# Patient Record
Sex: MALE | Race: WHITE | Employment: FULL TIME | URBAN - METROPOLITAN AREA
[De-identification: names, ages, dates, MRNs, and addresses within clinical notes are randomized per-mention and may not be internally consistent; named-entity substitution may affect disease eponyms.]

---

## 2020-09-02 ENCOUNTER — HOSPITAL ENCOUNTER (OUTPATIENT)
Age: 60
Setting detail: SPECIMEN
Discharge: HOME | End: 2020-09-02
Payer: COMMERCIAL

## 2020-09-02 PROCEDURE — 81378 HLA I & II TYPING HR: CPT

## 2020-09-25 ENCOUNTER — HOSPITAL ENCOUNTER (OUTPATIENT)
Age: 60
Setting detail: SPECIMEN
Discharge: HOME | End: 2020-09-25
Payer: COMMERCIAL

## 2020-09-25 PROCEDURE — 38204 BL DONOR SEARCH MANAGEMENT: CPT

## 2020-09-29 ENCOUNTER — HOSPITAL ENCOUNTER (OUTPATIENT)
Dept: CT IMAGING | Age: 60
Discharge: HOME | End: 2020-09-29
Payer: COMMERCIAL

## 2020-09-29 ENCOUNTER — HOSPITAL ENCOUNTER (OUTPATIENT)
Dept: GENERAL RADIOLOGY | Age: 60
Discharge: HOME | End: 2020-09-29
Payer: COMMERCIAL

## 2020-09-29 ENCOUNTER — HOSPITAL ENCOUNTER (OUTPATIENT)
Dept: ONCOLOGY | Age: 60
Setting detail: INFUSION SERIES
Discharge: HOME | End: 2020-09-29
Payer: COMMERCIAL

## 2020-09-29 ENCOUNTER — APPOINTMENT (OUTPATIENT)
Dept: PULMONOLOGY | Age: 60
End: 2020-09-29
Payer: COMMERCIAL

## 2020-09-29 ENCOUNTER — HOSPITAL ENCOUNTER (OUTPATIENT)
Dept: NON INVASIVE DIAGNOSTICS | Age: 60
Discharge: HOME | End: 2020-09-29
Payer: COMMERCIAL

## 2020-09-29 VITALS
WEIGHT: 315 LBS | TEMPERATURE: 97.9 F | SYSTOLIC BLOOD PRESSURE: 135 MMHG | RESPIRATION RATE: 16 BRPM | HEIGHT: 75 IN | HEART RATE: 73 BPM | BODY MASS INDEX: 39.17 KG/M2 | DIASTOLIC BLOOD PRESSURE: 83 MMHG

## 2020-09-29 LAB
A/G RATIO: 2 (ref 1.1–2.2)
ABO/RH: NORMAL
ALBUMIN SERPL-MCNC: 4.1 G/DL (ref 3.4–5)
ALP BLD-CCNC: 83 U/L (ref 40–129)
ALT SERPL-CCNC: 61 U/L (ref 10–40)
ANION GAP SERPL CALCULATED.3IONS-SCNC: 8 MMOL/L (ref 3–16)
ANISOCYTOSIS: ABNORMAL
ANTIBODY SCREEN: NORMAL
AST SERPL-CCNC: 23 U/L (ref 15–37)
BASOPHILS ABSOLUTE: 0.1 K/UL (ref 0–0.2)
BASOPHILS RELATIVE PERCENT: 0.5 %
BILIRUB SERPL-MCNC: 0.7 MG/DL (ref 0–1)
BILIRUBIN DIRECT: <0.2 MG/DL (ref 0–0.3)
BILIRUBIN, INDIRECT: NORMAL MG/DL (ref 0–1)
BUN BLDV-MCNC: 25 MG/DL (ref 7–20)
CALCIUM SERPL-MCNC: 9 MG/DL (ref 8.3–10.6)
CHLORIDE BLD-SCNC: 104 MMOL/L (ref 99–110)
CO2: 26 MMOL/L (ref 21–32)
CREAT SERPL-MCNC: 0.9 MG/DL (ref 0.8–1.3)
EKG ATRIAL RATE: 73 BPM
EKG DIAGNOSIS: NORMAL
EKG P AXIS: 62 DEGREES
EKG P-R INTERVAL: 150 MS
EKG Q-T INTERVAL: 416 MS
EKG QRS DURATION: 92 MS
EKG QTC CALCULATION (BAZETT): 458 MS
EKG R AXIS: 51 DEGREES
EKG T AXIS: 68 DEGREES
EKG VENTRICULAR RATE: 73 BPM
EOSINOPHILS ABSOLUTE: 0 K/UL (ref 0–0.6)
EOSINOPHILS RELATIVE PERCENT: 0 %
FERRITIN: 1117 NG/ML (ref 30–400)
GFR AFRICAN AMERICAN: >60
GFR NON-AFRICAN AMERICAN: >60
GLOBULIN: 2.1 G/DL
GLUCOSE BLD-MCNC: 126 MG/DL (ref 70–99)
HAV IGM SER IA-ACNC: NORMAL
HBV SURFACE AB TITR SER: 226.7 MIU/ML
HCT VFR BLD CALC: 24.3 % (ref 40.5–52.5)
HEMOGLOBIN: 8.1 G/DL (ref 13.5–17.5)
HEPATITIS B CORE IGM ANTIBODY: NORMAL
HEPATITIS B SURFACE ANTIGEN INTERPRETATION: NORMAL
HEPATITIS C ANTIBODY INTERPRETATION: NORMAL
LACTATE DEHYDROGENASE: 195 U/L (ref 100–190)
LV EF: 63 %
LVEF MODALITY: NORMAL
LYMPHOCYTES ABSOLUTE: 0.2 K/UL (ref 1–5.1)
LYMPHOCYTES RELATIVE PERCENT: 1.9 %
MACROCYTES: ABNORMAL
MAGNESIUM: 2.2 MG/DL (ref 1.8–2.4)
MCH RBC QN AUTO: 31.8 PG (ref 26–34)
MCHC RBC AUTO-ENTMCNC: 33.3 G/DL (ref 31–36)
MCV RBC AUTO: 95.4 FL (ref 80–100)
MONOCYTES ABSOLUTE: 0 K/UL (ref 0–1.3)
MONOCYTES RELATIVE PERCENT: 0.2 %
NEUTROPHILS ABSOLUTE: 10.8 K/UL (ref 1.7–7.7)
NEUTROPHILS RELATIVE PERCENT: 97.4 %
OVALOCYTES: ABNORMAL
PDW BLD-RTO: 19.4 % (ref 12.4–15.4)
PLATELET # BLD: 68 K/UL (ref 135–450)
PLATELET SLIDE REVIEW: ABNORMAL
PMV BLD AUTO: 7 FL (ref 5–10.5)
POIKILOCYTES: ABNORMAL
POTASSIUM SERPL-SCNC: 4.3 MMOL/L (ref 3.5–5.1)
PROSTATE SPECIFIC ANTIGEN: 4.15 NG/ML (ref 0–4)
RBC # BLD: 2.54 M/UL (ref 4.2–5.9)
SODIUM BLD-SCNC: 138 MMOL/L (ref 136–145)
TEAR DROP CELLS: ABNORMAL
TOTAL PROTEIN: 6.2 G/DL (ref 6.4–8.2)
WBC # BLD: 11.1 K/UL (ref 4–11)

## 2020-09-29 PROCEDURE — 71046 X-RAY EXAM CHEST 2 VIEWS: CPT

## 2020-09-29 PROCEDURE — 80053 COMPREHEN METABOLIC PANEL: CPT

## 2020-09-29 PROCEDURE — 82728 ASSAY OF FERRITIN: CPT

## 2020-09-29 PROCEDURE — 93010 ELECTROCARDIOGRAM REPORT: CPT | Performed by: INTERNAL MEDICINE

## 2020-09-29 PROCEDURE — 86645 CMV ANTIBODY IGM: CPT

## 2020-09-29 PROCEDURE — 86663 EPSTEIN-BARR ANTIBODY: CPT

## 2020-09-29 PROCEDURE — C8929 TTE W OR WO FOL WCON,DOPPLER: HCPCS

## 2020-09-29 PROCEDURE — 0065U SYFLS TST NONTREPONEMAL ANTB: CPT

## 2020-09-29 PROCEDURE — 86664 EPSTEIN-BARR NUCLEAR ANTIGEN: CPT

## 2020-09-29 PROCEDURE — 86706 HEP B SURFACE ANTIBODY: CPT

## 2020-09-29 PROCEDURE — 86694 HERPES SIMPLEX NES ANTBDY: CPT

## 2020-09-29 PROCEDURE — 83735 ASSAY OF MAGNESIUM: CPT

## 2020-09-29 PROCEDURE — 36591 DRAW BLOOD OFF VENOUS DEVICE: CPT

## 2020-09-29 PROCEDURE — 86787 VARICELLA-ZOSTER ANTIBODY: CPT

## 2020-09-29 PROCEDURE — 70486 CT MAXILLOFACIAL W/O DYE: CPT

## 2020-09-29 PROCEDURE — 86789 WEST NILE VIRUS ANTIBODY: CPT

## 2020-09-29 PROCEDURE — 83615 LACTATE (LD) (LDH) ENZYME: CPT

## 2020-09-29 PROCEDURE — 86901 BLOOD TYPING SEROLOGIC RH(D): CPT

## 2020-09-29 PROCEDURE — 86778 TOXOPLASMA ANTIBODY IGM: CPT

## 2020-09-29 PROCEDURE — 85025 COMPLETE CBC W/AUTO DIFF WBC: CPT

## 2020-09-29 PROCEDURE — 86788 WEST NILE VIRUS AB IGM: CPT

## 2020-09-29 PROCEDURE — 86707 HEPATITIS BE ANTIBODY: CPT

## 2020-09-29 PROCEDURE — 84153 ASSAY OF PSA TOTAL: CPT

## 2020-09-29 PROCEDURE — 93005 ELECTROCARDIOGRAM TRACING: CPT | Performed by: INTERNAL MEDICINE

## 2020-09-29 PROCEDURE — 87536 HIV-1 QUANT&REVRSE TRNSCRPJ: CPT

## 2020-09-29 PROCEDURE — 82248 BILIRUBIN DIRECT: CPT

## 2020-09-29 PROCEDURE — 87340 HEPATITIS B SURFACE AG IA: CPT

## 2020-09-29 PROCEDURE — 87522 HEPATITIS C REVRS TRNSCRPJ: CPT

## 2020-09-29 PROCEDURE — 86780 TREPONEMA PALLIDUM: CPT

## 2020-09-29 PROCEDURE — 86850 RBC ANTIBODY SCREEN: CPT

## 2020-09-29 PROCEDURE — 86803 HEPATITIS C AB TEST: CPT

## 2020-09-29 PROCEDURE — 86709 HEPATITIS A IGM ANTIBODY: CPT

## 2020-09-29 PROCEDURE — 6360000004 HC RX CONTRAST MEDICATION: Performed by: INTERNAL MEDICINE

## 2020-09-29 PROCEDURE — 86705 HEP B CORE ANTIBODY IGM: CPT

## 2020-09-29 PROCEDURE — 86701 HIV-1ANTIBODY: CPT

## 2020-09-29 PROCEDURE — 81371 HLA I & II TYPE VERIFY LR: CPT

## 2020-09-29 PROCEDURE — 86900 BLOOD TYPING SEROLOGIC ABO: CPT

## 2020-09-29 PROCEDURE — 36592 COLLECT BLOOD FROM PICC: CPT

## 2020-09-29 PROCEDURE — 86665 EPSTEIN-BARR CAPSID VCA: CPT

## 2020-09-29 PROCEDURE — 86777 TOXOPLASMA ANTIBODY: CPT

## 2020-09-29 PROCEDURE — 86702 HIV-2 ANTIBODY: CPT

## 2020-09-29 RX ADMIN — PERFLUTREN 1.65 MG: 6.52 INJECTION, SUSPENSION INTRAVENOUS at 10:39

## 2020-09-29 NOTE — PROGRESS NOTES
Pt to [facility]PO for labs and teaching w/Amanda ARRIAGA RN. Labs drawn from central line and patient tolerated well. Dc'd ambulatory to home per self.

## 2020-09-29 NOTE — ONCOLOGY
Met with patient for pre transplant evaluation. Plan for MUD non-myeloablative PBSCT. Patient aware we are still waiting on the donor to agree to collection date. Brief overview of [facility] admission routine, preparative regimen side effects and day of transplant. Discussed potential side effects of infection, pancytopenia, nausea, diarrhea and GVHD. Aware of approx one month hospital stay and need for 24H caregiver for approx  days. Plan for cousin to stay with patient and is a traveling RN. Discussed discharge criteria, expected follow up visits and expectations of caregiver. Will continue to provide education on transplant recovery and medications. Patient has Allogeneic Transplant Handbook and used for reference. Labs and status reviewed with Dr. Brenda Art.  Patient to return to [facility] on 10/6 for candidacy

## 2020-09-30 ENCOUNTER — OFFICE VISIT (OUTPATIENT)
Dept: PRIMARY CARE CLINIC | Age: 60
End: 2020-09-30
Payer: COMMERCIAL

## 2020-09-30 LAB
CYTOMEGALOVIRUS IGM ANTIBODY: <8 AU/ML
EPSTEIN BARR VIRUS NUCLEAR AB IGG: 423 U/ML (ref 0–21.9)
EPSTEIN-BARR EARLY ANTIGEN ANTIBODY: 32.6 U/ML (ref 0–10.9)
EPSTEIN-BARR VCA IGG: 102 U/ML (ref 0–21.9)
EPSTEIN-BARR VCA IGM: <10 U/ML (ref 0–43.9)
HERPES TYPE 1/2 IGM COMBINED: 0.6 IV
HIV AG/AB: NORMAL
HIV ANTIGEN: NORMAL
HIV-1 ANTIBODY: NORMAL
HIV-2 AB: NORMAL
TOTAL SYPHILLIS IGG/IGM: NORMAL
TOXOPLASMA GONDI AB IGM: <3 AU/ML
TOXOPLASMA GONDII AB IGG: 28.8 IU/ML
VARICELLA-ZOSTER VIRUS AB, IGG: NORMAL

## 2020-09-30 PROCEDURE — 99211 OFF/OP EST MAY X REQ PHY/QHP: CPT | Performed by: NURSE PRACTITIONER

## 2020-09-30 NOTE — BH NOTE
Pre-Transplant Psychological Evaluation, Kelly Gates, 2020  Screening Measures:  PHQ-9, TERS  Reason for Referral: Mr. 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 He was diagnosed in late May 2020 when he presented with shortness of breath and was found to have pneumonia. He had low blood counts and a bone marrow biopsy on  revealed 24% blasts. He had induction chemotherapy with GIGI-C Daunorubicin, and he has had 2 cycles of consolidation. He will have a matched, unrelated donor. Mental Status/Appearance/Coping Style: Mr. Arely Jung is a tall, obese white man of 60 years. He is personable and engaging. He was most forthcoming about his work history and interests but didnt elaborate much on his personal life or relationships. 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 Social/Family History/Support System: Mr. Arely [place] [address] [place] [address], the middle of 3 sons born to his parents. He has a brother Abraham Bergeron who is 59 and lives in [address] [place] [address] in [facility], and a younger brother Gordon Contreras who is 61, retired from the [place] [address], and living on a farm in [place]. Their mother was an artist who specialized in oil restoration of paintings. She was also a champion , and he says she was always either in tennis clothes or paint-spattered. She  of carcinoid cancer at 68. His father was an  and he  in 1 of dementia at age 80. During high school, Mr. 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 He graduated from [employer] with a degree in Marketing and then came back to [address] [place] [address] to work.   He got an PRADEEP from [employer] after he had been working for a year or so. He enjoyed being involved in local politics and worked on [employer]. He  at 46 but that marriage lasted less than a year and he has no children. Mr. Jose Pope lives in [address] and before COVID he enjoyed throwing huge parties in his home several times per year. 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 He has a cousin in [place] who is a traveling nurse, Natalee Gomez, who has agreed to come to [place] to be his caregiver after transplant. Occupational History/Financial Concerns: Mr. Jose Pope worked for [employer] [address] until after he got his PRADEEP. 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 He says he learned to write policy in that job. In 2001 he became a  representing [employer] interests. He left his  job in 2009 to work at [employer]. He says his cousin talked him into going into real estate sales because he Gene Archana a lot of people. 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 Quality of Life Issues (Body Image, hair loss, sexual functioning):  Mr. Jose Pope denies any issues related to his diagnosis. His only complaint was that he cant have his annual [employer] party and didnt get to have his [employer] party this year. History/Current or Projected Mental Health Issues/substance use:  Mr. Susanna Munoz smoke and doesnt use marijuana. He says he drinks normal and has a couple of glasses of wine per day and denies that he has ever had higher consumption than that.   However, an ED note from 2014 when he presented with syncope and a fall leading to unconsciousness records that he was a heavy drinker and had 6-8 shots per day, which was described as normal for him 4-5 times per week. At the time of that ED visit he was counseled to decrease his drinking. That disclosure was fished for but not provided during this interview. Mr. Yaya Trinh denies any history of mental illness. Assessment and Plan:  Mr. Yaya Trinh has obtained a commitment from a second cousin who has agreed to come to town to be his caregiver. He says he knows everybody but he doesnt have anyone close enough to participate in his care. He was misleading about his history of alcohol consumption and it isnt clear if that is an old habit or if it persists. There is nothing in his medical chart in Care Everywhere to indicate that a physician has ever been told about his alcohol consumption. He was not confronted about the discrepancy between his self-report and the charted ED visit, but it might be important to do so at some point. At the very least he needs to know that he has to do his best to accurately report on his habits and compliance. Other than his omission of information about his alcohol history, Mr. Yaya Trinh seems to have found a caregiver and is otherwise presenting no psychosocial barriers to transplant. Mr. Yaya Trinh will receive routine distress screening and intervention as needed.     Estella Osorio Psy.D, ABPP

## 2020-10-01 LAB
HEPATITIS BE ANTIBODY: NEGATIVE
VARICELLA ZOSTER AB IGM: 0.14 ISR

## 2020-10-02 LAB
HCV QNT BY NAAT IU/ML: NOT DETECTED IU/ML
HCV QNT BY NAAT LOG IU/ML: NOT DETECTED LOG IU/ML
HERPES TYPE I/II IGG COMBINED: >22.4 IV
HIV-1 QNT LOG, IU/ML: NOT DETECTED LOG CPY/ML
HIV-1 QNT, IU/ML: NOT DETECTED CPY/ML
INTERPRETATION: NOT DETECTED
INTERPRETATION: NOT DETECTED
SARS-COV-2, NAA: NOT DETECTED
WEST NILE VIRUS, IGG: 0.48 IV
WEST NILE VIRUS, IGM: 0.41 IV

## 2020-10-06 ENCOUNTER — HOSPITAL ENCOUNTER (OUTPATIENT)
Dept: ONCOLOGY | Age: 60
Setting detail: INFUSION SERIES
Discharge: HOME | End: 2020-10-06
Payer: COMMERCIAL

## 2020-10-06 ENCOUNTER — HOSPITAL ENCOUNTER (OUTPATIENT)
Dept: PULMONOLOGY | Age: 60
Discharge: HOME | End: 2020-10-06
Payer: COMMERCIAL

## 2020-10-06 VITALS
RESPIRATION RATE: 18 BRPM | TEMPERATURE: 98.8 F | HEART RATE: 86 BPM | SYSTOLIC BLOOD PRESSURE: 136 MMHG | DIASTOLIC BLOOD PRESSURE: 82 MMHG

## 2020-10-06 DIAGNOSIS — C92.00 ACUTE MYELOID LEUKEMIA NOT HAVING ACHIEVED REMISSION (HCC): Primary | ICD-10-CM

## 2020-10-06 LAB
BLOOD BANK DISPENSE STATUS: NORMAL
BLOOD BANK PRODUCT CODE: NORMAL
BPU ID: NORMAL
DESCRIPTION BLOOD BANK: NORMAL

## 2020-10-06 PROCEDURE — 94760 N-INVAS EAR/PLS OXIMETRY 1: CPT

## 2020-10-06 PROCEDURE — 94726 PLETHYSMOGRAPHY LUNG VOLUMES: CPT

## 2020-10-06 PROCEDURE — 6360000002 HC RX W HCPCS: Performed by: NURSE PRACTITIONER

## 2020-10-06 PROCEDURE — 6360000002 HC RX W HCPCS: Performed by: INTERNAL MEDICINE

## 2020-10-06 PROCEDURE — 94060 EVALUATION OF WHEEZING: CPT

## 2020-10-06 PROCEDURE — 94729 DIFFUSING CAPACITY: CPT

## 2020-10-06 PROCEDURE — 94664 DEMO&/EVAL PT USE INHALER: CPT

## 2020-10-06 PROCEDURE — P9037 PLATE PHERES LEUKOREDU IRRAD: HCPCS

## 2020-10-06 PROCEDURE — 36430 TRANSFUSION BLD/BLD COMPNT: CPT

## 2020-10-06 RX ORDER — SODIUM CHLORIDE 0.9 % (FLUSH) 0.9 %
20 SYRINGE (ML) INJECTION PRN
Status: CANCELLED | OUTPATIENT
Start: 2020-10-06

## 2020-10-06 RX ORDER — 0.9 % SODIUM CHLORIDE 0.9 %
10 VIAL (ML) INJECTION ONCE
Status: CANCELLED | OUTPATIENT
Start: 2020-10-06

## 2020-10-06 RX ORDER — SODIUM CHLORIDE 9 MG/ML
INJECTION, SOLUTION INTRAVENOUS CONTINUOUS
Status: CANCELLED | OUTPATIENT
Start: 2020-10-06

## 2020-10-06 RX ORDER — SODIUM CHLORIDE 0.9 % (FLUSH) 0.9 %
10 SYRINGE (ML) INJECTION PRN
Status: CANCELLED | OUTPATIENT
Start: 2020-10-06

## 2020-10-06 RX ORDER — DIPHENHYDRAMINE HYDROCHLORIDE 50 MG/ML
50 INJECTION INTRAMUSCULAR; INTRAVENOUS ONCE
Status: CANCELLED | OUTPATIENT
Start: 2020-10-06

## 2020-10-06 RX ORDER — POTASSIUM CHLORIDE 20 MEQ/1
20 TABLET, EXTENDED RELEASE ORAL 2 TIMES DAILY
Status: ON HOLD | COMMUNITY
End: 2020-11-30 | Stop reason: HOSPADM

## 2020-10-06 RX ORDER — ALBUTEROL SULFATE 2.5 MG/3ML
2.5 SOLUTION RESPIRATORY (INHALATION) ONCE
Status: COMPLETED | OUTPATIENT
Start: 2020-10-06 | End: 2020-10-06

## 2020-10-06 RX ORDER — SOTALOL HYDROCHLORIDE 120 MG/1
120 TABLET ORAL DAILY
COMMUNITY

## 2020-10-06 RX ORDER — PROMETHAZINE HYDROCHLORIDE 12.5 MG/1
12.5 TABLET ORAL ONCE
COMMUNITY

## 2020-10-06 RX ORDER — VALACYCLOVIR HYDROCHLORIDE 500 MG/1
500 TABLET, FILM COATED ORAL 2 TIMES DAILY
COMMUNITY

## 2020-10-06 RX ORDER — METHYLPREDNISOLONE SODIUM SUCCINATE 125 MG/2ML
125 INJECTION, POWDER, LYOPHILIZED, FOR SOLUTION INTRAMUSCULAR; INTRAVENOUS ONCE
Status: CANCELLED | OUTPATIENT
Start: 2020-10-06

## 2020-10-06 RX ORDER — HEPARIN SODIUM (PORCINE) LOCK FLUSH IV SOLN 100 UNIT/ML 100 UNIT/ML
500 SOLUTION INTRAVENOUS PRN
Status: CANCELLED | OUTPATIENT
Start: 2020-10-06

## 2020-10-06 RX ORDER — HEPARIN SODIUM (PORCINE) LOCK FLUSH IV SOLN 100 UNIT/ML 100 UNIT/ML
500 SOLUTION INTRAVENOUS PRN
Status: DISCONTINUED | OUTPATIENT
Start: 2020-10-06 | End: 2020-10-07 | Stop reason: HOSPADM

## 2020-10-06 RX ORDER — AMLODIPINE BESYLATE AND BENAZEPRIL HYDROCHLORIDE 5; 40 MG/1; MG/1
1 CAPSULE ORAL DAILY
Status: ON HOLD | COMMUNITY
End: 2020-11-30 | Stop reason: HOSPADM

## 2020-10-06 RX ADMIN — ALBUTEROL SULFATE 2.5 MG: 2.5 SOLUTION RESPIRATORY (INHALATION) at 13:16

## 2020-10-06 RX ADMIN — Medication 500 UNITS: at 17:11

## 2020-10-06 NOTE — PLAN OF CARE
Problem: Falls - Risk of:  Goal: Will remain free from falls  Description: Will remain free from falls  Outcome: Ongoing  Goal: Absence of physical injury  Description: Absence of physical injury  Outcome: Ongoing   Pt is a  fall risk. Explained fall risk precautions to pt  and rationale behind their use to keep the patient safe. Belongings are in reach. Pt encouraged to notify staff for any and all assistance. Staff present in tx suite throughout entirety of pts treatment to monitor and protect from falls. Assistance provided when ambulating to restroom utilizing Stay With Me. Problem: KNOWLEDGE DEFICIT  Goal: Patient/S.O. demonstrates understanding of disease process, treatment plan, medications, and discharge instructions. Outcome: Ongoing       Patient's platelet count this AM: 8    Pt seen and assessed at [facility]. Seen at [facility] today for 1 unit platelets per standing orders for above lab values. Blood products transfused per [facility] policy. Pt tolerated transfusion well and without incident. Pt verbalizes understanding of discharge instructions. Discharged ambulatory to home per self.

## 2020-10-08 ENCOUNTER — OFFICE VISIT (OUTPATIENT)
Dept: PULMONOLOGY | Age: 60
End: 2020-10-08
Payer: COMMERCIAL

## 2020-10-08 VITALS
WEIGHT: 315 LBS | HEIGHT: 75 IN | OXYGEN SATURATION: 97 % | BODY MASS INDEX: 39.17 KG/M2 | HEART RATE: 82 BPM | TEMPERATURE: 97.1 F

## 2020-10-08 PROCEDURE — 99204 OFFICE O/P NEW MOD 45 MIN: CPT | Performed by: INTERNAL MEDICINE

## 2020-10-08 NOTE — PROGRESS NOTES
atypical infectious/inflammatory etiology versus pulmonary edema. ABDOMEN AND PELVIS:    FINDINGS:    LIVER: Mild diffuse fatty infiltration of liver. GALLBLADDER AND BILIARY TREE: Gallbladder not distended.  No biliary dilatation. PANCREAS: Normal.    SPLEEN: Normal.    ADRENAL GLANDS: Normal.    KIDNEYS AND URETERS: Normal.    URINARY BLADDER: Normal.    BOWEL: There is acute diverticulitis of the sigmoid colon with extensive edema in the sigmoid mesenteric fat. No perforation or abscess. Large and small bowel loops are otherwise normal in caliber and wall thickness.  Appendix is normal.    REPRODUCTIVE ORGANS: No associated masses. LYMPH NODES: No abnormally enlarged nodes. PERITONEUM/RETROPERITONEUM: No ascites or free air. VESSELS: Aorta is normal in caliber. Major intra-abdominal vessels are patent. ABDOMINAL WALL: Normal.    BONES: No significant abnormality. CXR on 9/29/20  No acute process or consolidation.         Right internal jugular dual-lumen catheter appreciated with the tip at the atrial caval junction     PFT on 10/6/20  FEV1/FVC 71 with predicted ratio of 75  FEV1 54%, FVC 57%  TLC 83%  %  DLCO 41%  DLCO corrected is 84%    Assessment:      Diagnosis Orders   1. 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 PFT reviewed. It technically show moderate obstruction by FEV1/FVC ratio lower than predicted and also based on ATS criteria with low FEV1 and FVC plus normal TLC and high RV. However his FEV1 ratio of 71, normal corrected DLCO, never smoker, no emphysema on CT scan and no hx of asthma, in addition no wheezing on exam with good bilateral air entry, I believe his PFT findings purely due to morbid obesity.     He had f/u CT scan on 6/25 show near resolution of pneumonia, and recent CXR is within normal.    He has CASH, he is compliant with CPAP.  He was advised to wipe mask daily, clean mask and tubing weekly, change mask and tubing every 3-6 months and change water in water tank daily.       He is cleared from pulmonary perspective to proceed with SCT

## 2020-10-10 NOTE — PROCEDURES
[address], [address]                               PULMONARY FUNCTION    PATIENT NAME: Tamie Ledezma                 :        1960  MED REC NO:   [id number]                          ROOM:  ACCOUNT NO:   [de-identified]                           ADMIT DATE: 10/06/2020  PROVIDER:     Gilma Diane MD    DATE OF PROCEDURE:  10/06/2020    INDICATION:  BMT evaluation, AML remission, short of breath. BMI:  29.2. TOBACCO HISTORY:  Never smoked. Spirometry data is acceptable and reproducible. Lung volumes performed via plethysmography. Diffusion capacity adjusted via [address] for hemoglobin of 7. Room air oxygen saturation is 93%. SPIROMETRY:  FEV1 to FVC ratio is 71%. FEV1 is 2.36, which is 54% of  predicted. FVC is 3.31, which is 57% of predicted. Lung volumes show total lung capacity of 6.89, which is 83% of  predicted. Residual volume is 2.72, which is 141% of predicted. Diffusion capacity is 34.42, which is 84% of predicted. IMPRESSION:  1. No obstructive defect. 2.  There is no significant response to bronchodilators. 3.  Air trapping is present. 4.  Normal diffusion capacity when adjusted for hemoglobin of 7.         Fely Pinto MD    D: 10/09/2020 15:59:58       T: 10/09/2020 18:33:34     [email or web address]  Job#: [id number]     Doc#: [id number]    CC:

## 2020-10-13 ENCOUNTER — HOSPITAL ENCOUNTER (OUTPATIENT)
Age: 60
Setting detail: SPECIMEN
Discharge: HOME | End: 2020-10-13
Payer: COMMERCIAL

## 2020-10-13 PROCEDURE — 38204 BL DONOR SEARCH MANAGEMENT: CPT

## 2020-10-21 ENCOUNTER — HOSPITAL ENCOUNTER (OUTPATIENT)
Dept: ONCOLOGY | Age: 60
Setting detail: INFUSION SERIES
Discharge: HOME | End: 2020-10-21
Payer: COMMERCIAL

## 2020-10-21 VITALS
OXYGEN SATURATION: 98 % | DIASTOLIC BLOOD PRESSURE: 89 MMHG | HEART RATE: 74 BPM | TEMPERATURE: 97.6 F | SYSTOLIC BLOOD PRESSURE: 137 MMHG | RESPIRATION RATE: 16 BRPM

## 2020-10-21 LAB
AMPHETAMINE SCREEN, URINE: NORMAL
ANISOCYTOSIS: ABNORMAL
ATYPICAL LYMPHOCYTE RELATIVE PERCENT: 2 % (ref 0–6)
BANDED NEUTROPHILS RELATIVE PERCENT: 2 % (ref 0–7)
BARBITURATE SCREEN URINE: NORMAL
BASOPHILS ABSOLUTE: 0.1 K/UL (ref 0–0.2)
BASOPHILS RELATIVE PERCENT: 1 %
BENZODIAZEPINE SCREEN, URINE: NORMAL
CANNABINOID SCREEN URINE: NORMAL
COCAINE METABOLITE SCREEN URINE: NORMAL
EOSINOPHILS ABSOLUTE: 0 K/UL (ref 0–0.6)
EOSINOPHILS RELATIVE PERCENT: 0 %
HCT VFR BLD CALC: 26.6 % (ref 40.5–52.5)
HEMOGLOBIN: 8.8 G/DL (ref 13.5–17.5)
LYMPHOCYTES ABSOLUTE: 1.1 K/UL (ref 1–5.1)
LYMPHOCYTES RELATIVE PERCENT: 12 %
Lab: NORMAL
MCH RBC QN AUTO: 31.9 PG (ref 26–34)
MCHC RBC AUTO-ENTMCNC: 33 G/DL (ref 31–36)
MCV RBC AUTO: 96.7 FL (ref 80–100)
METAMYELOCYTES RELATIVE PERCENT: 1 %
METHADONE SCREEN, URINE: NORMAL
MONOCYTES ABSOLUTE: 2.2 K/UL (ref 0–1.3)
MONOCYTES RELATIVE PERCENT: 27 %
NEUTROPHILS ABSOLUTE: 4.6 K/UL (ref 1.7–7.7)
NEUTROPHILS RELATIVE PERCENT: 55 %
NUCLEATED RED BLOOD CELLS: 3 /100 WBC
NUCLEATED RED BLOOD CELLS: 3 /100 WBC
OPIATE SCREEN URINE: NORMAL
OXYCODONE URINE: NORMAL
PDW BLD-RTO: 20.5 % (ref 12.4–15.4)
PH UA: 5
PHENCYCLIDINE SCREEN URINE: NORMAL
PLATELET # BLD: 163 K/UL (ref 135–450)
PMV BLD AUTO: 7.6 FL (ref 5–10.5)
POLYCHROMASIA: ABNORMAL
PROPOXYPHENE SCREEN: NORMAL
RBC # BLD: 2.75 M/UL (ref 4.2–5.9)
TEAR DROP CELLS: ABNORMAL
WBC # BLD: 8 K/UL (ref 4–11)

## 2020-10-21 PROCEDURE — 36592 COLLECT BLOOD FROM PICC: CPT

## 2020-10-21 PROCEDURE — 38204 BL DONOR SEARCH MANAGEMENT: CPT

## 2020-10-21 PROCEDURE — 85025 COMPLETE CBC W/AUTO DIFF WBC: CPT

## 2020-10-21 PROCEDURE — 80307 DRUG TEST PRSMV CHEM ANLYZR: CPT

## 2020-10-21 NOTE — ONCOLOGY
Here for repeat urine drug screen and CBC as part of allogeneic bmt evaluation.  Patient is here for teaching, family conference and candidacy at [facility]

## 2020-10-21 NOTE — PROGRESS NOTES
Patient arrived for urine drug screen and CBC for BMT evaluation and teaching. Labs drawn and sent. Patient denied additional questions or concerns. Discharged ambulatory to next appointment.

## 2020-10-30 ENCOUNTER — OFFICE VISIT (OUTPATIENT)
Dept: PRIMARY CARE CLINIC | Age: 60
End: 2020-10-30
Payer: COMMERCIAL

## 2020-10-30 PROCEDURE — 99211 OFF/OP EST MAY X REQ PHY/QHP: CPT | Performed by: NURSE PRACTITIONER

## 2020-11-02 LAB — SARS-COV-2: NOT DETECTED

## 2020-11-02 NOTE — H&P
[facility]  History and Physical          Attending Physician: Tanya Amanda MD    Primary Care: Kike Grijalva       Referring MD: Dar Lopez MD  [id number]. [address] [facility], [address] [place] [address]    Name: Kenna Juan :  1960  MRN:  [id number]    Admission: 20    Date:  20    Reason for Admission: Melphalan and Fludarabine preparative regimen followed by Matched Unrelated Allogeneic Transplant for AML in remission     History of Present Illness: This is a 55 yo male w/ h/o AML, intermediate risk (Dx 2020). His PMH is also significant for atrial fibrillation, HTN & CASH. He was diagnosed w/ AML after initially presenting w/ dyspnea and found to have pneumonia. CBC revealed a WBC 3.4, Hgb 6.9  platelets 36,576. He underwent BM bx/asp (20) that showed AML w/ 24% blasts. The FISH panel was negative & cytogenetics were normal.  His CEPPA, c-kit, FLT3, NPM1, IDH 1 & IDH 2 were not detected. He received induction chemotherapy w/ 7 + 3 (Sweetie-C / daunorubicin) on 20. His day + 14 BM bx/asp (20) was negative for residual leukemia. He then had a recovery BM bx/asp (20) that was negative for AML w/ reticulin fibrosis grade 1 of 3 diffusely. He achieved CR1 and then began consolidation chemotherapy w/ HiDAC (20). He completed 3 cycles while undergoing work up for allogeneic transplant. 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 He is currently in remission from AML. He is now being admitted for melphalan and fludarabine preparative regimen. This will be followed by a matched (10:10 w/ DPB1 non-permissive mismatch) unrelated allogeneic transplant from a female [facility] international donor. He will receive peripheral blood progenitor cells on 20. His graft versus host disease prophylaxis will include Prograf and IV Methotrexate on days + 1, 3, 6 & 11.   He feels well today and denies fever, chills, sweats, change in appetite, visual changes, headache, sinus congestion, sore throat, mouth pain, cough, increased shortness of breath, chest pain, abdominal pain, nausea, vomiting, diarrhea, constipation, dysuria, hematuria, lower extremity pain or swelling. He has no complaints today. Pre Transplant Workup:        Pre Transplant labs:     9/29/2020    EBV VCA IgG 102.0 (H)   EBV VCA IgM <10.0   HIV-1 Antibody Non-Reactive   HIV Ag/Ab Non-Reactive   HIV ANTIGEN Non-Reactive   HIV-2 Ab Non-Reactive   HIV-1 QNT Log, IU/ML Not Detected   HIV-1 QNT, IU/ML Not Detected   CMV IgM <8.0   EBV Early Ag Ab 32.6 (H)   Toxoplasmosis IgG 28.8   Toxoplasmosis IgM <3.0   VARICELLA ZOSTER AB IGM 0.14   Herpes Type 1/2 IgM Combined 0.60   Zi Barr virus nuclear Ab IgG 423.0 (H)   WEST NILE VIRUS AB IGM 0.41   WEST NILE VIRUS AB, IGG 0.48      9/29/2020    Herpes Type 1/2 IgM Combined 0.60   Hep A IgM Non-reactive   Hep B E Ab Negative   Hep B S Ab 226.70   Hep B S Ag Interp Non-reactive   Hep C Ab Interp Non-reactive   Hep B Core Ab, IgM Non-reactive   HCV QNT by NAAT IU/ML Not Detected   HCV Qnt by NAAT log IU/ml Not Detected      9/29/2020    Herpes Type I/II IgG Combined >22.40   Interpretation Not Detected   Total Syphillis IgG/IgM Non-Reactive   Varicella-Zoster Virus Ab, Igg Immune         Past Surgical History:   Procedure Laterality Date    KNEE ARTHROSCOPY         Past Medical History:   Diagnosis Date    Hypertension        Prior to Admission medications    Medication Sig Start Date End Date Taking?  Authorizing Provider   amLODIPine-benazepril (LOTREL) 5-40 MG per capsule Take 1 capsule by mouth daily    Historical Provider, MD   sotalol (BETAPACE) 120 MG tablet Take 120 mg by mouth 2 times daily    Historical Provider, MD   promethazine (PHENERGAN) 12.5 MG tablet Take 12.5 mg by mouth once    Historical Provider, MD   valACYclovir (VALTREX) 500 MG tablet Take 500 mg by mouth 2 times 09/29/20 (!) 325 lb 13.4 oz (147.8 kg)       KPS: {KPS:29423::\"100% Normal no complaints; no evidence of disease\",\"90% Able to carry on normal activity; minor signs or symptoms of disease\",\"80% Normal activity with effort; some signs or symptoms of disease\",\"70% Cares for self; unable to carry on normal activity or to do active work\",\"60% Requires occasional assistance, but is able to care for most of his personal needs\",\"50%  Requires considerable assistance and frequent medical care\",\"40% Disabled; requires special care and assistance\",\"30% Severely disabled; hospital admission is indicated although death not imminent\",\"20% Very sick; hospital admission necessary; active supportive treatment necessary\",\"10% Moribund; fatal processes progressing rapidly\",\"0% Dead\"}    ECOG PS:  {findings; ecog performance status:21682}    General: Awake, alert and oriented. HEENT: normocephalic, alopecia, PERRL, no scleral erythema or icterus, Oral mucosa moist and intact, throat clear  NECK: supple without palpable adenopathy  BACK: Straight, negative CVAT  SKIN: warm dry and intact without lesions rashes or masses  CHEST: CTA bilaterally without use of accessory muscles  CV: Normal S1 S2, RRR, no MRG  ABD: NT, ND, normoactive BS, no palpable masses or hepatosplenomegaly  EXTREMITIES: without edema, denies calf tenderness  NEURO: CN II - XII grossly intact  CATHETER: Right TLH ([facility], 6/11/20) - CDI    Laboratory Data:   CBC: No results for input(s): WBC, HGB, HCT, MCV, PLT in the last 72 hours. BMP/Mag:No results for input(s): NA, K, CL, CO2, PHOS, BUN, CREATININE, MG in the last 72 hours. Invalid input(s): CA  LIVP: No results for input(s): AST, ALT, LIPASE, BILIDIR, BILITOT, ALKPHOS in the last 72 hours. Invalid input(s): AMYLASE,  ALB  Coags: No results for input(s): PROTIME, INR, APTT in the last 72 hours. Uric Acid No results for input(s): LABURIC in the last 72 hours.   No results found for: CMVDNAQNT  No results found for: TACROLEV     PROBLEM LIST:         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 CMML - 1 (Dx 10/2020)  3. Atrial Fibrillation  4. CASH  5. HTN      TREATMENT:        1. Induction: Sweetie-C & Daunorubicin (6/12/20)  2. Consolidation:  HiDAC x 3 cycles (7/28/20, 8/29/20 & 9/22/20)  3. MUD Allo - PB BMT (DPB1 non-permissive mismatch)  Preparative Regimen:  Melphalan and Fludarabine  Date of BMT:  11/11/20  Source of stem cells:  PBPC  Donor/Recipient Blood Type:  A positive / 0 positive   Donor Sex:  Female / NMDP (DID # [id number]) - Follow FISH XY  CMV Donor / Recipient: Negative / Negative          ASSESSMENT AND PLAN:        1. AML - intermediate risk / CMML-1:  - BM bx/asp (10/14/20) - No AML, but shows CMML-1     Dr. Minor Levin has discussed the risks associated with transplant which include the risk of infection, bleeding and inability to obtain a long term remission. He understands these risks and is willing to proceed. The consent is signed and on the chart. Preparative Regimen:  Melphalan and Fludarabine  Date of BMT:  11/11/20  Source of stem cells:  PBPC  Donor/Recipient Blood Type:  A positive / 0 positive   Donor Sex:  Female / NMDP (DID # [id number]) - Follow FISH XY  CMV Donor / Recipient: Negative / Negative        Day - 6    2. ID:  No evidence of infection.    - Start Valtrex prophylaxis    - Start Diflucan prophylaxis on 11/8/20   - Start Levaquin when [address] < 1.5    Donor/Recipient CMV:  Negative / negative   - Routine surveillance is not required       3. Heme:  Anemia from chemotherapy . - Transfuse for Hgb < 7 and Platelets < 24Y.    - No transfusion today. 4.  Metabolic:  Electrolytes are WNL and renal fxn stable. - Cont KCl 20 meq bid   - Start IVF:     - Replace potassium and magnesium per policy.     5.  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  - Start Prograf on 11/8/20    Tacro Level:    11/12/20 - First Level   No results found for: TACROLEV    6. VOD:  No evidence of VOD. Admission Weight:    Current Weight:  . No results for input(s): BILIDIR, BILITOT in the last 72 hours. - Cont Actigall. 7. Pulmonary:    - H/o CASH  - PFT (10/6/20) - FEV1/FVC 71 w/ predicted ratio of 75 & DLCO 41% (corrected 84%). He has moderate obstruction by FEV1/FVC ratio lower than predicted and also based on ATS criteria with low FEV, likely d/t morbid obesity   - Pulm clearance by Dr. Nell Godoy  CASH:  - Cont CPAP  - Encourage IS and ambulation     8.  GI / Nutrition:    Nutrition:  Appetite is good at this point.  - Start low microbial diet  - Dietary to follow    9. Cardiac:  - H/o A. Fib & HTN  - Echo (9/29/20) -  LVEF 60-65% w/ left atrial dilatation  A. Fib:  - Cont sotolol 120 mg bid   HTN:   - Cont amlodipine-benazepril 5/40 mg daily    - DVT Prophylaxis: Platelets >02,271 cells/dL, - daily lovenox prophylaxis ordered  Contraindications to pharmacologic prophylaxis: None  Contraindications to mechanical prophylaxis: None    - Disposition:  D/c home once ANC > 1.0 & toxicities resolved following allogeneic transplant     The patient was seen and examined by Dr. Danish Pikcard. This admission history and physical has been discussed and agreed upon by Dr. Danish Pickard.

## 2020-11-05 ENCOUNTER — APPOINTMENT (OUTPATIENT)
Dept: GENERAL RADIOLOGY | Age: 60
DRG: 014 | End: 2020-11-05
Attending: INTERNAL MEDICINE
Payer: COMMERCIAL

## 2020-11-05 ENCOUNTER — HOSPITAL ENCOUNTER (INPATIENT)
Age: 60
LOS: 25 days | Discharge: HOME | DRG: 014 | End: 2020-11-30
Attending: INTERNAL MEDICINE | Admitting: INTERNAL MEDICINE
Payer: COMMERCIAL

## 2020-11-05 PROBLEM — Z94.84 STEM CELLS TRANSPLANT STATUS (HCC): Status: ACTIVE | Noted: 2020-11-05

## 2020-11-05 LAB
ABO/RH: NORMAL
ALBUMIN SERPL-MCNC: 4.3 G/DL (ref 3.4–5)
ALP BLD-CCNC: 105 U/L (ref 40–129)
ALT SERPL-CCNC: 22 U/L (ref 10–40)
ANION GAP SERPL CALCULATED.3IONS-SCNC: 13 MMOL/L (ref 3–16)
ANTIBODY SCREEN: NORMAL
APTT: 34.4 SEC (ref 24.2–36.2)
AST SERPL-CCNC: 18 U/L (ref 15–37)
BACTERIA: ABNORMAL /HPF
BASOPHILS ABSOLUTE: 0.1 K/UL (ref 0–0.2)
BASOPHILS RELATIVE PERCENT: 1.8 %
BILIRUB SERPL-MCNC: 0.5 MG/DL (ref 0–1)
BILIRUBIN DIRECT: <0.2 MG/DL (ref 0–0.3)
BILIRUBIN URINE: NEGATIVE
BILIRUBIN, INDIRECT: NORMAL MG/DL (ref 0–1)
BLOOD, URINE: ABNORMAL
BUN BLDV-MCNC: 24 MG/DL (ref 7–20)
CALCIUM SERPL-MCNC: 9.3 MG/DL (ref 8.3–10.6)
CHLORIDE BLD-SCNC: 103 MMOL/L (ref 99–110)
CLARITY: CLEAR
CO2: 23 MMOL/L (ref 21–32)
COLOR: YELLOW
CREAT SERPL-MCNC: 1.3 MG/DL (ref 0.8–1.3)
EKG ATRIAL RATE: 70 BPM
EKG DIAGNOSIS: NORMAL
EKG P AXIS: 63 DEGREES
EKG P-R INTERVAL: 158 MS
EKG Q-T INTERVAL: 420 MS
EKG QRS DURATION: 88 MS
EKG QTC CALCULATION (BAZETT): 453 MS
EKG R AXIS: 46 DEGREES
EKG T AXIS: 59 DEGREES
EKG VENTRICULAR RATE: 70 BPM
EOSINOPHILS ABSOLUTE: 0.3 K/UL (ref 0–0.6)
EOSINOPHILS RELATIVE PERCENT: 4.6 %
EPITHELIAL CELLS, UA: ABNORMAL /HPF (ref 0–5)
GFR AFRICAN AMERICAN: >60
GFR NON-AFRICAN AMERICAN: 56
GLUCOSE BLD-MCNC: 119 MG/DL (ref 70–99)
GLUCOSE URINE: NEGATIVE MG/DL
HCT VFR BLD CALC: 33.3 % (ref 40.5–52.5)
HEMOGLOBIN: 10.9 G/DL (ref 13.5–17.5)
HYALINE CASTS: ABNORMAL /LPF (ref 0–2)
INR BLD: 0.99 (ref 0.86–1.14)
KETONES, URINE: NEGATIVE MG/DL
LACTATE DEHYDROGENASE: 256 U/L (ref 100–190)
LEUKOCYTE ESTERASE, URINE: NEGATIVE
LYMPHOCYTES ABSOLUTE: 1 K/UL (ref 1–5.1)
LYMPHOCYTES RELATIVE PERCENT: 14.1 %
MAGNESIUM: 1.8 MG/DL (ref 1.8–2.4)
MCH RBC QN AUTO: 32.4 PG (ref 26–34)
MCHC RBC AUTO-ENTMCNC: 32.8 G/DL (ref 31–36)
MCV RBC AUTO: 98.9 FL (ref 80–100)
MICROSCOPIC EXAMINATION: YES
MONOCYTES ABSOLUTE: 0.8 K/UL (ref 0–1.3)
MONOCYTES RELATIVE PERCENT: 11.4 %
MUCUS: ABNORMAL /LPF
NEUTROPHILS ABSOLUTE: 5 K/UL (ref 1.7–7.7)
NEUTROPHILS RELATIVE PERCENT: 68.1 %
NITRITE, URINE: NEGATIVE
PDW BLD-RTO: 21.7 % (ref 12.4–15.4)
PH UA: 6 (ref 5–8)
PHOSPHORUS: 3.9 MG/DL (ref 2.5–4.9)
PLATELET # BLD: 205 K/UL (ref 135–450)
PMV BLD AUTO: 7.3 FL (ref 5–10.5)
POTASSIUM SERPL-SCNC: 4.4 MMOL/L (ref 3.5–5.1)
PROTEIN UA: NEGATIVE MG/DL
PROTHROMBIN TIME: 11.5 SEC (ref 10–13.2)
RBC # BLD: 3.37 M/UL (ref 4.2–5.9)
RBC UA: ABNORMAL /HPF (ref 0–4)
SODIUM BLD-SCNC: 139 MMOL/L (ref 136–145)
SPECIFIC GRAVITY UA: 1.02 (ref 1–1.03)
TOTAL PROTEIN: 6.4 G/DL (ref 6.4–8.2)
URIC ACID, SERUM: 8.5 MG/DL (ref 3.5–7.2)
URINE TYPE: ABNORMAL
UROBILINOGEN, URINE: 0.2 E.U./DL
WBC # BLD: 7.4 K/UL (ref 4–11)
WBC UA: ABNORMAL /HPF (ref 0–5)

## 2020-11-05 PROCEDURE — 85610 PROTHROMBIN TIME: CPT

## 2020-11-05 PROCEDURE — 80048 BASIC METABOLIC PNL TOTAL CA: CPT

## 2020-11-05 PROCEDURE — 6360000002 HC RX W HCPCS: Performed by: NURSE PRACTITIONER

## 2020-11-05 PROCEDURE — 86850 RBC ANTIBODY SCREEN: CPT

## 2020-11-05 PROCEDURE — 6360000002 HC RX W HCPCS: Performed by: INTERNAL MEDICINE

## 2020-11-05 PROCEDURE — 6370000000 HC RX 637 (ALT 250 FOR IP): Performed by: NURSE PRACTITIONER

## 2020-11-05 PROCEDURE — 94761 N-INVAS EAR/PLS OXIMETRY MLT: CPT

## 2020-11-05 PROCEDURE — 2060000000 HC ICU INTERMEDIATE R&B

## 2020-11-05 PROCEDURE — 83735 ASSAY OF MAGNESIUM: CPT

## 2020-11-05 PROCEDURE — 94150 VITAL CAPACITY TEST: CPT

## 2020-11-05 PROCEDURE — 80076 HEPATIC FUNCTION PANEL: CPT

## 2020-11-05 PROCEDURE — 2580000003 HC RX 258: Performed by: INTERNAL MEDICINE

## 2020-11-05 PROCEDURE — 85025 COMPLETE CBC W/AUTO DIFF WBC: CPT

## 2020-11-05 PROCEDURE — 83615 LACTATE (LD) (LDH) ENZYME: CPT

## 2020-11-05 PROCEDURE — 71046 X-RAY EXAM CHEST 2 VIEWS: CPT

## 2020-11-05 PROCEDURE — 96417 CHEMO IV INFUS EACH ADDL SEQ: CPT

## 2020-11-05 PROCEDURE — 6370000000 HC RX 637 (ALT 250 FOR IP): Performed by: INTERNAL MEDICINE

## 2020-11-05 PROCEDURE — 85730 THROMBOPLASTIN TIME PARTIAL: CPT

## 2020-11-05 PROCEDURE — 81001 URINALYSIS AUTO W/SCOPE: CPT

## 2020-11-05 PROCEDURE — 2580000003 HC RX 258: Performed by: NURSE PRACTITIONER

## 2020-11-05 PROCEDURE — 84100 ASSAY OF PHOSPHORUS: CPT

## 2020-11-05 PROCEDURE — 86900 BLOOD TYPING SEROLOGIC ABO: CPT

## 2020-11-05 PROCEDURE — 3E04305 INTRODUCTION OF OTHER ANTINEOPLASTIC INTO CENTRAL VEIN, PERCUTANEOUS APPROACH: ICD-10-PCS | Performed by: INTERNAL MEDICINE

## 2020-11-05 PROCEDURE — 86901 BLOOD TYPING SEROLOGIC RH(D): CPT

## 2020-11-05 PROCEDURE — 93010 ELECTROCARDIOGRAM REPORT: CPT | Performed by: INTERNAL MEDICINE

## 2020-11-05 PROCEDURE — 93005 ELECTROCARDIOGRAM TRACING: CPT | Performed by: NURSE PRACTITIONER

## 2020-11-05 PROCEDURE — 84550 ASSAY OF BLOOD/URIC ACID: CPT

## 2020-11-05 RX ORDER — FLUCONAZOLE 200 MG/1
400 TABLET ORAL DAILY
Status: DISCONTINUED | OUTPATIENT
Start: 2020-11-08 | End: 2020-11-30 | Stop reason: HOSPADM

## 2020-11-05 RX ORDER — LORAZEPAM 0.5 MG/1
0.5 TABLET ORAL EVERY 4 HOURS PRN
Status: DISCONTINUED | OUTPATIENT
Start: 2020-11-05 | End: 2020-11-30 | Stop reason: HOSPADM

## 2020-11-05 RX ORDER — SOTALOL HYDROCHLORIDE 120 MG/1
120 TABLET ORAL 2 TIMES DAILY
Status: DISCONTINUED | OUTPATIENT
Start: 2020-11-05 | End: 2020-11-30 | Stop reason: HOSPADM

## 2020-11-05 RX ORDER — SULFAMETHOXAZOLE AND TRIMETHOPRIM 800; 160 MG/1; MG/1
1 TABLET ORAL 2 TIMES DAILY
Status: COMPLETED | OUTPATIENT
Start: 2020-11-05 | End: 2020-11-06

## 2020-11-05 RX ORDER — SODIUM CHLORIDE 0.9 % (FLUSH) 0.9 %
10 SYRINGE (ML) INJECTION EVERY 12 HOURS SCHEDULED
Status: DISCONTINUED | OUTPATIENT
Start: 2020-11-05 | End: 2020-11-30 | Stop reason: HOSPADM

## 2020-11-05 RX ORDER — URSODIOL 250 MG/1
500 TABLET, FILM COATED ORAL 2 TIMES DAILY
Status: DISCONTINUED | OUTPATIENT
Start: 2020-11-05 | End: 2020-11-30 | Stop reason: HOSPADM

## 2020-11-05 RX ORDER — POTASSIUM CHLORIDE 20 MEQ/1
20 TABLET, EXTENDED RELEASE ORAL 2 TIMES DAILY
Status: DISCONTINUED | OUTPATIENT
Start: 2020-11-05 | End: 2020-11-07

## 2020-11-05 RX ORDER — AMLODIPINE BESYLATE AND BENAZEPRIL HYDROCHLORIDE 5; 40 MG/1; MG/1
1 CAPSULE ORAL DAILY
Status: DISCONTINUED | OUTPATIENT
Start: 2020-11-06 | End: 2020-11-05 | Stop reason: CLARIF

## 2020-11-05 RX ORDER — VALACYCLOVIR HYDROCHLORIDE 500 MG/1
500 TABLET, FILM COATED ORAL 2 TIMES DAILY
Status: DISCONTINUED | OUTPATIENT
Start: 2020-11-05 | End: 2020-11-30 | Stop reason: HOSPADM

## 2020-11-05 RX ORDER — MAGNESIUM SULFATE IN WATER 40 MG/ML
4 INJECTION, SOLUTION INTRAVENOUS PRN
Status: DISCONTINUED | OUTPATIENT
Start: 2020-11-05 | End: 2020-11-30 | Stop reason: HOSPADM

## 2020-11-05 RX ORDER — METHOTREXATE 25 MG/ML
12.5 INJECTION, SOLUTION INTRA-ARTERIAL; INTRAMUSCULAR; INTRAVENOUS ONCE
Status: COMPLETED | OUTPATIENT
Start: 2020-11-17 | End: 2020-11-17

## 2020-11-05 RX ORDER — POTASSIUM CHLORIDE 29.8 MG/ML
20 INJECTION INTRAVENOUS PRN
Status: DISCONTINUED | OUTPATIENT
Start: 2020-11-05 | End: 2020-11-30 | Stop reason: HOSPADM

## 2020-11-05 RX ORDER — FLUCONAZOLE 200 MG/1
400 TABLET ORAL DAILY
Status: DISCONTINUED | OUTPATIENT
Start: 2020-11-10 | End: 2020-11-05

## 2020-11-05 RX ORDER — TACROLIMUS 1 MG/1
3 CAPSULE ORAL EVERY 12 HOURS
Status: DISCONTINUED | OUTPATIENT
Start: 2020-11-08 | End: 2020-11-12

## 2020-11-05 RX ORDER — FUROSEMIDE 10 MG/ML
40 INJECTION INTRAMUSCULAR; INTRAVENOUS EVERY 12 HOURS
Status: DISCONTINUED | OUTPATIENT
Start: 2020-11-06 | End: 2020-11-12 | Stop reason: ALTCHOICE

## 2020-11-05 RX ORDER — DEXAMETHASONE 4 MG/1
20 TABLET ORAL ONCE
Status: COMPLETED | OUTPATIENT
Start: 2020-11-05 | End: 2020-11-05

## 2020-11-05 RX ORDER — SODIUM CHLORIDE 9 MG/ML
INJECTION, SOLUTION INTRAVENOUS CONTINUOUS
Status: ACTIVE | OUTPATIENT
Start: 2020-11-05 | End: 2020-11-05

## 2020-11-05 RX ORDER — PROCHLORPERAZINE MALEATE 10 MG
10 TABLET ORAL EVERY 4 HOURS PRN
Status: DISCONTINUED | OUTPATIENT
Start: 2020-11-05 | End: 2020-11-30 | Stop reason: HOSPADM

## 2020-11-05 RX ORDER — ONDANSETRON HYDROCHLORIDE 8 MG/1
24 TABLET, FILM COATED ORAL ONCE
Status: COMPLETED | OUTPATIENT
Start: 2020-11-05 | End: 2020-11-05

## 2020-11-05 RX ORDER — SODIUM CHLORIDE 9 MG/ML
INJECTION, SOLUTION INTRAVENOUS CONTINUOUS PRN
Status: DISCONTINUED | OUTPATIENT
Start: 2020-11-05 | End: 2020-11-30 | Stop reason: HOSPADM

## 2020-11-05 RX ORDER — AMLODIPINE BESYLATE 5 MG/1
5 TABLET ORAL DAILY
Status: DISCONTINUED | OUTPATIENT
Start: 2020-11-05 | End: 2020-11-30

## 2020-11-05 RX ORDER — METHOTREXATE 25 MG/ML
12.5 INJECTION, SOLUTION INTRA-ARTERIAL; INTRAMUSCULAR; INTRAVENOUS ONCE
Status: COMPLETED | OUTPATIENT
Start: 2020-11-14 | End: 2020-11-14

## 2020-11-05 RX ORDER — LORAZEPAM 2 MG/ML
0.5 INJECTION INTRAMUSCULAR EVERY 4 HOURS PRN
Status: DISCONTINUED | OUTPATIENT
Start: 2020-11-05 | End: 2020-11-30 | Stop reason: HOSPADM

## 2020-11-05 RX ORDER — METHOTREXATE 25 MG/ML
12.5 INJECTION, SOLUTION INTRA-ARTERIAL; INTRAMUSCULAR; INTRAVENOUS ONCE
Status: COMPLETED | OUTPATIENT
Start: 2020-11-12 | End: 2020-11-12

## 2020-11-05 RX ORDER — URSODIOL 300 MG/1
500 CAPSULE ORAL 2 TIMES DAILY
COMMUNITY

## 2020-11-05 RX ORDER — SODIUM CHLORIDE 9 MG/ML
INJECTION, SOLUTION INTRAVENOUS CONTINUOUS
Status: DISCONTINUED | OUTPATIENT
Start: 2020-11-05 | End: 2020-11-12

## 2020-11-05 RX ORDER — METHOTREXATE 25 MG/ML
12.5 INJECTION, SOLUTION INTRA-ARTERIAL; INTRAMUSCULAR; INTRAVENOUS ONCE
Status: COMPLETED | OUTPATIENT
Start: 2020-11-22 | End: 2020-11-22

## 2020-11-05 RX ORDER — SODIUM CHLORIDE 0.9 % (FLUSH) 0.9 %
10 SYRINGE (ML) INJECTION PRN
Status: DISCONTINUED | OUTPATIENT
Start: 2020-11-05 | End: 2020-11-30 | Stop reason: HOSPADM

## 2020-11-05 RX ORDER — PROCHLORPERAZINE EDISYLATE 5 MG/ML
10 INJECTION INTRAMUSCULAR; INTRAVENOUS EVERY 4 HOURS PRN
Status: DISCONTINUED | OUTPATIENT
Start: 2020-11-05 | End: 2020-11-30 | Stop reason: HOSPADM

## 2020-11-05 RX ORDER — LISINOPRIL 40 MG/1
40 TABLET ORAL DAILY
Status: DISCONTINUED | OUTPATIENT
Start: 2020-11-05 | End: 2020-11-30

## 2020-11-05 RX ADMIN — Medication 10 ML: at 21:09

## 2020-11-05 RX ADMIN — SULFAMETHOXAZOLE AND TRIMETHOPRIM 1 TABLET: 800; 160 TABLET ORAL at 21:11

## 2020-11-05 RX ADMIN — ONDANSETRON HYDROCHLORIDE 24 MG: 8 TABLET, FILM COATED ORAL at 15:38

## 2020-11-05 RX ADMIN — URSODIOL 500 MG: 250 TABLET, FILM COATED ORAL at 21:09

## 2020-11-05 RX ADMIN — DEXAMETHASONE 20 MG: 4 TABLET ORAL at 15:38

## 2020-11-05 RX ADMIN — MELPHALAN HYDROCHLORIDE 330 MG: KIT INTRAVENOUS at 16:09

## 2020-11-05 RX ADMIN — POTASSIUM CHLORIDE 20 MEQ: 20 TABLET, EXTENDED RELEASE ORAL at 21:09

## 2020-11-05 RX ADMIN — Medication 10 ML: at 12:12

## 2020-11-05 RX ADMIN — SODIUM CHLORIDE 15 ML: 900 IRRIGANT IRRIGATION at 17:37

## 2020-11-05 RX ADMIN — SOTALOL HYDROCHLORIDE 120 MG: 120 TABLET ORAL at 21:09

## 2020-11-05 RX ADMIN — ENOXAPARIN SODIUM 40 MG: 40 INJECTION SUBCUTANEOUS at 17:37

## 2020-11-05 RX ADMIN — SULFAMETHOXAZOLE AND TRIMETHOPRIM 1 TABLET: 800; 160 TABLET ORAL at 12:27

## 2020-11-05 RX ADMIN — SODIUM CHLORIDE 15 ML: 900 IRRIGANT IRRIGATION at 12:20

## 2020-11-05 RX ADMIN — SODIUM CHLORIDE 15 ML: 900 IRRIGANT IRRIGATION at 21:10

## 2020-11-05 RX ADMIN — VALACYCLOVIR HYDROCHLORIDE 500 MG: 500 TABLET, FILM COATED ORAL at 21:08

## 2020-11-05 RX ADMIN — SODIUM CHLORIDE: 9 INJECTION, SOLUTION INTRAVENOUS at 15:32

## 2020-11-05 RX ADMIN — SODIUM CHLORIDE: 9 INJECTION, SOLUTION INTRAVENOUS at 12:07

## 2020-11-05 ASSESSMENT — PAIN SCALES - GENERAL
PAINLEVEL_OUTOF10: 0
PAINLEVEL_OUTOF10: 0

## 2020-11-05 NOTE — H&P
[facility]  History and Physical          Attending Physician: Gabriela Henson MD    Primary Care: Cornell Redd       Referring MD: Emelyn Stewart MD  [facility]. 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 His PMH is also significant for atrial fibrillation, HTN & CASH. He was diagnosed w/ AML after initially presenting w/ dyspnea and found to have pneumonia. CBC revealed a WBC 3.4, Hgb 6.9  platelets 13,640. He underwent BM bx/asp (20) that showed AML w/ 24% blasts. The FISH panel was negative & cytogenetics were normal.  His CEPPA, c-kit, FLT3, NPM1, IDH 1 & IDH 2 were not detected. He received induction chemotherapy w/ 7 + 3 (Sweetie-C / daunorubicin) on 20. His day + 14 BM bx/asp (20) was negative for residual leukemia. He then had a recovery BM bx/asp (20) that was negative for AML w/ reticulin fibrosis grade 1 of 3 diffusely. He achieved CR1 and then began consolidation chemotherapy w/ HiDAC (20). He completed 3 cycles while undergoing work up for allogeneic transplant. 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 He is currently in remission from AML. He is now being admitted for melphalan and fludarabine preparative regimen. This will be followed by a matched (10:10 w/ DPB1 non-permissive mismatch) unrelated allogeneic transplant from a female [facility] international donor. He will receive peripheral blood progenitor cells on 20. His graft versus host disease prophylaxis will include Prograf and IV Methotrexate on days + 1, 3, 6 & 11.   He feels well today and denies fever, chills, sweats, change in appetite, visual changes, headache, sinus congestion, sore throat, mouth pain, cough, increased shortness of breath, chest pain, abdominal pain, nausea, vomiting, diarrhea, constipation, dysuria, hematuria, lower extremity pain or swelling. He has no complaints today. Pre Transplant Workup:        Pre Transplant labs:     9/29/2020    EBV VCA IgG 102.0 (H)   EBV VCA IgM <10.0   HIV-1 Antibody Non-Reactive   HIV Ag/Ab Non-Reactive   HIV ANTIGEN Non-Reactive   HIV-2 Ab Non-Reactive   HIV-1 QNT Log, IU/ML Not Detected   HIV-1 QNT, IU/ML Not Detected   CMV IgM <8.0   EBV Early Ag Ab 32.6 (H)   Toxoplasmosis IgG 28.8   Toxoplasmosis IgM <3.0   VARICELLA ZOSTER AB IGM 0.14   Herpes Type 1/2 IgM Combined 0.60   Zi Barr virus nuclear Ab IgG 423.0 (H)   WEST NILE VIRUS AB IGM 0.41   WEST NILE VIRUS AB, IGG 0.48      9/29/2020    Herpes Type 1/2 IgM Combined 0.60   Hep A IgM Non-reactive   Hep B E Ab Negative   Hep B S Ab 226.70   Hep B S Ag Interp Non-reactive   Hep C Ab Interp Non-reactive   Hep B Core Ab, IgM Non-reactive   HCV QNT by NAAT IU/ML Not Detected   HCV Qnt by NAAT log IU/ml Not Detected      9/29/2020    Herpes Type I/II IgG Combined >22.40   Interpretation Not Detected   Total Syphillis IgG/IgM Non-Reactive   Varicella-Zoster Virus Ab, Igg Immune         Past Surgical History:   Procedure Laterality Date    KNEE ARTHROSCOPY         Past Medical History:   Diagnosis Date    Hypertension        Prior to Admission medications    Medication Sig Start Date End Date Taking?  Authorizing Provider   ursodiol (ACTIGALL) 300 MG capsule Take 500 mg by mouth 2 times daily   Yes Historical Provider, MD   amLODIPine-benazepril (LOTREL) 5-40 MG per capsule Take 1 capsule by mouth daily   Yes Historical Provider, MD   sotalol (BETAPACE) 120 MG tablet Take 120 mg by mouth 2 times daily   Yes Historical Provider, MD   valACYclovir (VALTREX) 500 MG tablet Take 500 mg by mouth 2 times daily   Yes Historical Provider, MD   potassium chloride (KLOR-CON M20) 20 MEQ extended release tablet Take 20 mEq by mouth 2 times daily   Yes Historical Provider, MD   promethazine (PHENERGAN) 12.5 MG tablet Take 12.5 mg by mouth once    Historical Provider, MD       Allergies   Allergen Reactions    Mercury Rash    Pcn [Penicillins] Rash       Family History   Problem Relation Age of Onset    Cancer Mother     Cancer Father     Other Father         Social History     Socioeconomic History    Marital status: Single     Spouse name: Not on file    Number of children: Not on file    Years of education: Not on file    Highest education level: Not on file   Occupational History    Not on file   Social Needs    Financial resource strain: Not on file    Food insecurity     Worry: Not on file     Inability: Not on file    Transportation needs     Medical: Not on file     Non-medical: Not on file   Tobacco Use    Smoking status: Never Smoker    Smokeless tobacco: Never Used   Substance and Sexual Activity    Alcohol use: Yes     Comment: daily    Drug use: No    Sexual activity: Never   Lifestyle    Physical activity     Days per week: Not on file     Minutes per session: Not on file    Stress: Not on file   Relationships    Social connections     Talks on phone: Not on file     Gets together: Not on file     Attends Christian service: Not on file     Active member of club or organization: Not on file     Attends meetings of clubs or organizations: Not on file     Relationship status: Not on file    Intimate partner violence     Fear of current or ex partner: Not on file     Emotionally abused: Not on file     Physically abused: Not on file     Forced sexual activity: Not on file   Other Topics Concern    Not on file   Social History Narrative    Not on file        ROS:  As noted above, otherwise remainder of 10-point ROS negative    Physical Exam:    Vital Signs:  BP (!) 140/87 Pulse 84   Resp 16   Ht 6' 3\" (1.905 m)   Wt (!) 324 lb 3.2 oz (147.1 kg)   SpO2 98%   BMI 40.52 kg/m²     Weight:    Wt Readings from Last 3 Encounters:   11/05/20 (!) 324 lb 3.2 oz (147.1 kg)   10/08/20 (!) 325 lb (147.4 kg)   09/29/20 (!) 325 lb 13.4 oz (147.8 kg)       KPS: 80% Normal activity with effort; some signs or symptoms of disease    ECOG PS:  (2) Ambulatory and capable of self care, unable to carry out work activity, up and about > 50% or waking hours    General: Awake, alert and oriented. HEENT: normocephalic, alopecia, PERRL, no scleral erythema or icterus, Oral mucosa moist and intact, throat clear  NECK: supple without palpable adenopathy  BACK: Straight, negative CVAT  SKIN: warm dry and intact without lesions rashes or masses  CHEST: CTA bilaterally without use of accessory muscles  CV: Normal S1 S2, RRR, no MRG  ABD: NT, ND, normoactive BS, no palpable masses or hepatosplenomegaly  EXTREMITIES: without edema, denies calf tenderness  NEURO: CN II - XII grossly intact  CATHETER: Right TLH ([facility], 6/11/20) - CDI    Laboratory Data:   CBC:   Recent Labs     11/05/20  1113   WBC 7.4   HGB 10.9*   HCT 33.3*   MCV 98.9        BMP/Mag:  Recent Labs     11/05/20  1113      K 4.4      CO2 23   PHOS 3.9   BUN 24*   CREATININE 1.3   MG 1.80     LIVP:   Recent Labs     11/05/20  1113   AST 18   ALT 22   BILIDIR <0.2   BILITOT 0.5   ALKPHOS 105     Coags:   Recent Labs     11/05/20  1113   PROTIME 11.5   INR 0.99   APTT 34.4     Uric Acid   Recent Labs     11/05/20  1113   LABURIC 8.5*     No results found for: CMVDNAQNT  No results found for: TACROLEV     PROBLEM LIST:         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 CMML - 1 (Dx 10/2020)  3. Atrial Fibrillation  4. CASH  5. HTN      TREATMENT:        1. Induction: Sweetie-C & Daunorubicin (6/12/20)  2. Consolidation:  HiDAC x 3 cycles (7/28/20, 8/29/20 & 9/22/20)  3.   MUD Allo - PB BMT (DPB1 non-permissive mismatch)  Preparative policy. 5.  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  - Start Prograf on 11/8/20    Tacro Level:    11/12/20 - First Level   No results found for: TACROLEV    6. VOD:  No evidence of VOD. Admission Weight: (!) 324 lb 3.2 oz (147.1 kg)  Current Weight: Weight: (!) 324 lb 3.2 oz (147.1 kg). No results for input(s): BILIDIR, BILITOT in the last 72 hours. - Cont Actigall. 7. Pulmonary:    - H/o CASH  - PFT (10/6/20) - FEV1/FVC 71 w/ predicted ratio of 75 & DLCO 41% (corrected 84%). He has moderate obstruction by FEV1/FVC ratio lower than predicted and also based on ATS criteria with low FEV, likely d/t morbid obesity   - Pulm clearance by Dr. Amelia Marx  CASH:  - Cont CPAP  - Encourage IS and ambulation     8.  GI / Nutrition:    Nutrition:  Appetite is good at this point.  - Start low microbial diet  - Dietary to follow    9. Cardiac:  - H/o A. Fib & HTN  - Echo (9/29/20) -  LVEF 60-65% w/ left atrial dilatation  A. Fib:  - Cont sotolol 120 mg bid   HTN:   - Cont amlodipine-lisinopril 5/40 mg daily    - DVT Prophylaxis: Platelets >05,626 cells/dL, - daily lovenox prophylaxis ordered  Contraindications to pharmacologic prophylaxis: None  Contraindications to mechanical prophylaxis: None    - Disposition:  D/c home once ANC > 1.0 & toxicities resolved following allogeneic transplant     The patient was seen and examined by Dr. Pedro Pablo Geiger. This admission history and physical has been discussed and agreed upon by Dr. Pedro Pablo Geiger.

## 2020-11-05 NOTE — FLOWSHEET NOTE
11/05/20 1451   Encounter Summary   Services provided to: Patient   Referral/Consult From: Nursing Supervisor/Manager   Continue Visiting   (es 11/5)   Complexity of Encounter Low   Length of Encounter 30 minutes   Routine   Type Initial   Assessment Approachable; Hopeful   Intervention Active listening;Explored feelings, thoughts, concerns;Explored coping resources;Nurtured hope;Prayer;Discussed relationship with God;Discussed belief system/Anglican practices/riley;Discussed illness/injury and it's impact   Outcome Engaged in conversation;Receptive

## 2020-11-05 NOTE — PROGRESS NOTES
4 Eyes Admission Assessment     I agree as the admission nurse that 2 RN's have performed a thorough Head to Toe Skin Assessment on the patient. ALL assessment sites listed below have been assessed on admission. Areas assessed by both nurses: melceio/denny  [x]   Head, Face, and Ears   [x]   Shoulders, Back, and Chest  [x]   Arms, Elbows, and Hands   [x]   Coccyx, Sacrum, and Ischium  [x]   Legs, Feet, and Heels        Does the Patient have Skin Breakdown?   No         Marco Antonio Prevention initiated:  Yes   Wound Care Orders initiated:  No      [facility] nurse consulted for Pressure Injury (Stage 3,4, Unstageable, DTI, NWPT, and Complex wounds) or Marco Antonio score 18 or lower:  NA      Nurse 1 eSignature: Electronically signed by Saul Sarkar RN on 11/5/20 at 3:30 PM EST    **SHARE this note so that the co-signing nurse is able to place an eSignature**    Nurse 2 eSignature:   Electronically signed by Dav Gao RN on 11/5/20 at 3:31 PM EST

## 2020-11-05 NOTE — ONCOLOGY
Original chemotherapy orders reviewed and acknowledged. Appropriateness of chemotherapy treatment regimen Melphalan/fludarabine/methotrexate for diagnosis of AML was verified. Patient educated on chemotherapy regimen. Consent for chemotherapy obtained. Estimated body surface area is 2.79 meters squared as calculated from the following:    Height as of this encounter: 6' 3\" (1.905 m). Weight as of this encounter: 324 lb 3.2 oz (147.1 kg). verified. Appropriate dosing calculations of chemotherapy based on above height, weight, and BSA verified.       Abena Harrell11/5/2020  1:40 PM

## 2020-11-05 NOTE — PLAN OF CARE
Problem: Falls - Risk of:  Goal: Will remain free from falls  Description: Will remain free from falls  Outcome: Ongoing    Pt with activity orders for up ad abebe. Encouraged pt to be up OOB as much as possible throughout the day and for all meals. Encouraged frequent short naps as necessary to preserve energy but instructed that while awake, pt should be OOB. Encouraged pt to ambulate in halls. Pt seen up ad abebe in halls twice this shift. Pt is visualized to be OOB % of the time this shift. Will continue to encourage frequent activity. Problem: Infection - Central Venous Catheter-Associated Bloodstream Infection:  Goal: Will show no infection signs and symptoms  Description: Will show no infection signs and symptoms  Outcome: Ongoing   Pt afebrile. Pt admitted today. Will monitor. Problem: Bleeding:  Goal: Will show no signs and symptoms of excessive bleeding  Description: Will show no signs and symptoms of excessive bleeding  Outcome: Ongoing   Patient's hemoglobin this AM:   Recent Labs     11/05/20  1113   HGB 10.9*     Patient's platelet count this AM:   Recent Labs     11/05/20  1113       Thrombocytopenia Precautions in place. Patient showing no signs or symptoms of active bleeding. Transfusion not indicated at this time. Patient verbalizes understanding of all instructions. Will continue to assess and implement POC. Call light within reach and hourly rounding in place. Problem: Venous Thromboembolism:  Goal: Will show no signs or symptoms of venous thromboembolism  Description: Will show no signs or symptoms of venous thromboembolism  Outcome: Ongoing   Adherent with DVT Prevention: Pt is at risk for DVT d/t decreased mobility and cancer treatment. Pt educated on importance of activity. Pt has orders for Subcut prophylactic lovenox. Pt verbalizes understanding of need for prophylaxis while inpatient.      Problem: Discharge Planning:  Goal: Discharged to appropriate level of care  Description: Discharged to appropriate level of care  Outcome: Ongoing   Pt verbalize understanding of treatment plan. Will monitor. Problem: PROTECTIVE PRECAUTIONS  Goal: Patient will remain free of nosocomial Infections  Outcome: Ongoing   Pt verbalize understanding of precautions. Will monitor .

## 2020-11-05 NOTE — PROGRESS NOTES
Clinical Pharmacy Progress Note    Patient Name: Janey Ledbetter  YOB: 1960  Diagnosis: AML -MelFlu SHEILA f/b MUD PBSC Allogeneic HSCT     GVHD Prophylaxis:  Tacrolimus (Prograf) starting Day -3   · Adjusted according to levels - drawn via red lumen  Methotrexate 12.5 mg IVPush days +1 (11/12), +3 (11/14), +6 (11/17), +11 (11/22)     Tacrolimus (Prograf) levels starting day +1  Tacrolimus (Prograf) goal level:  8-15 ng/mL    Date SCr Bili Prograf Dose Prograf Level Adjustments / Comments   11/5; d-6 1.3 0.5 3 mg po bid -- Oral tacrolimus starts 11/8 (day-3) with first tacrolimus trough level on 11/12 (day+1) followed by MWF thereafter. Please call with questions. Taylor Kilgore. [facility] Clinical Pharmacist  Wireless:  [phone]  11/5/2020 4:55 PM

## 2020-11-05 NOTE — ONCOLOGY
Administration: Chemotherapy drug Melphalan independently verified with Dayna Welch RN prior to administration. Acknowledgement of informed consent for chemotherapy administration verified. Original order, appropriateness of regimen, drug supplied, height, weight, BSA, dose calculations, expiration dates/times, drug appearance, and two patient identifiers were verified by both RNs. Drug checked for vesicant/irritant status and for risk of hypersensitivity. Most recent laboratory values and allergies, were reviewed. Positive, brisk blood return via CVC was confirmed prior to administration. Chest x-ray for correct line placement reviewed. Mila Turner and Dayna Welch RN verified correct rate of chemotherapy and maintenance IV fluids. Patient was educated on chemotherapy regimen prior to administration including indication for treatment related to disease & side effects of chemotherapy drug. Patient verbalizes understanding of all instructions. Cryotherapy with ice and pop sicles. Completion of Chemotherapy: Monitoring during infusion done per policy, see Flowsheets. Blood return verified before, during, and after infusion per policy; no signs of extravasation. Pt {tolerated chemotherapy well and without incident. Chemotherapy infusion end time on the [facility]. Will continue to monitor.

## 2020-11-06 LAB
ALBUMIN SERPL-MCNC: 4.3 G/DL (ref 3.4–5)
ALP BLD-CCNC: 93 U/L (ref 40–129)
ALT SERPL-CCNC: 22 U/L (ref 10–40)
ANION GAP SERPL CALCULATED.3IONS-SCNC: 8 MMOL/L (ref 3–16)
AST SERPL-CCNC: 15 U/L (ref 15–37)
BASOPHILS ABSOLUTE: 0 K/UL (ref 0–0.2)
BASOPHILS RELATIVE PERCENT: 0.5 %
BILIRUB SERPL-MCNC: 0.4 MG/DL (ref 0–1)
BILIRUBIN DIRECT: <0.2 MG/DL (ref 0–0.3)
BILIRUBIN, INDIRECT: ABNORMAL MG/DL (ref 0–1)
BUN BLDV-MCNC: 19 MG/DL (ref 7–20)
CALCIUM SERPL-MCNC: 8.9 MG/DL (ref 8.3–10.6)
CHLORIDE BLD-SCNC: 106 MMOL/L (ref 99–110)
CO2: 23 MMOL/L (ref 21–32)
CREAT SERPL-MCNC: 1.2 MG/DL (ref 0.8–1.3)
EOSINOPHILS ABSOLUTE: 0 K/UL (ref 0–0.6)
EOSINOPHILS RELATIVE PERCENT: 0.2 %
GFR AFRICAN AMERICAN: >60
GFR NON-AFRICAN AMERICAN: >60
GLUCOSE BLD-MCNC: 155 MG/DL (ref 70–99)
HCT VFR BLD CALC: 31.3 % (ref 40.5–52.5)
HEMOGLOBIN: 10.4 G/DL (ref 13.5–17.5)
LACTATE DEHYDROGENASE: 208 U/L (ref 100–190)
LYMPHOCYTES ABSOLUTE: 0.4 K/UL (ref 1–5.1)
LYMPHOCYTES RELATIVE PERCENT: 5.8 %
MCH RBC QN AUTO: 32.5 PG (ref 26–34)
MCHC RBC AUTO-ENTMCNC: 33.3 G/DL (ref 31–36)
MCV RBC AUTO: 97.5 FL (ref 80–100)
MONOCYTES ABSOLUTE: 0.2 K/UL (ref 0–1.3)
MONOCYTES RELATIVE PERCENT: 2.4 %
NEUTROPHILS ABSOLUTE: 6.4 K/UL (ref 1.7–7.7)
NEUTROPHILS RELATIVE PERCENT: 91.1 %
PDW BLD-RTO: 21.6 % (ref 12.4–15.4)
PHOSPHORUS: 3.5 MG/DL (ref 2.5–4.9)
PLATELET # BLD: 173 K/UL (ref 135–450)
PMV BLD AUTO: 6.9 FL (ref 5–10.5)
POTASSIUM SERPL-SCNC: 4.4 MMOL/L (ref 3.5–5.1)
RBC # BLD: 3.21 M/UL (ref 4.2–5.9)
SODIUM BLD-SCNC: 137 MMOL/L (ref 136–145)
TOTAL PROTEIN: 6.2 G/DL (ref 6.4–8.2)
URIC ACID, SERUM: 8.6 MG/DL (ref 3.5–7.2)
WBC # BLD: 7 K/UL (ref 4–11)

## 2020-11-06 PROCEDURE — 36592 COLLECT BLOOD FROM PICC: CPT

## 2020-11-06 PROCEDURE — 2060000000 HC ICU INTERMEDIATE R&B

## 2020-11-06 PROCEDURE — 96413 CHEMO IV INFUSION 1 HR: CPT

## 2020-11-06 PROCEDURE — 2580000003 HC RX 258: Performed by: INTERNAL MEDICINE

## 2020-11-06 PROCEDURE — 84100 ASSAY OF PHOSPHORUS: CPT

## 2020-11-06 PROCEDURE — 80076 HEPATIC FUNCTION PANEL: CPT

## 2020-11-06 PROCEDURE — 85025 COMPLETE CBC W/AUTO DIFF WBC: CPT

## 2020-11-06 PROCEDURE — 83615 LACTATE (LD) (LDH) ENZYME: CPT

## 2020-11-06 PROCEDURE — 6370000000 HC RX 637 (ALT 250 FOR IP): Performed by: INTERNAL MEDICINE

## 2020-11-06 PROCEDURE — 84550 ASSAY OF BLOOD/URIC ACID: CPT

## 2020-11-06 PROCEDURE — 80048 BASIC METABOLIC PNL TOTAL CA: CPT

## 2020-11-06 PROCEDURE — 6370000000 HC RX 637 (ALT 250 FOR IP): Performed by: NURSE PRACTITIONER

## 2020-11-06 PROCEDURE — 6360000002 HC RX W HCPCS: Performed by: INTERNAL MEDICINE

## 2020-11-06 PROCEDURE — 2580000003 HC RX 258: Performed by: NURSE PRACTITIONER

## 2020-11-06 PROCEDURE — 6360000002 HC RX W HCPCS: Performed by: NURSE PRACTITIONER

## 2020-11-06 RX ORDER — ALLOPURINOL 300 MG/1
300 TABLET ORAL DAILY
Status: DISCONTINUED | OUTPATIENT
Start: 2020-11-06 | End: 2020-11-12

## 2020-11-06 RX ADMIN — ALLOPURINOL 300 MG: 300 TABLET ORAL at 08:54

## 2020-11-06 RX ADMIN — Medication 10 ML: at 08:45

## 2020-11-06 RX ADMIN — POTASSIUM CHLORIDE 20 MEQ: 20 TABLET, EXTENDED RELEASE ORAL at 08:46

## 2020-11-06 RX ADMIN — SODIUM CHLORIDE 15 ML: 900 IRRIGANT IRRIGATION at 17:31

## 2020-11-06 RX ADMIN — SODIUM CHLORIDE: 9 INJECTION, SOLUTION INTRAVENOUS at 23:59

## 2020-11-06 RX ADMIN — VALACYCLOVIR HYDROCHLORIDE 500 MG: 500 TABLET, FILM COATED ORAL at 21:41

## 2020-11-06 RX ADMIN — POTASSIUM CHLORIDE 20 MEQ: 20 TABLET, EXTENDED RELEASE ORAL at 21:41

## 2020-11-06 RX ADMIN — URSODIOL 500 MG: 250 TABLET, FILM COATED ORAL at 08:45

## 2020-11-06 RX ADMIN — SODIUM CHLORIDE: 9 INJECTION, SOLUTION INTRAVENOUS at 10:04

## 2020-11-06 RX ADMIN — LISINOPRIL 40 MG: 40 TABLET ORAL at 08:46

## 2020-11-06 RX ADMIN — SODIUM CHLORIDE 15 ML: 900 IRRIGANT IRRIGATION at 10:04

## 2020-11-06 RX ADMIN — SULFAMETHOXAZOLE AND TRIMETHOPRIM 1 TABLET: 800; 160 TABLET ORAL at 21:41

## 2020-11-06 RX ADMIN — SOTALOL HYDROCHLORIDE 120 MG: 120 TABLET ORAL at 21:41

## 2020-11-06 RX ADMIN — SODIUM CHLORIDE: 9 INJECTION, SOLUTION INTRAVENOUS at 17:28

## 2020-11-06 RX ADMIN — ENOXAPARIN SODIUM 40 MG: 40 INJECTION SUBCUTANEOUS at 17:28

## 2020-11-06 RX ADMIN — ONDANSETRON HYDROCHLORIDE 12 MG: 8 TABLET, FILM COATED ORAL at 09:34

## 2020-11-06 RX ADMIN — URSODIOL 500 MG: 250 TABLET, FILM COATED ORAL at 21:41

## 2020-11-06 RX ADMIN — Medication 10 ML: at 21:42

## 2020-11-06 RX ADMIN — VALACYCLOVIR HYDROCHLORIDE 500 MG: 500 TABLET, FILM COATED ORAL at 08:46

## 2020-11-06 RX ADMIN — SOTALOL HYDROCHLORIDE 120 MG: 120 TABLET ORAL at 08:46

## 2020-11-06 RX ADMIN — SULFAMETHOXAZOLE AND TRIMETHOPRIM 1 TABLET: 800; 160 TABLET ORAL at 08:46

## 2020-11-06 RX ADMIN — SODIUM CHLORIDE 15 ML: 900 IRRIGANT IRRIGATION at 21:45

## 2020-11-06 RX ADMIN — SODIUM CHLORIDE: 9 INJECTION, SOLUTION INTRAVENOUS at 04:55

## 2020-11-06 RX ADMIN — AMLODIPINE BESYLATE 5 MG: 5 TABLET ORAL at 08:46

## 2020-11-06 RX ADMIN — FLUDARABINE PHOSPHATE 62.5 MG: 25 INJECTION, SOLUTION INTRAVENOUS at 09:59

## 2020-11-06 ASSESSMENT — PAIN SCALES - GENERAL
PAINLEVEL_OUTOF10: 0

## 2020-11-06 NOTE — CARE COORDINATION
Type of Admission  AML  MUD ( Female Donor) T:0: 11/11/20  Prep. Regimen: Melphalan+Fludara        Central venous catheter  Right TLC ( 9/29/20 , pre-existing from [facility])        Plan  Proceed with MUD Allogenic SCT for treatment of AML        Update  11/6/20: Planned admission for allogeneic SCT. Keeping active by use elliptical equipment in his room. Education  11/6/20:  Re-introduced myself in RN Discharge planner role. States his cousin Susanne Monaco, who is a nurse will be coming into town by Thanksgiving to act as his Care giver. Patient and caregiver have been through the educational process for allogeneic bone marrow transplantation including the allogeneic family meeting, preadmission teaching and preadmission physician office visit. , with Effie Robin RN BMT Coordinator. Patient and caregiver verbalize understanding of treatment regimen, possible adverse events, length of stay, and risk and benefits of transplantation and are agreeable to proceed. Patient and caregiver have been given an opportunity to ask, and to have their questions answered to their satisfaction. Documentation for above education can be found in the [facility], [facility] office chart.     [address]      Discharge          Pending

## 2020-11-06 NOTE — PLAN OF CARE
Problem: Falls - Risk of:  Goal: Will remain free from falls  Description: Will remain free from falls  11/6/2020 0036 by Kavita Garcia RN  Outcome: Ongoing  Note: Orthostatic vital signs obtained at start of shift - see flowsheet for details. Pt does not meet criteria for orthostasis. Pt is a Med fall risk. See Cleatis Clan Fall Score and ABCDS Injury Risk assessments. - Screening for Orthostasis AND not a [place] Risk per CAMEJO/ABCDS: Pt bed is in low position, side rails up, call light and belongings are in reach. Fall risk light is on outside pts room. Pt encouraged to call for assistance as needed. Will continue with hourly rounds for PO intake, pain needs, toileting and repositioning as needed. Problem: Falls - Risk of:  Goal: Absence of physical injury  Description: Absence of physical injury  11/5/2020 1822 by Dennie Hatcher, RN  Outcome: Ongoing     Problem: Bleeding:  Goal: Will show no signs and symptoms of excessive bleeding  Description: Will show no signs and symptoms of excessive bleeding  11/6/2020 0036 by Kavita Garcia RN  Outcome: Ongoing  Note: Patient's hemoglobin this AM:   Recent Labs     11/05/20  1113   HGB 10.9*     Patient's platelet count this AM:   Recent Labs     11/05/20  1113       Thrombocytopenia not present at this time. Patient showing no signs or symptoms of active bleeding. Transfusion not indicated at this time. Patient verbalizes understanding of all instructions. Will continue to assess and implement POC. Call light within reach and hourly rounding in place. Problem: Venous Thromboembolism:  Goal: Will show no signs or symptoms of venous thromboembolism  Description: Will show no signs or symptoms of venous thromboembolism  11/6/2020 0036 by Kavita Garcia RN  Outcome: Ongoing  Note: Adherent with DVT Prevention: Pt is at risk for DVT d/t decreased mobility and cancer treatment. Pt educated on importance of activity.   Pt has orders for [employer]

## 2020-11-06 NOTE — PLAN OF CARE
Problem: Falls - Risk of:  Goal: Will remain free from falls  Description: Will remain free from falls  11/6/2020 1203 by Felicia Lucero RN  Outcome: Ongoing    Orthostatic vital signs obtained at start of shift - see flowsheet for details. Pt does not meet criteria for orthostasis. Pt is a Med fall risk. See Cinderella Hire Fall Score and ABCDS Injury Risk assessments. - Screening for Orthostasis AND not a [place] Risk per CAMEJO/ABCDS: Pt bed is in low position, side rails up, call light and belongings are in reach. Fall risk light is on outside pts room. Pt encouraged to call for assistance as needed. Will continue with hourly rounds for PO intake, pain needs, toileting and repositioning as needed. Problem: Infection - Central Venous Catheter-Associated Bloodstream Infection:  Goal: Will show no infection signs and symptoms  Description: Will show no infection signs and symptoms  11/6/2020 1203 by Felicia Lucero RN  Outcome: Ongoing    CVC site remains free of signs/symptoms of infection. No drainage, edema, erythema, pain, or warmth noted at site. Dressing changes continue per protocol and on an as needed basis - see flowsheet. Pt to shower this shift. Problem: Bleeding:  Goal: Will show no signs and symptoms of excessive bleeding  Description: Will show no signs and symptoms of excessive bleeding  11/6/2020 1203 by Felicia Lucero RN  Outcome: Ongoing    Patient's hemoglobin this AM:   Recent Labs     11/06/20  0345   HGB 10.4*     Patient's platelet count this AM:   Recent Labs     11/06/20  0345       Thrombocytopenia Precautions in place. Patient showing no signs or symptoms of active bleeding. Transfusion not indicated at this time. Patient verbalizes understanding of all instructions. Will continue to assess and implement POC. Call light within reach and hourly rounding in place.        Problem: Venous Thromboembolism:  Goal: Will show no signs or symptoms of venous thromboembolism  Description: Will show no signs or symptoms of venous thromboembolism  11/6/2020 1203 by Lesvia Williamson RN  Outcome: Ongoing    Adherent with DVT Prevention: Pt is at risk for DVT d/t decreased mobility and cancer treatment. Pt educated on importance of activity. Pt verbalizes understanding of need for prophylaxis while inpatient. Problem: PROTECTIVE PRECAUTIONS  Goal: Patient will remain free of nosocomial Infections  11/6/2020 1203 by Lesvia Williamson RN  Outcome: Ongoing    No s/sx of infection noted. RN will monitor.

## 2020-11-06 NOTE — PROGRESS NOTES
[facility]  Progress Note          Attending Physician: Kary Dowd MD    Primary Care: Gloria Lackey       Referring MD: Radha Landa MD  [address] [facility], [address] [place] [address]    Name: Preeti Shields :  1960  MRN:  [id number]    Admission: 20    Date:  20    Reason for Admission: Melphalan and Fludarabine preparative regimen followed by Matched Unrelated Allogeneic Transplant for AML in remission     Interval history:   He feels well today and denies fever, chills, sweats, change in appetite, visual changes, headache, sinus congestion, sore throat, mouth pain, cough, increased shortness of breath, chest pain, abdominal pain, nausea, vomiting, diarrhea, constipation, dysuria, hematuria, lower extremity pain or swelling. He has no complaints today. Past Surgical History:   Procedure Laterality Date    COLONOSCOPY      ENDOSCOPY, COLON, DIAGNOSTIC      HERNIA REPAIR      age 9   [employer] KNEE ARTHROSCOPY      SKIN BIOPSY      mole       Past Medical History:   Diagnosis Date    Arthritis     knees    Cancer ([facility] [address]     AML    History of blood transfusion     Hyperlipidemia     Hypertension        Prior to Admission medications    Medication Sig Start Date End Date Taking?  Authorizing Provider   ursodiol (ACTIGALL) 300 MG capsule Take 500 mg by mouth 2 times daily   Yes Historical Provider, MD   amLODIPine-benazepril (LOTREL) 5-40 MG per capsule Take 1 capsule by mouth daily   Yes Historical Provider, MD   sotalol (BETAPACE) 120 MG tablet Take 120 mg by mouth 2 times daily   Yes Historical Provider, MD   valACYclovir (VALTREX) 500 MG tablet Take 500 mg by mouth 2 times daily   Yes Historical Provider, MD   potassium chloride (KLOR-CON M20) 20 MEQ extended release tablet Take 20 mEq by mouth 2 times daily   Yes Historical Provider, MD   promethazine (PHENERGAN) 12.5 MG tablet Take 12.5 mg by mouth once    Historical Provider, MD Resp 18   Ht 6' 3\" (1.905 m)   Wt (!) 319 lb 12.8 oz (145.1 kg)   SpO2 99%   BMI 39.97 kg/m²     Weight:    Wt Readings from Last 3 Encounters:   11/06/20 (!) 319 lb 12.8 oz (145.1 kg)   10/08/20 (!) 325 lb (147.4 kg)   09/29/20 (!) 325 lb 13.4 oz (147.8 kg)       KPS: 80% Normal activity with effort; some signs or symptoms of disease    ECOG PS:  (2) Ambulatory and capable of self care, unable to carry out work activity, up and about > 50% or waking hours    General: Awake, alert and oriented. HEENT: normocephalic, alopecia, PERRL, no scleral erythema or icterus, Oral mucosa moist and intact, throat clear  NECK: supple without palpable adenopathy  BACK: Straight, negative CVAT  SKIN: warm dry and intact without lesions rashes or masses  CHEST: CTA bilaterally without use of accessory muscles  CV: Normal S1 S2, RRR, no MRG  ABD: NT, ND, normoactive BS, no palpable masses or hepatosplenomegaly  EXTREMITIES: without edema, denies calf tenderness  NEURO: CN II - XII grossly intact  CATHETER: Right tri-fusion [facility], 6/11/20) - CDI    Laboratory Data:   CBC:   Recent Labs     11/05/20  1113 11/06/20  0345   WBC 7.4 7.0   HGB 10.9* 10.4*   HCT 33.3* 31.3*   MCV 98.9 97.5    173     BMP/Mag:  Recent Labs     11/05/20 1113 11/06/20  0345    137   K 4.4 4.4    106   CO2 23 23   PHOS 3.9 3.5   BUN 24* 19   CREATININE 1.3 1.2   MG 1.80  --      LIVP:   Recent Labs     11/05/20  1113 11/06/20  0345   AST 18 15   ALT 22 22   BILIDIR <0.2 <0.2   BILITOT 0.5 0.4   ALKPHOS 105 93     Coags:   Recent Labs     11/05/20  1113   PROTIME 11.5   INR 0.99   APTT 34.4     Uric Acid   Recent Labs     11/05/20  1113 11/06/20  0345   LABURIC 8.5* 8.6*     No results found for: CMVDNAQNT  No results found for: TACROLEV     PROBLEM LIST:         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 CMML - 1 (Dx 10/2020)  3. Atrial Fibrillation  4. CASH  5. HTN      TREATMENT:        1.   Induction: Sweetie-C & Daunorubicin (6/12/20)  2. Consolidation:  HiDAC x 3 cycles (7/28/20, 8/29/20 & 9/22/20)  3. MUD Allo - PB BMT (DPB1 non-permissive mismatch)  Preparative Regimen:  Melphalan and Fludarabine  Date of BMT:  11/11/20  Source of stem cells:  PBPC  Donor/Recipient Blood Type:  A positive / 0 positive   Donor Sex:  Female / NMDP (DID # [id number]) - Follow FISH XY  CMV Donor / Recipient: Negative / Negative          ASSESSMENT AND PLAN:        1. AML - intermediate risk / CMML-1:  - BM bx/asp (10/14/20) - No AML, but shows CMML-1     Preparative Regimen:  Melphalan and Fludarabine  Date of BMT:  11/11/20  Source of stem cells:  PBPC  Donor/Recipient Blood Type:  A positive / 0 positive   Donor Sex:  Female / NMDP (DID # [id number]) - Follow FISH XY  CMV Donor / Recipient: Negative / Negative      Day - 5    2. ID:  No evidence of infection.    - Start Valtrex prophylaxis    - Start Diflucan prophylaxis on 11/8/20   - Start Levaquin when [address] < 1.5    Donor/Recipient CMV:  Negative / negative   - Routine surveillance is not required       3. Heme:  Anemia from chemotherapy . - Transfuse for Hgb < 7 and Platelets < 67L.    - No transfusion today. 4.  Metabolic:  Electrolytes are WNL; elevated Cr  -Last Cr 9/2020:  0.9  - Cont KCl 20 meq bid   - Start IVF: NS @ 150 cc/hr   - Replace potassium and magnesium per policy. 5.  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  - Start Prograf on 11/8/20    [place] Level:    11/12/20 - First Level   No results found for: TACROLEV    6. VOD:  No evidence of VOD. Admission Weight: (!) 324 lb 3.2 oz (147.1 kg)  Current Weight: Weight: (!) 319 lb 12.8 oz (145.1 kg). Recent Labs     11/06/20  0345   BILIDIR <0.2   BILITOT 0.4     - Cont Actigall. 7. Pulmonary:    - H/o CASH  - PFT (10/6/20) - FEV1/FVC 71 w/ predicted ratio of 75 & DLCO 41% (corrected 84%).   He has moderate obstruction by FEV1/FVC ratio lower than predicted and also based on ATS criteria with low

## 2020-11-06 NOTE — PROGRESS NOTES
NUTRITION ASSESSMENT  Admission Date: 11/5/2020     Type and Reason for Visit: Initial, Consult    NUTRITION RECOMMENDATIONS:   1. PO Diet: Continue current diet low microbial.    2. Nutrition Education: provided verbal review of food safety s/p BMT and dietary recommendations. NUTRITION ASSESSMENT:  Nutrition eval for pt admit for Vanessa/Flu the MUD ALLO 11/11. Pt reports stable, adequate nutrition leading up to admission for tpx. States through past chemo, pt has had altered taste affecting his PO intake, but that he was able to find alternatives to maintain nutrition. RD verbally reviewed diet during/after TPX, discussed/reviewed food safety practices (pt declined handouts at this time). RD will monitor for nutritionaly adequacy s/p BMT. MALNUTRITION ASSESSMENT  Context of Malnutrition: Acute Illness(on chronic)   Malnutrition Status: No malnutrition  Findings of the 6 clinical characteristics of malnutrition (Minimum of 2 out of 6 clinical characteristics is required to make the diagnosis of moderate or severe Protein Calorie Malnutrition based on AND/ASPEN Guidelines):  Energy Intake: No significant decrease in energy intake    Energy Intake Time: No significant    Weight Loss %: No significant loss   Weight loss Time: No significant    Due to current CDC guidelines recommending 6-ft distancing for social isolation for COVID19 prevention, physical aspects of the malnutrition assessment were withheld at this time.      NUTRITION DIAGNOSIS  Problem: Problem #1: Increased nutrient needs  Etiology: Increased demand for nutrient  Signs & Symptoms: chemot; ALLO bmt     NUTRITION INTERVENTION  Food and/or Nutrient Delivery:Continue Current diet   Nutrition education/counseling/coordination of care: Education Initiated     NUTRITION MONITORING & EVALUATION:  Evaluation:Goals set   Goals:Goals: pt will maintain adequate po nutrition s/p BMT by consuming greater than 75% of meals offered through admission to promote meeting increased nutrient needs. Monitoring: Diet Tolerance , Meal Intake  or Pertinent Labs      OBJECTIVE DATA:  · Nutrition-Focused Physical Findings: lbm pta;   · Wounds None      Past Medical History:   Diagnosis Date    Arthritis     knees    Cancer ([address]     AML    History of blood transfusion     Hyperlipidemia     Hypertension         ANTHROPOMETRICS  Current Height: 6' 3\" (190.5 cm)  Current Weight: (!) 319 lb 12.8 oz (145.1 kg)    Admission weight: (!) 324 lb 3.2 oz (147.1 kg)  Ideal Bodyweight 196 lb    Usual Bodyweight 325 lb per emr   Weight Changes no significant        BMI BMI (Calculated): 40.1    Wt Readings from Last 50 Encounters:   11/06/20 (!) 319 lb 12.8 oz (145.1 kg)   10/08/20 (!) 325 lb (147.4 kg)   09/29/20 (!) 325 lb 13.4 oz (147.8 kg)       COMPARATIVE STANDARDS  Estimated Total Kcals/Day : 15-18 Current Bodyweight (145.1 kg) 8494-5538 kcal    Estimated Total Protein (g/day) : 1.5-1.8 Ideal Bodyweight  (89.1 kg) 134-160g/day  Estimated Daily Total Fluid (ml/day): 2993-7808 mL per day     Food / Nutrition-Related History  Pre-Admission / Home Diet:  Pre-Admission/Home Diet: General   Home Supplements / Herbals:   none noted  Food Restrictions / Cultural Requests:   none noted    Current Nutrition Therapies   DIET GENERAL;     PO Intake: %  PO Supplement: None   PO Supplement Intake: None   IVF: NS @ 150 ml/hr     NUTRITION RISK LEVEL: Risk Level:  Moderate     Michael Adler RD, LD  [place]:  [phone]  Office:  [phone]

## 2020-11-06 NOTE — PLAN OF CARE
Nutrition Problem #1: Increased nutrient needs  Intervention: Food and/or Nutrient Delivery: Continue Current Diet  Nutritional Goals: pt will maintain adequate po nutrition s/p BMT by consuming greater than 75% of meals offered through admission to promote meeting increased nutrient needs.

## 2020-11-07 LAB
ANION GAP SERPL CALCULATED.3IONS-SCNC: 10 MMOL/L (ref 3–16)
BASOPHILS ABSOLUTE: 0 K/UL (ref 0–0.2)
BASOPHILS RELATIVE PERCENT: 0.5 %
BUN BLDV-MCNC: 27 MG/DL (ref 7–20)
CALCIUM SERPL-MCNC: 8.6 MG/DL (ref 8.3–10.6)
CHLORIDE BLD-SCNC: 107 MMOL/L (ref 99–110)
CO2: 23 MMOL/L (ref 21–32)
CREAT SERPL-MCNC: 1.3 MG/DL (ref 0.8–1.3)
EOSINOPHILS ABSOLUTE: 0 K/UL (ref 0–0.6)
EOSINOPHILS RELATIVE PERCENT: 0.1 %
GFR AFRICAN AMERICAN: >60
GFR NON-AFRICAN AMERICAN: 56
GLUCOSE BLD-MCNC: 109 MG/DL (ref 70–99)
HCT VFR BLD CALC: 30.3 % (ref 40.5–52.5)
HEMOGLOBIN: 10.1 G/DL (ref 13.5–17.5)
LYMPHOCYTES ABSOLUTE: 0.2 K/UL (ref 1–5.1)
LYMPHOCYTES RELATIVE PERCENT: 3.8 %
MCH RBC QN AUTO: 32.5 PG (ref 26–34)
MCHC RBC AUTO-ENTMCNC: 33.2 G/DL (ref 31–36)
MCV RBC AUTO: 97.8 FL (ref 80–100)
MONOCYTES ABSOLUTE: 0.3 K/UL (ref 0–1.3)
MONOCYTES RELATIVE PERCENT: 5 %
NEUTROPHILS ABSOLUTE: 4.6 K/UL (ref 1.7–7.7)
NEUTROPHILS RELATIVE PERCENT: 90.6 %
PDW BLD-RTO: 21.7 % (ref 12.4–15.4)
PLATELET # BLD: 159 K/UL (ref 135–450)
PMV BLD AUTO: 7.1 FL (ref 5–10.5)
POTASSIUM SERPL-SCNC: 4.7 MMOL/L (ref 3.5–5.1)
RBC # BLD: 3.1 M/UL (ref 4.2–5.9)
SODIUM BLD-SCNC: 140 MMOL/L (ref 136–145)
WBC # BLD: 5.1 K/UL (ref 4–11)

## 2020-11-07 PROCEDURE — 6360000002 HC RX W HCPCS: Performed by: NURSE PRACTITIONER

## 2020-11-07 PROCEDURE — 6370000000 HC RX 637 (ALT 250 FOR IP): Performed by: NURSE PRACTITIONER

## 2020-11-07 PROCEDURE — 2580000003 HC RX 258: Performed by: INTERNAL MEDICINE

## 2020-11-07 PROCEDURE — 2580000003 HC RX 258: Performed by: NURSE PRACTITIONER

## 2020-11-07 PROCEDURE — 96413 CHEMO IV INFUSION 1 HR: CPT

## 2020-11-07 PROCEDURE — 36592 COLLECT BLOOD FROM PICC: CPT

## 2020-11-07 PROCEDURE — 85025 COMPLETE CBC W/AUTO DIFF WBC: CPT

## 2020-11-07 PROCEDURE — 80048 BASIC METABOLIC PNL TOTAL CA: CPT

## 2020-11-07 PROCEDURE — 6360000002 HC RX W HCPCS: Performed by: INTERNAL MEDICINE

## 2020-11-07 PROCEDURE — 2060000000 HC ICU INTERMEDIATE R&B

## 2020-11-07 PROCEDURE — 6370000000 HC RX 637 (ALT 250 FOR IP): Performed by: INTERNAL MEDICINE

## 2020-11-07 RX ORDER — SENNA AND DOCUSATE SODIUM 50; 8.6 MG/1; MG/1
2 TABLET, FILM COATED ORAL DAILY PRN
Status: DISCONTINUED | OUTPATIENT
Start: 2020-11-07 | End: 2020-11-30 | Stop reason: HOSPADM

## 2020-11-07 RX ADMIN — SODIUM CHLORIDE 15 ML: 900 IRRIGANT IRRIGATION at 21:41

## 2020-11-07 RX ADMIN — FUROSEMIDE 40 MG: 10 INJECTION, SOLUTION INTRAMUSCULAR; INTRAVENOUS at 06:42

## 2020-11-07 RX ADMIN — ONDANSETRON HYDROCHLORIDE 12 MG: 8 TABLET, FILM COATED ORAL at 09:21

## 2020-11-07 RX ADMIN — VALACYCLOVIR HYDROCHLORIDE 500 MG: 500 TABLET, FILM COATED ORAL at 21:00

## 2020-11-07 RX ADMIN — SOTALOL HYDROCHLORIDE 120 MG: 120 TABLET ORAL at 09:21

## 2020-11-07 RX ADMIN — Medication 10 ML: at 21:40

## 2020-11-07 RX ADMIN — PROCHLORPERAZINE MALEATE 10 MG: 10 TABLET ORAL at 19:04

## 2020-11-07 RX ADMIN — VALACYCLOVIR HYDROCHLORIDE 500 MG: 500 TABLET, FILM COATED ORAL at 09:21

## 2020-11-07 RX ADMIN — URSODIOL 500 MG: 250 TABLET, FILM COATED ORAL at 09:21

## 2020-11-07 RX ADMIN — FLUDARABINE PHOSPHATE 62.5 MG: 25 INJECTION, SOLUTION INTRAVENOUS at 10:09

## 2020-11-07 RX ADMIN — SODIUM CHLORIDE: 9 INJECTION, SOLUTION INTRAVENOUS at 14:58

## 2020-11-07 RX ADMIN — URSODIOL 500 MG: 250 TABLET, FILM COATED ORAL at 20:59

## 2020-11-07 RX ADMIN — SOTALOL HYDROCHLORIDE 120 MG: 120 TABLET ORAL at 21:00

## 2020-11-07 RX ADMIN — AMLODIPINE BESYLATE 5 MG: 5 TABLET ORAL at 09:22

## 2020-11-07 RX ADMIN — ALLOPURINOL 300 MG: 300 TABLET ORAL at 09:22

## 2020-11-07 RX ADMIN — ENOXAPARIN SODIUM 40 MG: 40 INJECTION SUBCUTANEOUS at 18:30

## 2020-11-07 RX ADMIN — SODIUM CHLORIDE: 9 INJECTION, SOLUTION INTRAVENOUS at 06:34

## 2020-11-07 RX ADMIN — LISINOPRIL 40 MG: 40 TABLET ORAL at 09:22

## 2020-11-07 RX ADMIN — Medication 10 ML: at 10:15

## 2020-11-07 RX ADMIN — POTASSIUM CHLORIDE 20 MEQ: 20 TABLET, EXTENDED RELEASE ORAL at 09:21

## 2020-11-07 ASSESSMENT — PAIN SCALES - GENERAL
PAINLEVEL_OUTOF10: 0

## 2020-11-07 NOTE — PROGRESS NOTES
[facility] Allogeneic Progress Note    2020    Benito Goodwin    :  1960    MRN:  [id number]    Referring MD: Toby Ramsey MD  [facility]. [address] [facility], [address] [place] [address]      Subjective:  No particular complaints. Eating and drinking well.      ECOG PS:  (1) Restricted in physically strenuous activity, ambulatory and able to do work of light nature    KPS: 80% Normal activity with effort; some signs or symptoms of disease    Isolation:  None     Medications    Scheduled Meds:   allopurinol  300 mg Oral Daily    sodium chloride flush  10 mL Intravenous 2 times per day    Saline Mouthwash  15 mL Swish & Spit 4x Daily AC & HS    valACYclovir  500 mg Oral BID    enoxaparin  40 mg Subcutaneous Daily    potassium chloride  20 mEq Oral BID    sotalol  120 mg Oral BID    amLODIPine  5 mg Oral Daily    And    lisinopril  40 mg Oral Daily    furosemide  40 mg Intravenous Q12H    ondansetron  12 mg Oral Q24H    fludarabine (FLUDARA) chemo IVPB  62.5 mg Intravenous Q24H    [START ON 2020] tacrolimus  3 mg Oral Q12H    [START ON 2020] methotrexate sodium  12.5 mg Intravenous Once    [START ON 2020] methotrexate sodium  12.5 mg Intravenous Once    [START ON 2020] methotrexate sodium  12.5 mg Intravenous Once    ursodiol  500 mg Oral BID    [START ON 2020] methotrexate sodium  12.5 mg Intravenous Once    [START ON 2020] fluconazole  400 mg Oral Daily     Continuous Infusions:   sodium chloride      sodium chloride 150 mL/hr at 20 0634     PRN Meds:sodium chloride, sodium chloride flush, potassium chloride, magnesium sulfate, magnesium hydroxide, Saline Mouthwash, alteplase, prochlorperazine **OR** prochlorperazine, LORazepam **OR** LORazepam    ROS:  As noted above, otherwise remainder of 10-point ROS negative    Physical Exam:    I&O:      Intake/Output Summary (Last 24 hours) at 2020 1011  Last data filed at 11/7/2020 0915  Gross per 24 hour   Intake 4186 ml   Output 4620 ml   Net -434 ml       Vital Signs:  /75   Pulse 73   Temp 97.8 °F (36.6 °C) (Oral)   Resp 18   Ht 6' 3\" (1.905 m)   Wt (!) 330 lb (149.7 kg)   SpO2 98%   BMI 41.25 kg/m²     Weight:    Wt Readings from Last 3 Encounters:   11/07/20 (!) 330 lb (149.7 kg)   10/08/20 (!) 325 lb (147.4 kg)   09/29/20 (!) 325 lb 13.4 oz (147.8 kg)       General: Awake, alert and oriented. HEENT: normocephalic, alopecia, PERRL, no scleral erythema or icterus, Oral mucosa moist and intact, throat clear  NECK: supple without palpable adenopathy  BACK: Straight, negative CVAT  SKIN: warm dry and intact without lesions rashes or masses  CHEST: CTA bilaterally without use of accessory muscles  CV: Normal S1 S2, RRR, no MRG  ABD: NT, ND, normoactive BS, no palpable masses or hepatosplenomegaly  EXTREMITIES: without edema, denies calf tenderness  NEURO: CN II - XII grossly intact  CATHETER: Right tri-fusion ([facility], 6/11/20) - CDI    Data:   CBC:   Recent Labs     11/05/20 1113 11/06/20 0345 11/07/20  0329   WBC 7.4 7.0 5.1   HGB 10.9* 10.4* 10.1*   HCT 33.3* 31.3* 30.3*   MCV 98.9 97.5 97.8    173 159     BMP/Mag:  Recent Labs     11/05/20 1113 11/06/20  0345 11/07/20  0329    137 140   K 4.4 4.4 4.7    106 107   CO2 23 23 23   PHOS 3.9 3.5  --    BUN 24* 19 27*   CREATININE 1.3 1.2 1.3   MG 1.80  --   --      LIVP:   Recent Labs     11/05/20  1113 11/06/20  0345   AST 18 15   ALT 22 22   BILIDIR <0.2 <0.2   BILITOT 0.5 0.4   ALKPHOS 105 93     Uric Acid:    Recent Labs     11/06/20  0345   LABURIC 8.6*     Coags:   Recent Labs     11/05/20  1113   PROTIME 11.5   INR 0.99   APTT 34.4        PROBLEM LIST:         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 CMML - 1 (Dx 10/2020)  3. Atrial Fibrillation  4. CASH  5. HTN      TREATMENT:        1. Induction: Sweetie-C & Daunorubicin (6/12/20)  2.   Consolidation:  HiDAC x 3 cycles (7/28/20, 8/29/20 & 9/22/20)  3. MUD Allo - PB BMT (DPB1 non-permissive mismatch)  Preparative Regimen:  Melphalan and Fludarabine  Date of BMT:  11/11/20  Source of stem cells:  PBPC  Donor/Recipient Blood Type:  A positive / 0 positive   Donor Sex:  Female / NMDP (DID # [id number]) - Follow FISH XY  CMV Donor / Recipient: Negative / Negative       ASSESSMENT AND PLAN:        1. AML - intermediate risk / CMML-1:  - BM bx/asp (10/14/20) - No AML, but shows CMML-1   - Admitted for Melphalan & Fludarabine f/b allogeneic PBSC infusion 11/11/20     Day - 4     2. ID:  No evidence of infection.    - Start Valtrex prophylaxis    - Start Diflucan prophylaxis on 11/8/20   - Start Levaquin when [address] < 1.5     Donor/Recipient CMV:  Negative / negative   - Routine surveillance is not required       3. Heme:  Anemia from chemotherapy . - Transfuse for Hgb < 7 and Platelets < 97G.    - No transfusion today     4. Metabolic / CKD III: Baseline SCr 1.2-1.3  - D/c KCl 20 meq bid   - Cont IVFs: NS @ 150 mL/hr   - Replace potassium and magnesium per PRN orders     5.  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  - Start Prograf on 11/8/20. First level 11/12/20 then qMWF     6. VOD:  No evidence of VOD. Admission Weight: (!) 324 lb 3.2 oz (147.1 kg)  Current Weight: Weight: (!) 330 lb (149.7 kg). Recent Labs     11/06/20  0345   BILIDIR <0.2   BILITOT 0.4     - Cont Actigall.     7. Pulmonary: H/o CASH  - PFT (10/6/20) - FEV1/FVC 71 w/ predicted ratio of 75 & DLCO 41% (corrected 84%). He has moderate obstruction by FEV1/FVC ratio lower than predicted and also based on ATS criteria with low FEV, likely d/t morbid obesity   - Pulm clearance by Dr. Itzel De Oliveira  CASH:  - Cont CPAP  - Encourage IS and ambulation      8. GI / Nutrition:    Nutrition:  Appetite is good at this point.  - Cont low microbial diet  - Dietary to follow     9. Cardiac: H/o A.  Fib & HTN  - Echo (9/29/20) -  LVEF 60-65% w/ left atrial dilatation  A. Fib:  - Cont sotalol 120 mg BID  HTN:   - Cont amlodipine-lisinopril 5/40 mg daily     - DVT Prophylaxis: Platelets >95,210 cells/dL, - daily lovenox prophylaxis ordered  Contraindications to pharmacologic prophylaxis: None  Contraindications to mechanical prophylaxis: None     - Disposition:  D/c home once ANC > 1.0 & toxicities resolved following allogeneic transplant       [place], APRDION - ELIZABETH Rojas.  Adryan Encarnacion, [address] [place] [address]

## 2020-11-07 NOTE — PLAN OF CARE
Problem: Falls - Risk of:  Goal: Will remain free from falls  Description: Will remain free from falls  Outcome: Met This Shift  Note: Orthostatic vital signs obtained at start of shift - see flowsheet for details. Pt does not meet criteria for orthostasis. Pt is a Med fall risk. See Latrice Good Fall Score and ABCDS Injury Risk assessments. - Screening for Orthostasis AND not a [place] Risk per CAMEJO/ABCDS: Pt bed is in low position, side rails up, call light and belongings are in reach. Fall risk light is on outside pts room. Pt encouraged to call for assistance as needed. Will continue with hourly rounds for PO intake, pain needs, toileting and repositioning as needed. Problem: Infection - Central Venous Catheter-Associated Bloodstream Infection:  Goal: Will show no infection signs and symptoms  Description: Will show no infection signs and symptoms  Outcome: Ongoing     Problem: Bleeding:  Goal: Will show no signs and symptoms of excessive bleeding  Description: Will show no signs and symptoms of excessive bleeding  Outcome: Ongoing     Patient's hemoglobin this AM:   Recent Labs     11/07/20  0329   HGB 10.1*     Patient's platelet count this AM:   Recent Labs     11/07/20  0329       Thrombocytopenia not present at this time. Patient showing no signs or symptoms of active bleeding. Transfusion not indicated at this time. Patient verbalizes understanding of all instructions. Will continue to assess and implement POC. Call light within reach and hourly rounding in place. Problem: Venous Thromboembolism:  Goal: Will show no signs or symptoms of venous thromboembolism  Description: Will show no signs or symptoms of venous thromboembolism  Outcome: Met This Shift  Note: Adherent with DVT Prevention: Pt is at risk for DVT d/t decreased mobility and cancer treatment. Pt educated on importance of activity. Pt has orders for Subcut prophylactic lovenox.   Pt verbalizes understanding of need for prophylaxis while inpatient. Problem: PROTECTIVE PRECAUTIONS  Goal: Patient will remain free of nosocomial Infections  Outcome: Ongoing  Note: Patient is in private room. Patient is adhering to handwashing and low microbial diet. Patient has been afebrile with stable vitals. Will continue to monitor.

## 2020-11-07 NOTE — PROGRESS NOTES
VSS. Ortho negative. Assessment completed. Scheduled meds given whole with water. Pt resting comfortably in chair. Call light within reach. Denies further needs at this time. Will continue to monitor.  Electronically signed by David Monreal RN on 11/7/2020 at 9:27 AM

## 2020-11-07 NOTE — PROGRESS NOTES
Administration: Chemotherapy drug fludarabine independently verified with Ally Mujica RN prior to administration. Acknowledgement of informed consent for chemotherapy administration verified. Original order, appropriateness of regimen, drug supplied, height, weight, BSA, dose calculations, expiration dates/times, drug appearance, and two patient identifiers were verified by both RNs. Drug checked for vesicant/irritant status and for risk of hypersensitivity. Most recent laboratory values and allergies, were reviewed. Positive, brisk blood return via CVC was confirmed prior to administration. Chest x-ray for correct line placement reviewed. Ramiro Johnson and Ally Mujica RN verified correct rate of chemotherapy and maintenance IV fluids. Patient was educated on chemotherapy regimen prior to administration including indication for treatment related to disease & side effects of chemotherapy drug. Patient verbalizes understanding of all instructions.

## 2020-11-08 LAB
ANION GAP SERPL CALCULATED.3IONS-SCNC: 9 MMOL/L (ref 3–16)
BASOPHILS ABSOLUTE: 0 K/UL (ref 0–0.2)
BASOPHILS RELATIVE PERCENT: 0.8 %
BILIRUBIN URINE: NEGATIVE
BLOOD, URINE: NEGATIVE
BUN BLDV-MCNC: 29 MG/DL (ref 7–20)
CALCIUM SERPL-MCNC: 8.5 MG/DL (ref 8.3–10.6)
CHLORIDE BLD-SCNC: 106 MMOL/L (ref 99–110)
CLARITY: CLEAR
CO2: 23 MMOL/L (ref 21–32)
COLOR: YELLOW
CREAT SERPL-MCNC: 1.2 MG/DL (ref 0.8–1.3)
EOSINOPHILS ABSOLUTE: 0 K/UL (ref 0–0.6)
EOSINOPHILS RELATIVE PERCENT: 0.1 %
GFR AFRICAN AMERICAN: >60
GFR NON-AFRICAN AMERICAN: >60
GLUCOSE BLD-MCNC: 98 MG/DL (ref 70–99)
GLUCOSE URINE: NEGATIVE MG/DL
HCT VFR BLD CALC: 28.7 % (ref 40.5–52.5)
HEMOGLOBIN: 9.7 G/DL (ref 13.5–17.5)
KETONES, URINE: NEGATIVE MG/DL
LEUKOCYTE ESTERASE, URINE: NEGATIVE
LYMPHOCYTES ABSOLUTE: 0.1 K/UL (ref 1–5.1)
LYMPHOCYTES RELATIVE PERCENT: 2 %
MCH RBC QN AUTO: 32.9 PG (ref 26–34)
MCHC RBC AUTO-ENTMCNC: 33.8 G/DL (ref 31–36)
MCV RBC AUTO: 97.4 FL (ref 80–100)
MICROSCOPIC EXAMINATION: NORMAL
MONOCYTES ABSOLUTE: 0 K/UL (ref 0–1.3)
MONOCYTES RELATIVE PERCENT: 1 %
NEUTROPHILS ABSOLUTE: 2.4 K/UL (ref 1.7–7.7)
NEUTROPHILS RELATIVE PERCENT: 96.1 %
NITRITE, URINE: NEGATIVE
PDW BLD-RTO: 21.3 % (ref 12.4–15.4)
PH UA: 6 (ref 5–8)
PLATELET # BLD: 131 K/UL (ref 135–450)
PMV BLD AUTO: 7.3 FL (ref 5–10.5)
POTASSIUM SERPL-SCNC: 4.4 MMOL/L (ref 3.5–5.1)
PROTEIN UA: NEGATIVE MG/DL
RBC # BLD: 2.94 M/UL (ref 4.2–5.9)
SODIUM BLD-SCNC: 138 MMOL/L (ref 136–145)
SPECIFIC GRAVITY UA: 1.02 (ref 1–1.03)
URINE TYPE: NORMAL
UROBILINOGEN, URINE: 0.2 E.U./DL
WBC # BLD: 2.5 K/UL (ref 4–11)

## 2020-11-08 PROCEDURE — 6370000000 HC RX 637 (ALT 250 FOR IP): Performed by: NURSE PRACTITIONER

## 2020-11-08 PROCEDURE — 80048 BASIC METABOLIC PNL TOTAL CA: CPT

## 2020-11-08 PROCEDURE — 36592 COLLECT BLOOD FROM PICC: CPT

## 2020-11-08 PROCEDURE — 81003 URINALYSIS AUTO W/O SCOPE: CPT

## 2020-11-08 PROCEDURE — 6360000002 HC RX W HCPCS: Performed by: INTERNAL MEDICINE

## 2020-11-08 PROCEDURE — 6360000002 HC RX W HCPCS: Performed by: NURSE PRACTITIONER

## 2020-11-08 PROCEDURE — 6370000000 HC RX 637 (ALT 250 FOR IP): Performed by: INTERNAL MEDICINE

## 2020-11-08 PROCEDURE — 2580000003 HC RX 258: Performed by: INTERNAL MEDICINE

## 2020-11-08 PROCEDURE — 2060000000 HC ICU INTERMEDIATE R&B

## 2020-11-08 PROCEDURE — 2580000003 HC RX 258: Performed by: NURSE PRACTITIONER

## 2020-11-08 PROCEDURE — 85025 COMPLETE CBC W/AUTO DIFF WBC: CPT

## 2020-11-08 RX ADMIN — VALACYCLOVIR HYDROCHLORIDE 500 MG: 500 TABLET, FILM COATED ORAL at 19:44

## 2020-11-08 RX ADMIN — AMLODIPINE BESYLATE 5 MG: 5 TABLET ORAL at 09:05

## 2020-11-08 RX ADMIN — ENOXAPARIN SODIUM 40 MG: 40 INJECTION SUBCUTANEOUS at 19:01

## 2020-11-08 RX ADMIN — TACROLIMUS 3 MG: 1 CAPSULE ORAL at 19:44

## 2020-11-08 RX ADMIN — SODIUM CHLORIDE 15 ML: 900 IRRIGANT IRRIGATION at 21:26

## 2020-11-08 RX ADMIN — SOTALOL HYDROCHLORIDE 120 MG: 120 TABLET ORAL at 09:06

## 2020-11-08 RX ADMIN — FLUCONAZOLE 400 MG: 200 TABLET ORAL at 09:05

## 2020-11-08 RX ADMIN — Medication 10 ML: at 21:25

## 2020-11-08 RX ADMIN — FLUDARABINE PHOSPHATE 62.5 MG: 25 INJECTION, SOLUTION INTRAVENOUS at 10:02

## 2020-11-08 RX ADMIN — SOTALOL HYDROCHLORIDE 120 MG: 120 TABLET ORAL at 19:44

## 2020-11-08 RX ADMIN — SODIUM CHLORIDE: 9 INJECTION, SOLUTION INTRAVENOUS at 10:02

## 2020-11-08 RX ADMIN — PROCHLORPERAZINE MALEATE 10 MG: 10 TABLET ORAL at 18:58

## 2020-11-08 RX ADMIN — ONDANSETRON HYDROCHLORIDE 12 MG: 8 TABLET, FILM COATED ORAL at 09:05

## 2020-11-08 RX ADMIN — SODIUM CHLORIDE 15 ML: 900 IRRIGANT IRRIGATION at 06:30

## 2020-11-08 RX ADMIN — ALLOPURINOL 300 MG: 300 TABLET ORAL at 09:05

## 2020-11-08 RX ADMIN — SODIUM CHLORIDE: 9 INJECTION, SOLUTION INTRAVENOUS at 09:05

## 2020-11-08 RX ADMIN — Medication 10 ML: at 09:05

## 2020-11-08 RX ADMIN — LISINOPRIL 40 MG: 40 TABLET ORAL at 09:05

## 2020-11-08 RX ADMIN — URSODIOL 500 MG: 250 TABLET, FILM COATED ORAL at 19:44

## 2020-11-08 RX ADMIN — TACROLIMUS 3 MG: 1 CAPSULE ORAL at 09:05

## 2020-11-08 RX ADMIN — PROCHLORPERAZINE MALEATE 10 MG: 10 TABLET ORAL at 13:53

## 2020-11-08 RX ADMIN — VALACYCLOVIR HYDROCHLORIDE 500 MG: 500 TABLET, FILM COATED ORAL at 09:05

## 2020-11-08 RX ADMIN — URSODIOL 500 MG: 250 TABLET, FILM COATED ORAL at 09:05

## 2020-11-08 ASSESSMENT — PAIN SCALES - GENERAL
PAINLEVEL_OUTOF10: 0

## 2020-11-08 NOTE — PROGRESS NOTES
[facility] Allogeneic Progress Note    2020    Latrice Hendricks    :  1960    MRN:  [id number]    Referring MD: Raisa Escalera MD  [address]  [address] [place], [address]      Subjective:  Queasiness last night but improved with meds.        ECOG PS:  (1) Restricted in physically strenuous activity, ambulatory and able to do work of light nature    KPS: 80% Normal activity with effort; some signs or symptoms of disease    Isolation:  None     Medications    Scheduled Meds:   allopurinol  300 mg Oral Daily    sodium chloride flush  10 mL Intravenous 2 times per day    Saline Mouthwash  15 mL Swish & Spit 4x Daily AC & HS    valACYclovir  500 mg Oral BID    enoxaparin  40 mg Subcutaneous Daily    sotalol  120 mg Oral BID    amLODIPine  5 mg Oral Daily    And    lisinopril  40 mg Oral Daily    furosemide  40 mg Intravenous Q12H    ondansetron  12 mg Oral Q24H    fludarabine (FLUDARA) chemo IVPB  62.5 mg Intravenous Q24H    tacrolimus  3 mg Oral Q12H    [START ON 2020] methotrexate sodium  12.5 mg Intravenous Once    [START ON 2020] methotrexate sodium  12.5 mg Intravenous Once    [START ON 2020] methotrexate sodium  12.5 mg Intravenous Once    ursodiol  500 mg Oral BID    [START ON 2020] methotrexate sodium  12.5 mg Intravenous Once    fluconazole  400 mg Oral Daily     Continuous Infusions:   sodium chloride      sodium chloride 150 mL/hr at 20 0905     PRN Meds:sennosides-docusate sodium, sodium chloride, sodium chloride flush, potassium chloride, magnesium sulfate, magnesium hydroxide, Saline Mouthwash, alteplase, prochlorperazine **OR** prochlorperazine, LORazepam **OR** LORazepam    ROS:  As noted above, otherwise remainder of 10-point ROS negative    Physical Exam:    I&O:      Intake/Output Summary (Last 24 hours) at 2020 0941  Last data filed at 2020 0421  Gross per 24 hour   Intake 4350 ml   Output 6400 ml   Net -2050 ml       Vital Signs:  /70   Pulse 68   Temp 97.7 °F (36.5 °C) (Oral)   Resp 18   Ht 6' 3\" (1.905 m)   Wt (!) 329 lb 6.4 oz (149.4 kg)   SpO2 98%   BMI 41.17 kg/m²     Weight:    Wt Readings from Last 3 Encounters:   11/08/20 (!) 329 lb 6.4 oz (149.4 kg)   10/08/20 (!) 325 lb (147.4 kg)   09/29/20 (!) 325 lb 13.4 oz (147.8 kg)       General: Awake, alert and oriented. HEENT: normocephalic, alopecia, PERRL, no scleral erythema or icterus, Oral mucosa moist and intact, throat clear  NECK: supple without palpable adenopathy  BACK: Straight, negative CVAT  SKIN: warm dry and intact without lesions rashes or masses  CHEST: CTA bilaterally without use of accessory muscles  CV: Normal S1 S2, RRR, no MRG  ABD: NT, ND, normoactive BS, no palpable masses or hepatosplenomegaly  EXTREMITIES: without edema, denies calf tenderness  NEURO: CN II - XII grossly intact  CATHETER: Right tri-fusion ([facility], 6/11/20) - CDI    Data:   CBC:   Recent Labs     11/06/20 0345 11/07/20 0329 11/08/20 0312   WBC 7.0 5.1 2.5*   HGB 10.4* 10.1* 9.7*   HCT 31.3* 30.3* 28.7*   MCV 97.5 97.8 97.4    159 131*     BMP/Mag:  Recent Labs     11/05/20  1113 11/06/20  0345 11/07/20  0329 11/08/20  0312    137 140 138   K 4.4 4.4 4.7 4.4    106 107 106   CO2 23 23 23 23   PHOS 3.9 3.5  --   --    BUN 24* 19 27* 29*   CREATININE 1.3 1.2 1.3 1.2   MG 1.80  --   --   --      LIVP:   Recent Labs     11/05/20  1113 11/06/20  0345   AST 18 15   ALT 22 22   BILIDIR <0.2 <0.2   BILITOT 0.5 0.4   ALKPHOS 105 93     Uric Acid:    Recent Labs     11/06/20  0345   LABURIC 8.6*     Coags:   Recent Labs     11/05/20  1113   PROTIME 11.5   INR 0.99   APTT 34.4        PROBLEM LIST:         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 CMML - 1 (Dx 10/2020)  3. Atrial Fibrillation  4. CASH  5. HTN      TREATMENT:        1. Induction: Sweetie-C & Daunorubicin (6/12/20)  2.   Consolidation:  HiDAC x 3 cycles (7/28/20, 8/29/20 &

## 2020-11-08 NOTE — PROGRESS NOTES
VSS. Ortho negative. Assessment completed. Scheduled meds given whole with water. Pt c/o feeling queasy. Pt resting comfortably in chair. Call light within reach. Denies further needs at this time. Will continue to monitor.  Electronically signed by Aris Cartwright RN on 11/8/2020 at 10:12 AM

## 2020-11-08 NOTE — PLAN OF CARE
precautions. No living plants or fresh flowers in his/her room. Patient educated on wearing mask when in hallways. Patient, staff, and visitors adhering to handwashing guidelines. Patient cleansed with chlorhexidine wipes and linens changed daily per protocol. Pt verbalizes understanding of low microbial diet. Patient remains free of nosocomial infections.

## 2020-11-08 NOTE — PLAN OF CARE
Problem: Infection - Central Venous Catheter-Associated Bloodstream Infection:  Goal: Will show no infection signs and symptoms  Description: Will show no infection signs and symptoms  Outcome: Met This Shift  Note: CVC site remains free of signs/symptoms of infection. No drainage, edema, erythema, pain, or warmth noted at site. Dressing changes continue per protocol and on an as needed basis - see flowsheet. Problem: Bleeding:  Goal: Will show no signs and symptoms of excessive bleeding  Description: Will show no signs and symptoms of excessive bleeding  Outcome: Met This Shift  Note: Patient's hemoglobin this AM:   Recent Labs     11/07/20 0329   HGB 10.1*     Patient's platelet count this AM:   Recent Labs     11/07/20 0329       Thrombocytopenia not present at this time. Patient showing no signs or symptoms of active bleeding. Transfusion not indicated at this time. Patient verbalizes understanding of all instructions. Will continue to assess and implement POC. Call light within reach and hourly rounding in place. Problem: Venous Thromboembolism:  Goal: Will show no signs or symptoms of venous thromboembolism  Description: Will show no signs or symptoms of venous thromboembolism  Outcome: Met This Shift  Note: Adherent with DVT Prevention: Pt is at risk for DVT d/t decreased mobility and cancer treatment. Pt educated on importance of activity. Pt verbalizes understanding of need for prophylaxis while inpatient. Problem: PROTECTIVE PRECAUTIONS  Goal: Patient will remain free of nosocomial Infections  Outcome: Met This Shift  Note: Pt remains in protective isolation. Pt and family aware of precautions. Pt following low microbial diet. Pt remaining afebrile, denies chills. Will continue to monitor.       Problem: Falls - Risk of:  Goal: Will remain free from falls  Description: Will remain free from falls  Note: Orthostatic vital signs obtained at start of shift - see flowsheet for details. Pt does not meet criteria for orthostasis. Pt is a Med fall risk. See Stephenson Flatten Fall Score and ABCDS Injury Risk assessments. - Screening for Orthostasis AND not a [place] Risk per CAMEJO/ABCDS: Pt bed is in low position, side rails up, call light and belongings are in reach. Fall risk light is on outside pts room. Pt encouraged to call for assistance as needed. Will continue with hourly rounds for PO intake, pain needs, toileting and repositioning as needed.

## 2020-11-08 NOTE — PROGRESS NOTES
Administration: Chemotherapy drug fludarabine independently verified with Natalya Avalos RN prior to administration. Acknowledgement of informed consent for chemotherapy administration verified. Original order, appropriateness of regimen, drug supplied, height, weight, BSA, dose calculations, expiration dates/times, drug appearance, and two patient identifiers were verified by both RNs. Drug checked for vesicant/irritant status and for risk of hypersensitivity. Most recent laboratory values and allergies, were reviewed. Positive, brisk blood return via CVC was confirmed prior to administration. Chest x-ray for correct line placement reviewed. Joycelyn Crowley and Natalya Avalos RN verified correct rate of chemotherapy and maintenance IV fluids. Patient was educated on chemotherapy regimen prior to administration including indication for treatment related to disease & side effects of chemotherapy drug. Patient verbalizes understanding of all instructions.

## 2020-11-09 ENCOUNTER — APPOINTMENT (OUTPATIENT)
Dept: GENERAL RADIOLOGY | Age: 60
DRG: 014 | End: 2020-11-09
Attending: INTERNAL MEDICINE
Payer: COMMERCIAL

## 2020-11-09 LAB
ALBUMIN SERPL-MCNC: 4 G/DL (ref 3.4–5)
ALP BLD-CCNC: 78 U/L (ref 40–129)
ALT SERPL-CCNC: 25 U/L (ref 10–40)
ANION GAP SERPL CALCULATED.3IONS-SCNC: 7 MMOL/L (ref 3–16)
APTT: 35.6 SEC (ref 24.2–36.2)
AST SERPL-CCNC: 18 U/L (ref 15–37)
BASOPHILS ABSOLUTE: 0 K/UL (ref 0–0.2)
BASOPHILS RELATIVE PERCENT: 0.5 %
BILIRUB SERPL-MCNC: 0.5 MG/DL (ref 0–1)
BILIRUBIN DIRECT: <0.2 MG/DL (ref 0–0.3)
BILIRUBIN, INDIRECT: ABNORMAL MG/DL (ref 0–1)
BUN BLDV-MCNC: 21 MG/DL (ref 7–20)
CALCIUM SERPL-MCNC: 8.6 MG/DL (ref 8.3–10.6)
CHLORIDE BLD-SCNC: 107 MMOL/L (ref 99–110)
CO2: 24 MMOL/L (ref 21–32)
CREAT SERPL-MCNC: 1 MG/DL (ref 0.8–1.3)
EOSINOPHILS ABSOLUTE: 0 K/UL (ref 0–0.6)
EOSINOPHILS RELATIVE PERCENT: 1.1 %
GFR AFRICAN AMERICAN: >60
GFR NON-AFRICAN AMERICAN: >60
GLUCOSE BLD-MCNC: 114 MG/DL (ref 70–99)
HCT VFR BLD CALC: 29 % (ref 40.5–52.5)
HEMOGLOBIN: 9.7 G/DL (ref 13.5–17.5)
INR BLD: 0.94 (ref 0.86–1.14)
LACTATE DEHYDROGENASE: 160 U/L (ref 100–190)
LYMPHOCYTES ABSOLUTE: 0 K/UL (ref 1–5.1)
LYMPHOCYTES RELATIVE PERCENT: 1 %
MAGNESIUM: 2.1 MG/DL (ref 1.8–2.4)
MCH RBC QN AUTO: 32.6 PG (ref 26–34)
MCHC RBC AUTO-ENTMCNC: 33.5 G/DL (ref 31–36)
MCV RBC AUTO: 97.3 FL (ref 80–100)
MONOCYTES ABSOLUTE: 0 K/UL (ref 0–1.3)
MONOCYTES RELATIVE PERCENT: 0.1 %
NEUTROPHILS ABSOLUTE: 1.8 K/UL (ref 1.7–7.7)
NEUTROPHILS RELATIVE PERCENT: 97.3 %
PDW BLD-RTO: 20.8 % (ref 12.4–15.4)
PHOSPHORUS: 3.8 MG/DL (ref 2.5–4.9)
PLATELET # BLD: 104 K/UL (ref 135–450)
PMV BLD AUTO: 7.2 FL (ref 5–10.5)
POTASSIUM SERPL-SCNC: 4.3 MMOL/L (ref 3.5–5.1)
PROTHROMBIN TIME: 10.9 SEC (ref 10–13.2)
RBC # BLD: 2.98 M/UL (ref 4.2–5.9)
SODIUM BLD-SCNC: 138 MMOL/L (ref 136–145)
TOTAL PROTEIN: 5.6 G/DL (ref 6.4–8.2)
URIC ACID, SERUM: 7.4 MG/DL (ref 3.5–7.2)
WBC # BLD: 1.9 K/UL (ref 4–11)

## 2020-11-09 PROCEDURE — 6370000000 HC RX 637 (ALT 250 FOR IP): Performed by: INTERNAL MEDICINE

## 2020-11-09 PROCEDURE — 83615 LACTATE (LD) (LDH) ENZYME: CPT

## 2020-11-09 PROCEDURE — 85025 COMPLETE CBC W/AUTO DIFF WBC: CPT

## 2020-11-09 PROCEDURE — 2060000000 HC ICU INTERMEDIATE R&B

## 2020-11-09 PROCEDURE — 2580000003 HC RX 258: Performed by: INTERNAL MEDICINE

## 2020-11-09 PROCEDURE — 85610 PROTHROMBIN TIME: CPT

## 2020-11-09 PROCEDURE — 36592 COLLECT BLOOD FROM PICC: CPT

## 2020-11-09 PROCEDURE — 94761 N-INVAS EAR/PLS OXIMETRY MLT: CPT

## 2020-11-09 PROCEDURE — 6370000000 HC RX 637 (ALT 250 FOR IP): Performed by: NURSE PRACTITIONER

## 2020-11-09 PROCEDURE — 6360000002 HC RX W HCPCS: Performed by: NURSE PRACTITIONER

## 2020-11-09 PROCEDURE — 83735 ASSAY OF MAGNESIUM: CPT

## 2020-11-09 PROCEDURE — 2580000003 HC RX 258: Performed by: NURSE PRACTITIONER

## 2020-11-09 PROCEDURE — 6360000002 HC RX W HCPCS: Performed by: INTERNAL MEDICINE

## 2020-11-09 PROCEDURE — 80076 HEPATIC FUNCTION PANEL: CPT

## 2020-11-09 PROCEDURE — 85730 THROMBOPLASTIN TIME PARTIAL: CPT

## 2020-11-09 PROCEDURE — 96413 CHEMO IV INFUSION 1 HR: CPT

## 2020-11-09 PROCEDURE — 38204 BL DONOR SEARCH MANAGEMENT: CPT

## 2020-11-09 PROCEDURE — 71045 X-RAY EXAM CHEST 1 VIEW: CPT

## 2020-11-09 PROCEDURE — 80048 BASIC METABOLIC PNL TOTAL CA: CPT

## 2020-11-09 PROCEDURE — 84550 ASSAY OF BLOOD/URIC ACID: CPT

## 2020-11-09 PROCEDURE — 84100 ASSAY OF PHOSPHORUS: CPT

## 2020-11-09 RX ADMIN — VALACYCLOVIR HYDROCHLORIDE 500 MG: 500 TABLET, FILM COATED ORAL at 08:49

## 2020-11-09 RX ADMIN — PROCHLORPERAZINE MALEATE 10 MG: 10 TABLET ORAL at 19:00

## 2020-11-09 RX ADMIN — FLUCONAZOLE 400 MG: 200 TABLET ORAL at 08:49

## 2020-11-09 RX ADMIN — FLUDARABINE PHOSPHATE 62.5 MG: 25 INJECTION, SOLUTION INTRAVENOUS at 10:16

## 2020-11-09 RX ADMIN — SODIUM CHLORIDE 15 ML: 900 IRRIGANT IRRIGATION at 20:18

## 2020-11-09 RX ADMIN — LISINOPRIL 40 MG: 40 TABLET ORAL at 08:49

## 2020-11-09 RX ADMIN — ONDANSETRON HYDROCHLORIDE 12 MG: 8 TABLET, FILM COATED ORAL at 09:27

## 2020-11-09 RX ADMIN — SODIUM CHLORIDE 15 ML: 900 IRRIGANT IRRIGATION at 05:12

## 2020-11-09 RX ADMIN — SODIUM CHLORIDE: 9 INJECTION, SOLUTION INTRAVENOUS at 23:06

## 2020-11-09 RX ADMIN — Medication 10 ML: at 20:18

## 2020-11-09 RX ADMIN — ALLOPURINOL 300 MG: 300 TABLET ORAL at 08:50

## 2020-11-09 RX ADMIN — PROCHLORPERAZINE MALEATE 10 MG: 10 TABLET ORAL at 12:39

## 2020-11-09 RX ADMIN — SOTALOL HYDROCHLORIDE 120 MG: 120 TABLET ORAL at 20:17

## 2020-11-09 RX ADMIN — TACROLIMUS 3 MG: 1 CAPSULE ORAL at 08:49

## 2020-11-09 RX ADMIN — URSODIOL 500 MG: 250 TABLET, FILM COATED ORAL at 20:17

## 2020-11-09 RX ADMIN — URSODIOL 500 MG: 250 TABLET, FILM COATED ORAL at 08:49

## 2020-11-09 RX ADMIN — ENOXAPARIN SODIUM 40 MG: 40 INJECTION SUBCUTANEOUS at 16:56

## 2020-11-09 RX ADMIN — AMLODIPINE BESYLATE 5 MG: 5 TABLET ORAL at 08:49

## 2020-11-09 RX ADMIN — Medication 10 ML: at 09:31

## 2020-11-09 RX ADMIN — SODIUM CHLORIDE: 9 INJECTION, SOLUTION INTRAVENOUS at 09:30

## 2020-11-09 RX ADMIN — SOTALOL HYDROCHLORIDE 120 MG: 120 TABLET ORAL at 09:27

## 2020-11-09 RX ADMIN — SODIUM CHLORIDE: 9 INJECTION, SOLUTION INTRAVENOUS at 16:56

## 2020-11-09 RX ADMIN — VALACYCLOVIR HYDROCHLORIDE 500 MG: 500 TABLET, FILM COATED ORAL at 20:17

## 2020-11-09 RX ADMIN — TACROLIMUS 3 MG: 1 CAPSULE ORAL at 20:17

## 2020-11-09 ASSESSMENT — PAIN SCALES - GENERAL
PAINLEVEL_OUTOF10: 0

## 2020-11-09 NOTE — CARE COORDINATION
Type of Admission  AML  MUD ( Female Donor) T:0: 11/11/20  Prep. Regimen: Melphalan+Fludara  Day -2        Central venous catheter  Right TLC ( 9/29/20 , pre-existing from 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 Keeping active by use elliptical equipment in his room. 11/9/20:  Using exercise bike in room. Education  11/6/20:  Re-introduced myself in RN Discharge planner role. States his cousin Omar Ballard, who is a nurse will be coming into town by Thanksgiving to act as his Care giver. Patient and caregiver have been through the educational process for allogeneic bone marrow transplantation including the allogeneic family meeting, preadmission teaching and preadmission physician office visit. , with Kayla English RN BMT Coordinator. Patient and caregiver verbalize understanding of treatment regimen, possible adverse events, length of stay, and risk and benefits of transplantation and are agreeable to proceed. Patient and caregiver have been given an opportunity to ask, and to have their questions answered to their satisfaction. Documentation for above education can be found in the [facility], [facility] office chart. Fernandachelsea Carter      Discharge  DISCHARGE ROUNDING:  Date:_11/9    Team members present : NP, SW, Charge RN, RN D/C Planner, [address]    Anticipated date of discharge: When [address] ois ? 1.0    Active problems/barriers to discharge    Home needs:     Caregivers: Tono Whitney ( RN)    Home medication issues: ( [facility]: [phone])     Patient/caregiver aware of plan?   Yes           Pending

## 2020-11-09 NOTE — PROGRESS NOTES
BCC Allogeneic Progress Note    2020    Sylvia Alexandre    :  1960    MRN:  [id number]    Referring MD: Sofia Thompson MD  [address]  [address], [address]      Subjective:  Feels well this am, denies sore throat, fevers, chills, cough, CP, SOB, abdominal pain, N/V, diarrhea. Reports good appetite and tolerating diet.       ECOG PS:  (1) Restricted in physically strenuous activity, ambulatory and able to do work of light nature    KPS: 80% Normal activity with effort; some signs or symptoms of disease    Isolation:  None     Medications    Scheduled Meds:   allopurinol  300 mg Oral Daily    sodium chloride flush  10 mL Intravenous 2 times per day    Saline Mouthwash  15 mL Swish & Spit 4x Daily AC & HS    valACYclovir  500 mg Oral BID    enoxaparin  40 mg Subcutaneous Daily    sotalol  120 mg Oral BID    amLODIPine  5 mg Oral Daily    And    lisinopril  40 mg Oral Daily    furosemide  40 mg Intravenous Q12H    ondansetron  12 mg Oral Q24H    fludarabine (FLUDARA) chemo IVPB  62.5 mg Intravenous Q24H    tacrolimus  3 mg Oral Q12H    [START ON 2020] methotrexate sodium  12.5 mg Intravenous Once    [START ON 2020] methotrexate sodium  12.5 mg Intravenous Once    [START ON 2020] methotrexate sodium  12.5 mg Intravenous Once    ursodiol  500 mg Oral BID    [START ON 2020] methotrexate sodium  12.5 mg Intravenous Once    fluconazole  400 mg Oral Daily     Continuous Infusions:   sodium chloride      sodium chloride 150 mL/hr at 20 1002     PRN Meds:sennosides-docusate sodium, sodium chloride, sodium chloride flush, potassium chloride, magnesium sulfate, magnesium hydroxide, Saline Mouthwash, alteplase, prochlorperazine **OR** prochlorperazine, LORazepam **OR** LORazepam    ROS:  As noted above, otherwise remainder of 10-point ROS negative    Physical Exam:    I&O:      Intake/Output Summary (Last 24 hours) at 11/9/2020 0726  Last data filed at 11/9/2020 0511  Gross per 24 hour   Intake 5543 ml   Output 6100 ml   Net -557 ml       Vital Signs:  BP (!) 144/73   Pulse 73   Temp 97.6 °F (36.4 °C) (Oral)   Resp 16   Ht 6' 3\" (1.905 m)   Wt (!) 329 lb 6.4 oz (149.4 kg)   SpO2 98%   BMI 41.17 kg/m²     Weight:    Wt Readings from Last 3 Encounters:   11/08/20 (!) 329 lb 6.4 oz (149.4 kg)   10/08/20 (!) 325 lb (147.4 kg)   09/29/20 (!) 325 lb 13.4 oz (147.8 kg)       General: Awake, alert and oriented. HEENT: normocephalic, alopecia, PERRL, no scleral erythema or icterus, Oral mucosa moist and intact, throat clear  NECK: supple without palpable adenopathy  BACK: Straight, negative CVAT  SKIN: warm dry and intact without lesions rashes or masses  CHEST: CTA bilaterally without use of accessory muscles  CV: Normal S1 S2, RRR, no MRG  ABD: NT, ND, normoactive BS, no palpable masses or hepatosplenomegaly  EXTREMITIES: without edema, denies calf tenderness  NEURO: CN II - XII grossly intact  CATHETER: Right tri-fusion ([facility], 6/11/20) - CDI    Data:   CBC:   Recent Labs     11/07/20 0329 11/08/20 0312 11/09/20 0256   WBC 5.1 2.5* 1.9*   HGB 10.1* 9.7* 9.7*   HCT 30.3* 28.7* 29.0*   MCV 97.8 97.4 97.3    131* 104*     BMP/Mag:  Recent Labs     11/07/20 0329 11/08/20 0312 11/09/20  0256    138 138   K 4.7 4.4 4.3    106 107   CO2 23 23 24   PHOS  --   --  3.8   BUN 27* 29* 21*   CREATININE 1.3 1.2 1.0   MG  --   --  2.10     LIVP:   Recent Labs     11/09/20  0256   AST 18   ALT 25   BILIDIR <0.2   BILITOT 0.5   ALKPHOS 78     Uric Acid:    Recent Labs     11/09/20  0256   LABURIC 7.4*     Coags:   Recent Labs     11/09/20  0256   PROTIME 10.9   INR 0.94   APTT 35.6        PROBLEM LIST:         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 CMML - 1 (Dx 10/2020)  3. Atrial Fibrillation  4. CASH  5. HTN      TREATMENT:        1. Induction: Sweetie-C & Daunorubicin (6/12/20)  2.   Consolidation:  HiDAC x 3 cycles (7/28/20, 8/29/20 & 9/22/20)  3. MUD Allo - PB BMT (DPB1 non-permissive mismatch)  Preparative Regimen:  Melphalan and Fludarabine  Date of BMT:  11/11/20  Source of stem cells:  PBPC  Donor/Recipient Blood Type:  A positive / 0 positive   Donor Sex:  Female / NMDP (DID # [id number]) - Follow FISH XY  CMV Donor / Recipient: Negative / Negative       ASSESSMENT AND PLAN:        1. AML - intermediate risk / CMML-1:  - BM bx/asp (10/14/20) - No AML, but shows CMML-1   - Admitted for Melphalan & Fludarabine f/b allogeneic PBSC infusion 11/11/20     Day - 2     2. ID:  No evidence of infection.    - Cont Valtrex ppx   - Continue diflucan ppx - started 11/8   - Start Levaquin when [address] < 1.5     Donor/Recipient CMV:  Negative / negative   - Routine surveillance is not required       3. Heme:  Anemia from chemotherapy . - Transfuse for Hgb < 7 and Platelets < 61V  - No transfusion today     4. Metabolic / CKD III: Baseline SCr 1.2-1.3  - D/c KCl 20 mEq BID  - Cont IVFs: NS at 150 mL/hr   - Replace potassium and magnesium per PRN orders     5.  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  - Start Prograf 3mg BID 11/8/20. First level 11/12/20 then qMWF     6. VOD:  No evidence of VOD. Admission Weight: (!) 324 lb 3.2 oz (147.1 kg)  Current Weight: Weight: (!) 329 lb 6.4 oz (149.4 kg). Recent Labs     11/09/20  0256   BILIDIR <0.2   BILITOT 0.5     - Cont Actigall     7. Pulmonary: H/o CASH  - PFT (10/6/20) - FEV1/FVC 71 w/ predicted ratio of 75 & DLCO 41% (corrected 84%). He has moderate obstruction by FEV1/FVC ratio lower than predicted and also based on ATS criteria with low FEV, likely d/t morbid obesity   - Pulm clearance by Dr. Brenda Alvarado  CASH:  - Cont CPAP  - Encourage IS and ambulation      8. GI / Nutrition:    Nutrition:  Appetite is good at this point.  - Cont low microbial diet  - Dietary to follow     9. Cardiac: H/o A.  Fib & HTN  - Echo (9/29/20) - LVEF 60-65% w/ left atrial dilatation  A. Fib:  - Cont sotalol 120 mg BID  HTN:   - Cont amlodipine-lisinopril 5/40 mg daily     - DVT Prophylaxis: Platelets >64,329 cells/dL, - daily lovenox prophylaxis ordered  Contraindications to pharmacologic prophylaxis: None  Contraindications to mechanical prophylaxis: None     - Disposition:  D/c home once ANC > 1.0 & toxicities resolved following allogeneic transplant       Electronically signed by Mike Murillo MD on 11/9/2020 at 8:26 AM  PGY-2  Internal Medicine Resident    Rebecca Smiley MD  [facility]  Please contact me through [address]

## 2020-11-09 NOTE — CARE COORDINATION
some time. 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 He shared fond memories of different laws that have come about because of his work. 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 Shahla Doe feels financially stable but did express concern because he does not have short-term or long-term disability benefits. He also works on commission, so if he is not working, he is not making money. He has money in savings but stated that his assisted would take a hit because of this. His biggest expense currently is his insurance premium, which is about $640/month. SW to assist with [address] that can assist with the cost of some of these insurance premiums. He was also given the NMDP pre-transplant marilyn form to complete to see if he is eligible for funds from them. He should be eligible for post-transplant NMDP marilyn as well. Since Shahla Doe has a [employer], he will not run the risk of losing insurance if he continues to pay his premiums. Lifestyle: In his free time, Shahla Doe enjoys spending time with friends and family. He plays the trombone in a band. He enjoys cooking and entertaining at his house. While admitted, he plans to work as much as he can. He will also watch movies. Shahla Doe denies use of tobacco or drugs. He says that he drinks a normal amount of alcohol. He has not had alcohol since his diagnosis he reports. Advanced Directives:    Shahla Doe will bring advanced directives with him once he is admitted and AMANDA will place on his chart.     Psychosocial Assessment of Candidates for Transplantation (PACT)    I. SOCIAL SUPPORT    (1) Family Support and/or Support System Stability     1  2  3   4  5   No strong interpersonal ties / highly unstable relationships  Some stable relationships, some problems evident  Stable committed relationships, good mental health in supporters    (2) Family Support and/or Support System Availability (will come into town)    1  2  3   4  5  Support unavailable  Support availability limited by emotional or geographic factors  In town with patient through process, emotionally supportive      II. PSYCHOLOGICAL HEALTH    (3) Psychopathology / Stable Personality Factors    1  2  3   4  5  Severe ongoing psychopathology   i.e. psychosis, recurrent depression, personality disorder  Moderate personality  or adjustment problems, reactive anxiety   or situational depression    Well-adjusted    (4) Risk for Psychopathology    1  2  3   4  5  Strong family history of psychopathology or previous psychiatric illness in patient  Periods of poor coping, some psychological sensitivity to meds, some family or spouse psychopathology  No history of major psychopathology in family or self, no periods of poor coping    III. LIFESTYLE FACTORS    (5) Healthy Lifestyle, Ability to Sustain Change in Lifestyle    1  2  3   4  5  Sedentary lifestyle, major dietary problems, ongoing smoking, reluctant to change  Some lifestyle change, may require further education to reduce controllable risk  Major, sustained changes in lifestyle with no major risk factors and willing to change    (6) Drug and Alcohol Abuse    1  2  3   4  5  Dependence with reluctance to change  Use in moderation and willing to discontinue  Abstinence or rare use    (7) Compliance with Medications and Medical Advice    1  2  3   4  5  Unreliable compliance, unconcerned and does not consult physician  Knowledgeable regarding medications, near adequate compliance, usually not vigilant   keeps records and consults physician      IV.  UNDERSTANDING OF TRANSPLANT AND FOLLOW-UP    (8) Relevant Knowledge and Receptiveness to Education    1  2  3   4  5  No idea of what is involved, views transplant as cure with no

## 2020-11-09 NOTE — PLAN OF CARE
Problem: Falls - Risk of:  Goal: Will remain free from falls  Description: Will remain free from falls  11/8/2020 2346 by Ubaldo Stephens RN  Outcome: Met This Shift  Note: Orthostatic vital signs obtained at start of shift - see flowsheet for details. Pt does not meet criteria for orthostasis. Pt is a Med fall risk. See [facility] Fall Score and ABCDS Injury Risk assessments. - Screening for Orthostasis AND not a [place] Risk per CAMEJO/ABCDS: Pt bed is in low position, side rails up, call light and belongings are in reach. Fall risk light is on outside pts room. Pt encouraged to call for assistance as needed. Will continue with hourly rounds for PO intake, pain needs, toileting and repositioning as needed. Problem: Bleeding:  Goal: Will show no signs and symptoms of excessive bleeding  Description: Will show no signs and symptoms of excessive bleeding  11/8/2020 2346 by Ubaldo Stephens RN  Outcome: Met This Shift  Note: Patient's hemoglobin this AM:   Recent Labs     11/08/20 0312   HGB 9.7*     Patient's platelet count this AM:   Recent Labs     11/08/20 0312   *    Thrombocytopenia not present at this time. Patient showing no signs or symptoms of active bleeding. Transfusion not indicated at this time. Patient verbalizes understanding of all instructions. Will continue to assess and implement POC. Call light within reach and hourly rounding in place. Problem: Infection - Central Venous Catheter-Associated Bloodstream Infection:  Goal: Will show no infection signs and symptoms  Description: Will show no infection signs and symptoms  11/8/2020 2346 by Ubaldo Stephens RN  Outcome: Ongoing  Note: CVC site remains free of signs/symptoms of infection. No drainage, edema, erythema, pain, or warmth noted at site. Dressing changes continue per protocol and on an as needed basis - see flowsheet.           Problem: Venous Thromboembolism:  Goal: Will show no signs or symptoms of venous

## 2020-11-09 NOTE — PLAN OF CARE
at this time. Patient verbalizes understanding of all instructions. Will continue to assess and implement POC. Call light within reach and hourly rounding in place. Problem: Discharge Planning:  Goal: Discharged to appropriate level of care  Description: Discharged to appropriate level of care  11/9/2020 1023 by Azael Arriola RN  Outcome: Ongoing   Pt is aware of current plan of care. Problem: PROTECTIVE PRECAUTIONS  Goal: Patient will remain free of nosocomial Infections  11/9/2020 1023 by Azael Arriola RN  Outcome: Ongoing   Pt remains in neutropenic precautions per floor policy. Pt, visitors, and staff noted to be following precautions appropriately. Handwashing in place; pt wearing mask in hallway per protocol. Pt in private room. Low microbial diet in place. Will continue to monitor.

## 2020-11-10 LAB
ANION GAP SERPL CALCULATED.3IONS-SCNC: 7 MMOL/L (ref 3–16)
ANISOCYTOSIS: ABNORMAL
BANDED NEUTROPHILS RELATIVE PERCENT: 5 % (ref 0–7)
BASOPHILS ABSOLUTE: 0 K/UL (ref 0–0.2)
BASOPHILS RELATIVE PERCENT: 0 %
BUN BLDV-MCNC: 16 MG/DL (ref 7–20)
CALCIUM SERPL-MCNC: 8.2 MG/DL (ref 8.3–10.6)
CHLORIDE BLD-SCNC: 110 MMOL/L (ref 99–110)
CO2: 23 MMOL/L (ref 21–32)
CREAT SERPL-MCNC: 0.8 MG/DL (ref 0.8–1.3)
EOSINOPHILS ABSOLUTE: 0 K/UL (ref 0–0.6)
EOSINOPHILS RELATIVE PERCENT: 1 %
GFR AFRICAN AMERICAN: >60
GFR NON-AFRICAN AMERICAN: >60
GLUCOSE BLD-MCNC: 98 MG/DL (ref 70–99)
HCT VFR BLD CALC: 27.8 % (ref 40.5–52.5)
HEMOGLOBIN: 9.2 G/DL (ref 13.5–17.5)
HYPOCHROMIA: ABNORMAL
LYMPHOCYTES ABSOLUTE: 0.1 K/UL (ref 1–5.1)
LYMPHOCYTES RELATIVE PERCENT: 3 %
MCH RBC QN AUTO: 32.4 PG (ref 26–34)
MCHC RBC AUTO-ENTMCNC: 32.9 G/DL (ref 31–36)
MCV RBC AUTO: 98.6 FL (ref 80–100)
MONOCYTES ABSOLUTE: 0 K/UL (ref 0–1.3)
MONOCYTES RELATIVE PERCENT: 0 %
NEUTROPHILS ABSOLUTE: 1.6 K/UL (ref 1.7–7.7)
NEUTROPHILS RELATIVE PERCENT: 91 %
PDW BLD-RTO: 20.4 % (ref 12.4–15.4)
PLATELET # BLD: 88 K/UL (ref 135–450)
PLATELET SLIDE REVIEW: ABNORMAL
PMV BLD AUTO: 7.2 FL (ref 5–10.5)
POLYCHROMASIA: ABNORMAL
POTASSIUM SERPL-SCNC: 4 MMOL/L (ref 3.5–5.1)
RBC # BLD: 2.82 M/UL (ref 4.2–5.9)
SODIUM BLD-SCNC: 140 MMOL/L (ref 136–145)
TEAR DROP CELLS: ABNORMAL
WBC # BLD: 1.7 K/UL (ref 4–11)

## 2020-11-10 PROCEDURE — 6360000002 HC RX W HCPCS: Performed by: NURSE PRACTITIONER

## 2020-11-10 PROCEDURE — 80048 BASIC METABOLIC PNL TOTAL CA: CPT

## 2020-11-10 PROCEDURE — 6360000002 HC RX W HCPCS: Performed by: INTERNAL MEDICINE

## 2020-11-10 PROCEDURE — 85025 COMPLETE CBC W/AUTO DIFF WBC: CPT

## 2020-11-10 PROCEDURE — 2580000003 HC RX 258: Performed by: INTERNAL MEDICINE

## 2020-11-10 PROCEDURE — 6370000000 HC RX 637 (ALT 250 FOR IP): Performed by: NURSE PRACTITIONER

## 2020-11-10 PROCEDURE — 6370000000 HC RX 637 (ALT 250 FOR IP): Performed by: INTERNAL MEDICINE

## 2020-11-10 PROCEDURE — 2580000003 HC RX 258: Performed by: NURSE PRACTITIONER

## 2020-11-10 PROCEDURE — 36592 COLLECT BLOOD FROM PICC: CPT

## 2020-11-10 PROCEDURE — 94760 N-INVAS EAR/PLS OXIMETRY 1: CPT

## 2020-11-10 PROCEDURE — 2060000000 HC ICU INTERMEDIATE R&B

## 2020-11-10 RX ORDER — LEVOFLOXACIN 500 MG/1
500 TABLET, FILM COATED ORAL NIGHTLY
Status: DISCONTINUED | OUTPATIENT
Start: 2020-11-10 | End: 2020-11-30 | Stop reason: HOSPADM

## 2020-11-10 RX ADMIN — PROCHLORPERAZINE MALEATE 10 MG: 10 TABLET ORAL at 17:53

## 2020-11-10 RX ADMIN — FLUCONAZOLE 400 MG: 200 TABLET ORAL at 08:53

## 2020-11-10 RX ADMIN — URSODIOL 500 MG: 250 TABLET, FILM COATED ORAL at 20:16

## 2020-11-10 RX ADMIN — URSODIOL 500 MG: 250 TABLET, FILM COATED ORAL at 08:53

## 2020-11-10 RX ADMIN — SODIUM CHLORIDE 15 ML: 900 IRRIGANT IRRIGATION at 20:21

## 2020-11-10 RX ADMIN — SODIUM CHLORIDE: 9 INJECTION, SOLUTION INTRAVENOUS at 05:16

## 2020-11-10 RX ADMIN — AMLODIPINE BESYLATE 5 MG: 5 TABLET ORAL at 08:53

## 2020-11-10 RX ADMIN — PROCHLORPERAZINE MALEATE 10 MG: 10 TABLET ORAL at 08:52

## 2020-11-10 RX ADMIN — SODIUM CHLORIDE: 9 INJECTION, SOLUTION INTRAVENOUS at 16:26

## 2020-11-10 RX ADMIN — LORAZEPAM 0.5 MG: 0.5 TABLET ORAL at 20:16

## 2020-11-10 RX ADMIN — TACROLIMUS 3 MG: 1 CAPSULE ORAL at 08:53

## 2020-11-10 RX ADMIN — VALACYCLOVIR HYDROCHLORIDE 500 MG: 500 TABLET, FILM COATED ORAL at 20:16

## 2020-11-10 RX ADMIN — SOTALOL HYDROCHLORIDE 120 MG: 120 TABLET ORAL at 08:52

## 2020-11-10 RX ADMIN — Medication 10 ML: at 20:21

## 2020-11-10 RX ADMIN — ALLOPURINOL 300 MG: 300 TABLET ORAL at 08:53

## 2020-11-10 RX ADMIN — LISINOPRIL 40 MG: 40 TABLET ORAL at 08:53

## 2020-11-10 RX ADMIN — LEVOFLOXACIN 500 MG: 500 TABLET, FILM COATED ORAL at 20:16

## 2020-11-10 RX ADMIN — SOTALOL HYDROCHLORIDE 120 MG: 120 TABLET ORAL at 20:17

## 2020-11-10 RX ADMIN — TACROLIMUS 3 MG: 1 CAPSULE ORAL at 20:16

## 2020-11-10 RX ADMIN — VALACYCLOVIR HYDROCHLORIDE 500 MG: 500 TABLET, FILM COATED ORAL at 08:52

## 2020-11-10 RX ADMIN — ENOXAPARIN SODIUM 40 MG: 40 INJECTION SUBCUTANEOUS at 17:54

## 2020-11-10 ASSESSMENT — PAIN SCALES - GENERAL
PAINLEVEL_OUTOF10: 0

## 2020-11-10 NOTE — PLAN OF CARE
Problem: Falls - Risk of:  Goal: Will remain free from falls  Description: Pt up ad abebe in room and halls with steady gait. Independent with ADL's. Uses call light appropriately for assistance as needed. Bed breaks on, non skid footwear on, side rails up x2, call light within reach. Hourly rounding performed per unit protocol. Will continue safety precautions. Will remain free from falls  Outcome: Met This Shift  Note: Pt up ad abebe in room and halls with steady gait. Independent with ADL's. Uses call light appropriately for assistance as needed. Bed breaks on, non skid footwear on, side rails up x2, call light within reach. Hourly rounding performed per unit protocol. Will continue safety precautions. Problem: Infection - Central Venous Catheter-Associated Bloodstream Infection:  Goal: Will show no infection signs and symptoms  Description: Will show no infection signs and symptoms  Outcome: Met This Shift  Note: CVC site remains free of signs/symptoms of infection. No drainage, edema, erythema, pain, or warmth noted at site. Dressing changes continue per protocol and on an as needed basis - see flowsheet. Compliant with [facility] Bath Protocol:  Performed CHG bath today per [facility] protocol utilizing CHG solution in the shower. CVC site cleansed with CHG wipe over dressing, skin surrounding dressing, and first 6\" of IV tubing. Pt tolerated well. Continued to encourage daily CHG bathing per [address] protocol. Problem: Bleeding:  Goal: Will show no signs and symptoms of excessive bleeding  Description: Will show no signs and symptoms of excessive bleeding  Outcome: Met This Shift  Note: No signs of bleeding noted.  No transfusions required per protocol     Problem: Venous Thromboembolism:  Goal: Will show no signs or symptoms of venous thromboembolism  Description: Will show no signs or symptoms of venous thromboembolism  Outcome: Met This Shift  Note: Adherent with DVT Prevention: Pt is at risk for DVT d/t decreased mobility and cancer treatment. Pt educated on importance of activity. Pt has orders for Subcut prophylactic lovenox. Pt verbalizes understanding of need for prophylaxis while inpatient. Problem: PROTECTIVE PRECAUTIONS  Goal: Patient will remain free of nosocomial Infections  Outcome: Met This Shift  Note: Pt remains afebrile this shift. VSS. Pt educated on infection prevention and continues to follow protective precautions appropriately. Will continue to monitor. Problem: Nausea/Vomiting:  Goal: Absence of nausea/vomiting  Description: C/O mild nausea without vomiting. Compazine x1 PO given as prophylaxis   Outcome: Met This Shift  Note: Pt reports mild nausea without vomiting.  Compazine 10mg PO given x1 for prophylaxis

## 2020-11-10 NOTE — PROGRESS NOTES
Wt (!) 332 lb (150.6 kg)   SpO2 100%   BMI 41.50 kg/m²     Weight:    Wt Readings from Last 3 Encounters:   11/09/20 (!) 332 lb (150.6 kg)   10/08/20 (!) 325 lb (147.4 kg)   09/29/20 (!) 325 lb 13.4 oz (147.8 kg)       General: Awake, alert and oriented. HEENT: normocephalic, alopecia, PERRL, no scleral erythema or icterus, Oral mucosa moist and intact, throat clear  NECK: supple without palpable adenopathy  BACK: Straight, negative CVAT  SKIN: warm dry and intact without lesions rashes or masses  CHEST: CTA bilaterally without use of accessory muscles  CV: Normal S1 S2, RRR, no MRG  ABD: NT, ND, normoactive BS, no palpable masses or hepatosplenomegaly  EXTREMITIES: without edema, denies calf tenderness  NEURO: CN II - XII grossly intact  CATHETER: Right tri-fusion ([facility], 6/11/20) - CDI    Data:   CBC:   Recent Labs     11/08/20  0312 11/09/20  0256 11/10/20  0310   WBC 2.5* 1.9* 1.7*   HGB 9.7* 9.7* 9.2*   HCT 28.7* 29.0* 27.8*   MCV 97.4 97.3 98.6   * 104* 88*     BMP/Mag:  Recent Labs     11/08/20  0312 11/09/20  0256 11/10/20  0310    138 140   K 4.4 4.3 4.0    107 110   CO2 23 24 23   PHOS  --  3.8  --    BUN 29* 21* 16   CREATININE 1.2 1.0 0.8   MG  --  2.10  --      LIVP:   Recent Labs     11/09/20 0256   AST 18   ALT 25   BILIDIR <0.2   BILITOT 0.5   ALKPHOS 78     Uric Acid:    Recent Labs     11/09/20 0256   LABURIC 7.4*     Coags:   Recent Labs     11/09/20 0256   PROTIME 10.9   INR 0.94   APTT 35.6        PROBLEM LIST:         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 CMML - 1 (Dx 10/2020)  3. Atrial Fibrillation  4. CASH  5. HTN      TREATMENT:        1. Induction: Sweetie-C & Daunorubicin (6/12/20)  2. Consolidation:  HiDAC x 3 cycles (7/28/20, 8/29/20 & 9/22/20)  3.   MUD Allo - PB BMT (DPB1 non-permissive mismatch)  Preparative Regimen:  Melphalan and Fludarabine  Date of BMT:  11/11/20  Source of stem cells:  PBPC  Donor/Recipient Blood Type:  A positive / 0 positive Donor Sex:  Female / NMDP (DID # [id number]) - Follow FISH XY  CMV Donor / Recipient: Negative / Negative       ASSESSMENT AND PLAN:        1. AML - intermediate risk / CMML-1:  - BM bx/asp (10/14/20) - No AML, but shows CMML-1   - Admitted for Melphalan & Fludarabine f/b allogeneic PBSC infusion 11/11/20     Day - 1     2. ID:  No evidence of infection.    - Cont Valtrex ppx   - Continue diflucan, Levaquin ppx - started 11/8        Donor/Recipient CMV:  Negative / negative   - Routine surveillance is not required       3. Heme: Pancytopenia from chemotherapy  - Transfuse for Hgb < 7 and Platelets < 22U  - No transfusion today     4. Metabolic / CKD III: Baseline SCr 1.2-1.3. 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  - Cont Prograf 3mg BID. First level 11/12/20 then qMWF  No results found for: TACROLEV     6. VOD:  No evidence of VOD. Admission Weight: (!) 324 lb 3.2 oz (147.1 kg)  Current Weight: Weight: (!) 332 lb (150.6 kg). Recent Labs     11/09/20  0256   BILIDIR <0.2   BILITOT 0.5     - Cont Actigall     7. Pulmonary: H/o CASH  - PFT (10/6/20) - FEV1/FVC 71 w/ predicted ratio of 75 & DLCO 41% (corrected 84%). He has moderate obstruction by FEV1/FVC ratio lower than predicted and also based on ATS criteria with low FEV, likely d/t morbid obesity   - Pulm clearance by Dr. Miesha Muniz  CASH:  - Cont CPAP  - Encourage IS and ambulation      8. GI / Nutrition:    Nutrition:  Appetite is good at this point.  - Cont low microbial diet  - Dietary to follow     9. Cardiac: H/o A.  Fib & HTN  - Echo (9/29/20) -  LVEF 60-65% w/ left atrial dilatation  A. Fib:  - Cont sotalol 120 mg BID  HTN:   - Cont amlodipine-lisinopril 5/40 mg daily     - DVT Prophylaxis: Platelets >54,942 cells/dL, - daily lovenox prophylaxis ordered  Contraindications to pharmacologic

## 2020-11-10 NOTE — PLAN OF CARE
Problem: Falls - Risk of:  Goal: Will remain free from falls  Description: Will remain free from falls  Outcome: Ongoing  Note: Orthostatic vital signs obtained at start of shift - see flowsheet for details. Pt does not meet criteria for orthostasis. Pt is a Med fall risk. See [facility] Fall Score and ABCDS Injury Risk assessments. Pt bed is in low position, side rails up, call light and belongings are in reach. Fall risk light is on outside pts room. Pt encouraged to call for assistance as needed. Will continue with hourly rounds for PO intake, pain needs, toileting and repositioning as needed. Problem: Infection - Central Venous Catheter-Associated Bloodstream Infection:  Goal: Will show no infection signs and symptoms  Description: Will show no infection signs and symptoms  Outcome: Ongoing  Note: CVC site remains free of signs/symptoms of infection. No drainage, edema, erythema, pain, or warmth noted at site. Dressing changes continue per protocol and on an as needed basis - see flowsheet. Problem: Bleeding:  Goal: Will show no signs and symptoms of excessive bleeding  Description: Will show no signs and symptoms of excessive bleeding  Outcome: Ongoing  Note:   ctions. Patient's hemoglobin this AM:   Recent Labs     11/10/20  0310   HGB 9.2*     Patient's platelet count this AM:   Recent Labs     11/10/20  0310   PLT 88*    Thrombocytopenia Precautions in place. Patient showing no signs or symptoms of active bleeding. Transfusion not indicated at this time. Patient verbalizes understanding of all instructions. Will continue to assess and implement POC. Call light within reach and hourly rounding in place. Problem: Venous Thromboembolism:  Goal: Will show no signs or symptoms of venous thromboembolism  Description: Will show no signs or symptoms of venous thromboembolism  Outcome: Ongoing  Note:  Pt is at risk for DVT d/t decreased mobility and cancer treatment.   Pt educated on importance of activity. Pt has orders for Subcut prophylactic lovenox. Pt verbalizes understanding of need for prophylaxis while inpatient. Problem: Discharge Planning:  Goal: Discharged to appropriate level of care  Description: Discharged to appropriate level of care  Outcome: Ongoing     Problem: PROTECTIVE PRECAUTIONS  Goal: Patient will remain free of nosocomial Infections  Outcome: Ongoing  Note: Pt remains in protective precautions. No living plants or fresh flowers in his/her room. Patient educated on wearing mask when in hallways. Patient, staff, and visitors adhering to handwashing guidelines. Patient cleansed with chlorhexidine wipes and linens changed daily per protocol. Pt verbalizes understanding of low microbial diet. Patient remains free of nosocomial infections.        Problem: Nutrition  Goal: Optimal nutrition therapy  Outcome: Ongoing

## 2020-11-10 NOTE — PROGRESS NOTES
Soft, semi formed stool noted this shift. Pt instructed to report watery or any changes in stool.   Will monitor

## 2020-11-11 LAB
ALBUMIN SERPL-MCNC: 3.9 G/DL (ref 3.4–5)
ALP BLD-CCNC: 82 U/L (ref 40–129)
ALT SERPL-CCNC: 27 U/L (ref 10–40)
ANION GAP SERPL CALCULATED.3IONS-SCNC: 8 MMOL/L (ref 3–16)
AST SERPL-CCNC: 17 U/L (ref 15–37)
BASOPHILS ABSOLUTE: 0 K/UL (ref 0–0.2)
BASOPHILS RELATIVE PERCENT: 0.4 %
BILIRUB SERPL-MCNC: 0.6 MG/DL (ref 0–1)
BILIRUBIN DIRECT: <0.2 MG/DL (ref 0–0.3)
BILIRUBIN, INDIRECT: ABNORMAL MG/DL (ref 0–1)
BUN BLDV-MCNC: 16 MG/DL (ref 7–20)
C DIFF TOXIN/ANTIGEN: NORMAL
CALCIUM SERPL-MCNC: 8.9 MG/DL (ref 8.3–10.6)
CHLORIDE BLD-SCNC: 105 MMOL/L (ref 99–110)
CO2: 25 MMOL/L (ref 21–32)
CREAT SERPL-MCNC: 0.8 MG/DL (ref 0.8–1.3)
EOSINOPHILS ABSOLUTE: 0 K/UL (ref 0–0.6)
EOSINOPHILS RELATIVE PERCENT: 1.2 %
GFR AFRICAN AMERICAN: >60
GFR NON-AFRICAN AMERICAN: >60
GLUCOSE BLD-MCNC: 106 MG/DL (ref 70–99)
HCT VFR BLD CALC: 29.4 % (ref 40.5–52.5)
HEMOGLOBIN: 9.8 G/DL (ref 13.5–17.5)
LACTATE DEHYDROGENASE: 158 U/L (ref 100–190)
LYMPHOCYTES ABSOLUTE: 0 K/UL (ref 1–5.1)
LYMPHOCYTES RELATIVE PERCENT: 0.5 %
MAGNESIUM: 1.8 MG/DL (ref 1.8–2.4)
MCH RBC QN AUTO: 32.5 PG (ref 26–34)
MCHC RBC AUTO-ENTMCNC: 33.4 G/DL (ref 31–36)
MCV RBC AUTO: 97.4 FL (ref 80–100)
MONOCYTES ABSOLUTE: 0 K/UL (ref 0–1.3)
MONOCYTES RELATIVE PERCENT: 0 %
NEUTROPHILS ABSOLUTE: 1.3 K/UL (ref 1.7–7.7)
NEUTROPHILS RELATIVE PERCENT: 97.9 %
PDW BLD-RTO: 19.9 % (ref 12.4–15.4)
PHOSPHORUS: 3.3 MG/DL (ref 2.5–4.9)
PLATELET # BLD: 79 K/UL (ref 135–450)
PMV BLD AUTO: 7.2 FL (ref 5–10.5)
POTASSIUM SERPL-SCNC: 3.9 MMOL/L (ref 3.5–5.1)
RBC # BLD: 3.02 M/UL (ref 4.2–5.9)
SODIUM BLD-SCNC: 138 MMOL/L (ref 136–145)
TOTAL PROTEIN: 5.7 G/DL (ref 6.4–8.2)
URIC ACID, SERUM: 5.2 MG/DL (ref 3.5–7.2)
WBC # BLD: 1.3 K/UL (ref 4–11)

## 2020-11-11 PROCEDURE — 6360000002 HC RX W HCPCS: Performed by: INTERNAL MEDICINE

## 2020-11-11 PROCEDURE — 2580000003 HC RX 258: Performed by: NURSE PRACTITIONER

## 2020-11-11 PROCEDURE — 6370000000 HC RX 637 (ALT 250 FOR IP): Performed by: NURSE PRACTITIONER

## 2020-11-11 PROCEDURE — 80048 BASIC METABOLIC PNL TOTAL CA: CPT

## 2020-11-11 PROCEDURE — 2060000000 HC ICU INTERMEDIATE R&B

## 2020-11-11 PROCEDURE — 6370000000 HC RX 637 (ALT 250 FOR IP): Performed by: INTERNAL MEDICINE

## 2020-11-11 PROCEDURE — 84550 ASSAY OF BLOOD/URIC ACID: CPT

## 2020-11-11 PROCEDURE — 84100 ASSAY OF PHOSPHORUS: CPT

## 2020-11-11 PROCEDURE — 30243Y3 TRANSFUSION OF ALLOGENEIC UNRELATED HEMATOPOIETIC STEM CELLS INTO CENTRAL VEIN, PERCUTANEOUS APPROACH: ICD-10-PCS | Performed by: INTERNAL MEDICINE

## 2020-11-11 PROCEDURE — 83735 ASSAY OF MAGNESIUM: CPT

## 2020-11-11 PROCEDURE — 87449 NOS EACH ORGANISM AG IA: CPT

## 2020-11-11 PROCEDURE — 36592 COLLECT BLOOD FROM PICC: CPT

## 2020-11-11 PROCEDURE — 80076 HEPATIC FUNCTION PANEL: CPT

## 2020-11-11 PROCEDURE — 6360000002 HC RX W HCPCS: Performed by: NURSE PRACTITIONER

## 2020-11-11 PROCEDURE — 85025 COMPLETE CBC W/AUTO DIFF WBC: CPT

## 2020-11-11 PROCEDURE — 38240 TRANSPLT ALLO HCT/DONOR: CPT

## 2020-11-11 PROCEDURE — 83615 LACTATE (LD) (LDH) ENZYME: CPT

## 2020-11-11 PROCEDURE — 87324 CLOSTRIDIUM AG IA: CPT

## 2020-11-11 RX ORDER — SODIUM CHLORIDE 9 MG/ML
INJECTION, SOLUTION INTRAVENOUS CONTINUOUS PRN
Status: DISCONTINUED | OUTPATIENT
Start: 2020-11-11 | End: 2020-11-30 | Stop reason: HOSPADM

## 2020-11-11 RX ORDER — LOPERAMIDE HYDROCHLORIDE 2 MG/1
2 CAPSULE ORAL PRN
Status: DISCONTINUED | OUTPATIENT
Start: 2020-11-11 | End: 2020-11-14

## 2020-11-11 RX ORDER — ACETAMINOPHEN 325 MG/1
650 TABLET ORAL ONCE
Status: COMPLETED | OUTPATIENT
Start: 2020-11-11 | End: 2020-11-11

## 2020-11-11 RX ORDER — DIPHENHYDRAMINE HCL 25 MG
25 TABLET ORAL ONCE
Status: COMPLETED | OUTPATIENT
Start: 2020-11-11 | End: 2020-11-11

## 2020-11-11 RX ORDER — EPINEPHRINE 1 MG/ML
0.3 INJECTION, SOLUTION, CONCENTRATE INTRAVENOUS
Status: DISPENSED | OUTPATIENT
Start: 2020-11-11 | End: 2020-11-11

## 2020-11-11 RX ORDER — DIPHENHYDRAMINE HYDROCHLORIDE 50 MG/ML
25 INJECTION INTRAMUSCULAR; INTRAVENOUS PRN
Status: DISCONTINUED | OUTPATIENT
Start: 2020-11-11 | End: 2020-11-12 | Stop reason: ALTCHOICE

## 2020-11-11 RX ORDER — LOPERAMIDE HYDROCHLORIDE 2 MG/1
4 CAPSULE ORAL ONCE
Status: COMPLETED | OUTPATIENT
Start: 2020-11-11 | End: 2020-11-11

## 2020-11-11 RX ORDER — FAMOTIDINE 20 MG/1
20 TABLET, FILM COATED ORAL 2 TIMES DAILY
Status: DISCONTINUED | OUTPATIENT
Start: 2020-11-11 | End: 2020-11-17

## 2020-11-11 RX ORDER — FUROSEMIDE 10 MG/ML
40 INJECTION INTRAMUSCULAR; INTRAVENOUS ONCE
Status: COMPLETED | OUTPATIENT
Start: 2020-11-11 | End: 2020-11-11

## 2020-11-11 RX ADMIN — FAMOTIDINE 20 MG: 20 TABLET ORAL at 12:50

## 2020-11-11 RX ADMIN — ENOXAPARIN SODIUM 40 MG: 40 INJECTION SUBCUTANEOUS at 18:17

## 2020-11-11 RX ADMIN — SODIUM CHLORIDE: 9 INJECTION, SOLUTION INTRAVENOUS at 07:40

## 2020-11-11 RX ADMIN — LEVOFLOXACIN 500 MG: 500 TABLET, FILM COATED ORAL at 22:15

## 2020-11-11 RX ADMIN — PROCHLORPERAZINE MALEATE 10 MG: 10 TABLET ORAL at 09:33

## 2020-11-11 RX ADMIN — HYDROCORTISONE SODIUM SUCCINATE 100 MG: 100 INJECTION, POWDER, FOR SOLUTION INTRAMUSCULAR; INTRAVENOUS at 13:36

## 2020-11-11 RX ADMIN — DIPHENHYDRAMINE HYDROCHLORIDE 25 MG: 50 INJECTION, SOLUTION INTRAMUSCULAR; INTRAVENOUS at 14:34

## 2020-11-11 RX ADMIN — SODIUM CHLORIDE: 9 INJECTION, SOLUTION INTRAVENOUS at 12:50

## 2020-11-11 RX ADMIN — ALLOPURINOL 300 MG: 300 TABLET ORAL at 10:02

## 2020-11-11 RX ADMIN — SODIUM CHLORIDE 15 ML: 900 IRRIGANT IRRIGATION at 10:03

## 2020-11-11 RX ADMIN — SOTALOL HYDROCHLORIDE 120 MG: 120 TABLET ORAL at 22:16

## 2020-11-11 RX ADMIN — FUROSEMIDE 40 MG: 10 INJECTION, SOLUTION INTRAMUSCULAR; INTRAVENOUS at 16:49

## 2020-11-11 RX ADMIN — TACROLIMUS 3 MG: 1 CAPSULE ORAL at 22:14

## 2020-11-11 RX ADMIN — Medication 10 ML: at 22:15

## 2020-11-11 RX ADMIN — DIPHENHYDRAMINE HYDROCHLORIDE 25 MG: 25 TABLET ORAL at 13:36

## 2020-11-11 RX ADMIN — FAMOTIDINE 20 MG: 20 TABLET ORAL at 22:14

## 2020-11-11 RX ADMIN — LOPERAMIDE HYDROCHLORIDE 4 MG: 2 CAPSULE ORAL at 07:40

## 2020-11-11 RX ADMIN — LOPERAMIDE HYDROCHLORIDE 2 MG: 2 CAPSULE ORAL at 12:56

## 2020-11-11 RX ADMIN — VALACYCLOVIR HYDROCHLORIDE 500 MG: 500 TABLET, FILM COATED ORAL at 22:14

## 2020-11-11 RX ADMIN — ACETAMINOPHEN 650 MG: 325 TABLET ORAL at 13:35

## 2020-11-11 RX ADMIN — TACROLIMUS 3 MG: 1 CAPSULE ORAL at 10:01

## 2020-11-11 RX ADMIN — LORAZEPAM 0.5 MG: 0.5 TABLET ORAL at 06:26

## 2020-11-11 RX ADMIN — LISINOPRIL 40 MG: 40 TABLET ORAL at 10:01

## 2020-11-11 RX ADMIN — URSODIOL 500 MG: 250 TABLET, FILM COATED ORAL at 22:14

## 2020-11-11 RX ADMIN — PROCHLORPERAZINE MALEATE 10 MG: 10 TABLET ORAL at 19:47

## 2020-11-11 RX ADMIN — Medication 10 ML: at 10:01

## 2020-11-11 RX ADMIN — SOTALOL HYDROCHLORIDE 120 MG: 120 TABLET ORAL at 10:02

## 2020-11-11 RX ADMIN — FLUCONAZOLE 400 MG: 200 TABLET ORAL at 10:02

## 2020-11-11 RX ADMIN — VALACYCLOVIR HYDROCHLORIDE 500 MG: 500 TABLET, FILM COATED ORAL at 10:01

## 2020-11-11 RX ADMIN — URSODIOL 500 MG: 250 TABLET, FILM COATED ORAL at 10:01

## 2020-11-11 RX ADMIN — SODIUM CHLORIDE: 9 INJECTION, SOLUTION INTRAVENOUS at 19:48

## 2020-11-11 RX ADMIN — AMLODIPINE BESYLATE 5 MG: 5 TABLET ORAL at 10:02

## 2020-11-11 RX ADMIN — SODIUM CHLORIDE 15 ML: 900 IRRIGANT IRRIGATION at 22:18

## 2020-11-11 ASSESSMENT — PAIN SCALES - GENERAL
PAINLEVEL_OUTOF10: 0

## 2020-11-11 NOTE — PLAN OF CARE
Problem: Falls - Risk of:  Goal: Will remain free from falls  Description: Pt up ad baebe in room and halls with steady gait. Independent with ADL's. Uses call light appropriately for assistance as needed. Bed breaks on, non skid footwear on, side rails up x2, call light within reach. Hourly rounding performed per unit protocol. Will continue safety precautions. Will remain free from falls  Outcome: Ongoing  Note: Orthostatic vital signs obtained at start of shift - see flowsheet for details. Pt does not meet criteria for orthostasis. Pt is a Med fall risk. See [place] Fall Score and ABCDS Injury Risk assessments. - Screening for Orthostasis AND not a [place] Risk per CAMEJO/ABCDS: Pt bed is in low position, side rails up, call light and belongings are in reach. Fall risk light is on outside pts room. Pt encouraged to call for assistance as needed. Will continue with hourly rounds for PO intake, pain needs, toileting and repositioning as needed. Problem: Infection - Central Venous Catheter-Associated Bloodstream Infection:  Goal: Will show no infection signs and symptoms  Description: Will show no infection signs and symptoms  Outcome: Ongoing  Note: CVC site remains free of signs/symptoms of infection. No drainage, edema, erythema, pain, or warmth noted at site. Dressing changes continue per protocol and on an as needed basis - see flowsheet. Compliant with BCC Bath Protocol:  Performed CHG bath today per [facility] protocol utilizing CHG solution in the shower. CVC site cleansed with CHG wipe over dressing, skin surrounding dressing, and first 6\" of IV tubing. Pt tolerated well. Continued to encourage daily CHG bathing per [facility] protocol.          Problem: Bleeding:  Goal: Will show no signs and symptoms of excessive bleeding  Description: Will show no signs and symptoms of excessive bleeding  Outcome: Ongoing  Note: Patient's hemoglobin this AM:   Recent Labs     11/11/20  0345   HGB 9.8* Patient's platelet count this AM:   Recent Labs     11/11/20  0345   PLT 79*    Thrombocytopenia Precautions in place. Patient showing no signs or symptoms of active bleeding. Transfusion not indicated at this time. Patient verbalizes understanding of all instructions. Will continue to assess and implement POC. Call light within reach and hourly rounding in place. Problem: Nausea/Vomiting:  Goal: Absence of nausea/vomiting  Description: C/O mild nausea without vomiting. Compazine x1 PO given as prophylaxis   Outcome: Ongoing  Note:   Pt continues with intermittent nausea. PRN antiemetics administered before meals with relief noted. Will continue to monitor. Problem: Venous Thromboembolism:  Goal: Will show no signs or symptoms of venous thromboembolism  Description: Will show no signs or symptoms of venous thromboembolism  Note: Adherent with DVT Prevention: Pt is at risk for DVT d/t decreased mobility and cancer treatment. Pt educated on importance of activity. Pt has orders for Subcut prophylactic lovenox. Pt verbalizes understanding of need for prophylaxis while inpatient. On Treatment for DVT/PE: Pt with recent hx of clot. Currently on treatment dose Lovenox. Pt at risk of bleeding d/t anticoagulation. Cautioned pt on safety precautions to decrease risk of bleeding. Will notify provider if platelets drop below 50,000 and/or if pt has invasive procedure scheduled in order to hold anticoagulation.

## 2020-11-11 NOTE — PROGRESS NOTES
Pt with 3 loose, watery BMs this shift not associated with laxative use. C.diff sample collected and walked to lab on ice per protocol. Pt placed in isolation. Awaiting results. Will continue to monitor.

## 2020-11-11 NOTE — CARE COORDINATION
Type of Admission  AML  MUD ( Female Donor) T:0: 11/11/20  Prep. Regimen: Melphalan+Fludara  Day 0        Central venous catheter  Right TLC ( 9/29/20 , pre-existing from [facility])        Plan  Proceed with MUD Allogenic SCT for treatment of AML        Update  11/6/20: Planned admission for allogeneic SCT. Keeping active by use elliptical equipment in his room. 11/9/20:  Using exercise bike in room. 11/11/20 Infusion of stem cells today. maintaining activity by using elliptical bike in room. Education  11/6/20:  Re-introduced myself in RN Discharge planner role. States his cousin Mable Villavicencio, who is a nurse will be coming into town by Thanksgiving to act as his Care giver. Patient and caregiver have been through the educational process for allogeneic bone marrow transplantation including the allogeneic family meeting, preadmission teaching and preadmission physician office visit. , with Jade Johnson RN BMT Coordinator. Patient and caregiver verbalize understanding of treatment regimen, possible adverse events, length of stay, and risk and benefits of transplantation and are agreeable to proceed. Patient and caregiver have been given an opportunity to ask, and to have their questions answered to their satisfaction. Documentation for above education can be found in the [facility], [facility] office chart. Dai Carter      Discharge  DISCHARGE ROUNDING:  Date:_11/9    Team members present : NP, SW, Charge RN, RN D/C Planner, [address]    Anticipated date of discharge: When [address] ois ? 1.0    Active problems/barriers to discharge    Home needs:     Caregivers: Ousmane Clemente ( RN)    Home medication issues: ( [facility]: [phone])     Patient/caregiver aware of plan?   Yes           Pending

## 2020-11-11 NOTE — PROGRESS NOTES
per 24 hour   Intake 2663 ml   Output 5151 ml   Net -2488 ml       Vital Signs:  /78   Pulse 76   Temp 97.7 °F (36.5 °C) (Oral)   Resp 18   Ht 6' 3\" (1.905 m)   Wt (!) 322 lb 3.2 oz (146.1 kg)   SpO2 98%   BMI 40.27 kg/m²     Weight:    Wt Readings from Last 3 Encounters:   11/10/20 (!) 322 lb 3.2 oz (146.1 kg)   10/08/20 (!) 325 lb (147.4 kg)   09/29/20 (!) 325 lb 13.4 oz (147.8 kg)       General: Awake, alert and oriented. HEENT: normocephalic, alopecia, PERRL, no scleral erythema or icterus, Oral mucosa moist and intact, throat clear  NECK: supple without palpable adenopathy  BACK: Straight, negative CVAT  SKIN: warm dry and intact without lesions rashes or masses  CHEST: CTA bilaterally without use of accessory muscles  CV: Normal S1 S2, RRR, no MRG  ABD: NT, ND, normoactive BS, no palpable masses or hepatosplenomegaly  EXTREMITIES: without edema, denies calf tenderness  NEURO: CN II - XII grossly intact  CATHETER: Right tri-fusion ([facility], 6/11/20) - CDI    Data:   CBC:   Recent Labs     11/09/20  0256 11/10/20  0310 11/11/20  0345   WBC 1.9* 1.7* 1.3*   HGB 9.7* 9.2* 9.8*   HCT 29.0* 27.8* 29.4*   MCV 97.3 98.6 97.4   * 88* 79*     BMP/Mag:  Recent Labs     11/09/20  0256 11/10/20  0310 11/11/20  0345    140 138   K 4.3 4.0 3.9    110 105   CO2 24 23 25   PHOS 3.8  --  3.3   BUN 21* 16 16   CREATININE 1.0 0.8 0.8   MG 2.10  --  1.80     LIVP:   Recent Labs     11/09/20 0256 11/11/20  0345   AST 18 17   ALT 25 27   BILIDIR <0.2 <0.2   BILITOT 0.5 0.6   ALKPHOS 78 82     Uric Acid:    Recent Labs     11/11/20  0345   LABURIC 5.2     Coags:   Recent Labs     11/09/20  0256   PROTIME 10.9   INR 0.94   APTT 35.6        PROBLEM LIST:         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 CMML - 1 (Dx 10/2020)  3. Atrial Fibrillation  4. CASH  5. HTN      TREATMENT:        1. Induction: Sweetie-C & Daunorubicin (6/12/20)  2. Consolidation:  HiDAC x 3 cycles (7/28/20, 8/29/20 & 9/22/20)  3. MUD Allo - PB BMT (DPB1 non-permissive mismatch)  Preparative Regimen:  Melphalan and Fludarabine  Date of BMT:  11/11/20  Source of stem cells:  PBPC  Donor/Recipient Blood Type:  A positive / 0 positive   Donor Sex:  Female / NMDP (DID # [id number]) - Follow FISH XY  CMV Donor / Recipient: Negative / Negative       ASSESSMENT AND PLAN:        1. AML - intermediate risk / CMML-1:  - BM bx/asp (10/14/20) - No AML, but shows CMML-1   - Admitted for Melphalan & Fludarabine f/b allogeneic PBSC infusion 11/11/20     Day 0     2. ID:  No evidence of infection.    - Cont Valtrex ppx   - Continue diflucan, Levaquin ppx - started 11/8        Donor/Recipient CMV:  Negative / negative   - Routine surveillance is not required       3. Heme: Pancytopenia from chemotherapy  - Transfuse for Hgb < 7 and Platelets < 26S  - No transfusion today     4. Metabolic / CKD III: Baseline SCr 1.2-1.3. 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  - Cont Prograf 3mg BID. First level 11/12/20 then qMWF  No results found for: TACROLEV     6. VOD:  No evidence of VOD. Admission Weight: (!) 324 lb 3.2 oz (147.1 kg)  Current Weight: Weight: (!) 322 lb 3.2 oz (146.1 kg). Recent Labs     11/11/20  0345   BILIDIR <0.2   BILITOT 0.6     - Cont Actigall     7. Pulmonary: H/o CASH  - PFT (10/6/20) - FEV1/FVC 71 w/ predicted ratio of 75 & DLCO 41% (corrected 84%). He has moderate obstruction by FEV1/FVC ratio lower than predicted and also based on ATS criteria with low FEV, likely d/t morbid obesity   - Pulm clearance by Dr. Merline Taylor  CASH:  - Cont CPAP  - Encourage IS and ambulation      8. GI / Nutrition:    Nutrition:  Appetite is good at this point.  - Cont low microbial diet  - Dietary to follow     9. Cardiac: H/o A. Fib & HTN  - Echo (9/29/20) -  LVEF 60-65% w/ left atrial dilatation  A. Fib:  - Cont sotalol 120 mg BID  HTN:   - Cont amlodipine-lisinopril 5/40 mg daily     - DVT Prophylaxis: Platelets >94,146 cells/dL, - daily lovenox prophylaxis ordered  Contraindications to pharmacologic prophylaxis: None  Contraindications to mechanical prophylaxis: None     - Disposition:  D/c home once ANC > 1.0 & toxicities resolved following allogeneic transplant       LEANDRA Molina - ELIZABETH Anaya MD  [facility]  Please contact me through [address]

## 2020-11-11 NOTE — PLAN OF CARE
Problem: Falls - Risk of:  Goal: Will remain free from falls  Description: Pt up ad abebe in room and halls with steady gait. Independent with ADL's. Uses call light appropriately for assistance as needed. Bed breaks on, non skid footwear on, side rails up x2, call light within reach. Hourly rounding performed per unit protocol. Will continue safety precautions. Will remain free from falls  11/11/2020 0121 by Omar Henderson RN  Outcome: Ongoing  Note: Orthostatic vital signs obtained at start of shift - see flowsheet for details. Pt does not meet criteria for orthostasis. Pt is a Med fall risk. See [place] Fall Score and ABCDS Injury Risk assessments. - Screening for Orthostasis AND not a [place] Risk per CAMEJO/ABCDS: Pt bed is in low position, side rails up, call light and belongings are in reach. Fall risk light is on outside pts room. Pt encouraged to call for assistance as needed. Will continue with hourly rounds for PO intake, pain needs, toileting and repositioning as needed. Problem: Infection - Central Venous Catheter-Associated Bloodstream Infection:  Goal: Will show no infection signs and symptoms  Description: Will show no infection signs and symptoms  11/11/2020 0121 by Omar Henderson RN  Outcome: Ongoing  Note: CVC site remains free of signs/symptoms of infection. No drainage, edema, erythema, pain, or warmth noted at site. Dressing changes continue per protocol and on an as needed basis - see flowsheet. Problem: Bleeding:  Goal: Will show no signs and symptoms of excessive bleeding  Description: Will show no signs and symptoms of excessive bleeding  11/11/2020 0121 by Omar Henderson RN  Outcome: Ongoing  Note: Patient's hemoglobin this AM:   Recent Labs     11/10/20  0310   HGB 9.2*     Patient's platelet count this AM:   Recent Labs     11/10/20  0310   PLT 88*    Thrombocytopenia Precautions in place. Patient showing no signs or symptoms of active bleeding.   Transfusion not indicated at this time. Patient verbalizes understanding of all instructions. Will continue to assess and implement POC. Call light within reach and hourly rounding in place. Problem: Venous Thromboembolism:  Goal: Will show no signs or symptoms of venous thromboembolism  Description: Will show no signs or symptoms of venous thromboembolism  11/11/2020 0121 by Deann Dyer RN  Outcome: Ongoing  Note: Adherent with DVT Prevention: Pt is at risk for DVT d/t decreased mobility and cancer treatment. Pt educated on importance of activity. Pt has orders for Subcut prophylactic lovenox. Pt verbalizes understanding of need for prophylaxis while inpatient. Problem: PROTECTIVE PRECAUTIONS  Goal: Patient will remain free of nosocomial Infections  11/11/2020 0121 by Deann Dyer RN  Outcome: Ongoing  Note: Pt remains in a private room. Pt is compliant with handwashing, low-microbial diet, and bathing per [facility] protocol. Pt remains afebrile at this time with stable vital signs. Will continue to monitor. Problem: Nausea/Vomiting:  Goal: Absence of nausea/vomiting  Description: C/O mild nausea without vomiting. Compazine x1 PO given as prophylaxis   11/11/2020 0121 by Deann Dyer RN  Outcome: Ongoing  Note: Pt continues with intermittent nausea. PRN antiemetics administered with some relief noted. Will continue to monitor. Problem: Diarrhea:  Goal: Bowel elimination is within specified parameters  Description: Bowel elimination is within specified parameters  Outcome: Ongoing  Note: Pt reports having loose BMs. Hat placed in back of toilet. Pt has had 2 loose BM in 24hr. Instructed pt to notify if BMs are loose and watery. Will continue to monitor.

## 2020-11-11 NOTE — PROCEDURES
Transplant (T0) Progress Note      Bennie Espitia                           Blood Type: O pos    11/11/20                          Start time:1406 Completion time: 2709    Transplant Type: Allogeneic unrelated    Product Type: PBSC (peripheral blood stem cell)    Product Unit Number: [id number]    Cell Count: 4.4 x 10^6  Volume: 451 mL      Product Manipulation:      Volume Reduction  No  Plasma Depletion  No  RBC Depletion  Yes    Catheter lumen used for infusion: Red        Positive Blood Return: Yes    Donor Name: Unknown                                     Blood Type: A pos    Relationship: Unrelated                         Donor Sex: Female    Premeds Given: Tylenol 650mg, Benadryl 25mg, Solu-cortef 120mg     Adverse Reaction(s) and treatment: Baseline vital signs obtained prior to infusion and monitoring completed throughout per [facility] protocol - see flowsheets. All IVFs turned down to [facility] during stem cell infusion. Stem cell product(s) verified with 2nd RN Patricia Griffin prior to infusion using 2 patient identifiers. Infused via gravity with rate controlled by Earl Rojas RN per [facility] protocols. Emergency medications epinephrine, benadryl, & hydrocortisone available as needed. Emergency medical equipment available as needed including telemetry monitoring throughout infusion, supplemental O2, suction equipment, and crash cart. RN at bedside throughout infusion. --At approximately 1430, pt began experiencing shortness of breath and tachypnea. O2 saturation declined to 89% from 99% at baseline. SHELTON Hammer notified. 2L O2 NC administered. 25mg IV Benadryl administered per Yeimi Hamilton NP. Following administration, pt reported slight improvement, but that some dyspnea remained. Within approximately 10 minutes of transplant completion pt reported feeling much better and approaching baseline. MD Suarez in to evaluate pt. Will monitor.       Torie Becerra

## 2020-11-12 LAB
ANION GAP SERPL CALCULATED.3IONS-SCNC: 10 MMOL/L (ref 3–16)
APTT: 34.9 SEC (ref 24.2–36.2)
BILIRUB SERPL-MCNC: 0.6 MG/DL (ref 0–1)
BUN BLDV-MCNC: 17 MG/DL (ref 7–20)
CALCIUM SERPL-MCNC: 8.5 MG/DL (ref 8.3–10.6)
CHLORIDE BLD-SCNC: 105 MMOL/L (ref 99–110)
CO2: 25 MMOL/L (ref 21–32)
CREAT SERPL-MCNC: 0.9 MG/DL (ref 0.8–1.3)
EKG ATRIAL RATE: 75 BPM
EKG DIAGNOSIS: NORMAL
EKG P AXIS: 64 DEGREES
EKG P-R INTERVAL: 152 MS
EKG Q-T INTERVAL: 398 MS
EKG QRS DURATION: 92 MS
EKG QTC CALCULATION (BAZETT): 444 MS
EKG R AXIS: 61 DEGREES
EKG T AXIS: 82 DEGREES
EKG VENTRICULAR RATE: 75 BPM
GFR AFRICAN AMERICAN: >60
GFR NON-AFRICAN AMERICAN: >60
GLUCOSE BLD-MCNC: 97 MG/DL (ref 70–99)
HCT VFR BLD CALC: 29.2 % (ref 40.5–52.5)
HEMOGLOBIN: 9.8 G/DL (ref 13.5–17.5)
INR BLD: 1.07 (ref 0.86–1.14)
MAGNESIUM: 1.6 MG/DL (ref 1.8–2.4)
MCH RBC QN AUTO: 32.6 PG (ref 26–34)
MCHC RBC AUTO-ENTMCNC: 33.6 G/DL (ref 31–36)
MCV RBC AUTO: 97.1 FL (ref 80–100)
PDW BLD-RTO: 19.4 % (ref 12.4–15.4)
PLATELET # BLD: 46 K/UL (ref 135–450)
PMV BLD AUTO: 6.6 FL (ref 5–10.5)
POTASSIUM SERPL-SCNC: 3.6 MMOL/L (ref 3.5–5.1)
PROTHROMBIN TIME: 12.4 SEC (ref 10–13.2)
RBC # BLD: 3.01 M/UL (ref 4.2–5.9)
SODIUM BLD-SCNC: 140 MMOL/L (ref 136–145)
TACROLIMUS BLOOD: 4.3 NG/ML (ref 5–20)
WBC # BLD: 0.3 K/UL (ref 4–11)

## 2020-11-12 PROCEDURE — 2580000003 HC RX 258: Performed by: NURSE PRACTITIONER

## 2020-11-12 PROCEDURE — 6370000000 HC RX 637 (ALT 250 FOR IP): Performed by: NURSE PRACTITIONER

## 2020-11-12 PROCEDURE — 96409 CHEMO IV PUSH SNGL DRUG: CPT

## 2020-11-12 PROCEDURE — 85730 THROMBOPLASTIN TIME PARTIAL: CPT

## 2020-11-12 PROCEDURE — 6360000002 HC RX W HCPCS: Performed by: STUDENT IN AN ORGANIZED HEALTH CARE EDUCATION/TRAINING PROGRAM

## 2020-11-12 PROCEDURE — 6370000000 HC RX 637 (ALT 250 FOR IP): Performed by: INTERNAL MEDICINE

## 2020-11-12 PROCEDURE — 85025 COMPLETE CBC W/AUTO DIFF WBC: CPT

## 2020-11-12 PROCEDURE — 2060000000 HC ICU INTERMEDIATE R&B

## 2020-11-12 PROCEDURE — 36592 COLLECT BLOOD FROM PICC: CPT

## 2020-11-12 PROCEDURE — 85610 PROTHROMBIN TIME: CPT

## 2020-11-12 PROCEDURE — 83735 ASSAY OF MAGNESIUM: CPT

## 2020-11-12 PROCEDURE — 80197 ASSAY OF TACROLIMUS: CPT

## 2020-11-12 PROCEDURE — 93010 ELECTROCARDIOGRAM REPORT: CPT | Performed by: INTERNAL MEDICINE

## 2020-11-12 PROCEDURE — 6360000002 HC RX W HCPCS: Performed by: INTERNAL MEDICINE

## 2020-11-12 PROCEDURE — 93005 ELECTROCARDIOGRAM TRACING: CPT | Performed by: NURSE PRACTITIONER

## 2020-11-12 PROCEDURE — 82247 BILIRUBIN TOTAL: CPT

## 2020-11-12 PROCEDURE — 80048 BASIC METABOLIC PNL TOTAL CA: CPT

## 2020-11-12 RX ORDER — SODIUM CHLORIDE AND POTASSIUM CHLORIDE .9; .15 G/100ML; G/100ML
SOLUTION INTRAVENOUS CONTINUOUS
Status: DISCONTINUED | OUTPATIENT
Start: 2020-11-12 | End: 2020-11-27

## 2020-11-12 RX ORDER — MAGNESIUM SULFATE IN WATER 40 MG/ML
2 INJECTION, SOLUTION INTRAVENOUS ONCE
Status: COMPLETED | OUTPATIENT
Start: 2020-11-12 | End: 2020-11-12

## 2020-11-12 RX ORDER — TACROLIMUS 1 MG/1
4 CAPSULE ORAL 2 TIMES DAILY
Status: DISCONTINUED | OUTPATIENT
Start: 2020-11-12 | End: 2020-11-27

## 2020-11-12 RX ORDER — MAGNESIUM SULFATE 1 G/100ML
1 INJECTION INTRAVENOUS ONCE
Status: DISCONTINUED | OUTPATIENT
Start: 2020-11-12 | End: 2020-11-12

## 2020-11-12 RX ORDER — TACROLIMUS 1 MG/1
1 CAPSULE ORAL ONCE
Status: COMPLETED | OUTPATIENT
Start: 2020-11-12 | End: 2020-11-12

## 2020-11-12 RX ADMIN — URSODIOL 500 MG: 250 TABLET, FILM COATED ORAL at 20:11

## 2020-11-12 RX ADMIN — LORAZEPAM 0.5 MG: 0.5 TABLET ORAL at 19:13

## 2020-11-12 RX ADMIN — PROCHLORPERAZINE MALEATE 10 MG: 10 TABLET ORAL at 12:34

## 2020-11-12 RX ADMIN — LEVOFLOXACIN 500 MG: 500 TABLET, FILM COATED ORAL at 20:12

## 2020-11-12 RX ADMIN — MAGNESIUM SULFATE HEPTAHYDRATE 2 G: 40 INJECTION, SOLUTION INTRAVENOUS at 07:23

## 2020-11-12 RX ADMIN — URSODIOL 500 MG: 250 TABLET, FILM COATED ORAL at 08:24

## 2020-11-12 RX ADMIN — SODIUM CHLORIDE 15 ML: 900 IRRIGANT IRRIGATION at 20:16

## 2020-11-12 RX ADMIN — METHOTREXATE 12.5 MG: 25 INJECTION, SOLUTION INTRA-ARTERIAL; INTRAMUSCULAR; INTRAVENOUS at 16:05

## 2020-11-12 RX ADMIN — SODIUM CHLORIDE: 9 INJECTION, SOLUTION INTRAVENOUS at 00:40

## 2020-11-12 RX ADMIN — LISINOPRIL 40 MG: 40 TABLET ORAL at 08:24

## 2020-11-12 RX ADMIN — PROCHLORPERAZINE MALEATE 10 MG: 10 TABLET ORAL at 08:23

## 2020-11-12 RX ADMIN — TACROLIMUS 1 MG: 1 CAPSULE ORAL at 14:24

## 2020-11-12 RX ADMIN — LOPERAMIDE HYDROCHLORIDE 2 MG: 2 CAPSULE ORAL at 21:45

## 2020-11-12 RX ADMIN — Medication 10 ML: at 20:16

## 2020-11-12 RX ADMIN — TACROLIMUS 3 MG: 1 CAPSULE ORAL at 08:24

## 2020-11-12 RX ADMIN — SOTALOL HYDROCHLORIDE 120 MG: 120 TABLET ORAL at 20:12

## 2020-11-12 RX ADMIN — Medication 10 ML: at 08:24

## 2020-11-12 RX ADMIN — FAMOTIDINE 20 MG: 20 TABLET ORAL at 20:11

## 2020-11-12 RX ADMIN — LOPERAMIDE HYDROCHLORIDE 2 MG: 2 CAPSULE ORAL at 11:30

## 2020-11-12 RX ADMIN — VALACYCLOVIR HYDROCHLORIDE 500 MG: 500 TABLET, FILM COATED ORAL at 08:23

## 2020-11-12 RX ADMIN — SOTALOL HYDROCHLORIDE 120 MG: 120 TABLET ORAL at 08:23

## 2020-11-12 RX ADMIN — FLUCONAZOLE 400 MG: 200 TABLET ORAL at 08:24

## 2020-11-12 RX ADMIN — LOPERAMIDE HYDROCHLORIDE 2 MG: 2 CAPSULE ORAL at 16:09

## 2020-11-12 RX ADMIN — PROCHLORPERAZINE MALEATE 10 MG: 10 TABLET ORAL at 18:04

## 2020-11-12 RX ADMIN — TACROLIMUS 4 MG: 1 CAPSULE ORAL at 20:11

## 2020-11-12 RX ADMIN — POTASSIUM CHLORIDE AND SODIUM CHLORIDE: 900; 150 INJECTION, SOLUTION INTRAVENOUS at 11:22

## 2020-11-12 RX ADMIN — AMLODIPINE BESYLATE 5 MG: 5 TABLET ORAL at 08:23

## 2020-11-12 RX ADMIN — VALACYCLOVIR HYDROCHLORIDE 500 MG: 500 TABLET, FILM COATED ORAL at 20:12

## 2020-11-12 RX ADMIN — ALLOPURINOL 300 MG: 300 TABLET ORAL at 08:23

## 2020-11-12 RX ADMIN — FAMOTIDINE 20 MG: 20 TABLET ORAL at 08:23

## 2020-11-12 ASSESSMENT — PAIN SCALES - GENERAL
PAINLEVEL_OUTOF10: 0

## 2020-11-12 NOTE — PLAN OF CARE
Problem: Nutrition  Goal: Optimal nutrition therapy  11/12/2020 1319 by Chel Bosch RD, LD  Outcome: Ongoing  Note: Nutrition Problem #1: Increased nutrient needs  Intervention: Food and/or Nutrient Delivery: Continue Current Diet  Nutritional Goals: pt will maintain adequate po nutrition s/p BMT by consuming greater than 75% of meals offered through admission to promote meeting increased nutrient needs.

## 2020-11-12 NOTE — PROGRESS NOTES
Administration: Chemotherapy drug Methotrexate independently verified with Wilbert Cool RN prior to administration. Acknowledgement of informed consent for chemotherapy administration verified. Original order, appropriateness of regimen, drug supplied, height, weight, BSA, dose calculations, expiration dates/times, drug appearance, and two patient identifiers were verified by both RNs. Drug checked for vesicant/irritant status and for risk of hypersensitivity. Most recent laboratory values and allergies, were reviewed. Positive, brisk blood return via CVC was confirmed prior to administration. Chest x-ray for correct line placement reviewed. Felisha Pelletier and Wilbert Cool RN verified correct rate of chemotherapy and maintenance IV fluids. Patient was educated on chemotherapy regimen prior to administration including indication for treatment related to disease & side effects of chemotherapy drug. Patient verbalizes understanding of all instructions. Completion of Chemotherapy: Monitoring during infusion done per policy, see Flowsheets. Blood return verified before, during, and after infusion per policy; no signs of extravasation. Pt tolerated chemotherapy well and without incident. Chemotherapy infusion end time on the [facility] Will continue to monitor.

## 2020-11-12 NOTE — PROGRESS NOTES
TELE HEALTH VISIT    This visit was performed via:   Service provided: This visit was performed via live interactive two-way video.    Clinician Location: [facility] [address] [place] [address]   Patient Location: Home    This call was made to Adolfo Heck to discuss   Chief Complaint   Patient presents with   • Follow-up Hypertension     She is an established patient of mine.  She is in [place] and her identity has been established.   Adolfo understands that we are limiting office visits due to the coronavirus pandemic and she consents to a virtual visit with charges submitted to her insurance.     Without the patient being seen and evaluated in person, there is a risk that the information and/or assessment may be incomplete or inaccurate.    CHIEF COMPLAINT:   Chief Complaint   Patient presents with   • Follow-up Hypertension         SUBJECTIVE:  F/u venous reflux  Says that she has had swelling, skin changes  The pain is worsening  Does compression but is not helping  Has not been to lymphedema clinic here  No evidence of clots    She also states she has numbness in her lateral thighs  No pain in her back  No saddle anesthesia  Baseline has some incontinence    She also has right carpal tunnel   She has numnbess in 2/3/4 finger  No wrist pain  Was previously in her left wrist        REVIEW OF SYSTEMS: Negative apart from leg pain, thigh numnbess, hand numbness    PROBLEM LIST:    No past medical history on file.    Past Surgical History:   Procedure Laterality Date   • Cholecystectomy     • Dilation and curettage           MEDICATIONS:  Current Outpatient Medications   Medication Sig Dispense Refill   • carvedilol (COREG) 12.5 MG tablet TAKE 1 TABLET BY MOUTH TWICE DAILY 60 tablet 0   • amLODIPine (NORVASC) 5 MG tablet TAKE 1 TABLET BY MOUTH DAILY 90 tablet 1   • albuterol (PROAIR RESPICLICK) 108 (90 Base) MCG/ACT inhaler Inhale 2 puffs into the lungs every 4 hours as needed for Shortness of  NUTRITION ASSESSMENT  Admission Date: 11/5/2020     Type and Reason for Visit: Reassess    NUTRITION RECOMMENDATIONS:   1. PO Diet: Continue current diet low microbial.    2. Nutrition Education: 11/6 provided verbal review of food safety s/p BMT and dietary recommendations. NUTRITION ASSESSMENT:  EMR review - pt is tolerating % of meals s/p ALLO D+1. Diarrhea improved and nutritionally stable at this point. Will continue to monitor for nutritional adequacy post BMT. MALNUTRITION ASSESSMENT  Context of Malnutrition: Acute Illness(on chronic)   Malnutrition Status: No malnutrition  Findings of the 6 clinical characteristics of malnutrition (Minimum of 2 out of 6 clinical characteristics is required to make the diagnosis of moderate or severe Protein Calorie Malnutrition based on AND/ASPEN Guidelines):  Energy Intake: No significant decrease in energy intake    Energy Intake Time: No significant    Weight Loss %: No significant loss   Weight loss Time: No significant    Due to current CDC guidelines recommending 6-ft distancing for social isolation for COVID19 prevention, physical aspects of the malnutrition assessment were withheld at this time. NUTRITION DIAGNOSIS  Problem: Problem #1: Increased nutrient needs  Etiology: Increased demand for nutrient  Signs & Symptoms: chemot; ALLO bmt     NUTRITION INTERVENTION  Food and/or Nutrient Delivery:Continue Current diet   Nutrition education/counseling/coordination of care: Education Initiated     NUTRITION MONITORING & EVALUATION:  Evaluation:Progressing towards goal   Goals:Goals: pt will maintain adequate po nutrition s/p BMT by consuming greater than 75% of meals offered through admission to promote meeting increased nutrient needs.    Monitoring: Diet Tolerance , Meal Intake  or Pertinent Labs      OBJECTIVE DATA:  · Nutrition-Focused Physical Findings: loose bm this AM; imodium PRN  · Wounds None      Past Medical History:   Diagnosis Date    Arthritis     knees    Cancer (HCC)     AML    History of blood transfusion     Hyperlipidemia     Hypertension         ANTHROPOMETRICS  Current Height: 6' 3\" (190.5 cm)  Current Weight: (!) 320 lb (145.2 kg)    Admission weight: (!) 324 lb 3.2 oz (147.1 kg)  Ideal Bodyweight 196 lb    Usual Bodyweight 325 lb per emr   Weight Changes no significant        BMI BMI (Calculated): 40.1    Wt Readings from Last 50 Encounters:   11/12/20 (!) 320 lb (145.2 kg)   10/08/20 (!) 325 lb (147.4 kg)   09/29/20 (!) 325 lb 13.4 oz (147.8 kg)       COMPARATIVE STANDARDS  Estimated Total Kcals/Day : 15-18 Current Bodyweight (145.1 kg) 4682-2485 kcal    Estimated Total Protein (g/day) : 1.5-1.8 Ideal Bodyweight  (89.1 kg) 134-160g/day  Estimated Daily Total Fluid (ml/day): 7511-5116 mL per day     Food / Nutrition-Related History  Pre-Admission / Home Diet:  Pre-Admission/Home Diet: General   Home Supplements / Herbals:   none noted  Food Restrictions / Cultural Requests:   none noted    Current Nutrition Therapies   DIET GENERAL;     PO Intake: %  PO Supplement: None   PO Supplement Intake: None   IVF: NS + 20 mEq KCl @ 50 ml/hr     NUTRITION RISK LEVEL: Risk Level:  Moderate     Chel Bosch RD, LD  [place]:  [phone]  Office:  [phone] Breath or Wheezing.     • acetaminophen (TYLENOL) 500 MG tablet Take 500 mg by mouth every 6 hours as needed for Pain.     • TURMERIC PO Take 1 tablet by mouth daily as needed (for inflammation).     • Multiple Vitamins-Minerals (AIRBORNE PO) Take 1 tablet by mouth daily.     • metoCLOPramide (REGLAN) 5 MG tablet Take 5 mg by mouth every 6 hours as needed for Nausea or Vomiting.     • traMADol (ULTRAM) 50 MG tablet Take 2 tablets by mouth 2 times daily as needed for Pain. 120 tablet 0   • fluticasone (FLONASE) 50 MCG/ACT nasal spray Spray 1 spray in each nostril daily. 16 g 12   • methylPREDNISolone (MEDROL DOSEPAK) 4 MG tablet Take 1 tablet by mouth as directed. follow package directions 21 tablet 0   • norethindrone (MICRONOR) 0.35 MG tablet TAKE 1 TABLET BY MOUTH DAILY 28 tablet 1   • montelukast (SINGULAIR) 10 MG tablet Take 10 mg by mouth.       No current facility-administered medications for this visit.        ALLERGIES :  ALLERGIES:   Allergen Reactions   • Hydrochlorothiazide Other (See Comments)     Unknown   • Lisinopril Angioedema   • Penicillins Other (See Comments)     Unknown       FAMILY HISTORY:  Family History   Problem Relation Age of Onset   • Diabetes Mother    • Diabetes Father    • Cancer, Colon Other    • Cancer, Ovarian Other        SOCIAL HISTORY:  Social History     Socioeconomic History   • Marital status:      Spouse name: Not on file   • Number of children: Not on file   • Years of education: Not on file   • Highest education level: Not on file   Occupational History   • Not on file   Social Needs   • Financial resource strain: Not on file   • Food insecurity:     Worry: Not on file     Inability: Not on file   • Transportation needs:     Medical: Not on file     Non-medical: Not on file   Tobacco Use   • Smoking status: Never Smoker   • Smokeless tobacco: Former User   Substance and Sexual Activity   • Alcohol use: No     Frequency: Never   • Drug use: No   • Sexual activity:  Not on file   Lifestyle   • Physical activity:     Days per week: Not on file     Minutes per session: Not on file   • Stress: Not on file   Relationships   • Social connections:     Talks on phone: Not on file     Gets together: Not on file     Attends Latter-day service: Not on file     Active member of club or organization: Not on file     Attends meetings of clubs or organizations: Not on file     Relationship status: Not on file   • Intimate partner violence:     Fear of current or ex partner: Not on file     Emotionally abused: Not on file     Physically abused: Not on file     Forced sexual activity: Not on file   Other Topics Concern   • Not on file   Social History Narrative   • Not on file       PHYSICAL EXAM (AS PER PATIENT FINDINGS) -  Morbidly obese  Anxious  Tearful      ASSESSMENT/PLAN:  Chronic venous insuffiency  Will see if lymphedema clinic is running  Will also get to vascular for evaluation    Numnbess/tingling  Likely lumbar radiculopathy  2 wk course of meloxicam    Left cts  Left wrist brace rx  Will hold off on any injections right now      Total Length of Tele Visit- 25 min.    More than 50% time was spent on counseling       FOLLOW UP - 1 month    All questions were answered. Patient verbalized understating and agreed with the plan of care.

## 2020-11-12 NOTE — PLAN OF CARE
Problem: Falls - Risk of:  Goal: Will remain free from falls  Outcome: Ongoing  Note: Orthostatic vital signs obtained at start of shift - see flowsheet for details. Pt does not meet criteria for orthostasis. Pt is a Med fall risk. See Johnston Cipriano Fall Score and ABCDS Injury Risk assessments. Pt bed is in low position, side rails up, call light and belongings are in reach. Fall risk light is on outside pts room. Pt encouraged to call for assistance as needed. Will continue with hourly rounds for PO intake, pain needs, toileting and repositioning as needed. Problem: Infection - Central Venous Catheter-Associated Bloodstream Infection:  Goal: Will show no infection signs and symptoms  Description: Will show no infection signs and symptoms  Outcome: Ongoing  Note: CVC site remains free of signs/symptoms of infection. No drainage, edema, erythema, pain, or warmth noted at site. Dressing changes continue per protocol and on an as needed basis - see flowsheet. Compliant with [facility] Bath Protocol:  Performed CHG bath today per [facility] protocol utilizing CHG solution in the shower. CVC site cleansed with CHG wipe over dressing, skin surrounding dressing, and first 6\" of IV tubing. Pt tolerated well. Continued to encourage daily CHG bathing per [facility] protocol. Problem: Bleeding:  Goal: Will show no signs and symptoms of excessive bleeding  Description: Will show no signs and symptoms of excessive bleeding  Outcome: Ongoing  Note:   Patient's hemoglobin this AM:   Recent Labs     11/12/20  0345   HGB 9.8*     Patient's platelet count this AM:   Recent Labs     11/12/20  0345   PLT 46*    Thrombocytopenia Precautions in place. Patient showing no signs or symptoms of active bleeding. Transfusion not indicated at this time. Patient verbalizes understanding of all instructions. Will continue to assess and implement POC. Call light within reach and hourly rounding in place.        Problem: Venous Thromboembolism:  Goal: Will show no signs or symptoms of venous thromboembolism  Description: Will show no signs or symptoms of venous thromboembolism  Outcome: Ongoing  Note: Pt is at risk for DVT d/t decreased mobility and cancer treatment. Pt educated on importance of activity. Pt has orders for Subcut prophylactic lovenox. Pt verbalizes understanding of need for prophylaxis while inpatient. Problem: PROTECTIVE PRECAUTIONS  Goal: Patient will remain free of nosocomial Infections  Outcome: Ongoing  Note: Pt remains in protective precautions. No living plants or fresh flowers in his/her room. Patient educated on wearing mask when in hallways. Patient, staff, and visitors adhering to handwashing guidelines. Patient cleansed with chlorhexidine wipes and linens changed daily per protocol. Pt verbalizes understanding of low microbial diet. Patient remains free of nosocomial infections. Problem: Nausea/Vomiting:  Goal: Absence of nausea/vomiting  Outcome: Ongoing  Note: Patient has had intermittent nausea throughout the day. PRN Zofran given before dinner, see MAR. Patient tolerating well, will continue to monitor. Problem: Diarrhea:  Goal: Bowel elimination is within specified parameters  Description: Bowel elimination is within specified parameters  Outcome: Ongoing  Note: Patient has had no complaints of diarrhea this shift. PRN imodium given prior to shift. Will continue to monitor.

## 2020-11-12 NOTE — PLAN OF CARE
Problem: PROTECTIVE PRECAUTIONS  Goal: Patient will remain free of nosocomial Infections  Outcome: Met This Shift  Note: Pt remains in protective precautions. No living plants or fresh flowers in his/her room. Patient educated on wearing mask when in hallways. Patient, staff, and visitors adhering to handwashing guidelines. Patient cleansed with chlorhexidine wipes and linens changed daily per protocol. Pt verbalizes understanding of low microbial diet. Patient remains free of nosocomial infections. Problem: Falls - Risk of:  Goal: Will remain free from falls  Description: Pt up ad abebe in room and halls with steady gait. Independent with ADL's. Uses call light appropriately for assistance as needed. Bed breaks on, non skid footwear on, side rails up x2, call light within reach. Hourly rounding performed per unit protocol. Will continue safety precautions. Will remain free from falls      Problem: Bleeding:  Goal: Will show no signs and symptoms of excessive bleeding  Description: Will show no signs and symptoms of excessive bleeding  Problem: Venous Thromboembolism:  Goal: Will show no signs or symptoms of venous thromboembolism  Description: Will show no signs or symptoms of venous thromboembolism  11/12/2020 1341 by May Padron RN  Outcome: Ongoing  Note: Pt is at risk for DVT d/t decreased mobility and cancer treatment. Pt educated on importance of activity. Pt has orders for SCDs while in bed, however pt currently refusing treatment. Reviewed risks of DVT & PE development while inpatient. Provider aware of patient's refusal and re-education of importance of prophylaxis. No new orders at this time. Will continue to re-instruct patient and intervene as appropriate. Outcome: Ongoing  Note: Patient's hemoglobin this AM:   Recent Labs     11/12/20  0345   HGB 9.8*     Patient's platelet count this AM:   Recent Labs     11/12/20  0345   PLT 46*    Thrombocytopenia Precautions in place. Patient showing no signs or symptoms of active bleeding. Transfusion not indicated at this time. Patient verbalizes understanding of all instructions. Will continue to assess and implement POC. Call light within reach and hourly rounding in place. Problem: Infection - Central Venous Catheter-Associated Bloodstream Infection:  Goal: Will show no infection signs and symptoms  Description: Will show no infection signs and symptoms    Outcome: Ongoing  Note: CVC site remains free of signs/symptoms of infection. No drainage, edema, erythema, pain, or warmth noted at site. Dressing changes continue per protocol and on an as needed basis - see flowsheet. Pt states if he will shower later. Problem: Venous Thromboembolism:  Goal: Will show no signs or symptoms of venous thromboembolism  Description: Will show no signs or symptoms of venous thromboembolism  Outcome: Ongoing  Note: Pt is at risk for DVT d/t decreased mobility and cancer treatment. Pt educated on importance of activity. Pt has orders for SCDs while in bed, however pt currently refusing treatment. Reviewed risks of DVT & PE development while inpatient. Provider aware of patient's refusal and re-education of importance of prophylaxis. No new orders at this time. Will continue to re-instruct patient and intervene as appropriate. .    Problem: Discharge Planning:  Goal: Discharged to appropriate level of care  Description: Discharged to appropriate level of care  Outcome: Ongoing  Note: Pt to discharge once counts recover. Problem: Nausea/Vomiting:  Goal: Absence of nausea/vomiting  Description: C/O mild nausea without vomiting. Compazine x1 PO given as prophylaxis   Outcome: Ongoing  Note: Pt premedicated prior to meals with compazine.

## 2020-11-12 NOTE — PROGRESS NOTES
[facility] Allogeneic Progress Note    2020    Lore Cohen    :  1960    MRN:  [id number]    Referring MD: Magnus Licona MD  [id number] [facility] [address]  [address], [address]      Subjective:  Diarrhea resolved. Tolerating diet, no n/v, abdominal pain, sore throat, cough, fevers, chills.       ECOG PS:  (1) Restricted in physically strenuous activity, ambulatory and able to do work of light nature    KPS: 80% Normal activity with effort; some signs or symptoms of disease    Isolation:  None     Medications    Scheduled Meds:   magnesium sulfate  1 g Intravenous Once    famotidine  20 mg Oral BID    levoFLOXacin  500 mg Oral Nightly    allopurinol  300 mg Oral Daily    sodium chloride flush  10 mL Intravenous 2 times per day    Saline Mouthwash  15 mL Swish & Spit 4x Daily AC & HS    valACYclovir  500 mg Oral BID    [Held by provider] enoxaparin  40 mg Subcutaneous Daily    sotalol  120 mg Oral BID    amLODIPine  5 mg Oral Daily    And    lisinopril  40 mg Oral Daily    furosemide  40 mg Intravenous Q12H    tacrolimus  3 mg Oral Q12H    methotrexate sodium  12.5 mg Intravenous Once    [START ON 2020] methotrexate sodium  12.5 mg Intravenous Once    [START ON 2020] methotrexate sodium  12.5 mg Intravenous Once    ursodiol  500 mg Oral BID    [START ON 2020] methotrexate sodium  12.5 mg Intravenous Once    fluconazole  400 mg Oral Daily     Continuous Infusions:   sodium chloride      sodium chloride      sodium chloride 200 mL/hr at 20 0040     PRN Meds:diphenhydrAMINE, sodium chloride, sodium chloride, [COMPLETED] loperamide **FOLLOWED BY** loperamide, sennosides-docusate sodium, sodium chloride, sodium chloride flush, potassium chloride, magnesium sulfate, magnesium hydroxide, Saline Mouthwash, alteplase, prochlorperazine **OR** prochlorperazine, LORazepam **OR** LORazepam    ROS:  As noted above, otherwise remainder of 10-point ROS negative    Physical Exam:    I&O:      Intake/Output Summary (Last 24 hours) at 11/12/2020 0708  Last data filed at 11/12/2020 0546  Gross per 24 hour   Intake 5692 ml   Output 8625 ml   Net -2933 ml       Vital Signs:  BP (!) 150/77   Pulse 75   Temp 97.9 °F (36.6 °C) (Oral)   Resp 18   Ht 6' 3\" (1.905 m)   Wt (!) 322 lb 9.6 oz (146.3 kg)   SpO2 100%   BMI 40.32 kg/m²     Weight:    Wt Readings from Last 3 Encounters:   11/11/20 (!) 322 lb 9.6 oz (146.3 kg)   10/08/20 (!) 325 lb (147.4 kg)   09/29/20 (!) 325 lb 13.4 oz (147.8 kg)       General: Awake, alert and oriented. HEENT: normocephalic, alopecia, PERRL, no scleral erythema or icterus, Oral mucosa moist and intact, throat clear  NECK: supple without palpable adenopathy  BACK: Straight, negative CVAT  SKIN: warm dry and intact without lesions rashes or masses  CHEST: CTA bilaterally without use of accessory muscles  CV: Normal S1 S2, RRR, no MRG  ABD: NT, ND, normoactive BS, no palpable masses or hepatosplenomegaly  EXTREMITIES: without edema, denies calf tenderness  NEURO: CN II - XII grossly intact  CATHETER: Right tri-fusion ([facility], 6/11/20) - CDI    Data:   CBC:   Recent Labs     11/10/20  0310 11/11/20  0345 11/12/20  0345   WBC 1.7* 1.3* 0.3*   HGB 9.2* 9.8* 9.8*   HCT 27.8* 29.4* 29.2*   MCV 98.6 97.4 97.1   PLT 88* 79* 46*     BMP/Mag:  Recent Labs     11/10/20  0310 11/11/20  0345 11/12/20  0345    138 140   K 4.0 3.9 3.6    105 105   CO2 23 25 25   PHOS  --  3.3  --    BUN 16 16 17   CREATININE 0.8 0.8 0.9   MG  --  1.80 1.60*     LIVP:   Recent Labs     11/11/20 0345 11/12/20 0345   AST 17  --    ALT 27  --    BILIDIR <0.2  --    BILITOT 0.6 0.6   ALKPHOS 82  --      Uric Acid:    Recent Labs     11/11/20 0345   LABURIC 5.2     Coags:   Recent Labs     11/12/20 0345   PROTIME 12.4   INR 1.07   APTT 34.9        PROBLEM LIST:         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 CMML - 1 (Dx 10/2020)  3.   Atrial Fibrillation  4. CASH  5. HTN      TREATMENT:        1. Induction: Sweetie-C & Daunorubicin (6/12/20)  2. Consolidation:  HiDAC x 3 cycles (7/28/20, 8/29/20 & 9/22/20)  3. MUD Allo - PB BMT (DPB1 non-permissive mismatch)  Preparative Regimen:  Melphalan and Fludarabine  Date of BMT:  11/11/20  Source of stem cells:  PBPC  Donor/Recipient Blood Type:  A positive / 0 positive   Donor Sex:  Female / NMDP (DID # [id number]) - Follow FISH XY  CMV Donor / Recipient: Negative / Negative       ASSESSMENT AND PLAN:        1. AML - intermediate risk / CMML-1:  - BM bx/asp (10/14/20) - No AML, but shows CMML-1   - Admitted for Melphalan & Fludarabine f/b allogeneic PBSC infusion 11/11/20     Day +1     2. ID:  No evidence of infection.    - Cont Valtrex ppx   - Continue diflucan, Levaquin ppx - started 11/8        Donor/Recipient CMV:  Negative / negative   - Routine surveillance is not required       3. Heme: Pancytopenia from chemotherapy  - Transfuse for Hgb < 7 and Platelets < 93S  - No transfusion today  - DVT ppx held 11/12     4. Metabolic / CKD III: Baseline SCr 1.2-1.3. 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  - Cont Prograf 3mg BID. First level 11/12/20 then qMWF  No results found for: TACROLEV     6. VOD:  No evidence of VOD. Admission Weight: (!) 324 lb 3.2 oz (147.1 kg)  Current Weight: Weight: (!) 322 lb 9.6 oz (146.3 kg). Recent Labs     11/11/20  0345 11/12/20  0345   BILIDIR <0.2  --    BILITOT 0.6 0.6     - Cont Actigall     7. Pulmonary: H/o CASH  - PFT (10/6/20) - FEV1/FVC 71 w/ predicted ratio of 75 & DLCO 41% (corrected 84%).   He has moderate obstruction by FEV1/FVC ratio lower than predicted and also based on ATS criteria with low FEV, likely d/t morbid obesity   - Pulm clearance by Dr. Karrie Johnson  CASH:  - Cont CPAP  - Encourage IS and ambulation    8. GI / Nutrition:    Nutrition:  Appetite is good at this point.  - Cont low microbial diet  - Dietary to follow     9. Cardiac: H/o A. Fib & HTN  - Echo (9/29/20) -  LVEF 60-65% w/ left atrial dilatation  A. Fib:  - Cont sotalol 120 mg BID  HTN:   - Cont amlodipine-lisinopril 5/40 mg daily     - DVT Prophylaxis: Platelets <91,681 cells/dL, - prophylactic lovenox on hold and mechanical prophylaxis with bilateral SCDs while in bed in place.   Contraindications to pharmacologic prophylaxis: None  Contraindications to mechanical prophylaxis: None     - Disposition:  D/c home once ANC > 1.0 & toxicities resolved following allogeneic transplant       Electronically signed by Eloy Simmons MD on 11/12/2020 at 7:08 AM  PGY-2  Internal Medicine Resident

## 2020-11-12 NOTE — PROGRESS NOTES
Clinical Pharmacy Progress Note    Patient Name: Zaire Hameed  YOB: 1960  Diagnosis: AML -MelFlu SHEILA f/b MUD PBSC Allogeneic HSCT     GVHD Prophylaxis:  Tacrolimus (Prograf) starting Day -3   · Adjusted according to levels - drawn via red lumen  Methotrexate 12.5 mg IVPush days +1 (11/12), +3 (11/14), +6 (11/17), +11 (11/22)     Tacrolimus (Prograf) levels starting day +1  Tacrolimus (Prograf) goal level:  8-15 ng/mL    Date SCr Bili Prograf Dose Prograf Level Adjustments / Comments   11/5; d-6 1.3 0.5 3 mg po bid -- Oral tacrolimus starts 11/8 (day-3) with first tacrolimus trough level on 11/12 (day+1) followed by MWF thereafter. 11/12; d+1 0.9 0.6 3 mg po bid 4.3 Increase tacrolimus to 4 mg po bid (additional 1 mg now). Next level Fri 11/13. Please call with questions. Lorenso Burkitt, Pharm. [facility] Clinical Pharmacist  Wireless:  [phone]  11/12/2020 8:27 AM

## 2020-11-13 LAB
ALBUMIN SERPL-MCNC: 4 G/DL (ref 3.4–5)
ALP BLD-CCNC: 80 U/L (ref 40–129)
ALT SERPL-CCNC: 29 U/L (ref 10–40)
ANION GAP SERPL CALCULATED.3IONS-SCNC: 7 MMOL/L (ref 3–16)
AST SERPL-CCNC: 20 U/L (ref 15–37)
BILIRUB SERPL-MCNC: 0.5 MG/DL (ref 0–1)
BILIRUBIN DIRECT: <0.2 MG/DL (ref 0–0.3)
BILIRUBIN, INDIRECT: ABNORMAL MG/DL (ref 0–1)
BUN BLDV-MCNC: 19 MG/DL (ref 7–20)
CALCIUM SERPL-MCNC: 8.6 MG/DL (ref 8.3–10.6)
CHLORIDE BLD-SCNC: 105 MMOL/L (ref 99–110)
CO2: 24 MMOL/L (ref 21–32)
CREAT SERPL-MCNC: 1 MG/DL (ref 0.8–1.3)
GFR AFRICAN AMERICAN: >60
GFR NON-AFRICAN AMERICAN: >60
GLUCOSE BLD-MCNC: 116 MG/DL (ref 70–99)
HCT VFR BLD CALC: 27.6 % (ref 40.5–52.5)
HEMOGLOBIN: 9.2 G/DL (ref 13.5–17.5)
LACTATE DEHYDROGENASE: 194 U/L (ref 100–190)
MAGNESIUM: 2 MG/DL (ref 1.8–2.4)
MCH RBC QN AUTO: 32.6 PG (ref 26–34)
MCHC RBC AUTO-ENTMCNC: 33.2 G/DL (ref 31–36)
MCV RBC AUTO: 98 FL (ref 80–100)
PDW BLD-RTO: 19.5 % (ref 12.4–15.4)
PHOSPHORUS: 3.7 MG/DL (ref 2.5–4.9)
PLATELET # BLD: 36 K/UL (ref 135–450)
PMV BLD AUTO: 6.9 FL (ref 5–10.5)
POTASSIUM SERPL-SCNC: 3.8 MMOL/L (ref 3.5–5.1)
RBC # BLD: 2.81 M/UL (ref 4.2–5.9)
SODIUM BLD-SCNC: 136 MMOL/L (ref 136–145)
TACROLIMUS BLOOD: 6.1 NG/ML (ref 5–20)
TOTAL PROTEIN: 5.7 G/DL (ref 6.4–8.2)
URIC ACID, SERUM: 5.3 MG/DL (ref 3.5–7.2)
WBC # BLD: 0.2 K/UL (ref 4–11)

## 2020-11-13 PROCEDURE — 36592 COLLECT BLOOD FROM PICC: CPT

## 2020-11-13 PROCEDURE — 6370000000 HC RX 637 (ALT 250 FOR IP): Performed by: NURSE PRACTITIONER

## 2020-11-13 PROCEDURE — 6370000000 HC RX 637 (ALT 250 FOR IP): Performed by: INTERNAL MEDICINE

## 2020-11-13 PROCEDURE — 80076 HEPATIC FUNCTION PANEL: CPT

## 2020-11-13 PROCEDURE — 84100 ASSAY OF PHOSPHORUS: CPT

## 2020-11-13 PROCEDURE — 6360000002 HC RX W HCPCS: Performed by: INTERNAL MEDICINE

## 2020-11-13 PROCEDURE — 83735 ASSAY OF MAGNESIUM: CPT

## 2020-11-13 PROCEDURE — 6360000002 HC RX W HCPCS: Performed by: STUDENT IN AN ORGANIZED HEALTH CARE EDUCATION/TRAINING PROGRAM

## 2020-11-13 PROCEDURE — 85025 COMPLETE CBC W/AUTO DIFF WBC: CPT

## 2020-11-13 PROCEDURE — 80048 BASIC METABOLIC PNL TOTAL CA: CPT

## 2020-11-13 PROCEDURE — 84550 ASSAY OF BLOOD/URIC ACID: CPT

## 2020-11-13 PROCEDURE — 83615 LACTATE (LD) (LDH) ENZYME: CPT

## 2020-11-13 PROCEDURE — 80197 ASSAY OF TACROLIMUS: CPT

## 2020-11-13 PROCEDURE — 2580000003 HC RX 258: Performed by: NURSE PRACTITIONER

## 2020-11-13 PROCEDURE — 2060000000 HC ICU INTERMEDIATE R&B

## 2020-11-13 RX ORDER — CALCIUM CARBONATE 200(500)MG
500 TABLET,CHEWABLE ORAL 3 TIMES DAILY PRN
Status: DISCONTINUED | OUTPATIENT
Start: 2020-11-13 | End: 2020-11-14

## 2020-11-13 RX ADMIN — SOTALOL HYDROCHLORIDE 120 MG: 120 TABLET ORAL at 20:08

## 2020-11-13 RX ADMIN — LOPERAMIDE HYDROCHLORIDE 2 MG: 2 CAPSULE ORAL at 14:29

## 2020-11-13 RX ADMIN — LORAZEPAM 0.5 MG: 0.5 TABLET ORAL at 23:09

## 2020-11-13 RX ADMIN — ANTACID TABLETS 500 MG: 500 TABLET, CHEWABLE ORAL at 20:07

## 2020-11-13 RX ADMIN — TACROLIMUS 4 MG: 1 CAPSULE ORAL at 20:07

## 2020-11-13 RX ADMIN — FAMOTIDINE 20 MG: 20 TABLET ORAL at 20:08

## 2020-11-13 RX ADMIN — VALACYCLOVIR HYDROCHLORIDE 500 MG: 500 TABLET, FILM COATED ORAL at 20:08

## 2020-11-13 RX ADMIN — SOTALOL HYDROCHLORIDE 120 MG: 120 TABLET ORAL at 08:53

## 2020-11-13 RX ADMIN — SODIUM CHLORIDE 15 ML: 900 IRRIGANT IRRIGATION at 16:26

## 2020-11-13 RX ADMIN — FLUCONAZOLE 400 MG: 200 TABLET ORAL at 08:46

## 2020-11-13 RX ADMIN — SODIUM CHLORIDE 15 ML: 900 IRRIGANT IRRIGATION at 11:44

## 2020-11-13 RX ADMIN — Medication 10 ML: at 08:46

## 2020-11-13 RX ADMIN — LOPERAMIDE HYDROCHLORIDE 2 MG: 2 CAPSULE ORAL at 16:00

## 2020-11-13 RX ADMIN — ANTACID TABLETS 500 MG: 500 TABLET, CHEWABLE ORAL at 17:31

## 2020-11-13 RX ADMIN — LISINOPRIL 40 MG: 40 TABLET ORAL at 08:46

## 2020-11-13 RX ADMIN — FAMOTIDINE 20 MG: 20 TABLET ORAL at 08:46

## 2020-11-13 RX ADMIN — PROCHLORPERAZINE MALEATE 10 MG: 10 TABLET ORAL at 19:29

## 2020-11-13 RX ADMIN — POTASSIUM CHLORIDE AND SODIUM CHLORIDE: 900; 150 INJECTION, SOLUTION INTRAVENOUS at 06:32

## 2020-11-13 RX ADMIN — VALACYCLOVIR HYDROCHLORIDE 500 MG: 500 TABLET, FILM COATED ORAL at 08:46

## 2020-11-13 RX ADMIN — TACROLIMUS 4 MG: 1 CAPSULE ORAL at 08:46

## 2020-11-13 RX ADMIN — Medication 10 ML: at 21:09

## 2020-11-13 RX ADMIN — ANTACID TABLETS 500 MG: 500 TABLET, CHEWABLE ORAL at 14:29

## 2020-11-13 RX ADMIN — LEVOFLOXACIN 500 MG: 500 TABLET, FILM COATED ORAL at 20:08

## 2020-11-13 RX ADMIN — SODIUM CHLORIDE 15 ML: 900 IRRIGANT IRRIGATION at 21:09

## 2020-11-13 RX ADMIN — AMLODIPINE BESYLATE 5 MG: 5 TABLET ORAL at 08:46

## 2020-11-13 RX ADMIN — URSODIOL 500 MG: 250 TABLET, FILM COATED ORAL at 20:07

## 2020-11-13 RX ADMIN — URSODIOL 500 MG: 250 TABLET, FILM COATED ORAL at 08:46

## 2020-11-13 RX ADMIN — PROCHLORPERAZINE MALEATE 10 MG: 10 TABLET ORAL at 13:40

## 2020-11-13 RX ADMIN — LORAZEPAM 0.5 MG: 0.5 TABLET ORAL at 08:51

## 2020-11-13 RX ADMIN — LOPERAMIDE HYDROCHLORIDE 2 MG: 2 CAPSULE ORAL at 17:31

## 2020-11-13 ASSESSMENT — PAIN SCALES - GENERAL
PAINLEVEL_OUTOF10: 0

## 2020-11-13 NOTE — PROGRESS NOTES
BCC Allogeneic Progress Note    2020    Zaire Hameed    :  1960    MRN:  [id number]    Referring MD: Binh Strauss MD  [address] [facility] [address]  [address], [address]      Subjective:  Diarrhea improving. Tolerating diet, no n/v, abdominal pain, sore throat, cough, fevers, chills.       ECOG PS:  (1) Restricted in physically strenuous activity, ambulatory and able to do work of light nature    KPS: 80% Normal activity with effort; some signs or symptoms of disease    Isolation:  None     Medications    Scheduled Meds:   tacrolimus  4 mg Oral BID    famotidine  20 mg Oral BID    levoFLOXacin  500 mg Oral Nightly    sodium chloride flush  10 mL Intravenous 2 times per day    Saline Mouthwash  15 mL Swish & Spit 4x Daily AC & HS    valACYclovir  500 mg Oral BID    sotalol  120 mg Oral BID    amLODIPine  5 mg Oral Daily    And    lisinopril  40 mg Oral Daily    [START ON 2020] methotrexate sodium  12.5 mg Intravenous Once    [START ON 2020] methotrexate sodium  12.5 mg Intravenous Once    ursodiol  500 mg Oral BID    [START ON 2020] methotrexate sodium  12.5 mg Intravenous Once    fluconazole  400 mg Oral Daily     Continuous Infusions:   0.9% NaCl with KCl 20 mEq 50 mL/hr at 20 8298    sodium chloride      sodium chloride       PRN Meds:sodium chloride, [COMPLETED] loperamide **FOLLOWED BY** loperamide, sennosides-docusate sodium, sodium chloride, sodium chloride flush, potassium chloride, magnesium sulfate, magnesium hydroxide, Saline Mouthwash, alteplase, prochlorperazine **OR** prochlorperazine, LORazepam **OR** LORazepam    ROS:  As noted above, otherwise remainder of 10-point ROS negative    Physical Exam:    I&O:      Intake/Output Summary (Last 24 hours) at 2020 0720  Last data filed at 2020 0634  Gross per 24 hour   Intake 2545 ml   Output 3425 ml   Net -880 ml       Vital Signs:  /76   Pulse 78 Temp 97.8 °F (36.6 °C) (Oral)   Resp 18   Ht 6' 3\" (1.905 m)   Wt (!) 320 lb (145.2 kg)   SpO2 99%   BMI 40.00 kg/m²     Weight:    Wt Readings from Last 3 Encounters:   11/12/20 (!) 320 lb (145.2 kg)   10/08/20 (!) 325 lb (147.4 kg)   09/29/20 (!) 325 lb 13.4 oz (147.8 kg)       General: Awake, alert and oriented. HEENT: normocephalic, alopecia, PERRL, no scleral erythema or icterus, Oral mucosa moist and intact, throat clear  NECK: supple without palpable adenopathy  BACK: Straight, negative CVAT  SKIN: warm dry and intact without lesions rashes or masses  CHEST: CTA bilaterally without use of accessory muscles  CV: Normal S1 S2, RRR, no MRG  ABD: NT, ND, normoactive BS, no palpable masses or hepatosplenomegaly  EXTREMITIES: without edema, denies calf tenderness  NEURO: CN II - XII grossly intact  CATHETER: Right tri-fusion ([facility], 6/11/20) - CDI    Data:   CBC:   Recent Labs     11/11/20 0345 11/12/20 0345 11/13/20 0426   WBC 1.3* 0.3* 0.2*   HGB 9.8* 9.8* 9.2*   HCT 29.4* 29.2* 27.6*   MCV 97.4 97.1 98.0   PLT 79* 46* 36*     BMP/Mag:  Recent Labs     11/11/20 0345 11/12/20 0345 11/13/20 0426    140 136   K 3.9 3.6 3.8    105 105   CO2 25 25 24   PHOS 3.3  --  3.7   BUN 16 17 19   CREATININE 0.8 0.9 1.0   MG 1.80 1.60* 2.00     LIVP:   Recent Labs     11/11/20 0345 11/12/20 0345 11/13/20 0426   AST 17  --  20   ALT 27  --  29   BILIDIR <0.2  --  <0.2   BILITOT 0.6 0.6 0.5   ALKPHOS 82  --  80     Uric Acid:    Recent Labs     11/13/20  0426   LABURIC 5.3     Coags:   Recent Labs     11/12/20  0345   PROTIME 12.4   INR 1.07   APTT 34.9        PROBLEM LIST:         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 CMML - 1 (Dx 10/2020)  3. Atrial Fibrillation  4. CASH  5. HTN      TREATMENT:        1. Induction: Sweetie-C & Daunorubicin (6/12/20)  2. Consolidation:  HiDAC x 3 cycles (7/28/20, 8/29/20 & 9/22/20)  3.   MUD Allo - PB BMT (DPB1 non-permissive mismatch)  Preparative Regimen:  Melphalan and Fludarabine  Date of BMT:  11/11/20  Source of stem cells:  PBPC  Donor/Recipient Blood Type:  A positive / 0 positive   Donor Sex:  Female / NMDP (DID # [id number]) - Follow FISH XY  CMV Donor / Recipient: Negative / Negative       ASSESSMENT AND PLAN:        1. AML - intermediate risk / CMML-1:  - BM bx/asp (10/14/20) - No AML, but shows CMML-1   - Admitted for Melphalan & Fludarabine f/b allogeneic PBSC infusion 11/11/20     Day +2     2. ID:  No evidence of infection.    - Cont Valtrex ppx   - Continue diflucan, Levaquin ppx - started 11/8        Donor/Recipient CMV:  Negative / negative   - Routine surveillance is not required       3. Heme: Pancytopenia from chemotherapy  - Transfuse for Hgb < 7 and Platelets < 79C  - No transfusion today  - DVT ppx held 11/12     4. Metabolic / CKD III: Baseline SCr 1.2-1.3. 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  - Cont Prograf 3mg BID. First level 11/12/20 then qMWF  Lab Results   Component Value Date    TACROLEV 4.3 11/12/2020   Next MTX 11/14/20     6. VOD:  No evidence of VOD. Admission Weight: (!) 324 lb 3.2 oz (147.1 kg)  Current Weight: Weight: (!) 320 lb (145.2 kg). Recent Labs     11/13/20  0426   BILIDIR <0.2   BILITOT 0.5     - Cont Actigall     7. Pulmonary: H/o CASH  - PFT (10/6/20) - FEV1/FVC 71 w/ predicted ratio of 75 & DLCO 41% (corrected 84%). He has moderate obstruction by FEV1/FVC ratio lower than predicted and also based on ATS criteria with low FEV, likely d/t morbid obesity   - Pulm clearance by Dr. Tere Jaramillo  CASH:  - Cont CPAP  - Encourage IS and ambulation      8. GI / Nutrition:    Nutrition:  Appetite is good at this point.  - Cont low microbial diet  - Dietary to follow     9. Cardiac: H/o A.  Fib & HTN  - Echo (9/29/20) -  LVEF 60-65% w/ left atrial dilatation  A. Fib:  - Cont sotalol 120 mg BID  HTN:   - Cont amlodipine-lisinopril 5/40 mg daily     - DVT Prophylaxis: Platelets <28,298 cells/dL, - prophylactic lovenox on hold and mechanical prophylaxis with bilateral SCDs while in bed in place.   Contraindications to pharmacologic prophylaxis: None  Contraindications to mechanical prophylaxis: None     - Disposition:  D/c home once ANC > 1.0 & toxicities resolved following allogeneic transplant       Electronically signed by Brenda Sage MD on 11/13/2020 at 7:20 AM  PGY-2  Internal Medicine Resident    Parul Selby MD  [facility]  Please contact me through [address]

## 2020-11-13 NOTE — PLAN OF CARE
Problem: Falls - Risk of:  Goal: Will remain free from falls  Description: Pt up ad abebe in room and halls with steady gait. Independent with ADL's. Uses call light appropriately for assistance as needed. Bed breaks on, non skid footwear on, side rails up x2, call light within reach. Hourly rounding performed per unit protocol. Will continue safety precautions. Will remain free from falls  11/13/2020 1847 by Bee Gr  Outcome: Ongoing         Problem: Infection - Central Venous Catheter-Associated Bloodstream Infection:  Goal: Will show no infection signs and symptoms  Description: Will show no infection signs and symptoms  11/13/2020 1847 by Bee Gr  Outcome: Ongoing   CVC site remains free of signs/symptoms of infection. No drainage, edema, erythema, pain, or warmth noted at site. Dressing changes continue per protocol and on an as needed basis - see flowsheet. Problem: Bleeding:  Goal: Will show no signs and symptoms of excessive bleeding  Description: Will show no signs and symptoms of excessive bleeding  11/13/2020 1847 by Bee Gr  Outcome: Ongoing   Patient's hemoglobin this AM:   Recent Labs     11/13/20 0426   HGB 9.2*     Patient's platelet count this AM:   Recent Labs     11/13/20 0426   PLT 36*    Thrombocytopenia not present at this time. Patient showing no signs or symptoms of active bleeding. Transfusion not indicated at this time. Patient verbalizes understanding of all instructions. Will continue to assess and implement POC. Call light within reach and hourly rounding in place. Problem: PROTECTIVE PRECAUTIONS  Goal: Patient will remain free of nosocomial Infections  11/13/2020 1847 by Bee Gr  Outcome: Ongoing   WBC count 0.2. Patient aware of protective precautions. Educated on importance of handwashing and wearing mask when ambulating OOB.       Problem: Diarrhea:  Goal: Bowel elimination is within specified parameters  Description: Bowel elimination is within specified parameters  11/13/2020 1847 by Bee Gr  Outcome: Ongoing   Patient continues to have loose stools, has PRN immodium available.  Last measured output 50ml

## 2020-11-13 NOTE — PROGRESS NOTES
Clinical Pharmacy Progress Note    Patient Name: Jennifer Weaver  YOB: 1960  Diagnosis: AML -MelFlu SHEILA f/b MUD PBSC Allogeneic HSCT     GVHD Prophylaxis:  Tacrolimus (Prograf) starting Day -3   · Adjusted according to levels - drawn via red lumen  Methotrexate 12.5 mg IVPush days +1 (11/12), +3 (11/14), +6 (11/17), +11 (11/22)     Tacrolimus (Prograf) levels starting day +1  Tacrolimus (Prograf) goal level:  8-15 ng/mL    Date SCr Bili Prograf Dose Prograf Level Adjustments / Comments   11/5; d-6 1.3 0.5 3 mg po bid -- Oral tacrolimus starts 11/8 (day-3) with first tacrolimus trough level on 11/12 (day+1) followed by MWF thereafter. 11/12; d+1 0.9 0.6 3 mg po bid 4.3 Increase tacrolimus to 4 mg po bid (additional 1 mg now). Next level Fri 11/13.   11/13, d+2 1.0 0.5 4mg PO BID  6.1 Tacrolimus level slightly subtherapeutic based on range above. Dose increased yesterday - would not anticipate steady state achieved. Will continue current management and re-check level on Monday, 11/16                                                Please call with questions. Melody Dior, Pharm. Millicent Castrejon  [facility] Clinical Pharmacist  Wireless:  [phone]  11/13/2020 3:17 PM

## 2020-11-14 LAB
ANION GAP SERPL CALCULATED.3IONS-SCNC: 10 MMOL/L (ref 3–16)
BILIRUB SERPL-MCNC: 0.6 MG/DL (ref 0–1)
BUN BLDV-MCNC: 17 MG/DL (ref 7–20)
CALCIUM SERPL-MCNC: 9 MG/DL (ref 8.3–10.6)
CHLORIDE BLD-SCNC: 108 MMOL/L (ref 99–110)
CO2: 24 MMOL/L (ref 21–32)
CREAT SERPL-MCNC: 0.8 MG/DL (ref 0.8–1.3)
GFR AFRICAN AMERICAN: >60
GFR NON-AFRICAN AMERICAN: >60
GLUCOSE BLD-MCNC: 109 MG/DL (ref 70–99)
HCT VFR BLD CALC: 27.1 % (ref 40.5–52.5)
HEMOGLOBIN: 9.2 G/DL (ref 13.5–17.5)
MAGNESIUM: 1.8 MG/DL (ref 1.8–2.4)
MCH RBC QN AUTO: 32.6 PG (ref 26–34)
MCHC RBC AUTO-ENTMCNC: 33.8 G/DL (ref 31–36)
MCV RBC AUTO: 96.4 FL (ref 80–100)
PDW BLD-RTO: 19.1 % (ref 12.4–15.4)
PLATELET # BLD: 26 K/UL (ref 135–450)
PMV BLD AUTO: 6.8 FL (ref 5–10.5)
POTASSIUM SERPL-SCNC: 4.3 MMOL/L (ref 3.5–5.1)
RBC # BLD: 2.82 M/UL (ref 4.2–5.9)
SODIUM BLD-SCNC: 142 MMOL/L (ref 136–145)
WBC # BLD: 0.1 K/UL (ref 4–11)

## 2020-11-14 PROCEDURE — 80048 BASIC METABOLIC PNL TOTAL CA: CPT

## 2020-11-14 PROCEDURE — 6370000000 HC RX 637 (ALT 250 FOR IP): Performed by: NURSE PRACTITIONER

## 2020-11-14 PROCEDURE — 85025 COMPLETE CBC W/AUTO DIFF WBC: CPT

## 2020-11-14 PROCEDURE — 6370000000 HC RX 637 (ALT 250 FOR IP): Performed by: INTERNAL MEDICINE

## 2020-11-14 PROCEDURE — 6360000002 HC RX W HCPCS: Performed by: INTERNAL MEDICINE

## 2020-11-14 PROCEDURE — 36592 COLLECT BLOOD FROM PICC: CPT

## 2020-11-14 PROCEDURE — 2060000000 HC ICU INTERMEDIATE R&B

## 2020-11-14 PROCEDURE — 83735 ASSAY OF MAGNESIUM: CPT

## 2020-11-14 PROCEDURE — 2580000003 HC RX 258: Performed by: NURSE PRACTITIONER

## 2020-11-14 PROCEDURE — 96409 CHEMO IV PUSH SNGL DRUG: CPT

## 2020-11-14 PROCEDURE — 6360000002 HC RX W HCPCS: Performed by: STUDENT IN AN ORGANIZED HEALTH CARE EDUCATION/TRAINING PROGRAM

## 2020-11-14 PROCEDURE — 82247 BILIRUBIN TOTAL: CPT

## 2020-11-14 RX ORDER — LOPERAMIDE HYDROCHLORIDE 2 MG/1
4 CAPSULE ORAL ONCE
Status: COMPLETED | OUTPATIENT
Start: 2020-11-14 | End: 2020-11-14

## 2020-11-14 RX ORDER — LOPERAMIDE HYDROCHLORIDE 2 MG/1
2 CAPSULE ORAL 4 TIMES DAILY PRN
Status: DISCONTINUED | OUTPATIENT
Start: 2020-11-14 | End: 2020-11-30 | Stop reason: HOSPADM

## 2020-11-14 RX ORDER — CALCIUM CARBONATE 200(500)MG
750 TABLET,CHEWABLE ORAL 3 TIMES DAILY PRN
Status: DISCONTINUED | OUTPATIENT
Start: 2020-11-14 | End: 2020-11-17

## 2020-11-14 RX ADMIN — VALACYCLOVIR HYDROCHLORIDE 500 MG: 500 TABLET, FILM COATED ORAL at 09:35

## 2020-11-14 RX ADMIN — TACROLIMUS 4 MG: 1 CAPSULE ORAL at 09:36

## 2020-11-14 RX ADMIN — FAMOTIDINE 20 MG: 20 TABLET ORAL at 20:16

## 2020-11-14 RX ADMIN — AMLODIPINE BESYLATE 5 MG: 5 TABLET ORAL at 09:36

## 2020-11-14 RX ADMIN — FAMOTIDINE 20 MG: 20 TABLET ORAL at 09:35

## 2020-11-14 RX ADMIN — VALACYCLOVIR HYDROCHLORIDE 500 MG: 500 TABLET, FILM COATED ORAL at 20:16

## 2020-11-14 RX ADMIN — TACROLIMUS 4 MG: 1 CAPSULE ORAL at 20:16

## 2020-11-14 RX ADMIN — PROCHLORPERAZINE MALEATE 10 MG: 10 TABLET ORAL at 18:01

## 2020-11-14 RX ADMIN — FLUCONAZOLE 400 MG: 200 TABLET ORAL at 09:35

## 2020-11-14 RX ADMIN — ANTACID TABLETS 500 MG: 500 TABLET, CHEWABLE ORAL at 09:36

## 2020-11-14 RX ADMIN — URSODIOL 500 MG: 250 TABLET, FILM COATED ORAL at 20:16

## 2020-11-14 RX ADMIN — POTASSIUM CHLORIDE AND SODIUM CHLORIDE: 900; 150 INJECTION, SOLUTION INTRAVENOUS at 18:49

## 2020-11-14 RX ADMIN — ANTACID TABLETS 750 MG: 500 TABLET, CHEWABLE ORAL at 13:46

## 2020-11-14 RX ADMIN — LOPERAMIDE HYDROCHLORIDE 4 MG: 2 CAPSULE ORAL at 15:02

## 2020-11-14 RX ADMIN — SOTALOL HYDROCHLORIDE 120 MG: 120 TABLET ORAL at 20:16

## 2020-11-14 RX ADMIN — LORAZEPAM 0.5 MG: 0.5 TABLET ORAL at 19:31

## 2020-11-14 RX ADMIN — SODIUM CHLORIDE 15 ML: 900 IRRIGANT IRRIGATION at 20:17

## 2020-11-14 RX ADMIN — METHOTREXATE 12.5 MG: 25 INJECTION, SOLUTION INTRA-ARTERIAL; INTRAMUSCULAR; INTRAVENOUS at 14:58

## 2020-11-14 RX ADMIN — SOTALOL HYDROCHLORIDE 120 MG: 120 TABLET ORAL at 09:37

## 2020-11-14 RX ADMIN — LISINOPRIL 40 MG: 40 TABLET ORAL at 09:35

## 2020-11-14 RX ADMIN — Medication 10 ML: at 09:36

## 2020-11-14 RX ADMIN — LEVOFLOXACIN 500 MG: 500 TABLET, FILM COATED ORAL at 20:17

## 2020-11-14 RX ADMIN — LOPERAMIDE HYDROCHLORIDE 2 MG: 2 CAPSULE ORAL at 11:22

## 2020-11-14 RX ADMIN — SODIUM CHLORIDE 15 ML: 900 IRRIGANT IRRIGATION at 05:57

## 2020-11-14 RX ADMIN — ANTACID TABLETS 750 MG: 500 TABLET, CHEWABLE ORAL at 18:49

## 2020-11-14 RX ADMIN — Medication 10 ML: at 20:17

## 2020-11-14 RX ADMIN — URSODIOL 500 MG: 250 TABLET, FILM COATED ORAL at 09:35

## 2020-11-14 ASSESSMENT — PAIN SCALES - GENERAL
PAINLEVEL_OUTOF10: 0

## 2020-11-14 NOTE — PLAN OF CARE
Problem: Falls - Risk of:  Goal: Will remain free from falls  Description: Pt up ad abebe in room and halls with steady gait. Independent with ADL's. Uses call light appropriately for assistance as needed. Bed breaks on, non skid footwear on, side rails up x2, call light within reach. Hourly rounding performed per unit protocol. Will continue safety precautions. Will remain free from falls  11/14/2020 0005 by Tari Salguero RN  Outcome: Met This Shift  Note: Orthostatic vital signs obtained at start of shift - see flowsheet for details. Pt does not meet criteria for orthostasis. Pt is a Med fall risk. See Leela Tin Fall Score and ABCDS Injury Risk assessments. - Screening for Orthostasis AND not a [place] Risk per CAMEJO/ABCDS: Pt bed is in low position, side rails up, call light and belongings are in reach. Fall risk light is on outside pts room. Pt encouraged to call for assistance as needed. Will continue with hourly rounds for PO intake, pain needs, toileting and repositioning as needed. Problem: Infection - Central Venous Catheter-Associated Bloodstream Infection:  Goal: Will show no infection signs and symptoms  Description: Will show no infection signs and symptoms  11/14/2020 0005 by Tari Salguero RN  Outcome: Met This Shift  Note: CVC site remains free of signs/symptoms of infection. No drainage, edema, erythema, pain, or warmth noted at site. Dressing changes continue per protocol and on an as needed basis - see flowsheet. Problem: Bleeding:  Goal: Will show no signs and symptoms of excessive bleeding  Description: Will show no signs and symptoms of excessive bleeding  11/14/2020 0005 by Tari Salguero RN  Outcome: Met This Shift  Note: Patient's hemoglobin this AM:   Recent Labs     11/13/20 0426   HGB 9.2*     Patient's platelet count this AM:   Recent Labs     11/13/20 0426   PLT 36*    Thrombocytopenia not present at this time.   Patient showing no signs or symptoms of

## 2020-11-14 NOTE — PLAN OF CARE
Problem: Falls - Risk of:  Goal: Will remain free from falls  Description: Pt up ad abebe in room and halls with steady gait. Independent with ADL's. Uses call light appropriately for assistance as needed. Bed breaks on, non skid footwear on, side rails up x2, call light within reach. Hourly rounding performed per unit protocol. Will continue safety precautions. Will remain free from falls  Outcome: Ongoing  Note: Orthostatic vital signs obtained at start of shift - see flowsheet for details. Pt does not meet criteria for orthostasis. Pt is a Med fall risk. See Jame Flatten Fall Score and ABCDS Injury Risk assessments. Pt bed is in low position, side rails up, call light and belongings are in reach. Fall risk light is on outside pts room. Pt encouraged to call for assistance as needed. Will continue with hourly rounds for PO intake, pain needs, toileting and repositioning as needed. Problem: Infection - Central Venous Catheter-Associated Bloodstream Infection:  Goal: Will show no infection signs and symptoms  Description: Will show no infection signs and symptoms  Outcome: Ongoing  Note: CVC site remains free of signs/symptoms of infection. No drainage, edema, erythema, pain, or warmth noted at site. Dressing changes continue per protocol and on an as needed basis - see flowsheet. Compliant with BCC Bath Protocol:  Performed CHG bath last night per [facility] protocol utilizing CHG solution in the shower. CVC site cleansed with CHG wipe over dressing, skin surrounding dressing, and first 6\" of IV tubing. Pt tolerated well. Continued to encourage daily CHG bathing per [facility] protocol.     Problem: Bleeding:  Goal: Will show no signs and symptoms of excessive bleeding  Description: Will show no signs and symptoms of excessive bleeding  Outcome: Ongoing  Note: Patient's hemoglobin this AM:   Recent Labs     11/14/20  0333   HGB 9.2*     Patient's platelet count this AM:   Recent Labs     11/14/20 0333   PLT 26* Thrombocytopenia Precautions in place. Patient showing no signs or symptoms of active bleeding. Transfusion not indicated at this time. Patient verbalizes understanding of all instructions. Will continue to assess and implement POC. Call light within reach and hourly rounding in place. Problem: PROTECTIVE PRECAUTIONS  Goal: Patient will remain free of nosocomial Infections  Outcome: Ongoing  Note: Pt remains in protective precautions. Pt educated on wearing mask when in hallways. Pt, staff, and visitors adhering to handwashing guidelines. Pt educated to shower or bathe daily with chlorhexidine and linens changed daily per protocol. Pt verbalizes understanding of low microbial diet. Will continue to monitor. Problem: Nausea/Vomiting:  Goal: Absence of nausea/vomiting  Description: C/O mild nausea without vomiting. Compazine x1 PO given as prophylaxis   Outcome: Ongoing  Note: Pt c/o indigestion and related nausea. Pt states that symptoms are adequately relieved with Tums. Tums dosage and frequency increased today. Will continue to monitor. Problem: Diarrhea:  Goal: Passage of soft, formed stool  Description: Passage of soft, formed stool  Outcome: Ongoing  Note: Pt still c/o loose stools despite using imodium. Imodium dose changed today. Will continue to monitor.

## 2020-11-14 NOTE — PROGRESS NOTES
BCC Allogeneic Progress Note    2020    Ananth Gilliam    :  1960    MRN:  [id number]    Referring MD: Stevan Brennan MD  [address] [facility] [address]  [address] [place] [address], [address]      Subjective:  Diarrhea about the same     ECOG PS:  (1) Restricted in physically strenuous activity, ambulatory and able to do work of light nature    KPS: 80% Normal activity with effort; some signs or symptoms of disease    Isolation:  None     Medications    Scheduled Meds:   tacrolimus  4 mg Oral BID    famotidine  20 mg Oral BID    levoFLOXacin  500 mg Oral Nightly    sodium chloride flush  10 mL Intravenous 2 times per day    Saline Mouthwash  15 mL Swish & Spit 4x Daily AC & HS    valACYclovir  500 mg Oral BID    sotalol  120 mg Oral BID    amLODIPine  5 mg Oral Daily    And    lisinopril  40 mg Oral Daily    methotrexate sodium  12.5 mg Intravenous Once    [START ON 2020] methotrexate sodium  12.5 mg Intravenous Once    ursodiol  500 mg Oral BID    [START ON 2020] methotrexate sodium  12.5 mg Intravenous Once    fluconazole  400 mg Oral Daily     Continuous Infusions:   0.9% NaCl with KCl 20 mEq 50 mL/hr at 20 5164    sodium chloride      sodium chloride       PRN Meds:calcium carbonate, sodium chloride, [COMPLETED] loperamide **FOLLOWED BY** loperamide, sennosides-docusate sodium, sodium chloride, sodium chloride flush, potassium chloride, magnesium sulfate, magnesium hydroxide, Saline Mouthwash, alteplase, prochlorperazine **OR** prochlorperazine, LORazepam **OR** LORazepam    ROS:  As noted above, otherwise remainder of 10-point ROS negative    Physical Exam:    I&O:      Intake/Output Summary (Last 24 hours) at 2020 1249  Last data filed at 2020 0941  Gross per 24 hour   Intake 2486.4 ml   Output 4875 ml   Net -2388.6 ml       Vital Signs:  BP (!) 145/90   Pulse 81   Temp 98 °F (36.7 °C) (Oral)   Resp 16   Ht 6' 3\" (1.905 m)   Wt (!) 317 lb 9.6 oz (144.1 kg)   SpO2 99%   BMI 39.70 kg/m²     Weight:    Wt Readings from Last 3 Encounters:   11/14/20 (!) 317 lb 9.6 oz (144.1 kg)   10/08/20 (!) 325 lb (147.4 kg)   09/29/20 (!) 325 lb 13.4 oz (147.8 kg)       General: Awake, alert and oriented. HEENT: normocephalic, alopecia, PERRL, no scleral erythema or icterus, Oral mucosa moist and intact, throat clear  NECK: supple without palpable adenopathy  BACK: Straight, negative CVAT  SKIN: warm dry and intact without lesions rashes or masses  CHEST: CTA bilaterally without use of accessory muscles  CV: Normal S1 S2, RRR, no MRG  ABD: NT, ND, normoactive BS, no palpable masses or hepatosplenomegaly  EXTREMITIES: without edema, denies calf tenderness  NEURO: CN II - XII grossly intact  CATHETER: Right tri-fusion ([facility], 6/11/20) - CDI    Data:   CBC:   Recent Labs     11/12/20 0345 11/13/20 0426 11/14/20 0333   WBC 0.3* 0.2* 0.1*   HGB 9.8* 9.2* 9.2*   HCT 29.2* 27.6* 27.1*   MCV 97.1 98.0 96.4   PLT 46* 36* 26*     BMP/Mag:  Recent Labs     11/12/20 0345 11/13/20 0426 11/14/20 0333    136 142   K 3.6 3.8 4.3    105 108   CO2 25 24 24   PHOS  --  3.7  --    BUN 17 19 17   CREATININE 0.9 1.0 0.8   MG 1.60* 2.00 1.80     LIVP:   Recent Labs     11/12/20 0345 11/13/20 0426 11/14/20  0333   AST  --  20  --    ALT  --  29  --    BILIDIR  --  <0.2  --    BILITOT 0.6 0.5 0.6   ALKPHOS  --  80  --      Uric Acid:    Recent Labs     11/13/20 0426   LABURIC 5.3     Coags:   Recent Labs     11/12/20 0345   PROTIME 12.4   INR 1.07   APTT 34.9        PROBLEM LIST:         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 CMML - 1 (Dx 10/2020)  3. Atrial Fibrillation  4. CASH  5. HTN      TREATMENT:        1. Induction: Sweetie-C & Daunorubicin (6/12/20)  2. Consolidation:  HiDAC x 3 cycles (7/28/20, 8/29/20 & 9/22/20)  3.   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 AML - intermediate risk / CMML-1:  - BM bx/asp (10/14/20) - No AML, but shows CMML-1   - Admitted for Melphalan & Fludarabine f/b allogeneic PBSC infusion 11/11/20     Day +3     2. ID:  No evidence of infection.    - Cont Valtrex ppx   - Continue diflucan, Levaquin ppx - started 11/8        Donor/Recipient CMV:  Negative / negative   - Routine surveillance is not required       3. Heme: Pancytopenia from chemotherapy  - Transfuse for Hgb < 7 and Platelets < 76K  - No transfusion today  - DVT ppx held 11/12     4. Metabolic / CKD III: Baseline SCr 1.2-1.3. 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  - Cont Prograf 3mg BID. First level 11/12/20 then qMWF  Lab Results   Component Value Date    TACROLEV 6.1 11/13/2020    TACROLEV 4.3 11/12/2020   Next MTX 11/14/20     6. VOD:  No evidence of VOD. Admission Weight: (!) 324 lb 3.2 oz (147.1 kg)  Current Weight: Weight: (!) 317 lb 9.6 oz (144.1 kg). Recent Labs     11/13/20  0426 11/14/20  0333   BILIDIR <0.2  --    BILITOT 0.5 0.6     - Cont Actigall     7. Pulmonary: H/o CASH  - PFT (10/6/20) - FEV1/FVC 71 w/ predicted ratio of 75 & DLCO 41% (corrected 84%). He has moderate obstruction by FEV1/FVC ratio lower than predicted and also based on ATS criteria with low FEV, likely d/t morbid obesity   - Pulm clearance by Dr. Chel Steward  ACSH:  - Cont CPAP  - Encourage IS and ambulation      8. GI / Nutrition:    Nutrition:  Appetite is good at this point.  - Cont low microbial diet  - Dietary to follow     9. Cardiac: H/o A.  Fib & HTN  - Echo (9/29/20) -  LVEF 60-65% w/ left atrial dilatation  A. Fib:  - Cont sotalol 120 mg BID  HTN:   - Cont amlodipine-lisinopril 5/40 mg daily     - DVT Prophylaxis: Platelets <29,253 cells/dL, - prophylactic lovenox on hold and mechanical prophylaxis with bilateral SCDs while in bed in place. Contraindications to pharmacologic prophylaxis: None  Contraindications to mechanical prophylaxis: None     - Disposition:  D/c home once ANC > 1.0 & toxicities resolved following allogeneic transplant       Kiko Barbosa DO, MS  [facility]    Please contact via:  1. [employer]  2.   Cell Phone:  [phone]    11/14/2020   12:54 PM

## 2020-11-14 NOTE — PROGRESS NOTES
Administration: Chemotherapy drug methotrexate independently verified with Pastor Edson RN prior to administration. Acknowledgement of informed consent for chemotherapy administration verified. Original order, appropriateness of regimen, drug supplied, height, weight, BSA, dose calculations, expiration dates/times, drug appearance, and two patient identifiers were verified by both RNs. Drug checked for vesicant/irritant status and for risk of hypersensitivity. Most recent laboratory values and allergies, were reviewed. Positive, brisk blood return via CVC was confirmed prior to administration. Chest x-ray for correct line placement reviewed. Ki Chew and Pastor Edson RN verified correct rate of chemotherapy and maintenance IV fluids. Patient was educated on chemotherapy regimen prior to administration including indication for treatment related to disease & side effects of chemotherapy drug. Patient verbalizes understanding of all instructions. Completion of Chemotherapy: Monitoring during infusion done per policy, see Flowsheets. Blood return verified before, during, and after infusion per policy; no signs of extravasation. Pt tolerated chemotherapy well and without incident. Will continue to monitor.     Ki Chew RN

## 2020-11-15 LAB
ANION GAP SERPL CALCULATED.3IONS-SCNC: 10 MMOL/L (ref 3–16)
BILIRUB SERPL-MCNC: 0.6 MG/DL (ref 0–1)
BUN BLDV-MCNC: 16 MG/DL (ref 7–20)
CALCIUM SERPL-MCNC: 9 MG/DL (ref 8.3–10.6)
CHLORIDE BLD-SCNC: 105 MMOL/L (ref 99–110)
CO2: 24 MMOL/L (ref 21–32)
CREAT SERPL-MCNC: 0.9 MG/DL (ref 0.8–1.3)
GFR AFRICAN AMERICAN: >60
GFR NON-AFRICAN AMERICAN: >60
GLUCOSE BLD-MCNC: 111 MG/DL (ref 70–99)
HCT VFR BLD CALC: 28 % (ref 40.5–52.5)
HEMOGLOBIN: 9.3 G/DL (ref 13.5–17.5)
MAGNESIUM: 1.5 MG/DL (ref 1.8–2.4)
MCH RBC QN AUTO: 32.4 PG (ref 26–34)
MCHC RBC AUTO-ENTMCNC: 33.3 G/DL (ref 31–36)
MCV RBC AUTO: 97.1 FL (ref 80–100)
PDW BLD-RTO: 18.6 % (ref 12.4–15.4)
PLATELET # BLD: 20 K/UL (ref 135–450)
PMV BLD AUTO: 8.1 FL (ref 5–10.5)
POTASSIUM SERPL-SCNC: 4.3 MMOL/L (ref 3.5–5.1)
RBC # BLD: 2.89 M/UL (ref 4.2–5.9)
SODIUM BLD-SCNC: 139 MMOL/L (ref 136–145)
WBC # BLD: 0.1 K/UL (ref 4–11)

## 2020-11-15 PROCEDURE — 82247 BILIRUBIN TOTAL: CPT

## 2020-11-15 PROCEDURE — 6370000000 HC RX 637 (ALT 250 FOR IP): Performed by: NURSE PRACTITIONER

## 2020-11-15 PROCEDURE — 6360000002 HC RX W HCPCS: Performed by: STUDENT IN AN ORGANIZED HEALTH CARE EDUCATION/TRAINING PROGRAM

## 2020-11-15 PROCEDURE — 36592 COLLECT BLOOD FROM PICC: CPT

## 2020-11-15 PROCEDURE — 2580000003 HC RX 258: Performed by: NURSE PRACTITIONER

## 2020-11-15 PROCEDURE — 83735 ASSAY OF MAGNESIUM: CPT

## 2020-11-15 PROCEDURE — 80048 BASIC METABOLIC PNL TOTAL CA: CPT

## 2020-11-15 PROCEDURE — 6370000000 HC RX 637 (ALT 250 FOR IP): Performed by: INTERNAL MEDICINE

## 2020-11-15 PROCEDURE — 6360000002 HC RX W HCPCS: Performed by: INTERNAL MEDICINE

## 2020-11-15 PROCEDURE — 85025 COMPLETE CBC W/AUTO DIFF WBC: CPT

## 2020-11-15 PROCEDURE — 2060000000 HC ICU INTERMEDIATE R&B

## 2020-11-15 RX ORDER — DIPHENOXYLATE HYDROCHLORIDE AND ATROPINE SULFATE 2.5; .025 MG/1; MG/1
1 TABLET ORAL PRN
Status: DISCONTINUED | OUTPATIENT
Start: 2020-11-15 | End: 2020-11-30 | Stop reason: HOSPADM

## 2020-11-15 RX ADMIN — DIPHENOXYLATE HYDROCHLORIDE AND ATROPINE SULFATE 1 TABLET: 2.5; .025 TABLET ORAL at 11:41

## 2020-11-15 RX ADMIN — SOTALOL HYDROCHLORIDE 120 MG: 120 TABLET ORAL at 09:15

## 2020-11-15 RX ADMIN — PROCHLORPERAZINE MALEATE 10 MG: 10 TABLET ORAL at 09:14

## 2020-11-15 RX ADMIN — SODIUM CHLORIDE 15 ML: 900 IRRIGANT IRRIGATION at 21:28

## 2020-11-15 RX ADMIN — FAMOTIDINE 20 MG: 20 TABLET ORAL at 21:26

## 2020-11-15 RX ADMIN — VALACYCLOVIR HYDROCHLORIDE 500 MG: 500 TABLET, FILM COATED ORAL at 09:06

## 2020-11-15 RX ADMIN — URSODIOL 500 MG: 250 TABLET, FILM COATED ORAL at 21:27

## 2020-11-15 RX ADMIN — DIPHENOXYLATE HYDROCHLORIDE AND ATROPINE SULFATE 1 TABLET: 2.5; .025 TABLET ORAL at 14:43

## 2020-11-15 RX ADMIN — TACROLIMUS 4 MG: 1 CAPSULE ORAL at 09:05

## 2020-11-15 RX ADMIN — SODIUM CHLORIDE 15 ML: 900 IRRIGANT IRRIGATION at 06:46

## 2020-11-15 RX ADMIN — FLUCONAZOLE 400 MG: 200 TABLET ORAL at 09:06

## 2020-11-15 RX ADMIN — PROCHLORPERAZINE EDISYLATE 10 MG: 5 INJECTION INTRAMUSCULAR; INTRAVENOUS at 19:53

## 2020-11-15 RX ADMIN — LEVOFLOXACIN 500 MG: 500 TABLET, FILM COATED ORAL at 21:26

## 2020-11-15 RX ADMIN — AMLODIPINE BESYLATE 5 MG: 5 TABLET ORAL at 09:06

## 2020-11-15 RX ADMIN — TACROLIMUS 4 MG: 1 CAPSULE ORAL at 21:26

## 2020-11-15 RX ADMIN — LISINOPRIL 40 MG: 40 TABLET ORAL at 09:06

## 2020-11-15 RX ADMIN — POTASSIUM CHLORIDE AND SODIUM CHLORIDE: 900; 150 INJECTION, SOLUTION INTRAVENOUS at 16:37

## 2020-11-15 RX ADMIN — SODIUM CHLORIDE 15 ML: 900 IRRIGANT IRRIGATION at 16:03

## 2020-11-15 RX ADMIN — Medication 10 ML: at 09:07

## 2020-11-15 RX ADMIN — ANTACID TABLETS 750 MG: 500 TABLET, CHEWABLE ORAL at 06:47

## 2020-11-15 RX ADMIN — FAMOTIDINE 20 MG: 20 TABLET ORAL at 09:05

## 2020-11-15 RX ADMIN — DIPHENOXYLATE HYDROCHLORIDE AND ATROPINE SULFATE 1 TABLET: 2.5; .025 TABLET ORAL at 19:54

## 2020-11-15 RX ADMIN — ANTACID TABLETS 750 MG: 500 TABLET, CHEWABLE ORAL at 11:41

## 2020-11-15 RX ADMIN — VALACYCLOVIR HYDROCHLORIDE 500 MG: 500 TABLET, FILM COATED ORAL at 21:26

## 2020-11-15 RX ADMIN — ANTACID TABLETS 750 MG: 500 TABLET, CHEWABLE ORAL at 18:26

## 2020-11-15 RX ADMIN — SOTALOL HYDROCHLORIDE 120 MG: 120 TABLET ORAL at 21:27

## 2020-11-15 RX ADMIN — URSODIOL 500 MG: 250 TABLET, FILM COATED ORAL at 09:06

## 2020-11-15 RX ADMIN — Medication 10 ML: at 21:27

## 2020-11-15 ASSESSMENT — PAIN SCALES - GENERAL
PAINLEVEL_OUTOF10: 0

## 2020-11-15 NOTE — PLAN OF CARE
Problem: Falls - Risk of:  Goal: Will remain free from falls  Description: Pt up ad abebe in room and halls with steady gait. Independent with ADL's. Uses call light appropriately for assistance as needed. Bed breaks on, non skid footwear on, side rails up x2, call light within reach. Hourly rounding performed per unit protocol. Will continue safety precautions. Will remain free from falls  11/14/2020 2155 by Kam Be RN  Outcome: Ongoing  Note: Orthostatic vital signs obtained at start of shift - see flowsheet for details. Pt does not meet criteria for orthostasis. Pt is a Med fall risk. See Karina Hopper Fall Score and ABCDS Injury Risk assessments. - Screening for Orthostasis AND not a [place] Risk per CAMEJO/ABCDS: Pt bed is in low position, side rails up, call light and belongings are in reach. Fall risk light is on outside pts room. Pt encouraged to call for assistance as needed. Will continue with hourly rounds for PO intake, pain needs, toileting and repositioning as needed. Problem: Infection - Central Venous Catheter-Associated Bloodstream Infection:  Goal: Will show no infection signs and symptoms  Description: Will show no infection signs and symptoms  11/14/2020 2155 by Kam Be RN  Outcome: Ongoing  Note: CVC site remains free of signs/symptoms of infection. No drainage, edema, erythema, pain, or warmth noted at site. Dressing changes continue per protocol and on an as needed basis - see flowsheet. Compliant with BCC Bath Protocol:  Performed CHG bath today per BCC protocol utilizing CHG solution in the shower. CVC site cleansed with CHG wipe over dressing, skin surrounding dressing, and first 6\" of IV tubing. Pt tolerated well. Continued to encourage daily CHG bathing per [facility] protocol.      Problem: Bleeding:  Goal: Will show no signs and symptoms of excessive bleeding  Description: Will show no signs and symptoms of excessive bleeding  11/14/2020 2155 by Alyssa JEAN BAPTISTE MARY Godoy  Outcome: Ongoing  Note: Patient's hemoglobin this AM:   Recent Labs     11/14/20  0333   HGB 9.2*     Patient's platelet count this AM:   Recent Labs     11/14/20  0333   PLT 26*    Thrombocytopenia Precautions in place. Patient showing no signs or symptoms of active bleeding. Transfusion not indicated at this time. Patient verbalizes understanding of all instructions. Will continue to assess and implement POC. Call light within reach and hourly rounding in place. Problem: Venous Thromboembolism:  Goal: Will show no signs or symptoms of venous thromboembolism  Description: Will show no signs or symptoms of venous thromboembolism  11/14/2020 2155 by Mary Banerjee RN  Outcome: Ongoing  Note: Refusing DVT Prevention: Pt is at risk for DVT d/t decreased mobility and cancer treatment. Pt educated on importance of activity. Pt has orders for SCDs while in bed, however pt currently refusing treatment. Reviewed risks of DVT & PE development while inpatient. Provider aware of patient's refusal and re-education of importance of prophylaxis. No new orders at this time. Will continue to re-instruct patient and intervene as appropriate. Problem: Nausea/Vomiting:  Goal: Absence of nausea/vomiting  Description: C/O mild nausea without vomiting. Compazine x1 PO given as prophylaxis   11/14/2020 2155 by Mary Banerjee RN  Outcome: Ongoing  Note: Pt continues with intermittent nausea. PRN antiemetics administered with some relief noted. Will continue to monitor. Problem: Diarrhea:  Goal: Bowel elimination is within specified parameters  Description: Bowel elimination is within specified parameters  11/14/2020 2155 by Mary Banerjee RN  Outcome: Ongoing  Note: Pt continues with diarrhea. Pt is c.diff negative from 11/12. PRN Imodium administered. Will continue to monitor.

## 2020-11-15 NOTE — PLAN OF CARE
Problem: Falls - Risk of:  Goal: Will remain free from falls  Description: Pt up ad abebe in room and halls with steady gait. Independent with ADL's. Uses call light appropriately for assistance as needed. Bed breaks on, non skid footwear on, side rails up x2, call light within reach. Hourly rounding performed per unit protocol. Will continue safety precautions. Will remain free from falls  11/15/2020 0426 by Meg Hendricks RN  Outcome: Ongoing  Note: Orthostatic vital signs obtained at start of shift - see flowsheet for details. Pt does not meet criteria for orthostasis. Pt is a Med fall risk. See Kathern Bellow Fall Score and ABCDS Injury Risk assessments. - Screening for Orthostasis AND not a [place] Risk per CAMEJO/ABCDS: Pt bed is in low position, side rails up, call light and belongings are in reach. Fall risk light is on outside pts room. Pt encouraged to call for assistance as needed. Will continue with hourly rounds for PO intake, pain needs, toileting and repositioning as needed. Problem: Infection - Central Venous Catheter-Associated Bloodstream Infection:  Goal: Will show no infection signs and symptoms  Description: Will show no infection signs and symptoms  11/15/2020 0426 by Meg Hendricks RN  Outcome: Ongoing  Note: CVC site remains free of signs/symptoms of infection. No drainage, edema, erythema, pain, or warmth noted at site. Dressing changes continue per protocol and on an as needed basis - see flowsheet. Problem: Bleeding:  Goal: Will show no signs and symptoms of excessive bleeding  Description: Will show no signs and symptoms of excessive bleeding  11/15/2020 0426 by Meg Hendricks RN  Outcome: Ongoing  Note: Patient's hemoglobin this AM:   Recent Labs     11/15/20  0320   HGB 9.3*     Patient's platelet count this AM:   Recent Labs     11/15/20  0320   PLT 20*    Thrombocytopenia Precautions in place. Patient showing no signs or symptoms of active bleeding.   Transfusion not indicated at this time. Patient verbalizes understanding of all instructions. Will continue to assess and implement POC. Call light within reach and hourly rounding in place. Problem: Venous Thromboembolism:  Goal: Will show no signs or symptoms of venous thromboembolism  Description: Will show no signs or symptoms of venous thromboembolism  11/15/2020 0426 by Kennedy Penn RN  Outcome: Ongoing  Note: Adherent with DVT Prevention: Pt is at risk for DVT d/t decreased mobility and cancer treatment. Pt educated on importance of activity. Pt has orders for SCDs while in bed. Pt verbalizes understanding of need for prophylaxis while inpatient. .      Problem: Discharge Planning:  Goal: Discharged to appropriate level of care  Description: Discharged to appropriate level of care  11/15/2020 0426 by Kennedy Penn RN  Outcome: Ongoing  Note: Cole Garcia is aware of current treatment plan. Will continue to ;monitor. Problem: PROTECTIVE PRECAUTIONS  Goal: Patient will remain free of nosocomial Infections  11/15/2020 0426 by Kennedy Penn RN  Outcome: Ongoing  Note: Pt remains in neutropenic precautions for WBC of 0.1. Pt educated on use of mask prior to leaving room. All staff and visitor following handwashing requirements this shift prior to entering room. Low microbial diet order in place and being followed. Problem: Nausea/Vomiting:  Goal: Absence of nausea/vomiting  Description: C/O mild nausea without vomiting. Compazine x1 PO given as prophylaxis   11/15/2020 0426 by Kennedy Penn RN  Outcome: Ongoing  Note: Cole Garcia has had no complaints of nausea or vomiting during shift. Will continue to monitor.

## 2020-11-15 NOTE — PROGRESS NOTES
BCC Allogeneic Progress Note    11/15/2020    Latrice Hendricks    :  1960    MRN:  [id number]    Referring MD: Raisa Escalera MD  [address]  [address] [place], [address]      Subjective:  He continues to have nausea and diarrhea, states imodium and lomotil are helping.      ECOG PS:  (1) Restricted in physically strenuous activity, ambulatory and able to do work of light nature    KPS: 80% Normal activity with effort; some signs or symptoms of disease    Isolation:  None     Medications    Scheduled Meds:   tacrolimus  4 mg Oral BID    famotidine  20 mg Oral BID    levoFLOXacin  500 mg Oral Nightly    sodium chloride flush  10 mL Intravenous 2 times per day    Saline Mouthwash  15 mL Swish & Spit 4x Daily AC & HS    valACYclovir  500 mg Oral BID    sotalol  120 mg Oral BID    amLODIPine  5 mg Oral Daily    And    lisinopril  40 mg Oral Daily    [START ON 2020] methotrexate sodium  12.5 mg Intravenous Once    ursodiol  500 mg Oral BID    [START ON 2020] methotrexate sodium  12.5 mg Intravenous Once    fluconazole  400 mg Oral Daily     Continuous Infusions:   0.9% NaCl with KCl 20 mEq 50 mL/hr at 20 1849    sodium chloride      sodium chloride       PRN Meds:[COMPLETED] loperamide **FOLLOWED BY** loperamide, calcium carbonate, sodium chloride, sennosides-docusate sodium, sodium chloride, sodium chloride flush, potassium chloride, magnesium sulfate, magnesium hydroxide, Saline Mouthwash, alteplase, prochlorperazine **OR** prochlorperazine, LORazepam **OR** LORazepam    ROS:  As noted above, otherwise remainder of 10-point ROS negative    Physical Exam:    I&O:      Intake/Output Summary (Last 24 hours) at 11/15/2020 1038  Last data filed at 11/15/2020 0917  Gross per 24 hour   Intake 2220 ml   Output 4025 ml   Net -1805 ml       Vital Signs:  /87   Pulse 78   Temp 98.4 °F (36.9 °C) (Oral)   Resp 18   Ht 6' 3\" (1.905 m) Wt (!) 314 lb 3.2 oz (142.5 kg)   SpO2 100%   BMI 39.27 kg/m²     Weight:    Wt Readings from Last 3 Encounters:   11/15/20 (!) 314 lb 3.2 oz (142.5 kg)   10/08/20 (!) 325 lb (147.4 kg)   09/29/20 (!) 325 lb 13.4 oz (147.8 kg)       General: Awake, alert and oriented. HEENT: normocephalic, PERRL, no scleral erythema or icterus, Oral mucosa moist and intact, throat clear  NECK: supple  BACK: Straight  SKIN: warm dry and intact without lesions rashes or masses  CHEST: CTA bilaterally without use of accessory muscles  CV: Normal S1 S2, RRR, no MRG  ABD: NT, ND, normoactive BS, no palpable masses or hepatosplenomegaly  EXTREMITIES: without edema, denies calf tenderness  NEURO: CN II - XII grossly intact  CATHETER: Right tri-fusion ([facility], 6/11/20) - CDI    Data:   CBC:   Recent Labs     11/13/20  0426 11/14/20  0333 11/15/20  0320   WBC 0.2* 0.1* 0.1*   HGB 9.2* 9.2* 9.3*   HCT 27.6* 27.1* 28.0*   MCV 98.0 96.4 97.1   PLT 36* 26* 20*     BMP/Mag:  Recent Labs     11/13/20  0426 11/14/20  0333 11/15/20  0320    142 139   K 3.8 4.3 4.3    108 105   CO2 24 24 24   PHOS 3.7  --   --    BUN 19 17 16   CREATININE 1.0 0.8 0.9   MG 2.00 1.80 1.50*     LIVP:   Recent Labs     11/13/20 0426 11/14/20  0333 11/15/20  0320   AST 20  --   --    ALT 29  --   --    BILIDIR <0.2  --   --    BILITOT 0.5 0.6 0.6   ALKPHOS 80  --   --      Uric Acid:    Recent Labs     11/13/20 0426   LABURIC 5.3     Coags:   No results for input(s): PROTIME, INR, APTT in the last 72 hours.     PROBLEM LIST:         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 CMML - 1 (Dx 10/2020)  3. Atrial Fibrillation  4. CASH  5. HTN    Post Transplant Complications:  1. Nausea and Indigestion  2. Diarrhea       TREATMENT:        1. Induction: Sweetie-C & Daunorubicin (6/12/20)  2. Consolidation:  HiDAC x 3 cycles (7/28/20, 8/29/20 & 9/22/20)  3.   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 AML - intermediate risk / CMML-1:  - BM bx/asp (10/14/20) - No AML, but shows CMML-1   - S/p Melphalan & Fludarabine f/b allogeneic PBSC infusion 11/11/20  - Post txp disease eval:  BM bx, no abnormal FISH, cytogenetics or mutations to follow     Day + 5     2. ID:  No evidence of infection.    - Cont Levaquin, Diflucan & Valtrex ppx       Donor/Recipient CMV:  Negative / negative   - Routine surveillance is not required       3. Heme: Pancytopenia from chemotherapy  - Transfuse for Hgb < 7 and Platelets < 22N  - No transfusion today    4. Metabolic / CKD III: stable e-lytes and renal fxn. Hypervolemia has improved  - Baseline SCr 1.2-1.3.  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  - Cont Prograf 4 mg BID. [place] Level qMWF:    Lab Results   Component Value Date    TACROLEV 6.1 11/13/2020    TACROLEV 4.3 11/12/2020   Next MTX 11/14/20     6. VOD:  No evidence of VOD. Admission Weight: (!) 324 lb 3.2 oz (147.1 kg)  Current Weight: Weight: (!) 314 lb 3.2 oz (142.5 kg). Recent Labs     11/13/20  0426  11/15/20  0320   BILIDIR <0.2  --   --    BILITOT 0.5   < > 0.6    < > = values in this interval not displayed. - Cont Actigall     7. Pulmonary: H/o CASH  - PFT (10/6/20) - FEV1/FVC 71 w/ predicted ratio of 75 & DLCO 41% (corrected 84%).   He has moderate obstruction by FEV1/FVC ratio lower than predicted and also based on ATS criteria with low FEV, likely d/t morbid obesity   - Pulm clearance by Dr. Awais Bishop  CASH:  - Cont CPAP  - Encourage IS and ambulation      8. GI / Nutrition: Nutrition:  Appetite is good  - Cont low microbial diet  - Dietary to follow  Diarrhea: ongoing and he states Imodium is helping  - C. Diff (11/11/20) - negative  - Cont Imodium as needed   - Added Lomotil as needed  Nausea & Indigestion:  Ongoing  - Compazine and Ativan as needed  - Cont Pepcid bid  - Cont TUMS as needed     9. Cardiac: H/o A. Fib & HTN  - Echo (9/29/20) -  LVEF 60-65% w/ left atrial dilatation  A. Fib:  Currently in NSR  - Cont sotalol 120 mg BID  HTN: stable  - Cont amlodipine-lisinopril 5/40 mg daily         - DVT Prophylaxis: Platelets <91,999 cells/dL, - prophylactic lovenox on hold and mechanical prophylaxis with bilateral SCDs while in bed in place.   Contraindications to pharmacologic prophylaxis: None  Contraindications to mechanical prophylaxis: None     - Disposition:  D/c home once ANC > 1.0 & toxicities resolved following allogeneic transplant         Electronically signed by Arturo Toribio MD on 11/16/2020 at 7:00 AM  PGY-2  Internal Medicine Resident    Pedro Gannon MD  [facility]  Please contact me through [employer]      11/15/2020   1:34 PM

## 2020-11-15 NOTE — PLAN OF CARE
Problem: Falls - Risk of:  Goal: Will remain free from falls  Description: Pt up ad abebe in room and halls with steady gait. Independent with ADL's. Uses call light appropriately for assistance as needed. Bed breaks on, non skid footwear on, side rails up x2, call light within reach. Hourly rounding performed per unit protocol. Will continue safety precautions. Will remain free from falls  Outcome: Ongoing  Note: Orthostatic vital signs obtained at start of shift - see flowsheet for details. Pt does not meet criteria for orthostasis. Pt is a Med fall risk. See [facility] Fall Score and ABCDS Injury Risk assessments. Pt bed is in low position, side rails up, call light and belongings are in reach. Fall risk light is on outside pts room. Pt encouraged to call for assistance as needed. Will continue with hourly rounds for PO intake, pain needs, toileting and repositioning as needed. Problem: Infection - Central Venous Catheter-Associated Bloodstream Infection:  Goal: Will show no infection signs and symptoms  Description: Will show no infection signs and symptoms  Outcome: Ongoing  Note: CVC site remains free of signs/symptoms of infection. No drainage, edema, erythema, pain, or warmth noted at site. Dressing changes continue per protocol and on an as needed basis - see flowsheet. Problem: Bleeding:  Goal: Will show no signs and symptoms of excessive bleeding  Description: Will show no signs and symptoms of excessive bleeding  Outcome: Ongoing  Patient's hemoglobin this AM:   Recent Labs     11/15/20  0320   HGB 9.3*     Patient's platelet count this AM:   Recent Labs     11/15/20  0320   PLT 20*    Thrombocytopenia Precautions in place. Patient showing no signs or symptoms of active bleeding. Transfusion not indicated at this time. Patient verbalizes understanding of all instructions. Will continue to assess and implement POC. Call light within reach and hourly rounding in place.       Problem: PROTECTIVE PRECAUTIONS  Goal: Patient will remain free of nosocomial Infections  Outcome: Ongoing  Note: Pt remains in protective precautions. Pt educated on wearing mask when in hallways. Pt, staff, and visitors adhering to handwashing guidelines. Pt educated to shower or bathe daily with chlorhexidine. Pt verbalizes understanding of low microbial diet. Will continue to monitor. Problem: Nausea/Vomiting:  Goal: Absence of nausea/vomiting  Description: C/O mild nausea without vomiting. Compazine x1 PO given as prophylaxis   Outcome: Ongoing  Note: Pt c/o indigestion and related nausea. Pt states that symptoms are adequately relieved with Tums and PRN compazine. Will continue to monitor. Problem: Diarrhea:  Goal: Passage of soft, formed stool  Description: Passage of soft, formed stool  Outcome: Ongoing  Note: Pt still c/o loose stools despite using imodium. Imodium discontinued and replaced with lomotil.

## 2020-11-16 ENCOUNTER — APPOINTMENT (OUTPATIENT)
Dept: GENERAL RADIOLOGY | Age: 60
DRG: 014 | End: 2020-11-16
Attending: INTERNAL MEDICINE
Payer: COMMERCIAL

## 2020-11-16 LAB
ALBUMIN SERPL-MCNC: 4 G/DL (ref 3.4–5)
ALP BLD-CCNC: 89 U/L (ref 40–129)
ALT SERPL-CCNC: 96 U/L (ref 10–40)
ANION GAP SERPL CALCULATED.3IONS-SCNC: 10 MMOL/L (ref 3–16)
APTT: 35.7 SEC (ref 24.2–36.2)
AST SERPL-CCNC: 35 U/L (ref 15–37)
BILIRUB SERPL-MCNC: 0.5 MG/DL (ref 0–1)
BILIRUBIN DIRECT: <0.2 MG/DL (ref 0–0.3)
BILIRUBIN URINE: NEGATIVE
BILIRUBIN, INDIRECT: ABNORMAL MG/DL (ref 0–1)
BLOOD, URINE: ABNORMAL
BUN BLDV-MCNC: 17 MG/DL (ref 7–20)
CALCIUM SERPL-MCNC: 9 MG/DL (ref 8.3–10.6)
CHLORIDE BLD-SCNC: 107 MMOL/L (ref 99–110)
CLARITY: CLEAR
CO2: 23 MMOL/L (ref 21–32)
COLOR: YELLOW
CREAT SERPL-MCNC: 0.9 MG/DL (ref 0.8–1.3)
GFR AFRICAN AMERICAN: >60
GFR NON-AFRICAN AMERICAN: >60
GLUCOSE BLD-MCNC: 107 MG/DL (ref 70–99)
GLUCOSE URINE: NEGATIVE MG/DL
HCT VFR BLD CALC: 26.2 % (ref 40.5–52.5)
HEMOGLOBIN: 8.8 G/DL (ref 13.5–17.5)
INR BLD: 1.01 (ref 0.86–1.14)
KETONES, URINE: NEGATIVE MG/DL
LACTATE DEHYDROGENASE: 166 U/L (ref 100–190)
LEUKOCYTE ESTERASE, URINE: NEGATIVE
MAGNESIUM: 1.5 MG/DL (ref 1.8–2.4)
MCH RBC QN AUTO: 32.5 PG (ref 26–34)
MCHC RBC AUTO-ENTMCNC: 33.6 G/DL (ref 31–36)
MCV RBC AUTO: 96.8 FL (ref 80–100)
MICROSCOPIC EXAMINATION: YES
NITRITE, URINE: NEGATIVE
PDW BLD-RTO: 18.6 % (ref 12.4–15.4)
PH UA: 6 (ref 5–8)
PHOSPHORUS: 4.2 MG/DL (ref 2.5–4.9)
PLATELET # BLD: 12 K/UL (ref 135–450)
PMV BLD AUTO: 8.8 FL (ref 5–10.5)
POTASSIUM SERPL-SCNC: 4.2 MMOL/L (ref 3.5–5.1)
PROTEIN UA: NEGATIVE MG/DL
PROTHROMBIN TIME: 11.7 SEC (ref 10–13.2)
RBC # BLD: 2.71 M/UL (ref 4.2–5.9)
RBC UA: NORMAL /HPF (ref 0–4)
SODIUM BLD-SCNC: 140 MMOL/L (ref 136–145)
SPECIFIC GRAVITY UA: 1.01 (ref 1–1.03)
TACROLIMUS BLOOD: 7.5 NG/ML (ref 5–20)
TOTAL PROTEIN: 6 G/DL (ref 6.4–8.2)
URIC ACID, SERUM: 5.1 MG/DL (ref 3.5–7.2)
URINE TYPE: ABNORMAL
UROBILINOGEN, URINE: 0.2 E.U./DL
WBC # BLD: 0.1 K/UL (ref 4–11)
WBC UA: NORMAL /HPF (ref 0–5)

## 2020-11-16 PROCEDURE — 85730 THROMBOPLASTIN TIME PARTIAL: CPT

## 2020-11-16 PROCEDURE — 36592 COLLECT BLOOD FROM PICC: CPT

## 2020-11-16 PROCEDURE — 6370000000 HC RX 637 (ALT 250 FOR IP): Performed by: NURSE PRACTITIONER

## 2020-11-16 PROCEDURE — 80048 BASIC METABOLIC PNL TOTAL CA: CPT

## 2020-11-16 PROCEDURE — 81001 URINALYSIS AUTO W/SCOPE: CPT

## 2020-11-16 PROCEDURE — 2060000000 HC ICU INTERMEDIATE R&B

## 2020-11-16 PROCEDURE — 83615 LACTATE (LD) (LDH) ENZYME: CPT

## 2020-11-16 PROCEDURE — 6370000000 HC RX 637 (ALT 250 FOR IP): Performed by: INTERNAL MEDICINE

## 2020-11-16 PROCEDURE — 71045 X-RAY EXAM CHEST 1 VIEW: CPT

## 2020-11-16 PROCEDURE — 84100 ASSAY OF PHOSPHORUS: CPT

## 2020-11-16 PROCEDURE — 85610 PROTHROMBIN TIME: CPT

## 2020-11-16 PROCEDURE — 80197 ASSAY OF TACROLIMUS: CPT

## 2020-11-16 PROCEDURE — 6360000002 HC RX W HCPCS: Performed by: STUDENT IN AN ORGANIZED HEALTH CARE EDUCATION/TRAINING PROGRAM

## 2020-11-16 PROCEDURE — 2580000003 HC RX 258: Performed by: NURSE PRACTITIONER

## 2020-11-16 PROCEDURE — 6360000002 HC RX W HCPCS: Performed by: INTERNAL MEDICINE

## 2020-11-16 PROCEDURE — 84550 ASSAY OF BLOOD/URIC ACID: CPT

## 2020-11-16 PROCEDURE — 85025 COMPLETE CBC W/AUTO DIFF WBC: CPT

## 2020-11-16 PROCEDURE — 83735 ASSAY OF MAGNESIUM: CPT

## 2020-11-16 PROCEDURE — 80076 HEPATIC FUNCTION PANEL: CPT

## 2020-11-16 RX ORDER — SIMETHICONE 80 MG
80 TABLET,CHEWABLE ORAL EVERY 6 HOURS PRN
Status: DISCONTINUED | OUTPATIENT
Start: 2020-11-16 | End: 2020-11-30 | Stop reason: HOSPADM

## 2020-11-16 RX ORDER — MAGNESIUM SULFATE IN WATER 40 MG/ML
2 INJECTION, SOLUTION INTRAVENOUS ONCE
Status: COMPLETED | OUTPATIENT
Start: 2020-11-16 | End: 2020-11-16

## 2020-11-16 RX ADMIN — SIMETHICONE 80 MG: 80 TABLET, CHEWABLE ORAL at 22:54

## 2020-11-16 RX ADMIN — FLUCONAZOLE 400 MG: 200 TABLET ORAL at 09:01

## 2020-11-16 RX ADMIN — VALACYCLOVIR HYDROCHLORIDE 500 MG: 500 TABLET, FILM COATED ORAL at 09:00

## 2020-11-16 RX ADMIN — VALACYCLOVIR HYDROCHLORIDE 500 MG: 500 TABLET, FILM COATED ORAL at 20:27

## 2020-11-16 RX ADMIN — MAGNESIUM SULFATE HEPTAHYDRATE 2 G: 40 INJECTION, SOLUTION INTRAVENOUS at 07:32

## 2020-11-16 RX ADMIN — TACROLIMUS 4 MG: 1 CAPSULE ORAL at 09:00

## 2020-11-16 RX ADMIN — URSODIOL 500 MG: 250 TABLET, FILM COATED ORAL at 20:27

## 2020-11-16 RX ADMIN — DIPHENOXYLATE HYDROCHLORIDE AND ATROPINE SULFATE 1 TABLET: 2.5; .025 TABLET ORAL at 20:26

## 2020-11-16 RX ADMIN — DIPHENOXYLATE HYDROCHLORIDE AND ATROPINE SULFATE 1 TABLET: 2.5; .025 TABLET ORAL at 06:58

## 2020-11-16 RX ADMIN — FAMOTIDINE 20 MG: 20 TABLET ORAL at 09:00

## 2020-11-16 RX ADMIN — ANTACID TABLETS 750 MG: 500 TABLET, CHEWABLE ORAL at 17:59

## 2020-11-16 RX ADMIN — URSODIOL 500 MG: 250 TABLET, FILM COATED ORAL at 09:01

## 2020-11-16 RX ADMIN — SOTALOL HYDROCHLORIDE 120 MG: 120 TABLET ORAL at 09:00

## 2020-11-16 RX ADMIN — Medication 10 ML: at 09:01

## 2020-11-16 RX ADMIN — TACROLIMUS 4 MG: 1 CAPSULE ORAL at 20:34

## 2020-11-16 RX ADMIN — AMLODIPINE BESYLATE 5 MG: 5 TABLET ORAL at 09:01

## 2020-11-16 RX ADMIN — PROCHLORPERAZINE MALEATE 10 MG: 10 TABLET ORAL at 19:07

## 2020-11-16 RX ADMIN — ANTACID TABLETS 750 MG: 500 TABLET, CHEWABLE ORAL at 11:20

## 2020-11-16 RX ADMIN — SODIUM CHLORIDE 15 ML: 900 IRRIGANT IRRIGATION at 06:21

## 2020-11-16 RX ADMIN — POTASSIUM CHLORIDE AND SODIUM CHLORIDE: 900; 150 INJECTION, SOLUTION INTRAVENOUS at 03:37

## 2020-11-16 RX ADMIN — LEVOFLOXACIN 500 MG: 500 TABLET, FILM COATED ORAL at 20:27

## 2020-11-16 RX ADMIN — DIPHENOXYLATE HYDROCHLORIDE AND ATROPINE SULFATE 1 TABLET: 2.5; .025 TABLET ORAL at 13:25

## 2020-11-16 RX ADMIN — DIPHENOXYLATE HYDROCHLORIDE AND ATROPINE SULFATE 1 TABLET: 2.5; .025 TABLET ORAL at 19:07

## 2020-11-16 RX ADMIN — Medication 10 ML: at 20:28

## 2020-11-16 RX ADMIN — DIPHENOXYLATE HYDROCHLORIDE AND ATROPINE SULFATE 1 TABLET: 2.5; .025 TABLET ORAL at 17:59

## 2020-11-16 RX ADMIN — SOTALOL HYDROCHLORIDE 120 MG: 120 TABLET ORAL at 20:27

## 2020-11-16 RX ADMIN — FAMOTIDINE 20 MG: 20 TABLET ORAL at 20:27

## 2020-11-16 RX ADMIN — ANTACID TABLETS 750 MG: 500 TABLET, CHEWABLE ORAL at 06:58

## 2020-11-16 RX ADMIN — SODIUM CHLORIDE 15 ML: 900 IRRIGANT IRRIGATION at 20:28

## 2020-11-16 RX ADMIN — LISINOPRIL 40 MG: 40 TABLET ORAL at 09:00

## 2020-11-16 RX ADMIN — LOPERAMIDE HYDROCHLORIDE 2 MG: 2 CAPSULE ORAL at 22:54

## 2020-11-16 ASSESSMENT — PAIN SCALES - GENERAL
PAINLEVEL_OUTOF10: 0

## 2020-11-16 NOTE — PLAN OF CARE
Problem: Falls - Risk of:  Goal: Will remain free from falls  Description: Pt up ad abebe in room and halls with steady gait. Independent with ADL's. Uses call light appropriately for assistance as needed. Bed breaks on, non skid footwear on, side rails up x2, call light within reach. Hourly rounding performed per unit protocol. Will continue safety precautions. Will remain free from falls  Outcome: Ongoing  Note: Orthostatic vital signs obtained at start of shift - see flowsheet for details. Pt does not meet criteria for orthostasis. Pt is a Med fall risk. See [place] Fall Score and ABCDS Injury Risk assessments. - Screening for Orthostasis AND not a [place] Risk per CAMEJO/ABCDS: Pt bed is in low position, side rails up, call light and belongings are in reach. Fall risk light is on outside pts room. Pt encouraged to call for assistance as needed. Will continue with hourly rounds for PO intake, pain needs, toileting and repositioning as needed. Problem: Infection - Central Venous Catheter-Associated Bloodstream Infection:  Goal: Will show no infection signs and symptoms  Description: Will show no infection signs and symptoms  Outcome: Ongoing  Note: CVC site remains free of signs/symptoms of infection. No drainage, edema, erythema, pain, or warmth noted at site. Dressing changes continue per protocol and on an as needed basis - see flowsheet. Problem: Bleeding:  Goal: Will show no signs and symptoms of excessive bleeding  Description: Will show no signs and symptoms of excessive bleeding  Outcome: Ongoing  Note: Patient's hemoglobin this AM:   Recent Labs     11/16/20  0337   HGB 8.8*     Patient's platelet count this AM:   Recent Labs     11/16/20  0337   PLT 12*    Thrombocytopenia Precautions in place. Patient showing no signs or symptoms of active bleeding. Transfusion not indicated at this time. Patient verbalizes understanding of all instructions.  Will continue to assess and implement POC. Call light within reach and hourly rounding in place. Problem: Venous Thromboembolism:  Goal: Will show no signs or symptoms of venous thromboembolism  Description: Will show no signs or symptoms of venous thromboembolism  Outcome: Ongoing  Note: Adherent with DVT Prevention: Pt is at risk for DVT d/t decreased mobility and cancer treatment. Pt educated on importance of activity. Pt has orders for SCDs while in bed. Pt verbalizes understanding of need for prophylaxis while inpatient. Problem: Discharge Planning:  Goal: Discharged to appropriate level of care  Description: Discharged to appropriate level of care  Outcome: Ongoing  Note: Andrew Barksdale is aware of the current plan of care. Will continue to monitor. Problem: PROTECTIVE PRECAUTIONS  Goal: Patient will remain free of nosocomial Infections  Outcome: Ongoing  Note: Pt remains in neutropenic precautions for WBC of 0.1. Pt educated on use of mask prior to leaving room. All staff and visitor following handwashing requirements this shift prior to entering room. Low microbial diet order in place and being followed. Problem: Nausea/Vomiting:  Goal: Absence of nausea/vomiting  Description: C/O mild nausea without vomiting. Compazine x1 PO given as prophylaxis   Outcome: Ongoing  Note: One report of Nausea during shift, well controlled with Tums and Compazine IV. No complaints of vomiting during shift. Advised patient to eat smaller meals during day. Patient verbalized understanding.   Will continue to monitor

## 2020-11-16 NOTE — BH NOTE
Psychology    Psychologist attended clinical rounds with team and remained after to speak with pt. He is day +5 from his allo transplant. Doing well, up in room and fully dressed. He says he has diarrhea but is still eating, up walking, staying busy. Provided emotional support and encouragement and will continue to see pt for routine visits through hospital stay.     Estella Osorio Psy.D., ABPP

## 2020-11-16 NOTE — CARE COORDINATION
Type of Admission  AML  MUD ( Female Donor) T:0: 11/11/20  Prep. Regimen: Melphalan+Fludara  Day +5        Central venous catheter  Right TLC ( 9/29/20 , pre-existing from [facility])        Plan  Proceed with MUD Allogenic SCT for treatment of AML        Update  11/6/20: Planned admission for allogeneic SCT. Keeping active by use elliptical equipment in his room. 11/9/20:  Using exercise bike in room. 11/11/20 Infusion of stem cells today. maintaining activity by using elliptical bike in room. 11/16/20:  Continues to be active using elliptical bike in room, dresses daily. Niece into visit. Education  11/6/20:  Re-introduced myself in RN Discharge planner role. States his cousin Kvng Villagran, who is a nurse will be coming into town by Thanksgiving to act as his Care giver. Patient and caregiver have been through the educational process for allogeneic bone marrow transplantation including the allogeneic family meeting, preadmission teaching and preadmission physician office visit. , with Anita Laurent RN BMT Coordinator. Patient and caregiver verbalize understanding of treatment regimen, possible adverse events, length of stay, and risk and benefits of transplantation and are agreeable to proceed. Patient and caregiver have been given an opportunity to ask, and to have their questions answered to their satisfaction. Documentation for above education can be found in the [facility] office chart. Discharge  DISCHARGE ROUNDING:  Date:_11/9, 11/16    Team members present : NP, SW, Charge RN, RN D/C Planner, [id number]    Anticipated date of discharge: When [address] is >1.0 & without toxicities  Active problems/barriers to discharge    Home needs:     Caregivers: Lynette Flynn ( RN)    Home medication issues: ( [facility]: [phone])     Patient/caregiver aware of plan?   Yes           Pending

## 2020-11-16 NOTE — PROGRESS NOTES
Rounded on patient. Patient sleeping quietly in bed. VSS. No complaints of pain. Call light, bedside table and personal items within reach. Bed wheels locked. Bed in lowest position. .  Instructed patient to call if assistance is needed. Will continue to monitor.

## 2020-11-16 NOTE — PLAN OF CARE
Precautions in place. Patient showing no signs or symptoms of active bleeding. Transfusion not indicated at this time. Patient verbalizes understanding of all instructions. Will continue to assess and implement POC. Call light within reach and hourly rounding in place. Problem: PROTECTIVE PRECAUTIONS  Goal: Patient will remain free of nosocomial Infections  Outcome: Ongoing  Note: Pt remains in protective precautions. Pt educated on wearing mask when in hallways. Pt, staff, and visitors adhering to handwashing guidelines. Pt educated to shower or bathe daily with chlorhexidine. Pt verbalizes understanding of low microbial diet. Will continue to monitor. Problem: Nausea/Vomiting:  Goal: Absence of nausea/vomiting  Description: C/O mild nausea without vomiting. Compazine x1 PO given as prophylaxis   Outcome: Ongoing  Note: Pt c/o indigestion and related nausea. Pt states that symptoms are adequately relieved with Tums and PRN compazine. Will continue to monitor. Problem: Diarrhea:  Goal: Passage of soft, formed stool  Description: Passage of soft, formed stool  Outcome: Ongoing  Note: Pt continues to have loose stools. Lomotil is administered after each loose stool.

## 2020-11-17 LAB
ABO/RH: NORMAL
ANION GAP SERPL CALCULATED.3IONS-SCNC: 8 MMOL/L (ref 3–16)
ANTIBODY SCREEN: NORMAL
BILIRUB SERPL-MCNC: 0.4 MG/DL (ref 0–1)
BUN BLDV-MCNC: 18 MG/DL (ref 7–20)
CALCIUM SERPL-MCNC: 8.9 MG/DL (ref 8.3–10.6)
CHLORIDE BLD-SCNC: 104 MMOL/L (ref 99–110)
CO2: 22 MMOL/L (ref 21–32)
CREAT SERPL-MCNC: 1.1 MG/DL (ref 0.8–1.3)
GFR AFRICAN AMERICAN: >60
GFR NON-AFRICAN AMERICAN: >60
GLUCOSE BLD-MCNC: 112 MG/DL (ref 70–99)
HCT VFR BLD CALC: 25.5 % (ref 40.5–52.5)
HEMOGLOBIN: 8.7 G/DL (ref 13.5–17.5)
MAGNESIUM: 1.8 MG/DL (ref 1.8–2.4)
MCH RBC QN AUTO: 33 PG (ref 26–34)
MCHC RBC AUTO-ENTMCNC: 34 G/DL (ref 31–36)
MCV RBC AUTO: 96.8 FL (ref 80–100)
PDW BLD-RTO: 18.2 % (ref 12.4–15.4)
PLATELET # BLD: 8 K/UL (ref 135–450)
PMV BLD AUTO: 10.2 FL (ref 5–10.5)
POTASSIUM SERPL-SCNC: 4.2 MMOL/L (ref 3.5–5.1)
RBC # BLD: 2.63 M/UL (ref 4.2–5.9)
SODIUM BLD-SCNC: 134 MMOL/L (ref 136–145)
WBC # BLD: 0.1 K/UL (ref 4–11)

## 2020-11-17 PROCEDURE — 80048 BASIC METABOLIC PNL TOTAL CA: CPT

## 2020-11-17 PROCEDURE — 86850 RBC ANTIBODY SCREEN: CPT

## 2020-11-17 PROCEDURE — P9037 PLATE PHERES LEUKOREDU IRRAD: HCPCS

## 2020-11-17 PROCEDURE — 36592 COLLECT BLOOD FROM PICC: CPT

## 2020-11-17 PROCEDURE — 6360000002 HC RX W HCPCS: Performed by: INTERNAL MEDICINE

## 2020-11-17 PROCEDURE — 6370000000 HC RX 637 (ALT 250 FOR IP): Performed by: INTERNAL MEDICINE

## 2020-11-17 PROCEDURE — 86901 BLOOD TYPING SEROLOGIC RH(D): CPT

## 2020-11-17 PROCEDURE — 86900 BLOOD TYPING SEROLOGIC ABO: CPT

## 2020-11-17 PROCEDURE — 36430 TRANSFUSION BLD/BLD COMPNT: CPT

## 2020-11-17 PROCEDURE — 6370000000 HC RX 637 (ALT 250 FOR IP): Performed by: NURSE PRACTITIONER

## 2020-11-17 PROCEDURE — 2060000000 HC ICU INTERMEDIATE R&B

## 2020-11-17 PROCEDURE — 85025 COMPLETE CBC W/AUTO DIFF WBC: CPT

## 2020-11-17 PROCEDURE — 96409 CHEMO IV PUSH SNGL DRUG: CPT

## 2020-11-17 PROCEDURE — 2580000003 HC RX 258: Performed by: INTERNAL MEDICINE

## 2020-11-17 PROCEDURE — 83735 ASSAY OF MAGNESIUM: CPT

## 2020-11-17 PROCEDURE — 82247 BILIRUBIN TOTAL: CPT

## 2020-11-17 PROCEDURE — 2580000003 HC RX 258: Performed by: NURSE PRACTITIONER

## 2020-11-17 PROCEDURE — 6360000002 HC RX W HCPCS: Performed by: STUDENT IN AN ORGANIZED HEALTH CARE EDUCATION/TRAINING PROGRAM

## 2020-11-17 RX ORDER — 0.9 % SODIUM CHLORIDE 0.9 %
20 INTRAVENOUS SOLUTION INTRAVENOUS ONCE
Status: COMPLETED | OUTPATIENT
Start: 2020-11-17 | End: 2020-11-17

## 2020-11-17 RX ORDER — PANTOPRAZOLE SODIUM 40 MG/1
40 TABLET, DELAYED RELEASE ORAL
Status: DISCONTINUED | OUTPATIENT
Start: 2020-11-17 | End: 2020-11-30 | Stop reason: HOSPADM

## 2020-11-17 RX ORDER — CALCIUM CARBONATE 200(500)MG
1000 TABLET,CHEWABLE ORAL 3 TIMES DAILY PRN
Status: DISCONTINUED | OUTPATIENT
Start: 2020-11-17 | End: 2020-11-30 | Stop reason: HOSPADM

## 2020-11-17 RX ADMIN — VALACYCLOVIR HYDROCHLORIDE 500 MG: 500 TABLET, FILM COATED ORAL at 20:13

## 2020-11-17 RX ADMIN — SODIUM CHLORIDE 15 ML: 900 IRRIGANT IRRIGATION at 17:33

## 2020-11-17 RX ADMIN — FAMOTIDINE 20 MG: 20 TABLET ORAL at 09:00

## 2020-11-17 RX ADMIN — SIMETHICONE 80 MG: 80 TABLET, CHEWABLE ORAL at 04:54

## 2020-11-17 RX ADMIN — FLUCONAZOLE 400 MG: 200 TABLET ORAL at 08:59

## 2020-11-17 RX ADMIN — POTASSIUM CHLORIDE AND SODIUM CHLORIDE: 900; 150 INJECTION, SOLUTION INTRAVENOUS at 00:14

## 2020-11-17 RX ADMIN — SODIUM CHLORIDE 15 ML: 900 IRRIGANT IRRIGATION at 20:15

## 2020-11-17 RX ADMIN — TACROLIMUS 4 MG: 1 CAPSULE ORAL at 08:58

## 2020-11-17 RX ADMIN — LEVOFLOXACIN 500 MG: 500 TABLET, FILM COATED ORAL at 20:13

## 2020-11-17 RX ADMIN — SOTALOL HYDROCHLORIDE 120 MG: 120 TABLET ORAL at 20:14

## 2020-11-17 RX ADMIN — LOPERAMIDE HYDROCHLORIDE 2 MG: 2 CAPSULE ORAL at 02:33

## 2020-11-17 RX ADMIN — ANTACID TABLETS 750 MG: 500 TABLET, CHEWABLE ORAL at 09:05

## 2020-11-17 RX ADMIN — Medication 10 ML: at 09:00

## 2020-11-17 RX ADMIN — PANTOPRAZOLE SODIUM 40 MG: 40 TABLET, DELAYED RELEASE ORAL at 11:57

## 2020-11-17 RX ADMIN — LISINOPRIL 40 MG: 40 TABLET ORAL at 09:00

## 2020-11-17 RX ADMIN — POTASSIUM CHLORIDE AND SODIUM CHLORIDE: 900; 150 INJECTION, SOLUTION INTRAVENOUS at 17:46

## 2020-11-17 RX ADMIN — URSODIOL 500 MG: 250 TABLET, FILM COATED ORAL at 09:00

## 2020-11-17 RX ADMIN — SODIUM CHLORIDE 15 ML: 900 IRRIGANT IRRIGATION at 12:11

## 2020-11-17 RX ADMIN — LOPERAMIDE HYDROCHLORIDE 2 MG: 2 CAPSULE ORAL at 14:44

## 2020-11-17 RX ADMIN — SIMETHICONE 80 MG: 80 TABLET, CHEWABLE ORAL at 22:19

## 2020-11-17 RX ADMIN — AMLODIPINE BESYLATE 5 MG: 5 TABLET ORAL at 09:00

## 2020-11-17 RX ADMIN — URSODIOL 500 MG: 250 TABLET, FILM COATED ORAL at 20:13

## 2020-11-17 RX ADMIN — PROCHLORPERAZINE MALEATE 10 MG: 10 TABLET ORAL at 17:46

## 2020-11-17 RX ADMIN — DIPHENOXYLATE HYDROCHLORIDE AND ATROPINE SULFATE 1 TABLET: 2.5; .025 TABLET ORAL at 12:48

## 2020-11-17 RX ADMIN — TACROLIMUS 4 MG: 1 CAPSULE ORAL at 20:13

## 2020-11-17 RX ADMIN — SIMETHICONE 80 MG: 80 TABLET, CHEWABLE ORAL at 14:44

## 2020-11-17 RX ADMIN — ANTACID TABLETS 1000 MG: 500 TABLET, CHEWABLE ORAL at 11:57

## 2020-11-17 RX ADMIN — METHOTREXATE 12.5 MG: 25 SOLUTION INTRA-ARTERIAL; INTRAMUSCULAR; INTRATHECAL; INTRAVENOUS at 12:03

## 2020-11-17 RX ADMIN — SOTALOL HYDROCHLORIDE 120 MG: 120 TABLET ORAL at 08:59

## 2020-11-17 RX ADMIN — VALACYCLOVIR HYDROCHLORIDE 500 MG: 500 TABLET, FILM COATED ORAL at 08:59

## 2020-11-17 RX ADMIN — Medication 10 ML: at 20:15

## 2020-11-17 RX ADMIN — SODIUM CHLORIDE 20 ML: 9 INJECTION, SOLUTION INTRAVENOUS at 06:53

## 2020-11-17 RX ADMIN — SODIUM CHLORIDE 15 ML: 900 IRRIGANT IRRIGATION at 06:53

## 2020-11-17 RX ADMIN — ANTACID TABLETS 1000 MG: 500 TABLET, CHEWABLE ORAL at 19:03

## 2020-11-17 ASSESSMENT — PAIN SCALES - GENERAL
PAINLEVEL_OUTOF10: 4
PAINLEVEL_OUTOF10: 0

## 2020-11-17 ASSESSMENT — PAIN DESCRIPTION - PAIN TYPE: TYPE: ACUTE PAIN

## 2020-11-17 ASSESSMENT — PAIN DESCRIPTION - ORIENTATION: ORIENTATION: MID

## 2020-11-17 ASSESSMENT — PAIN DESCRIPTION - PROGRESSION
CLINICAL_PROGRESSION: NOT CHANGED

## 2020-11-17 ASSESSMENT — PAIN - FUNCTIONAL ASSESSMENT: PAIN_FUNCTIONAL_ASSESSMENT: ACTIVITIES ARE NOT PREVENTED

## 2020-11-17 ASSESSMENT — PAIN DESCRIPTION - ONSET: ONSET: ON-GOING

## 2020-11-17 ASSESSMENT — PAIN DESCRIPTION - DESCRIPTORS: DESCRIPTORS: CRAMPING

## 2020-11-17 ASSESSMENT — PAIN DESCRIPTION - LOCATION: LOCATION: ABDOMEN

## 2020-11-17 ASSESSMENT — PAIN DESCRIPTION - FREQUENCY: FREQUENCY: INTERMITTENT

## 2020-11-17 NOTE — PLAN OF CARE
Problem: Falls - Risk of:  Goal: Will remain free from falls  Description: Pt up ad abebe in room and halls with steady gait. Independent with ADL's. Uses call light appropriately for assistance as needed. Bed breaks on, non skid footwear on, side rails up x2, call light within reach. Hourly rounding performed per unit protocol. Will continue safety precautions. Will remain free from falls  Outcome: Ongoing  Note: Orthostatic vital signs obtained at start of shift - see flowsheet for details. Pt does not meet criteria for orthostasis. Pt is a Med fall risk. See Katn Bellow Fall Score and ABCDS Injury Risk assessments. Pt bed is in low position, side rails up, call light and belongings are in reach. Fall risk light is on outside pts room. Pt encouraged to call for assistance as needed. Will continue with hourly rounds for PO intake, pain needs, toileting and repositioning as needed. Problem: Infection - Central Venous Catheter-Associated Bloodstream Infection:  Goal: Will show no infection signs and symptoms  Description: Will show no infection signs and symptoms  Outcome: Ongoing  Note: CVC site remains free of signs/symptoms of infection. No drainage, edema, erythema, pain, or warmth noted at site. Dressing changes continue per protocol and on an as needed basis - see flowsheet. Problem: Bleeding:  Goal: Will show no signs and symptoms of excessive bleeding  Description: Will show no signs and symptoms of excessive bleeding  Outcome: Ongoing  Note: Patient's hemoglobin this AM:   Recent Labs     11/16/20  0337   HGB 8.8*     Patient's platelet count this AM:   Recent Labs     11/16/20  0337   PLT 12*    Thrombocytopenia Precautions in place. Patient showing no signs or symptoms of active bleeding. Transfusion not indicated at this time. Patient verbalizes understanding of all instructions. Will continue to assess and implement POC. Call light within reach and hourly rounding in place. Problem: Nausea/Vomiting:  Goal: Absence of nausea/vomiting  Description: C/O mild nausea without vomiting. Compazine x1 PO given as prophylaxis   Outcome: Ongoing  Note: Pt educated on importance of calling for antiemetics when nauseous or vomiting. Pt verbalized understanding. Will continue to monitor.

## 2020-11-17 NOTE — PROGRESS NOTES
BMI 39.20 kg/m²     Weight:    Wt Readings from Last 3 Encounters:   11/17/20 (!) 313 lb 9.6 oz (142.2 kg)   10/08/20 (!) 325 lb (147.4 kg)   09/29/20 (!) 325 lb 13.4 oz (147.8 kg)       General: Awake, alert and oriented. HEENT: normocephalic, PERRL, no scleral erythema or icterus, Oral mucosa moist and intact, throat clear  NECK: supple  BACK: Straight  SKIN: warm dry and intact without lesions rashes or masses  CHEST: CTA bilaterally without use of accessory muscles  CV: Normal S1 S2, RRR, no MRG  ABD: NT, ND, normoactive BS, no palpable masses or hepatosplenomegaly  EXTREMITIES: without edema, denies calf tenderness  NEURO: CN II - XII grossly intact  CATHETER: Right tri-fusion ([facility], 6/11/20) - CDI    Data:   CBC:   Recent Labs     11/15/20  0320 11/16/20  0337 11/17/20  0315   WBC 0.1* 0.1* 0.1*   HGB 9.3* 8.8* 8.7*   HCT 28.0* 26.2* 25.5*   MCV 97.1 96.8 96.8   PLT 20* 12* 8*     BMP/Mag:  Recent Labs     11/15/20  0320 11/16/20  0337 11/17/20  0315    140 134*   K 4.3 4.2 4.2    107 104   CO2 24 23 22   PHOS  --  4.2  --    BUN 16 17 18   CREATININE 0.9 0.9 1.1   MG 1.50* 1.50* 1.80     LIVP:   Recent Labs     11/15/20  0320 11/16/20  0337 11/17/20  0315   AST  --  35  --    ALT  --  96*  --    BILIDIR  --  <0.2  --    BILITOT 0.6 0.5 0.4   ALKPHOS  --  89  --      Uric Acid:    Recent Labs     11/16/20 0337   LABURIC 5.1     Coags:   Recent Labs     11/16/20 0337   PROTIME 11.7   INR 1.01   APTT 35.7        PROBLEM LIST:         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 CMML - 1 (Dx 10/2020)  3. Atrial Fibrillation  4. CASH  5. HTN    Post Transplant Complications:  1. Nausea and Indigestion  2. Diarrhea       TREATMENT:        1. Induction: Sweetie-C & Daunorubicin (6/12/20)  2. Consolidation:  HiDAC x 3 cycles (7/28/20, 8/29/20 & 9/22/20)  3.   MUD Allo - PB BMT (DPB1 non-permissive mismatch)  Preparative Regimen:  Melphalan and Fludarabine  Date of BMT:  11/11/20  Source of stem cells: PBPC - 4.4 x10^6 qf17wvocf/kg  Donor/Recipient Blood Type:  A positive / 0 positive   Donor Sex:  Female / NMDP (DID # [id number]) - Follow FISH XY  CMV Donor / Recipient: Negative / Negative       ASSESSMENT AND PLAN:        1. AML - intermediate risk / CMML-1:  - BM bx/asp (10/14/20) - No AML, but shows CMML-1   - S/p Melphalan & Fludarabine f/b allogeneic PBSC infusion 11/11/20  - Post txp disease eval:  BM bx, no abnormal FISH, cytogenetics or mutations to follow     Day +6  - MTX due today     2. ID:  No evidence of infection.    - Cont Levaquin, Diflucan & Valtrex ppx     Donor/Recipient CMV:  Negative / negative   - Routine surveillance is not required       3. Heme: Pancytopenia from chemotherapy  - Transfuse for Hgb < 7 and Platelets < 92L  - Plt transfusion today    4. Metabolic / CKD III: HypoNa, Hyperglycemia  - Baseline SCr 1.2-1.3.  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  - Cont Prograf 4 mg BID. Levels qMWF:  Lab Results   Component Value Date    TACROLEV 7.5 11/16/2020    TACROLEV 6.1 11/13/2020    TACROLEV 4.3 11/12/2020      6. VOD:  No evidence of VOD. Admission Weight: (!) 324 lb 3.2 oz (147.1 kg)  Current Weight: Weight: (!) 313 lb 9.6 oz (142.2 kg). Recent Labs     11/16/20  0337 11/17/20  0315   BILIDIR <0.2  --    BILITOT 0.5 0.4   - Cont Actigall     7. Pulmonary: H/o CASH  - PFT (10/6/20) - FEV1/FVC 71 w/ predicted ratio of 75 & DLCO 41% (corrected 84%).   He has moderate obstruction by FEV1/FVC ratio lower than predicted and also based on ATS criteria with low FEV, likely d/t morbid obesity   - Pulm clearance by Dr. Tere Jaramillo  CASH:  - Cont CPAP  - Encourage IS and ambulation      8. GI / Nutrition:    Nutrition:  Appetite is good  - Cont low microbial diet  - Dietary to follow  Diarrhea: ongoing and he states Imodium is helping  - C. Diff (11/11/20) - negative  - Cont Imodium as needed   - Added Lomotil as needed  Nausea & Indigestion:  Ongoing  - Compazine and Ativan as needed  - Cont Pepcid bid  - Cont TUMS as needed     9. Cardiac: H/o A. Fib & HTN  - Echo (9/29/20) -  LVEF 60-65% w/ left atrial dilatation  A. Fib:  Currently in NSR  - Cont sotalol 120 mg BID  HTN: stable  - Cont amlodipine-lisinopril 5/40 mg daily       - DVT Prophylaxis: Platelets <21,800 cells/dL, - prophylactic lovenox on hold and mechanical prophylaxis with bilateral SCDs while in bed in place.   Contraindications to pharmacologic prophylaxis: None  Contraindications to mechanical prophylaxis: None     - Disposition:  D/c home once ANC > 1.0 & toxicities resolved following allogeneic transplant       Lisa Tucker APRN - CNP   Jennifer Rivera MD  [facility]  Please contact me through [employer]      11/17/2020   9:19 AM

## 2020-11-17 NOTE — PLAN OF CARE
Problem: Falls - Risk of:  Goal: Will remain free from falls  Description: Pt up ad abebe in room and halls with steady gait. Independent with ADL's. Uses call light appropriately for assistance as needed. Bed breaks on, non skid footwear on, side rails up x2, call light within reach. Hourly rounding performed per unit protocol. Will continue safety precautions. Will remain free from falls  Outcome: Ongoing     Orthostatic vital signs obtained at start of shift - see flowsheet for details. Pt does not meet criteria for orthostasis. Pt is a Med fall risk. See Kamari Deems Fall Score and ABCDS Injury Risk assessments. - Screening for Orthostasis AND not a [place] Risk per CAMEJO/ABCDS: Pt bed is in low position, side rails up, call light and belongings are in reach. Fall risk light is on outside pts room. Pt encouraged to call for assistance as needed. Will continue with hourly rounds for PO intake, pain needs, toileting and repositioning as needed. Problem: Infection - Central Venous Catheter-Associated Bloodstream Infection:  Description: CVC site remains free of signs/symptoms of infection. No drainage, edema, erythema, pain, or warmth noted at site. Dressing changes continue per protocol and on an as needed basis - see flowsheet. CVC site remains free of signs/symptoms of infection. No drainage, edema, erythema, pain, or warmth noted at site. Dressing changes continue per protocol and on an as needed basis - see flowsheet. Problem: Bleeding:  Description: No signs of bleeding noted. No transfusions required per protocol.    Goal: Will show no signs and symptoms of excessive bleeding  Description: Will show no signs and symptoms of excessive bleeding  11/17/2020 1856 by Shaina Campbell RN  Outcome: Ongoing     Patient's hemoglobin this AM:   Recent Labs     11/17/20 0315   HGB 8.7*     Patient's platelet count this AM:   Recent Labs     11/17/20 0315   PLT 8*    Thrombocytopenia Precautions

## 2020-11-18 LAB
ALBUMIN SERPL-MCNC: 4 G/DL (ref 3.4–5)
ALP BLD-CCNC: 104 U/L (ref 40–129)
ALT SERPL-CCNC: 65 U/L (ref 10–40)
ANION GAP SERPL CALCULATED.3IONS-SCNC: 9 MMOL/L (ref 3–16)
AST SERPL-CCNC: 20 U/L (ref 15–37)
BILIRUB SERPL-MCNC: 0.5 MG/DL (ref 0–1)
BILIRUBIN DIRECT: <0.2 MG/DL (ref 0–0.3)
BILIRUBIN, INDIRECT: ABNORMAL MG/DL (ref 0–1)
BUN BLDV-MCNC: 15 MG/DL (ref 7–20)
CALCIUM SERPL-MCNC: 9.2 MG/DL (ref 8.3–10.6)
CHLORIDE BLD-SCNC: 106 MMOL/L (ref 99–110)
CO2: 24 MMOL/L (ref 21–32)
CREAT SERPL-MCNC: 1.1 MG/DL (ref 0.8–1.3)
GFR AFRICAN AMERICAN: >60
GFR NON-AFRICAN AMERICAN: >60
GLUCOSE BLD-MCNC: 111 MG/DL (ref 70–99)
HCT VFR BLD CALC: 25.9 % (ref 40.5–52.5)
HEMOGLOBIN: 8.8 G/DL (ref 13.5–17.5)
LACTATE DEHYDROGENASE: 149 U/L (ref 100–190)
MAGNESIUM: 1.7 MG/DL (ref 1.8–2.4)
MCH RBC QN AUTO: 32.8 PG (ref 26–34)
MCHC RBC AUTO-ENTMCNC: 34.1 G/DL (ref 31–36)
MCV RBC AUTO: 96.2 FL (ref 80–100)
PDW BLD-RTO: 18.2 % (ref 12.4–15.4)
PHOSPHORUS: 4.3 MG/DL (ref 2.5–4.9)
PLATELET # BLD: 10 K/UL (ref 135–450)
PMV BLD AUTO: 9.8 FL (ref 5–10.5)
POTASSIUM SERPL-SCNC: 4.8 MMOL/L (ref 3.5–5.1)
RBC # BLD: 2.7 M/UL (ref 4.2–5.9)
SODIUM BLD-SCNC: 139 MMOL/L (ref 136–145)
TACROLIMUS BLOOD: 8.1 NG/ML (ref 5–20)
TOTAL PROTEIN: 5.9 G/DL (ref 6.4–8.2)
URIC ACID, SERUM: 4.8 MG/DL (ref 3.5–7.2)
WBC # BLD: 0 K/UL (ref 4–11)

## 2020-11-18 PROCEDURE — 36430 TRANSFUSION BLD/BLD COMPNT: CPT

## 2020-11-18 PROCEDURE — 83735 ASSAY OF MAGNESIUM: CPT

## 2020-11-18 PROCEDURE — 6360000002 HC RX W HCPCS: Performed by: INTERNAL MEDICINE

## 2020-11-18 PROCEDURE — 6370000000 HC RX 637 (ALT 250 FOR IP): Performed by: INTERNAL MEDICINE

## 2020-11-18 PROCEDURE — 2580000003 HC RX 258: Performed by: NURSE PRACTITIONER

## 2020-11-18 PROCEDURE — 6370000000 HC RX 637 (ALT 250 FOR IP): Performed by: NURSE PRACTITIONER

## 2020-11-18 PROCEDURE — 80197 ASSAY OF TACROLIMUS: CPT

## 2020-11-18 PROCEDURE — 84550 ASSAY OF BLOOD/URIC ACID: CPT

## 2020-11-18 PROCEDURE — 85025 COMPLETE CBC W/AUTO DIFF WBC: CPT

## 2020-11-18 PROCEDURE — 2580000003 HC RX 258: Performed by: INTERNAL MEDICINE

## 2020-11-18 PROCEDURE — 36592 COLLECT BLOOD FROM PICC: CPT

## 2020-11-18 PROCEDURE — 80076 HEPATIC FUNCTION PANEL: CPT

## 2020-11-18 PROCEDURE — 6360000002 HC RX W HCPCS: Performed by: STUDENT IN AN ORGANIZED HEALTH CARE EDUCATION/TRAINING PROGRAM

## 2020-11-18 PROCEDURE — 80048 BASIC METABOLIC PNL TOTAL CA: CPT

## 2020-11-18 PROCEDURE — 84100 ASSAY OF PHOSPHORUS: CPT

## 2020-11-18 PROCEDURE — 83615 LACTATE (LD) (LDH) ENZYME: CPT

## 2020-11-18 PROCEDURE — 2060000000 HC ICU INTERMEDIATE R&B

## 2020-11-18 RX ORDER — ACETAMINOPHEN 325 MG/1
650 TABLET ORAL EVERY 4 HOURS PRN
Status: DISCONTINUED | OUTPATIENT
Start: 2020-11-18 | End: 2020-11-23

## 2020-11-18 RX ORDER — 0.9 % SODIUM CHLORIDE 0.9 %
20 INTRAVENOUS SOLUTION INTRAVENOUS ONCE
Status: COMPLETED | OUTPATIENT
Start: 2020-11-18 | End: 2020-11-18

## 2020-11-18 RX ORDER — DIPHENHYDRAMINE HCL 25 MG
25 TABLET ORAL EVERY 4 HOURS PRN
Status: DISCONTINUED | OUTPATIENT
Start: 2020-11-18 | End: 2020-11-23

## 2020-11-18 RX ADMIN — LOPERAMIDE HYDROCHLORIDE 2 MG: 2 CAPSULE ORAL at 09:15

## 2020-11-18 RX ADMIN — SIMETHICONE 80 MG: 80 TABLET, CHEWABLE ORAL at 04:42

## 2020-11-18 RX ADMIN — DIPHENOXYLATE HYDROCHLORIDE AND ATROPINE SULFATE 1 TABLET: 2.5; .025 TABLET ORAL at 13:17

## 2020-11-18 RX ADMIN — LISINOPRIL 40 MG: 40 TABLET ORAL at 08:15

## 2020-11-18 RX ADMIN — SODIUM CHLORIDE 20 ML: 9 INJECTION, SOLUTION INTRAVENOUS at 05:32

## 2020-11-18 RX ADMIN — PROCHLORPERAZINE MALEATE 10 MG: 10 TABLET ORAL at 17:30

## 2020-11-18 RX ADMIN — SODIUM CHLORIDE 15 ML: 900 IRRIGANT IRRIGATION at 21:41

## 2020-11-18 RX ADMIN — LOPERAMIDE HYDROCHLORIDE 2 MG: 2 CAPSULE ORAL at 11:10

## 2020-11-18 RX ADMIN — PANTOPRAZOLE SODIUM 40 MG: 40 TABLET, DELAYED RELEASE ORAL at 06:01

## 2020-11-18 RX ADMIN — PROCHLORPERAZINE MALEATE 10 MG: 10 TABLET ORAL at 06:01

## 2020-11-18 RX ADMIN — Medication 10 ML: at 21:29

## 2020-11-18 RX ADMIN — AMLODIPINE BESYLATE 5 MG: 5 TABLET ORAL at 08:16

## 2020-11-18 RX ADMIN — URSODIOL 500 MG: 250 TABLET, FILM COATED ORAL at 21:28

## 2020-11-18 RX ADMIN — SIMETHICONE 80 MG: 80 TABLET, CHEWABLE ORAL at 08:15

## 2020-11-18 RX ADMIN — Medication 10 ML: at 08:16

## 2020-11-18 RX ADMIN — SOTALOL HYDROCHLORIDE 120 MG: 120 TABLET ORAL at 08:21

## 2020-11-18 RX ADMIN — POTASSIUM CHLORIDE AND SODIUM CHLORIDE: 900; 150 INJECTION, SOLUTION INTRAVENOUS at 23:49

## 2020-11-18 RX ADMIN — LOPERAMIDE HYDROCHLORIDE 2 MG: 2 CAPSULE ORAL at 03:45

## 2020-11-18 RX ADMIN — URSODIOL 500 MG: 250 TABLET, FILM COATED ORAL at 08:15

## 2020-11-18 RX ADMIN — TACROLIMUS 4 MG: 1 CAPSULE ORAL at 08:15

## 2020-11-18 RX ADMIN — VALACYCLOVIR HYDROCHLORIDE 500 MG: 500 TABLET, FILM COATED ORAL at 21:28

## 2020-11-18 RX ADMIN — VALACYCLOVIR HYDROCHLORIDE 500 MG: 500 TABLET, FILM COATED ORAL at 08:16

## 2020-11-18 RX ADMIN — LEVOFLOXACIN 500 MG: 500 TABLET, FILM COATED ORAL at 21:28

## 2020-11-18 RX ADMIN — LOPERAMIDE HYDROCHLORIDE 2 MG: 2 CAPSULE ORAL at 21:29

## 2020-11-18 RX ADMIN — SIMETHICONE 80 MG: 80 TABLET, CHEWABLE ORAL at 21:39

## 2020-11-18 RX ADMIN — SOTALOL HYDROCHLORIDE 120 MG: 120 TABLET ORAL at 21:40

## 2020-11-18 RX ADMIN — FLUCONAZOLE 400 MG: 200 TABLET ORAL at 08:15

## 2020-11-18 RX ADMIN — ANTACID TABLETS 1000 MG: 500 TABLET, CHEWABLE ORAL at 17:52

## 2020-11-18 RX ADMIN — ANTACID TABLETS 1000 MG: 500 TABLET, CHEWABLE ORAL at 10:13

## 2020-11-18 RX ADMIN — TACROLIMUS 4 MG: 1 CAPSULE ORAL at 21:28

## 2020-11-18 ASSESSMENT — PAIN SCALES - GENERAL
PAINLEVEL_OUTOF10: 0
PAINLEVEL_OUTOF10: 0
PAINLEVEL_OUTOF10: 3

## 2020-11-18 ASSESSMENT — PAIN DESCRIPTION - LOCATION: LOCATION: ABDOMEN

## 2020-11-18 ASSESSMENT — PAIN DESCRIPTION - PAIN TYPE: TYPE: ACUTE PAIN

## 2020-11-18 ASSESSMENT — PAIN DESCRIPTION - PROGRESSION: CLINICAL_PROGRESSION: NOT CHANGED

## 2020-11-18 NOTE — PROGRESS NOTES
BCC Allogeneic Progress Note    2020    Han Sams    :  1960    MRN:  [id number]    Referring MD: Tanika Willingham MD  [address]  [address], [address]      Subjective:  C/O nausea and diarrhea. ECOG PS:  (1) Restricted in physically strenuous activity, ambulatory and able to do work of light nature    KPS: 80% Normal activity with effort; some signs or symptoms of disease    Isolation:  None     Medications    Scheduled Meds:   pantoprazole  40 mg Oral QAM AC    tacrolimus  4 mg Oral BID    levoFLOXacin  500 mg Oral Nightly    sodium chloride flush  10 mL Intravenous 2 times per day    Saline Mouthwash  15 mL Swish & Spit 4x Daily AC & HS    valACYclovir  500 mg Oral BID    sotalol  120 mg Oral BID    amLODIPine  5 mg Oral Daily    And    lisinopril  40 mg Oral Daily    ursodiol  500 mg Oral BID    [START ON 2020] methotrexate sodium  12.5 mg Intravenous Once    fluconazole  400 mg Oral Daily     Continuous Infusions:   0.9% NaCl with KCl 20 mEq 50 mL/hr at 20 1746    sodium chloride      sodium chloride       PRN Meds:calcium carbonate, simethicone, diphenoxylate-atropine, [COMPLETED] loperamide **FOLLOWED BY** loperamide, sodium chloride, sennosides-docusate sodium, sodium chloride, sodium chloride flush, potassium chloride, magnesium sulfate, magnesium hydroxide, Saline Mouthwash, alteplase, prochlorperazine **OR** prochlorperazine, LORazepam **OR** LORazepam    ROS:  As noted above, otherwise remainder of 10-point ROS negative    Physical Exam:    I&O:      Intake/Output Summary (Last 24 hours) at 2020 0909  Last data filed at 2020 0973  Gross per 24 hour   Intake 3730.35 ml   Output 2900 ml   Net 830.35 ml       Vital Signs:  /73   Pulse 83   Temp 98.6 °F (37 °C) (Oral)   Resp 18   Ht 6' 3\" (1.905 m)   Wt (!) 307 lb 8 oz (139.5 kg)   SpO2 100%   BMI 38.43 kg/m²     Weight:     Wt Readings from Last 3 Encounters:   11/18/20 (!) 307 lb 8 oz (139.5 kg)   10/08/20 (!) 325 lb (147.4 kg)   09/29/20 (!) 325 lb 13.4 oz (147.8 kg)       General: Awake, alert and oriented. HEENT: normocephalic, PERRL, no scleral erythema or icterus, Oral mucosa moist and intact, throat clear  NECK: supple  BACK: Straight  SKIN: warm dry and intact without lesions rashes or masses  CHEST: CTA bilaterally without use of accessory muscles  CV: Normal S1 S2, RRR, no MRG  ABD: NT, ND, normoactive BS, no palpable masses or hepatosplenomegaly  EXTREMITIES: without edema, denies calf tenderness  NEURO: CN II - XII grossly intact  CATHETER: Right tri-fusion ([facility], 6/11/20) - CDI    Data:   CBC:   Recent Labs     11/16/20 0337 11/17/20 0315 11/18/20 0310   WBC 0.1* 0.1* 0.0*   HGB 8.8* 8.7* 8.8*   HCT 26.2* 25.5* 25.9*   MCV 96.8 96.8 96.2   PLT 12* 8* 10*     BMP/Mag:  Recent Labs     11/16/20 0337 11/17/20 0315 11/18/20 0310    134* 139   K 4.2 4.2 4.8    104 106   CO2 23 22 24   PHOS 4.2  --  4.3   BUN 17 18 15   CREATININE 0.9 1.1 1.1   MG 1.50* 1.80 1.70*     LIVP:   Recent Labs     11/16/20 0337 11/17/20 0315 11/18/20 0310   AST 35  --  20   ALT 96*  --  65*   BILIDIR <0.2  --  <0.2   BILITOT 0.5 0.4 0.5   ALKPHOS 89  --  104     Uric Acid:    Recent Labs     11/18/20 0310   LABURIC 4.8     Coags:   Recent Labs     11/16/20 0337   PROTIME 11.7   INR 1.01   APTT 35.7        PROBLEM LIST:         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 CMML - 1 (Dx 10/2020)  3. Atrial Fibrillation  4. CASH  5. HTN    Post Transplant Complications:  1. Nausea and Indigestion  2. Diarrhea       TREATMENT:        1. Induction: Sweetie-C & Daunorubicin (6/12/20)  2. Consolidation:  HiDAC x 3 cycles (7/28/20, 8/29/20 & 9/22/20)  3.   MUD Allo - PB BMT (DPB1 non-permissive mismatch)  Preparative Regimen:  Melphalan and Fludarabine  Date of BMT:  11/11/20  Source of stem cells:  PBPC - 4.4 x10^6 eq19ohmyw/kg  Donor/Recipient Blood Type:  A positive / 0 positive   Donor Sex:  Female / NMDP (DID # [id number]) - Follow FISH XY  CMV Donor / Recipient: Negative / Negative       ASSESSMENT AND PLAN:        1. AML - intermediate risk / CMML-1:  - BM bx/asp (10/14/20) - No AML, but shows CMML-1   - S/p Melphalan & Fludarabine f/b allogeneic PBSC infusion 11/11/20  - Post txp disease eval:  BM bx, no abnormal FISH, cytogenetics or mutations to follow     Day +7     2. ID:  No evidence of infection.    - Cont Levaquin, Diflucan & Valtrex ppx     Donor/Recipient CMV:  Negative / negative   - Routine surveillance is not required       3. Heme: Pancytopenia from chemotherapy  - Transfuse for Hgb < 7 and Platelets < 00G  - Plt transfusion today    4. Metabolic / CKD III: HypoNa, Hyperglycemia  - Baseline SCr 1.2-1.3.  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  - Cont Prograf 4 mg BID. Levels qMWF:  Lab Results   Component Value Date    TACROLEV 7.5 11/16/2020    TACROLEV 6.1 11/13/2020    TACROLEV 4.3 11/12/2020      6. VOD:  No evidence of VOD. Admission Weight: (!) 324 lb 3.2 oz (147.1 kg)  Current Weight: Weight: (!) 307 lb 8 oz (139.5 kg). Recent Labs     11/18/20  0310   BILIDIR <0.2   BILITOT 0.5   - Cont Actigall     7. Pulmonary: H/o CASH  - PFT (10/6/20) - FEV1/FVC 71 w/ predicted ratio of 75 & DLCO 41% (corrected 84%). He has moderate obstruction by FEV1/FVC ratio lower than predicted and also based on ATS criteria with low FEV, likely d/t morbid obesity   - Pulm clearance by Dr. Eunice Rodney  CASH:  - Cont CPAP  - Encourage IS and ambulation      8. GI / Nutrition:    Nutrition:  Appetite is good  - Cont low microbial diet  - Dietary to follow  Diarrhea:   - C.  Diff (11/11/20) - negative  - Cont Imodium as needed   - Added Lomotil as needed  Nausea &

## 2020-11-18 NOTE — CARE COORDINATION
Type of Admission  AML  MUD ( Female Donor) T:0: 11/11/20  Prep. Regimen: Melphalan+Fludara  Day 7        Central venous catheter  Right TLC ( 9/29/20 , [facility])        Plan  Proceed with MUD Allogenic SCT for treatment of AML        Update  11/6/20: Planned admission for allogeneic SCT. Keeping active by use elliptical equipment in his room. 11/9/20:  Using exercise bike in room. 11/11/20 Infusion of stem cells today. maintaining activity by using elliptical bike in room. 11/16/20:  Continues to be active using elliptical bike in room, dresses daily. Niece into visit. 11/18/20:  Continues to have loose stools, taking imodium. Mild oral tenderness. Education  11/6/20:  Re-introduced myself in RN Discharge planner role. States his cousin Christopher Barr, who is a nurse will be coming into town by Thanksgiving to act as his Care giver. Patient and caregiver have been through the educational process for allogeneic bone marrow transplantation including the allogeneic family meeting, preadmission teaching and preadmission physician office visit. , with Orion Jeffers RN BMT Coordinator. Patient and caregiver verbalize understanding of treatment regimen, possible adverse events, length of stay, and risk and benefits of transplantation and are agreeable to proceed. Patient and caregiver have been given an opportunity to ask, and to have their questions answered to their satisfaction. Documentation for above education can be found in the [facility], [facility] office chart. 11/18/20:  Reviewed timeline to recovery and loose stools generally do not improve until wbc recovers.         Discharge  DISCHARGE ROUNDING:  Date:_11/9, 11/16    Team members present : NP, SW, Charge RN, RN D/C Planner, [id number]    Anticipated date of discharge: When [address] is >1.0 & without toxicities  Active problems/barriers to discharge    Home needs:     Caregivers: Germain Capps ( RN)    Home medication issues: ( [facility]: [phone]) 11/18/20: Fluconazole 200 mg bid ( 2 tabs) #60 with 3 refills  Valtrex 500 mg bid #60 with 3 refills  Tacrolimus 0.5 mg bid #60 with 3 refills  Tacrolimus 1 mg ( 3 caps) bid #120 with 3 refills  Cost pending & availability     Patient/caregiver aware of plan?   Yes           Pending

## 2020-11-18 NOTE — PROGRESS NOTES
This RN spoke with Kush Alvarado NP concerning pt having reported hive-like reaction after platelets infused this AM. Per Jaquelin PEOPLES: \"I'll put pre-meds in the [facility]. \" see orders. Will continue to monitor.

## 2020-11-18 NOTE — PLAN OF CARE
Problem: Falls - Risk of:  Goal: Will remain free from falls  Description: Pt up ad abebe in room and halls with steady gait. Independent with ADL's. Uses call light appropriately for assistance as needed. Bed breaks on, non skid footwear on, side rails up x2, call light within reach. Hourly rounding performed per unit protocol. Will continue safety precautions. Will remain free from falls  11/18/2020 0426 by Dustin Lawson RN  Outcome: Ongoing  Note: Orthostatic vital signs obtained at start of shift - see flowsheet for details. Pt does not meet criteria for orthostasis. Pt is a Med fall risk. See [place] Fall Score and ABCDS Injury Risk assessments. - Screening for Orthostasis AND not a [place] Risk per CAMEJO/ABCDS: Pt bed is in low position, side rails up, call light and belongings are in reach. Fall risk light is on outside pts room. Pt encouraged to call for assistance as needed. Will continue with hourly rounds for PO intake, pain needs, toileting and repositioning as needed. Problem: Infection - Central Venous Catheter-Associated Bloodstream Infection:  Goal: Will show no infection signs and symptoms  Description: Will show no infection signs and symptoms  11/18/2020 0426 by Dustin Lawson RN  Outcome: Ongoing  Note: CVC site remains free of signs/symptoms of infection. No drainage, edema, erythema, pain, or warmth noted at site. Dressing changes continue per protocol and on an as needed basis - see flowsheet. Problem: Bleeding:  Goal: Will show no signs and symptoms of excessive bleeding  Description: Will show no signs and symptoms of excessive bleeding  11/18/2020 0426 by Dustin Lawson RN  Outcome: Ongoing  Note: Patient's hemoglobin this AM:   Recent Labs     11/18/20  0310   HGB 8.8*     Patient's platelet count this AM:   Recent Labs     11/18/20 0310   PLT 10*    Thrombocytopenia Precautions in place. Patient showing no signs or symptoms of active bleeding.   Patient transfused blood products per orders - see flowsheet. Patient verbalizes understanding of all instructions. Will continue to assess and implement POC. Call light within reach and hourly rounding in place. Problem: Venous Thromboembolism:  Goal: Will show no signs or symptoms of venous thromboembolism  Description: Will show no signs or symptoms of venous thromboembolism  11/18/2020 0426 by Mike Gonzalez RN  Outcome: Ongoing  Note: Refusing DVT Prevention: Pt is at risk for DVT d/t decreased mobility and cancer treatment. Pt educated on importance of activity. Pt has orders for SCDs while in bed, however pt currently refusing treatment. Reviewed risks of DVT & PE development while inpatient. Provider aware of patient's refusal and re-education of importance of prophylaxis. No new orders at this time. Will continue to re-instruct patient and intervene as appropriate. Problem: Discharge Planning:  Goal: Discharged to appropriate level of care  Description: Discharged to appropriate level of care  11/18/2020 0426 by Mike Gonzalez RN  Outcome: Ongoing  Note: Continue to discuss plan of care with Andrew Barksdale. Problem: PROTECTIVE PRECAUTIONS  Goal: Patient will remain free of nosocomial Infections  11/18/2020 0426 by Mike Gonzalez RN  Outcome: Ongoing  Note: Pt remains in protective precautions. Pt educated on wearing mask when in hallways. Pt, staff, and visitors adhering to handwashing guidelines. Pt educated to shower or bathe daily with chlorhexidine and linens changed daily per protocol. Pt verbalizes understanding of low microbial diet. Will continue to monitor. Problem: Nausea/Vomiting:  Goal: Absence of nausea/vomiting  Description: C/O mild nausea without vomiting. Compazine x1 PO given as prophylaxis   11/18/2020 0426 by Mike Gonzalez RN  Outcome: Ongoing  Note: Andrew Barksdale has had no nausea this shift. Will continue to monitor.        Problem: Diarrhea:  Goal: Bowel elimination is within specified parameters  Description: Bowel elimination is within specified parameters  11/18/2020 0426 by Jesse Fenton RN  Outcome: Ongoing  Note: Dai Moreira has had 1 occurrence of diarrhea this shift and ha received imodium 1x. Will continue to monitor.

## 2020-11-18 NOTE — PROGRESS NOTES
Clinical Pharmacy Progress Note    Patient Name: Yanni Palomares  YOB: 1960  Diagnosis: AML -MelFlu SHEILA f/b MUD PBSC Allogeneic HSCT     GVHD Prophylaxis:  Tacrolimus (Prograf) starting Day -3   · Adjusted according to levels - drawn via red lumen  Methotrexate 12.5 mg IVPush days +1 (11/12), +3 (11/14), +6 (11/17), +11 (11/22)     Tacrolimus (Prograf) levels starting day +1  Tacrolimus (Prograf) goal level:  8-15 ng/mL    Date SCr Bili Prograf Dose Prograf Level Adjustments / Comments   11/5; d-6 1.3 0.5 3 mg po bid -- Oral tacrolimus starts 11/8 (day-3) with first tacrolimus trough level on 11/12 (day+1) followed by MWF thereafter. 11/12; d+1 0.9 0.6 3 mg po bid 4.3 Increase tacrolimus to 4 mg po bid (additional 1 mg now). Next level Fri 11/13.   11/13, d+2 1.0 0.5 4mg PO BID  6.1 Tacrolimus level slightly subtherapeutic based on range above. Dose increased yesterday - would not anticipate steady state achieved. Will continue current management and re-check level on Monday, 11/16 11/16; d5 0.9 0.5 4 mg po bid 7.5 Tacrolimus level increasing appropriately. No further increase in dose at this time. Next tacrolimus level Wed 11/18.   11/18; d7 1.1 0.4 4 mg po bid 8.1 No change. Next tacrolimus level Friday, 11/20. Please call with questions. Taylor Greenberg. [facility]  [address] Clinical Pharmacist  Wireless:  [phone]  11/18/2020 8:14 AM

## 2020-11-18 NOTE — PROGRESS NOTES
NUTRITION ASSESSMENT  Admission Date: 11/5/2020     Type and Reason for Visit: Reassess    NUTRITION RECOMMENDATIONS:   1. PO Diet: Continue current diet low microbial.    2. Nutrition Education: 11/6 provided verbal review of food safety s/p BMT and dietary recommendations. 3. ONS: pt declining all at this time. Will revisit if nutritional status worsens. NUTRITION ASSESSMENT:  Pt is tolerating % of meals s/p ALLO D+7. Pt reported nausea-antiemetic effective to an extent. Pt declined all ONS at this point stating that PO intakes are adequate. Weight loss of 15 lb within past week noted placing pt at moderate risk for malnutrition. RD will continue to monitor for nutritional adequacy post BMT. MALNUTRITION ASSESSMENT  Context of Malnutrition: Acute Illness(on chronic)   Malnutrition Status: At risk for malnutrition (Comment)  Findings of the 6 clinical characteristics of malnutrition (Minimum of 2 out of 6 clinical characteristics is required to make the diagnosis of moderate or severe Protein Calorie Malnutrition based on AND/ASPEN Guidelines):  Energy Intake: Mild decrease in energy intake (comment)   Energy Intake Time: Greater than or equal to 5 days    Weight Loss %: 4.6%   Weight loss Time: Greater than or equal to 7 days    Due to current CDC guidelines recommending 6-ft distancing for social isolation for COVID19 prevention, physical aspects of the malnutrition assessment were withheld at this time.      NUTRITION DIAGNOSIS  Problem: Problem #1: Increased nutrient needs  Etiology: Increased demand for nutrient  Signs & Symptoms: chemot; ALLO bmt     NUTRITION INTERVENTION  Food and/or Nutrient Delivery:Continue Current diet   Nutrition education/counseling/coordination of care: Education Initiated     NUTRITION MONITORING & EVALUATION:  Evaluation:Progress towards goal declining   Goals:Goals: pt will maintain adequate po nutrition s/p BMT by consuming greater than 75% of meals offered through admission to promote meeting increased nutrient needs. Monitoring: Diet Tolerance , Meal Intake  or Pertinent Labs      OBJECTIVE DATA:  · Nutrition-Focused Physical Findings: loose bm this AM; imodium PRN  · Wounds None      Past Medical History:   Diagnosis Date    Arthritis     knees    Cancer ([address]     AML    History of blood transfusion     Hyperlipidemia     Hypertension         ANTHROPOMETRICS  Current Height: 6' 3\" (190.5 cm)  Current Weight: (!) 307 lb 8 oz (139.5 kg)    Admission weight: (!) 324 lb 3.2 oz (147.1 kg)  Ideal Bodyweight 196 lb    Usual Bodyweight 325 lb per emr   Weight Changes -15 lb within past week       BMI BMI (Calculated): 38.5    Wt Readings from Last 50 Encounters:   11/18/20 (!) 307 lb 8 oz (139.5 kg)   10/08/20 (!) 325 lb (147.4 kg)   09/29/20 (!) 325 lb 13.4 oz (147.8 kg)       COMPARATIVE STANDARDS  Estimated Total Kcals/Day : 15-18 Current Bodyweight (140 kg) 6634-1104 kcal    Estimated Total Protein (g/day) : 1.5-1.8 Ideal Bodyweight  (89.1 kg) 134-160g/day  Estimated Daily Total Fluid (ml/day): 6793-2237 mL per day     Food / Nutrition-Related History  Pre-Admission / Home Diet:  Pre-Admission/Home Diet: General   Home Supplements / Herbals:   none noted  Food Restrictions / Cultural Requests:   none noted    Current Nutrition Therapies   DIET GENERAL;     PO Intake: %  PO Supplement: None   PO Supplement Intake: None   IVF: NS + 20 mEq KCl @ 50 ml/hr     NUTRITION RISK LEVEL: Risk Level:  Moderate     Naomi Deleon, [address]  [place]:  [phone]  Office:  [phone]

## 2020-11-19 LAB
ANION GAP SERPL CALCULATED.3IONS-SCNC: 8 MMOL/L (ref 3–16)
APTT: 34.9 SEC (ref 24.2–36.2)
BILIRUB SERPL-MCNC: 0.5 MG/DL (ref 0–1)
BUN BLDV-MCNC: 16 MG/DL (ref 7–20)
CALCIUM SERPL-MCNC: 8.9 MG/DL (ref 8.3–10.6)
CHLORIDE BLD-SCNC: 104 MMOL/L (ref 99–110)
CO2: 23 MMOL/L (ref 21–32)
CREAT SERPL-MCNC: 1.1 MG/DL (ref 0.8–1.3)
GFR AFRICAN AMERICAN: >60
GFR NON-AFRICAN AMERICAN: >60
GLUCOSE BLD-MCNC: 112 MG/DL (ref 70–99)
HCT VFR BLD CALC: 23.7 % (ref 40.5–52.5)
HEMOGLOBIN: 8.3 G/DL (ref 13.5–17.5)
INR BLD: 1.08 (ref 0.86–1.14)
MAGNESIUM: 1.6 MG/DL (ref 1.8–2.4)
MCH RBC QN AUTO: 33.1 PG (ref 26–34)
MCHC RBC AUTO-ENTMCNC: 34.9 G/DL (ref 31–36)
MCV RBC AUTO: 94.7 FL (ref 80–100)
PDW BLD-RTO: 17.7 % (ref 12.4–15.4)
PLATELET # BLD: 9 K/UL (ref 135–450)
PMV BLD AUTO: 9.8 FL (ref 5–10.5)
POTASSIUM SERPL-SCNC: 4.1 MMOL/L (ref 3.5–5.1)
PROTHROMBIN TIME: 12.5 SEC (ref 10–13.2)
RBC # BLD: 2.5 M/UL (ref 4.2–5.9)
SODIUM BLD-SCNC: 135 MMOL/L (ref 136–145)
WBC # BLD: 0 K/UL (ref 4–11)

## 2020-11-19 PROCEDURE — 2580000003 HC RX 258: Performed by: NURSE PRACTITIONER

## 2020-11-19 PROCEDURE — 2060000000 HC ICU INTERMEDIATE R&B

## 2020-11-19 PROCEDURE — 6370000000 HC RX 637 (ALT 250 FOR IP): Performed by: NURSE PRACTITIONER

## 2020-11-19 PROCEDURE — 82247 BILIRUBIN TOTAL: CPT

## 2020-11-19 PROCEDURE — 6370000000 HC RX 637 (ALT 250 FOR IP): Performed by: INTERNAL MEDICINE

## 2020-11-19 PROCEDURE — 83735 ASSAY OF MAGNESIUM: CPT

## 2020-11-19 PROCEDURE — 2580000003 HC RX 258: Performed by: INTERNAL MEDICINE

## 2020-11-19 PROCEDURE — 85610 PROTHROMBIN TIME: CPT

## 2020-11-19 PROCEDURE — 6360000002 HC RX W HCPCS: Performed by: STUDENT IN AN ORGANIZED HEALTH CARE EDUCATION/TRAINING PROGRAM

## 2020-11-19 PROCEDURE — 80048 BASIC METABOLIC PNL TOTAL CA: CPT

## 2020-11-19 PROCEDURE — 6360000002 HC RX W HCPCS: Performed by: INTERNAL MEDICINE

## 2020-11-19 PROCEDURE — 85025 COMPLETE CBC W/AUTO DIFF WBC: CPT

## 2020-11-19 PROCEDURE — 85730 THROMBOPLASTIN TIME PARTIAL: CPT

## 2020-11-19 PROCEDURE — 36592 COLLECT BLOOD FROM PICC: CPT

## 2020-11-19 RX ORDER — 0.9 % SODIUM CHLORIDE 0.9 %
20 INTRAVENOUS SOLUTION INTRAVENOUS ONCE
Status: COMPLETED | OUTPATIENT
Start: 2020-11-19 | End: 2020-11-19

## 2020-11-19 RX ADMIN — LISINOPRIL 40 MG: 40 TABLET ORAL at 08:39

## 2020-11-19 RX ADMIN — PROCHLORPERAZINE EDISYLATE 10 MG: 5 INJECTION INTRAMUSCULAR; INTRAVENOUS at 18:02

## 2020-11-19 RX ADMIN — ANTACID TABLETS 1000 MG: 500 TABLET, CHEWABLE ORAL at 19:11

## 2020-11-19 RX ADMIN — SODIUM CHLORIDE 15 ML: 900 IRRIGANT IRRIGATION at 05:56

## 2020-11-19 RX ADMIN — URSODIOL 500 MG: 250 TABLET, FILM COATED ORAL at 08:40

## 2020-11-19 RX ADMIN — LEVOFLOXACIN 500 MG: 500 TABLET, FILM COATED ORAL at 20:38

## 2020-11-19 RX ADMIN — LOPERAMIDE HYDROCHLORIDE 2 MG: 2 CAPSULE ORAL at 16:02

## 2020-11-19 RX ADMIN — ACETAMINOPHEN 650 MG: 325 TABLET ORAL at 05:10

## 2020-11-19 RX ADMIN — SODIUM CHLORIDE 15 ML: 900 IRRIGANT IRRIGATION at 17:33

## 2020-11-19 RX ADMIN — SIMETHICONE 80 MG: 80 TABLET, CHEWABLE ORAL at 19:11

## 2020-11-19 RX ADMIN — PANTOPRAZOLE SODIUM 40 MG: 40 TABLET, DELAYED RELEASE ORAL at 05:56

## 2020-11-19 RX ADMIN — SIMETHICONE 80 MG: 80 TABLET, CHEWABLE ORAL at 03:38

## 2020-11-19 RX ADMIN — FLUCONAZOLE 400 MG: 200 TABLET ORAL at 08:39

## 2020-11-19 RX ADMIN — SODIUM CHLORIDE 20 ML: 9 INJECTION, SOLUTION INTRAVENOUS at 05:56

## 2020-11-19 RX ADMIN — PROCHLORPERAZINE EDISYLATE 10 MG: 5 INJECTION INTRAMUSCULAR; INTRAVENOUS at 14:01

## 2020-11-19 RX ADMIN — POTASSIUM CHLORIDE AND SODIUM CHLORIDE: 900; 150 INJECTION, SOLUTION INTRAVENOUS at 17:33

## 2020-11-19 RX ADMIN — Medication 10 ML: at 08:39

## 2020-11-19 RX ADMIN — ANTACID TABLETS 1000 MG: 500 TABLET, CHEWABLE ORAL at 13:12

## 2020-11-19 RX ADMIN — DIPHENOXYLATE HYDROCHLORIDE AND ATROPINE SULFATE 1 TABLET: 2.5; .025 TABLET ORAL at 13:10

## 2020-11-19 RX ADMIN — SIMETHICONE 80 MG: 80 TABLET, CHEWABLE ORAL at 13:10

## 2020-11-19 RX ADMIN — SOTALOL HYDROCHLORIDE 120 MG: 120 TABLET ORAL at 20:37

## 2020-11-19 RX ADMIN — AMLODIPINE BESYLATE 5 MG: 5 TABLET ORAL at 08:40

## 2020-11-19 RX ADMIN — VALACYCLOVIR HYDROCHLORIDE 500 MG: 500 TABLET, FILM COATED ORAL at 20:38

## 2020-11-19 RX ADMIN — SOTALOL HYDROCHLORIDE 120 MG: 120 TABLET ORAL at 08:40

## 2020-11-19 RX ADMIN — TACROLIMUS 4 MG: 1 CAPSULE ORAL at 08:40

## 2020-11-19 RX ADMIN — TACROLIMUS 4 MG: 1 CAPSULE ORAL at 20:37

## 2020-11-19 RX ADMIN — SODIUM CHLORIDE 15 ML: 900 IRRIGANT IRRIGATION at 13:14

## 2020-11-19 RX ADMIN — Medication 10 ML: at 20:38

## 2020-11-19 RX ADMIN — DIPHENHYDRAMINE HYDROCHLORIDE 25 MG: 25 TABLET ORAL at 05:10

## 2020-11-19 RX ADMIN — LOPERAMIDE HYDROCHLORIDE 2 MG: 2 CAPSULE ORAL at 06:01

## 2020-11-19 RX ADMIN — URSODIOL 500 MG: 250 TABLET, FILM COATED ORAL at 20:38

## 2020-11-19 RX ADMIN — VALACYCLOVIR HYDROCHLORIDE 500 MG: 500 TABLET, FILM COATED ORAL at 08:39

## 2020-11-19 ASSESSMENT — PAIN SCALES - GENERAL
PAINLEVEL_OUTOF10: 0

## 2020-11-19 NOTE — PLAN OF CARE
Problem: Falls - Risk of:  Goal: Will remain free from falls  Description: Pt up ad abebe in room and halls with steady gait. Independent with ADL's. Uses call light appropriately for assistance as needed. Bed breaks on, non skid footwear on, side rails up x2, call light within reach. Hourly rounding performed per unit protocol. Will continue safety precautions. Will remain free from falls  11/19/2020 1141 by Neeraj Hidalgo RN  Outcome: Ongoing  Note: Pt remains free of falls; up ad abebe with steady gait. Patient's BP was orthostatic negative this shift. Bed in lowest position, wheels locked, side rails up 2/4. Possessions and call light within reach; pt uses call light appropriately. Will continue to monitor. Stable/No Isolation Precautions:  Pt with activity orders for up ad abebe. Encouraged pt to be up OOB as much as possible throughout the day and for all meals. Encouraged frequent short naps as necessary to preserve energy but instructed that while awake, pt should be OOB. Encouraged pt to ambulate in halls. Pt seen up ad abebe in halls once this shift. Pt is visualized to be OOB % of the time this shift. Will continue to encourage frequent activity. Problem: Infection - Central Venous Catheter-Associated Bloodstream Infection:  Goal: Will show no infection signs and symptoms  Description: Will show no infection signs and symptoms  11/19/2020 1141 by Neeraj Hidalgo RN  Outcome: Ongoing  Note: Pt afebrile. Trifusion in place; site and dressing remain c/d/i. Lines flush well with good blood return; Tegaderm and Biopatch in place. Will continue to monitor. CVC site remains free of signs/symptoms of infection. No drainage, edema, erythema, pain, or warmth noted at site. Dressing changes continue per protocol and on an as needed basis - see flowsheet.      Pt prefers to shower at HS; will inform night shift RN     Problem: Bleeding:  Goal: Will show no signs and symptoms of excessive bleeding  Description: Will show no signs and symptoms of excessive bleeding  11/19/2020 1141 by Kasi Del Angel RN  Outcome: Ongoing  Note: Patient's hemoglobin this AM:   Recent Labs     11/19/20  0328   HGB 8.3*     Patient's platelet count this AM:   Recent Labs     11/19/20  0328   PLT 9*    Thrombocytopenia Precautions in place. Patient showing no signs or symptoms of active bleeding. Patient received transfusions per orders prior to this shift. Patient verbalizes understanding of all instructions. Will continue to assess and implement POC. Call light within reach and hourly rounding in place. Problem: Venous Thromboembolism:  Goal: Will show no signs or symptoms of venous thromboembolism  Description: Will show no signs or symptoms of venous thromboembolism  11/19/2020 1141 by Kasi Del Angel RN  Outcome: Ongoing  Note: Adherent with DVT Prevention: Pt is at risk for DVT d/t decreased mobility and cancer treatment. Pt educated on importance of activity. Pt has orders for SCDs while in bed (ambulatory). Pt verbalizes understanding of need for prophylaxis while inpatient. Problem: PROTECTIVE PRECAUTIONS  Goal: Patient will remain free of nosocomial Infections  11/19/2020 1141 by Kasi Del Angel RN  Outcome: Ongoing  Note: Pt remains in neutropenic precautions per floor policy. Pt, visitors, and staff noted to be following precautions appropriately. Handwashing in place; pt wearing mask in hallway per protocol. Pt in private room. Low microbial diet in place. Will continue to monitor. Problem: Nausea/Vomiting:  Goal: Absence of nausea/vomiting  Description: C/O mild nausea without vomiting. Compazine x1 PO given as prophylaxis   11/19/2020 1141 by Kasi Del Angel RN  Outcome: Ongoing  Note: No c/o nausea so far this shift. Pt does report decreased appetite but eating fair amounts at this time; see I&O's. Pt refusing supplements at this time. Will continue to monitor.      Problem: Diarrhea:  Goal: Bowel elimination is within specified parameters  Description: Bowel elimination is within specified parameters  11/19/2020 1141 by Deandre Esteban RN  Outcome: Ongoing  Note: Pt continues with diarrhea; C Diff negative 11/11. PRN Imodium and Lomotil administered per eMar. Will continue to monitor.

## 2020-11-19 NOTE — PLAN OF CARE
all instructions. Will continue to assess and implement POC. Call light within reach and hourly rounding in place. Problem: Skin Integrity:  Goal: Will show no infection signs and symptoms  Description: Will show no infection signs and symptoms  11/19/2020 0304 by Fady Montes RN  Outcome: Ongoing  Note: CVC site remains free of signs/symptoms of infection. No drainage, edema, erythema, pain, or warmth noted at site. Dressing changes continue per protocol and on an as needed basis - see flowsheet.

## 2020-11-19 NOTE — PROGRESS NOTES
(139.5 kg)   SpO2 100%   BMI 38.43 kg/m²     Weight:    Wt Readings from Last 3 Encounters:   11/18/20 (!) 307 lb 8 oz (139.5 kg)   10/08/20 (!) 325 lb (147.4 kg)   09/29/20 (!) 325 lb 13.4 oz (147.8 kg)       General: Awake, alert and oriented. HEENT: normocephalic, PERRL, no scleral erythema or icterus, Oral mucosa moist and intact, throat clear  NECK: supple  BACK: Straight  SKIN: warm dry and intact without lesions rashes or masses  CHEST: CTA bilaterally without use of accessory muscles  CV: Normal S1 S2, RRR, no MRG  ABD: NT, ND, normoactive BS, no palpable masses or hepatosplenomegaly  EXTREMITIES: without edema, denies calf tenderness  NEURO: CN II - XII grossly intact  CATHETER: Right tri-fusion ([facility], 6/11/20) - CDI    Data:   CBC:   Recent Labs     11/17/20 0315 11/18/20 0310 11/19/20  0328   WBC 0.1* 0.0* 0.0*   HGB 8.7* 8.8* 8.3*   HCT 25.5* 25.9* 23.7*   MCV 96.8 96.2 94.7   PLT 8* 10* 9*     BMP/Mag:  Recent Labs     11/17/20 0315 11/18/20 0310 11/19/20  0328   * 139 135*   K 4.2 4.8 4.1    106 104   CO2 22 24 23   PHOS  --  4.3  --    BUN 18 15 16   CREATININE 1.1 1.1 1.1   MG 1.80 1.70* 1.60*     LIVP:   Recent Labs     11/17/20 0315 11/18/20 0310 11/19/20  0328   AST  --  20  --    ALT  --  65*  --    BILIDIR  --  <0.2  --    BILITOT 0.4 0.5 0.5   ALKPHOS  --  104  --      Uric Acid:    Recent Labs     11/18/20 0310   LABURIC 4.8     Coags:   Recent Labs     11/19/20  0328   PROTIME 12.5   INR 1.08   APTT 34.9        PROBLEM LIST:         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 CMML - 1 (Dx 10/2020)  3. Atrial Fibrillation  4. CASH  5. HTN    Post Transplant Complications:  1. Nausea and Indigestion  2. Diarrhea       TREATMENT:        1. Induction: Sweetie-C & Daunorubicin (6/12/20)  2. Consolidation:  HiDAC x 3 cycles (7/28/20, 8/29/20 & 9/22/20)  3.   MUD Allo - PB BMT (DPB1 non-permissive mismatch)  Preparative Regimen:  Melphalan and Fludarabine  Date of BMT: 11/11/20  Source of stem cells:  PBPC - 4.4 x10^6 [id number]/kg  Donor/Recipient Blood Type:  A positive / 0 positive   Donor Sex:  Female / NMDP (DID # [id number]) - Follow FISH XY  CMV Donor / Recipient: Negative / Negative       ASSESSMENT AND PLAN:        1. AML - intermediate risk / CMML-1:  - BM bx/asp (10/14/20) - No AML, but shows CMML-1   - S/p Melphalan & Fludarabine f/b allogeneic PBSC infusion 11/11/20  - Post txp disease eval:  BM bx, no abnormal FISH, cytogenetics or mutations to follow     Day +8     2. ID:  No evidence of infection.    - Cont Levaquin, Diflucan & Valtrex ppx     Donor/Recipient CMV:  Negative / negative   - Routine surveillance is not required       3. Heme: Pancytopenia from chemotherapy  - Transfuse for Hgb < 7 and Platelets < 75L  - Plt transfusion today    4. Metabolic / CKD III: HypoNa, HypoMag  - Baseline SCr 1.2-1.3.  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  - Cont Prograf 4 mg BID. Levels qMWF:  Lab Results   Component Value Date    TACROLEV 8.1 11/18/2020    TACROLEV 7.5 11/16/2020    TACROLEV 6.1 11/13/2020      6. VOD:  No evidence of VOD. Admission Weight: (!) 324 lb 3.2 oz (147.1 kg)  Current Weight: Weight: (!) 307 lb 8 oz (139.5 kg). Recent Labs     11/18/20  0310 11/19/20  0328   BILIDIR <0.2  --    BILITOT 0.5 0.5   - Cont Actigall     7. Pulmonary: H/o CASH  - PFT (10/6/20) - FEV1/FVC 71 w/ predicted ratio of 75 & DLCO 41% (corrected 84%). He has moderate obstruction by FEV1/FVC ratio lower than predicted and also based on ATS criteria with low FEV, likely d/t morbid obesity   - Pulm clearance by Dr. Td Bullock  CASH:  - Cont CPAP  - Encourage IS and ambulation      8. GI / Nutrition:    Nutrition:  Appetite is good  - Cont low microbial diet  - Dietary to follow  Diarrhea:   - C.  Diff (11/11/20) - negative  - Cont Imodium as needed   - Added Lomotil as needed  Nausea & Indigestion:  Ongoing  - Compazine and Ativan as needed  - Cont Pepcid bid  - Cont TUMS as needed     9. Cardiac: H/o A. Fib & HTN  - Echo (9/29/20) -  LVEF 60-65% w/ left atrial dilatation  A. Fib:  Currently in NSR  - Cont sotalol 120 mg BID  HTN: stable  - Cont amlodipine-lisinopril 5/40 mg daily       - DVT Prophylaxis: Platelets <15,927 cells/dL, - prophylactic lovenox on hold and mechanical prophylaxis with bilateral SCDs while in bed in place.   Contraindications to pharmacologic prophylaxis: None  Contraindications to mechanical prophylaxis: None     - Disposition:  D/c home once ANC > 1.0 & toxicities resolved following allogeneic transplant       LEANDRA Irving - ELIZABETH Lynn MD  [facility]  Please contact me through [employer]      11/19/2020   7:44 AM

## 2020-11-19 NOTE — CARE COORDINATION
Case Management Assessment           Daily Note                 Date/ Time of Note: 11/19/2020 3:25 PM         Note completed by: Cynthia Carrera    Patient Name: Jennifer Weaver  YOB: 1960    Diagnosis:AML (acute myeloblastic leukemia) ([address] [C92.00]  Stem cells transplant status [facility]) [Z94.84]  Patient Admission Status: Inpatient    Date of Admission:11/5/2020 10:02 AM Length of Stay: 14 GLOS:      Current Plan of Care: home  ________________________________________________________________________________________  PT AM-PAC:   / 24 per last evaluation on: none    OT AM-PAC:   / 24 per last evaluation on: none    DME Needs for discharge: none  ________________________________________________________________________________________  Discharge Plan: Home    Tentative discharge date: tbd    Current barriers to discharge: recovery from allogenic transplant    Referrals completed: Not Applicable    Resources/ information provided: Not indicated at this time  ________________________________________________________________________________________  Case Management Notes: Patient is day +8 from allogenic transplant. Continue to anticipate no needs at discharge. Following for possible needs. Sherrymiya Garcia and his family were provided with choice of provider; he and his family are in agreement with the discharge plan.     Care Transition Patient: No    Melissaine Esther Carrera  Case Management Department  Ph: [phone]  Fax: [phone]

## 2020-11-19 NOTE — PROGRESS NOTES
Patient urinating in toilet with each BM; unable to measure accurate output this shift (refusing hat in toilet). Will continue to monitor.

## 2020-11-19 NOTE — PLAN OF CARE
Problem: Falls - Risk of:  Goal: Will remain free from falls  Description: Pt up ad abebe in room and halls with steady gait. Independent with ADL's. Uses call light appropriately for assistance as needed. Bed breaks on, non skid footwear on, side rails up x2, call light within reach. Hourly rounding performed per unit protocol. Will continue safety precautions. Will remain free from falls  Outcome: Ongoing   Orthostatic vital signs obtained at start of shift - see flowsheet for details. Pt does not meet criteria for orthostasis. Pt is a Med fall risk. See Guillermo Contreras Fall Score and ABCDS Injury Risk assessments. - Screening for Orthostasis AND not a [place] Risk per CAMEJO/ABCDS: Pt bed is in low position, side rails up, call light and belongings are in reach. Fall risk light is on outside pts room. Pt encouraged to call for assistance as needed. Will continue with hourly rounds for PO intake, pain needs, toileting and repositioning as needed. Problem: Infection - Central Venous Catheter-Associated Bloodstream Infection:  Goal: Will show no infection signs and symptoms  Description: Will show no infection signs and symptoms  Outcome: Ongoing   CVC site remains free of signs/symptoms of infection. No drainage, edema, erythema, pain, or warmth noted at site. Dressing changes continue per protocol and on an as needed basis - see flowsheet. Compliant with BCC Bath Protocol:  Performed CHG bath today per [facility] protocol utilizing CHG solution in the shower. CVC site cleansed with CHG wipe over dressing, skin surrounding dressing, and first 6\" of IV tubing. Pt tolerated well. Continued to encourage daily CHG bathing per [facility] protocol.     Problem: Bleeding:  Goal: Will show no signs and symptoms of excessive bleeding  Description: Will show no signs and symptoms of excessive bleeding  Outcome: Ongoing   Patient's hemoglobin this AM:   Recent Labs     11/18/20  0310   HGB 8.8*     Patient's platelet count this AM:   Recent Labs     11/18/20  0310   PLT 10*    Thrombocytopenia Precautions in place. Patient showing no signs or symptoms of active bleeding. Patient received transfusions per orders prior to this shift. Patient verbalizes understanding of all instructions. Will continue to assess and implement POC. Call light within reach and hourly rounding in place. Problem: Venous Thromboembolism:  Goal: Will show no signs or symptoms of venous thromboembolism  Description: Will show no signs or symptoms of venous thromboembolism  Outcome: Ongoing   verbalizes understanding of need for prophylaxis while inpatient. Refusing DVT Prevention: Pt is at risk for DVT d/t decreased mobility and cancer treatment. Pt educated on importance of activity. Pt has orders for SCDs while in bed, however pt currently refusing treatment. Reviewed risks of DVT & PE development while inpatient. Provider aware of patient's refusal and re-education of importance of prophylaxis. No new orders at this time. Will continue to re-instruct patient and intervene as appropriate. Problem: Discharge Planning:  Goal: Discharged to appropriate level of care  Description: Discharged to appropriate level of care  Outcome: Ongoing   Pt verbalizes correct understanding of plan of care at this time; pt denies further questions concerning plan of care at this time; will continue to monitor. Problem: PROTECTIVE PRECAUTIONS  Goal: Patient will remain free of nosocomial Infections  Outcome: Ongoing   Pt remains afebrile this shift; pt room surfaces wiped down with bleach wipes this shift; pt and staff following appropriate hand hygiene and PPE protocols for infection prevention and/or isolation precautions in place at this time. Will continue to monitor.     Problem: Nutrition  Goal: Optimal nutrition therapy  11/18/2020 2225 by Clint Lopez RN  Outcome: Ongoing   See I/O for meal percentage intake; pt struggling with nausea but has been working with RN to prophylactically medicate before meals to combat nausea; pt able to consume entirety of dinner this evening. Will continue to monitor. Problem: Nausea/Vomiting:  Goal: Absence of nausea/vomiting  Description: C/O mild nausea without vomiting. Compazine x1 PO given as prophylaxis   Outcome: Ongoing   Pt still with mild, constant nausea; pt given PRN antiemetics - see MAR; pt able to consume meals and tolerate oral intake of meds and fluids this shift without episodes of emesis. Will continue to monitor. Problem: Diarrhea:  Goal: Bowel elimination is within specified parameters  Description: Bowel elimination is within specified parameters  Outcome: Ongoing   Pt still with loose, watery, frequent stools this shift; pt given PRN antidiarrheals - see MAR; see I/Os; will continue to monitor. Problem: Skin Integrity:  Goal: Will show no infection signs and symptoms  Description: Will show no infection signs and symptoms  Outcome: Ongoing   Pt shows no new signs of skin breakdown this shift; will continue to monitor.

## 2020-11-20 LAB
ALBUMIN SERPL-MCNC: 4 G/DL (ref 3.4–5)
ALP BLD-CCNC: 136 U/L (ref 40–129)
ALT SERPL-CCNC: 72 U/L (ref 10–40)
ANION GAP SERPL CALCULATED.3IONS-SCNC: 11 MMOL/L (ref 3–16)
AST SERPL-CCNC: 31 U/L (ref 15–37)
BILIRUB SERPL-MCNC: 0.7 MG/DL (ref 0–1)
BILIRUBIN DIRECT: <0.2 MG/DL (ref 0–0.3)
BILIRUBIN, INDIRECT: ABNORMAL MG/DL (ref 0–1)
BLOOD BANK DISPENSE STATUS: NORMAL
BLOOD BANK PRODUCT CODE: NORMAL
BPU ID: NORMAL
BUN BLDV-MCNC: 14 MG/DL (ref 7–20)
CALCIUM SERPL-MCNC: 9 MG/DL (ref 8.3–10.6)
CHLORIDE BLD-SCNC: 102 MMOL/L (ref 99–110)
CO2: 22 MMOL/L (ref 21–32)
CREAT SERPL-MCNC: 1.2 MG/DL (ref 0.8–1.3)
DESCRIPTION BLOOD BANK: NORMAL
GFR AFRICAN AMERICAN: >60
GFR NON-AFRICAN AMERICAN: >60
GLUCOSE BLD-MCNC: 121 MG/DL (ref 70–99)
HCT VFR BLD CALC: 24.7 % (ref 40.5–52.5)
HEMOGLOBIN: 8.6 G/DL (ref 13.5–17.5)
LACTATE DEHYDROGENASE: 169 U/L (ref 100–190)
MAGNESIUM: 1.5 MG/DL (ref 1.8–2.4)
MCH RBC QN AUTO: 32.9 PG (ref 26–34)
MCHC RBC AUTO-ENTMCNC: 34.7 G/DL (ref 31–36)
MCV RBC AUTO: 94.7 FL (ref 80–100)
PDW BLD-RTO: 17.8 % (ref 12.4–15.4)
PHOSPHORUS: 3.5 MG/DL (ref 2.5–4.9)
PLATELET # BLD: 10 K/UL (ref 135–450)
PMV BLD AUTO: 9.9 FL (ref 5–10.5)
POTASSIUM SERPL-SCNC: 4.4 MMOL/L (ref 3.5–5.1)
RBC # BLD: 2.61 M/UL (ref 4.2–5.9)
SODIUM BLD-SCNC: 135 MMOL/L (ref 136–145)
TACROLIMUS BLOOD: 8.1 NG/ML (ref 5–20)
TOTAL PROTEIN: 6.2 G/DL (ref 6.4–8.2)
URIC ACID, SERUM: 4.6 MG/DL (ref 3.5–7.2)
WBC # BLD: 0 K/UL (ref 4–11)

## 2020-11-20 PROCEDURE — 2060000000 HC ICU INTERMEDIATE R&B

## 2020-11-20 PROCEDURE — 80048 BASIC METABOLIC PNL TOTAL CA: CPT

## 2020-11-20 PROCEDURE — 6370000000 HC RX 637 (ALT 250 FOR IP): Performed by: NURSE PRACTITIONER

## 2020-11-20 PROCEDURE — 6370000000 HC RX 637 (ALT 250 FOR IP): Performed by: INTERNAL MEDICINE

## 2020-11-20 PROCEDURE — 84550 ASSAY OF BLOOD/URIC ACID: CPT

## 2020-11-20 PROCEDURE — 36592 COLLECT BLOOD FROM PICC: CPT

## 2020-11-20 PROCEDURE — 36430 TRANSFUSION BLD/BLD COMPNT: CPT

## 2020-11-20 PROCEDURE — 83615 LACTATE (LD) (LDH) ENZYME: CPT

## 2020-11-20 PROCEDURE — 84100 ASSAY OF PHOSPHORUS: CPT

## 2020-11-20 PROCEDURE — 6360000002 HC RX W HCPCS: Performed by: STUDENT IN AN ORGANIZED HEALTH CARE EDUCATION/TRAINING PROGRAM

## 2020-11-20 PROCEDURE — 85025 COMPLETE CBC W/AUTO DIFF WBC: CPT

## 2020-11-20 PROCEDURE — 2580000003 HC RX 258: Performed by: NURSE PRACTITIONER

## 2020-11-20 PROCEDURE — 80197 ASSAY OF TACROLIMUS: CPT

## 2020-11-20 PROCEDURE — 94761 N-INVAS EAR/PLS OXIMETRY MLT: CPT

## 2020-11-20 PROCEDURE — 80076 HEPATIC FUNCTION PANEL: CPT

## 2020-11-20 PROCEDURE — 83735 ASSAY OF MAGNESIUM: CPT

## 2020-11-20 PROCEDURE — 6360000002 HC RX W HCPCS: Performed by: INTERNAL MEDICINE

## 2020-11-20 RX ORDER — OXYCODONE HYDROCHLORIDE 5 MG/1
5 TABLET ORAL EVERY 6 HOURS PRN
Status: DISCONTINUED | OUTPATIENT
Start: 2020-11-20 | End: 2020-11-30 | Stop reason: HOSPADM

## 2020-11-20 RX ORDER — TRAMADOL HYDROCHLORIDE 50 MG/1
50 TABLET ORAL EVERY 6 HOURS PRN
Status: DISCONTINUED | OUTPATIENT
Start: 2020-11-20 | End: 2020-11-30 | Stop reason: HOSPADM

## 2020-11-20 RX ORDER — 0.9 % SODIUM CHLORIDE 0.9 %
20 INTRAVENOUS SOLUTION INTRAVENOUS ONCE
Status: DISCONTINUED | OUTPATIENT
Start: 2020-11-20 | End: 2020-11-30

## 2020-11-20 RX ADMIN — VALACYCLOVIR HYDROCHLORIDE 500 MG: 500 TABLET, FILM COATED ORAL at 09:48

## 2020-11-20 RX ADMIN — LOPERAMIDE HYDROCHLORIDE 2 MG: 2 CAPSULE ORAL at 07:56

## 2020-11-20 RX ADMIN — ANTACID TABLETS 1000 MG: 500 TABLET, CHEWABLE ORAL at 17:05

## 2020-11-20 RX ADMIN — LEVOFLOXACIN 500 MG: 500 TABLET, FILM COATED ORAL at 21:12

## 2020-11-20 RX ADMIN — ACETAMINOPHEN 650 MG: 325 TABLET ORAL at 05:56

## 2020-11-20 RX ADMIN — URSODIOL 500 MG: 250 TABLET, FILM COATED ORAL at 09:49

## 2020-11-20 RX ADMIN — SIMETHICONE 80 MG: 80 TABLET, CHEWABLE ORAL at 11:01

## 2020-11-20 RX ADMIN — PROCHLORPERAZINE MALEATE 10 MG: 10 TABLET ORAL at 18:29

## 2020-11-20 RX ADMIN — POTASSIUM CHLORIDE AND SODIUM CHLORIDE: 900; 150 INJECTION, SOLUTION INTRAVENOUS at 13:21

## 2020-11-20 RX ADMIN — DIPHENHYDRAMINE HYDROCHLORIDE 25 MG: 25 TABLET ORAL at 05:56

## 2020-11-20 RX ADMIN — ANTACID TABLETS 1000 MG: 500 TABLET, CHEWABLE ORAL at 08:49

## 2020-11-20 RX ADMIN — ANTACID TABLETS 1000 MG: 500 TABLET, CHEWABLE ORAL at 13:16

## 2020-11-20 RX ADMIN — SIMETHICONE 80 MG: 80 TABLET, CHEWABLE ORAL at 03:49

## 2020-11-20 RX ADMIN — PROCHLORPERAZINE MALEATE 10 MG: 10 TABLET ORAL at 08:49

## 2020-11-20 RX ADMIN — URSODIOL 500 MG: 250 TABLET, FILM COATED ORAL at 21:12

## 2020-11-20 RX ADMIN — SODIUM CHLORIDE 15 ML: 900 IRRIGANT IRRIGATION at 11:02

## 2020-11-20 RX ADMIN — SODIUM CHLORIDE 15 ML: 900 IRRIGANT IRRIGATION at 21:14

## 2020-11-20 RX ADMIN — SIMETHICONE 80 MG: 80 TABLET, CHEWABLE ORAL at 23:14

## 2020-11-20 RX ADMIN — SIMETHICONE 80 MG: 80 TABLET, CHEWABLE ORAL at 17:05

## 2020-11-20 RX ADMIN — Medication 10 ML: at 08:05

## 2020-11-20 RX ADMIN — SODIUM CHLORIDE 15 ML: 900 IRRIGANT IRRIGATION at 06:49

## 2020-11-20 RX ADMIN — LISINOPRIL 40 MG: 40 TABLET ORAL at 09:48

## 2020-11-20 RX ADMIN — FLUCONAZOLE 400 MG: 200 TABLET ORAL at 09:48

## 2020-11-20 RX ADMIN — Medication 10 ML: at 21:13

## 2020-11-20 RX ADMIN — AMLODIPINE BESYLATE 5 MG: 5 TABLET ORAL at 09:49

## 2020-11-20 RX ADMIN — SODIUM CHLORIDE 15 ML: 900 IRRIGANT IRRIGATION at 17:06

## 2020-11-20 RX ADMIN — TRAMADOL HYDROCHLORIDE 50 MG: 50 TABLET ORAL at 01:30

## 2020-11-20 RX ADMIN — DIPHENOXYLATE HYDROCHLORIDE AND ATROPINE SULFATE 1 TABLET: 2.5; .025 TABLET ORAL at 11:02

## 2020-11-20 RX ADMIN — SOTALOL HYDROCHLORIDE 120 MG: 120 TABLET ORAL at 21:14

## 2020-11-20 RX ADMIN — TACROLIMUS 4 MG: 1 CAPSULE ORAL at 21:12

## 2020-11-20 RX ADMIN — PANTOPRAZOLE SODIUM 40 MG: 40 TABLET, DELAYED RELEASE ORAL at 06:48

## 2020-11-20 RX ADMIN — SOTALOL HYDROCHLORIDE 120 MG: 120 TABLET ORAL at 09:48

## 2020-11-20 RX ADMIN — LOPERAMIDE HYDROCHLORIDE 2 MG: 2 CAPSULE ORAL at 15:29

## 2020-11-20 RX ADMIN — TACROLIMUS 4 MG: 1 CAPSULE ORAL at 09:48

## 2020-11-20 RX ADMIN — DIPHENOXYLATE HYDROCHLORIDE AND ATROPINE SULFATE 1 TABLET: 2.5; .025 TABLET ORAL at 16:40

## 2020-11-20 RX ADMIN — DIPHENOXYLATE HYDROCHLORIDE AND ATROPINE SULFATE 1 TABLET: 2.5; .025 TABLET ORAL at 05:15

## 2020-11-20 RX ADMIN — LOPERAMIDE HYDROCHLORIDE 2 MG: 2 CAPSULE ORAL at 03:57

## 2020-11-20 RX ADMIN — VALACYCLOVIR HYDROCHLORIDE 500 MG: 500 TABLET, FILM COATED ORAL at 21:12

## 2020-11-20 ASSESSMENT — PAIN SCALES - GENERAL
PAINLEVEL_OUTOF10: 0
PAINLEVEL_OUTOF10: 4
PAINLEVEL_OUTOF10: 3

## 2020-11-20 ASSESSMENT — PAIN DESCRIPTION - ORIENTATION: ORIENTATION: MID

## 2020-11-20 ASSESSMENT — PAIN DESCRIPTION - LOCATION: LOCATION: ABDOMEN

## 2020-11-20 ASSESSMENT — PAIN DESCRIPTION - PAIN TYPE: TYPE: ACUTE PAIN

## 2020-11-20 NOTE — PROGRESS NOTES
BCC Allogeneic Progress Note    2020    Jeananne Hashimoto    :  1960    MRN:  [id number]    Referring MD: Lyn Terrell MD  [address] [facility]  [address], [address]    Subjective:  Still C/O diarrhea and early sore throat.      ECOG PS:  (1) Restricted in physically strenuous activity, ambulatory and able to do work of light nature    KPS: 80% Normal activity with effort; some signs or symptoms of disease    Isolation:  None     Medications    Scheduled Meds:   sodium chloride  20 mL Intravenous Once    pantoprazole  40 mg Oral QAM AC    tacrolimus  4 mg Oral BID    levoFLOXacin  500 mg Oral Nightly    sodium chloride flush  10 mL Intravenous 2 times per day    Saline Mouthwash  15 mL Swish & Spit 4x Daily AC & HS    valACYclovir  500 mg Oral BID    sotalol  120 mg Oral BID    amLODIPine  5 mg Oral Daily    And    lisinopril  40 mg Oral Daily    ursodiol  500 mg Oral BID    [START ON 2020] methotrexate sodium  12.5 mg Intravenous Once    fluconazole  400 mg Oral Daily     Continuous Infusions:   0.9% NaCl with KCl 20 mEq 50 mL/hr at 20 1733    sodium chloride      sodium chloride       PRN Meds:traMADol, oxyCODONE, acetaminophen, diphenhydrAMINE, calcium carbonate, simethicone, diphenoxylate-atropine, [COMPLETED] loperamide **FOLLOWED BY** loperamide, sodium chloride, sennosides-docusate sodium, sodium chloride, sodium chloride flush, potassium chloride, magnesium sulfate, magnesium hydroxide, Saline Mouthwash, alteplase, prochlorperazine **OR** prochlorperazine, LORazepam **OR** LORazepam    ROS:  As noted above, otherwise remainder of 10-point ROS negative    Physical Exam:    I&O:      Intake/Output Summary (Last 24 hours) at 2020 0930  Last data filed at 2020 0758  Gross per 24 hour   Intake 3521 ml   Output 1200 ml   Net 2321 ml       Vital Signs:  BP (!) 124/56   Pulse 85   Temp 98.6 °F (37 °C) (Oral)   Resp 16 Ht 6' 3\" (1.905 m)   Wt (!) 306 lb 3.2 oz (138.9 kg)   SpO2 97%   BMI 38.27 kg/m²     Weight:    Wt Readings from Last 3 Encounters:   11/20/20 (!) 306 lb 3.2 oz (138.9 kg)   10/08/20 (!) 325 lb (147.4 kg)   09/29/20 (!) 325 lb 13.4 oz (147.8 kg)       General: Awake, alert and oriented. HEENT: normocephalic, PERRL, no scleral erythema or icterus, Oral mucosa moist and intact, throat clear  NECK: supple  BACK: Straight  SKIN: warm dry and intact without lesions rashes or masses  CHEST: CTA bilaterally without use of accessory muscles  CV: Normal S1 S2, RRR, no MRG  ABD: NT, ND, normoactive BS, no palpable masses or hepatosplenomegaly  EXTREMITIES: without edema, denies calf tenderness  NEURO: CN II - XII grossly intact  CATHETER: Right tri-fusion ([facility], 6/11/20) - CDI    Data:   CBC:   Recent Labs     11/18/20 0310 11/19/20 0328 11/20/20  0350   WBC 0.0* 0.0* 0.0*   HGB 8.8* 8.3* 8.6*   HCT 25.9* 23.7* 24.7*   MCV 96.2 94.7 94.7   PLT 10* 9* 10*     BMP/Mag:  Recent Labs     11/18/20 0310 11/19/20 0328 11/20/20  0350    135* 135*   K 4.8 4.1 4.4    104 102   CO2 24 23 22   PHOS 4.3  --  3.5   BUN 15 16 14   CREATININE 1.1 1.1 1.2   MG 1.70* 1.60* 1.50*     LIVP:   Recent Labs     11/18/20 0310 11/19/20 0328 11/20/20  0350   AST 20  --  31   ALT 65*  --  72*   BILIDIR <0.2  --  <0.2   BILITOT 0.5 0.5 0.7   ALKPHOS 104  --  136*     Uric Acid:    Recent Labs     11/20/20  0350   LABURIC 4.6     Coags:   Recent Labs     11/19/20  0328   PROTIME 12.5   INR 1.08   APTT 34.9        PROBLEM LIST:         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 CMML - 1 (Dx 10/2020)  3. Atrial Fibrillation  4. CASH  5. HTN    Post Transplant Complications:  1. Nausea and Indigestion  2. Diarrhea       TREATMENT:        1. Induction: Sweetie-C & Daunorubicin (6/12/20)  2. Consolidation:  HiDAC x 3 cycles (7/28/20, 8/29/20 & 9/22/20)  3.   MUD Allo - PB BMT (DPB1 non-permissive mismatch)  Preparative Regimen: Melphalan and Fludarabine  Date of BMT:  11/11/20  Source of stem cells:  PBPC - 4.4 x10^6 am32pawct/kg  Donor/Recipient Blood Type:  A positive / 0 positive   Donor Sex:  Female / NMDP (DID # [id number]) - Follow FISH XY  CMV Donor / Recipient: Negative / Negative       ASSESSMENT AND PLAN:        1. AML - intermediate risk / CMML-1:  - BM bx/asp (10/14/20) - No AML, but shows CMML-1   - S/p Melphalan & Fludarabine f/b allogeneic PBSC infusion 11/11/20  - Post txp disease eval:  BM bx, no abnormal FISH, cytogenetics or mutations to follow     Day +9     2. ID:  No evidence of infection.    - Cont Levaquin, Diflucan & Valtrex ppx     Donor/Recipient CMV:  Negative / negative   - Routine surveillance is not required       3. Heme: Pancytopenia from chemotherapy  - Transfuse for Hgb < 7 and Platelets < 37K  - Plt transfusion today    4. Metabolic / CKD III: HypoNa, HypoMg, Hyperglycemia  - Baseline SCr 1.2-1.3.  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  - Cont Prograf 4 mg BID. Levels qMWF:  Lab Results   Component Value Date    TACROLEV 8.1 11/18/2020    TACROLEV 7.5 11/16/2020    TACROLEV 6.1 11/13/2020      6. VOD:  No evidence of VOD. Admission Weight: (!) 324 lb 3.2 oz (147.1 kg)  Current Weight: Weight: (!) 306 lb 3.2 oz (138.9 kg). Recent Labs     11/20/20  0350   BILIDIR <0.2   BILITOT 0.7   - Cont Actigall     7. Pulmonary: H/o CASH  - PFT (10/6/20) - FEV1/FVC 71 w/ predicted ratio of 75 & DLCO 41% (corrected 84%).   He has moderate obstruction by FEV1/FVC ratio lower than predicted and also based on ATS criteria with low FEV, likely d/t morbid obesity   - Pulm clearance by Dr. Tere Jaramillo  CASH:  - Cont CPAP  - Encourage IS and ambulation      8. GI / Nutrition:    Nutrition:  Appetite is good  - Cont low microbial diet  - Dietary to follow  Diarrhea:   - C. Diff (11/11/20) - negative  - Cont Imodium as needed   - Added Lomotil as needed  Nausea & Indigestion:  Ongoing  - Compazine and Ativan as needed  - Cont Pepcid bid  - Cont TUMS as needed     9. Cardiac: H/o A. Fib & HTN  - Echo (9/29/20) -  LVEF 60-65% w/ left atrial dilatation  A. Fib:  Currently in NSR  - Cont sotalol 120 mg BID  HTN: stable  - Cont amlodipine-lisinopril 5/40 mg daily       - DVT Prophylaxis: Platelets <45,506 cells/dL, - prophylactic lovenox on hold and mechanical prophylaxis with bilateral SCDs while in bed in place.   Contraindications to pharmacologic prophylaxis: None  Contraindications to mechanical prophylaxis: None     - Disposition:  D/c home once ANC > 1.0 & toxicities resolved following allogeneic transplant       LEANDRA Lu - CNP   Orly Cunningham MD  [facility]  Please contact me through [employer]      11/20/2020   9:30 AM

## 2020-11-20 NOTE — PLAN OF CARE
rounding in place. Problem: Venous Thromboembolism:  Goal: Will show no signs or symptoms of venous thromboembolism  Outcome: Ongoing  Note: Adherent with DVT Prevention: Pt is at risk for DVT d/t decreased mobility and cancer treatment. Pt educated on importance of activity. Pt has orders for SCDs while in bed (ambulatory). Pt verbalizes understanding of need for prophylaxis while inpatient. Problem: PROTECTIVE PRECAUTIONS  Goal: Patient will remain free of nosocomial Infections  Outcome: Ongoing  Note: Pt remains in neutropenic precautions per floor policy. Pt, visitors, and staff noted to be following precautions appropriately. Handwashing in place; pt wearing mask in hallway per protocol. Pt in private room. Low microbial diet in place. Will continue to monitor. Problem: Nausea/Vomiting:  Goal: Absence of nausea/vomiting  Outcome: Ongoing  Note: Pt continues with nausea, flatus, and heartburn this shift along with decreased appetite. PRN Compazine, Tums, and Mylicon administered per eMar. Pt encouraged small, frequent needs to meet caloric needs; see I&O's. Pt refusing supplements at this time. Will continue to monitor. Problem: Diarrhea:  Goal: Bowel elimination is within specified parameters  Outcome: Ongoing  Note: Pt continues with diarrhea; C Diff negative 11/11. Pt refusing hat in toilet for monitoring. PRN Imodium and Lomotil administered per eMar. Will continue to monitor.

## 2020-11-20 NOTE — CARE COORDINATION
Type of Admission  AML  MUD ( Female Donor) T:0: 11/11/20  Prep. Regimen: Melphalan+Fludara  Day +9        Central venous catheter  Right TLC ( 9/29/20 , pre-existing from [facility])        Plan  Proceed with MUD Allogenic SCT for treatment of AML        Update  11/6/20: Planned admission for allogeneic SCT. Keeping active by use elliptical equipment in his room. 11/9/20:  Using exercise bike in room. 11/11/20 Infusion of stem cells today. maintaining activity by using elliptical bike in room. 11/16/20:  Continues to be active using elliptical bike in room, dresses daily. Niece into visit. 1/20/20:  Reports loose stools and beginning to have 'sore throat\". Attempting to eat small amounts. Education  11/6/20:  Re-introduced myself in RN Discharge planner role. States his cousin Bri Schroeder, who is a nurse will be coming into town by Thanksgiving to act as his Care giver. Patient and caregiver have been through the educational process for allogeneic bone marrow transplantation including the allogeneic family meeting, preadmission teaching and preadmission physician office visit. , with Christine Person RN BMT Coordinator. Patient and caregiver verbalize understanding of treatment regimen, possible adverse events, length of stay, and risk and benefits of transplantation and are agreeable to proceed. Patient and caregiver have been given an opportunity to ask, and to have their questions answered to their satisfaction. Documentation for above education can be found in the [facility], [facility] office chart.         Discharge  DISCHARGE ROUNDING:  Date:_11/9, 11/16    Team members present : NP, Yoly Richards, Charge RN, RN D/C Planner, [id number]    Anticipated date of discharge: When [address] is >1.0 & without toxicities  Active problems/barriers to discharge    Home needs:     Caregivers: Jae Delaney ( RN)    Home medication issues: ( [facility]: [phone]) 11/18: Fluconazole 200 mg ( 2 tabs daily #60 with 3 refills  Valtrex 500 mg bid #60 3 refills  Tacrolimus 0.5 mg bid #60 with 3 refills  Tacrolimus 1 mg cap ( 3 caps bid) #180 with 3 refills  11/20 No Charge for above medications per [facility]     Patient/caregiver aware of plan?   Yes           Pending

## 2020-11-20 NOTE — PROGRESS NOTES
Clinical Pharmacy Progress Note    Patient Name: Kisha Hammond  YOB: 1960  Diagnosis: AML -MelFlu SHEILA f/b MUD PBSC Allogeneic HSCT     GVHD Prophylaxis:  Tacrolimus (Prograf) starting Day -3   · Adjusted according to levels - drawn via red lumen  Methotrexate 12.5 mg IVPush days +1 (11/12), +3 (11/14), +6 (11/17), +11 (11/22)     Tacrolimus (Prograf) levels starting day +1  Tacrolimus (Prograf) goal level:  8-15 ng/mL    Date SCr Bili Prograf Dose Prograf Level Adjustments / Comments   11/5; d-6 1.3 0.5 3 mg po bid -- Oral tacrolimus starts 11/8 (day-3) with first tacrolimus trough level on 11/12 (day+1) followed by MWF thereafter. 11/12; d+1 0.9 0.6 3 mg po bid 4.3 Increase tacrolimus to 4 mg po bid (additional 1 mg now). Next level Fri 11/13.   11/13, d+2 1.0 0.5 4mg PO BID  6.1 Tacrolimus level slightly subtherapeutic based on range above. Dose increased yesterday - would not anticipate steady state achieved. Will continue current management and re-check level on Monday, 11/16 11/16; d5 0.9 0.5 4 mg po bid 7.5 Tacrolimus level increasing appropriately. No further increase in dose at this time. Next tacrolimus level Wed 11/18.   11/18; d7 1.1 0.4 4 mg po bid 8.1 No change. Next tacrolimus level Friday, 11/20.    11/20; d9 1.2 0.7 4 mg po bid 8.1 No change. Next tacrolimus level Monday, 11/23. Please call with questions. Taylor Crabtree. [facility] Clinical Pharmacist  Wireless:  [phone]  11/20/2020 8:47 AM

## 2020-11-20 NOTE — PLAN OF CARE
Patient's platelet count this AM:   Recent Labs     11/19/20  0328   PLT 9*    Thrombocytopenia Precautions in place. Patient showing no signs or symptoms of active bleeding. Patient received transfusions per orders prior to this shift. Patient verbalizes understanding of all instructions. Will continue to assess and implement POC. Call light within reach and hourly rounding in place. Problem: Venous Thromboembolism:  Goal: Will show no signs or symptoms of venous thromboembolism  Description: Will show no signs or symptoms of venous thromboembolism  Outcome: Ongoing  Note: Adherent with DVT Prevention: Pt is at risk for DVT d/t decreased mobility and cancer treatment. Pt educated on importance of activity. Pt has orders for SCDs while in bed. Pt verbalizes understanding of need for prophylaxis while inpatient. Problem: PROTECTIVE PRECAUTIONS  Goal: Patient will remain free of nosocomial Infections  Outcome: Ongoing     Problem: Nutrition  Goal: Optimal nutrition therapy  Outcome: Ongoing     Problem: Nausea/Vomiting:  Goal: Absence of nausea/vomiting  Description: C/O mild nausea without vomiting. Compazine x1 PO given as prophylaxis   Outcome: Ongoing  Note: Patient has not complained of nausea or vomiting so far this shift. Problem: Diarrhea:  Goal: Bowel elimination is within specified parameters  Description: Bowel elimination is within specified parameters  Outcome: Ongoing     Problem: Skin Integrity:  Goal: Will show no infection signs and symptoms  Description: Will show no infection signs and symptoms  Outcome: Ongoing  Note: CVC site remains free of signs/symptoms of infection. No drainage, edema, erythema, pain, or warmth noted at site. Dressing changes continue per protocol and on an as needed basis - see flowsheet. Compliant with BCC Bath Protocol:  Performed CHG bath today per BCC protocol utilizing CHG solution in the shower.   CVC site cleansed with CHG wipe over dressing, skin surrounding dressing, and first 6\" of IV tubing. Pt tolerated well. Continued to encourage daily CHG bathing per [facility] protocol. Problem: Pain:  Goal: Pain level will decrease  Description: Pain level will decrease  Outcome: Ongoing  Note: Patient has rated L abdominal pain 4/10 so far this shift. Dao Levi MD. Patient was ordered prn oxycodone and tramadol. The patient was given 50mg tramadol prn. SEE MAR.

## 2020-11-21 LAB
ANION GAP SERPL CALCULATED.3IONS-SCNC: 9 MMOL/L (ref 3–16)
BILIRUB SERPL-MCNC: 0.7 MG/DL (ref 0–1)
BLOOD BANK DISPENSE STATUS: NORMAL
BLOOD BANK PRODUCT CODE: NORMAL
BPU ID: NORMAL
BUN BLDV-MCNC: 15 MG/DL (ref 7–20)
CALCIUM SERPL-MCNC: 8.5 MG/DL (ref 8.3–10.6)
CHLORIDE BLD-SCNC: 98 MMOL/L (ref 99–110)
CO2: 22 MMOL/L (ref 21–32)
CREAT SERPL-MCNC: 1.2 MG/DL (ref 0.8–1.3)
DESCRIPTION BLOOD BANK: NORMAL
GFR AFRICAN AMERICAN: >60
GFR NON-AFRICAN AMERICAN: >60
GLUCOSE BLD-MCNC: 121 MG/DL (ref 70–99)
HCT VFR BLD CALC: 22.4 % (ref 40.5–52.5)
HEMOGLOBIN: 7.8 G/DL (ref 13.5–17.5)
MAGNESIUM: 1.4 MG/DL (ref 1.8–2.4)
MCH RBC QN AUTO: 33.1 PG (ref 26–34)
MCHC RBC AUTO-ENTMCNC: 34.9 G/DL (ref 31–36)
MCV RBC AUTO: 95 FL (ref 80–100)
PDW BLD-RTO: 17.5 % (ref 12.4–15.4)
PLATELET # BLD: 10 K/UL (ref 135–450)
PMV BLD AUTO: 9.6 FL (ref 5–10.5)
POTASSIUM SERPL-SCNC: 4.1 MMOL/L (ref 3.5–5.1)
RBC # BLD: 2.36 M/UL (ref 4.2–5.9)
SODIUM BLD-SCNC: 129 MMOL/L (ref 136–145)
WBC # BLD: 0.1 K/UL (ref 4–11)

## 2020-11-21 PROCEDURE — 6370000000 HC RX 637 (ALT 250 FOR IP): Performed by: INTERNAL MEDICINE

## 2020-11-21 PROCEDURE — 80048 BASIC METABOLIC PNL TOTAL CA: CPT

## 2020-11-21 PROCEDURE — 6360000002 HC RX W HCPCS: Performed by: INTERNAL MEDICINE

## 2020-11-21 PROCEDURE — 85025 COMPLETE CBC W/AUTO DIFF WBC: CPT

## 2020-11-21 PROCEDURE — P9037 PLATE PHERES LEUKOREDU IRRAD: HCPCS

## 2020-11-21 PROCEDURE — 36430 TRANSFUSION BLD/BLD COMPNT: CPT

## 2020-11-21 PROCEDURE — 2060000000 HC ICU INTERMEDIATE R&B

## 2020-11-21 PROCEDURE — 36592 COLLECT BLOOD FROM PICC: CPT

## 2020-11-21 PROCEDURE — 6360000002 HC RX W HCPCS: Performed by: STUDENT IN AN ORGANIZED HEALTH CARE EDUCATION/TRAINING PROGRAM

## 2020-11-21 PROCEDURE — 6370000000 HC RX 637 (ALT 250 FOR IP): Performed by: NURSE PRACTITIONER

## 2020-11-21 PROCEDURE — 83735 ASSAY OF MAGNESIUM: CPT

## 2020-11-21 PROCEDURE — 2500000003 HC RX 250 WO HCPCS

## 2020-11-21 PROCEDURE — 82247 BILIRUBIN TOTAL: CPT

## 2020-11-21 PROCEDURE — 6360000002 HC RX W HCPCS: Performed by: NURSE PRACTITIONER

## 2020-11-21 PROCEDURE — 2580000003 HC RX 258: Performed by: NURSE PRACTITIONER

## 2020-11-21 RX ORDER — 0.9 % SODIUM CHLORIDE 0.9 %
20 INTRAVENOUS SOLUTION INTRAVENOUS ONCE
Status: DISCONTINUED | OUTPATIENT
Start: 2020-11-21 | End: 2020-11-30

## 2020-11-21 RX ORDER — DEXTROSE, SODIUM CHLORIDE, AND POTASSIUM CHLORIDE 5; .45; .15 G/100ML; G/100ML; G/100ML
INJECTION INTRAVENOUS
Status: DISPENSED
Start: 2020-11-21 | End: 2020-11-21

## 2020-11-21 RX ADMIN — VALACYCLOVIR HYDROCHLORIDE 500 MG: 500 TABLET, FILM COATED ORAL at 20:37

## 2020-11-21 RX ADMIN — ACETAMINOPHEN 650 MG: 325 TABLET ORAL at 04:45

## 2020-11-21 RX ADMIN — LOPERAMIDE HYDROCHLORIDE 2 MG: 2 CAPSULE ORAL at 19:58

## 2020-11-21 RX ADMIN — DIPHENOXYLATE HYDROCHLORIDE AND ATROPINE SULFATE 1 TABLET: 2.5; .025 TABLET ORAL at 22:17

## 2020-11-21 RX ADMIN — SODIUM CHLORIDE 15 ML: 900 IRRIGANT IRRIGATION at 20:36

## 2020-11-21 RX ADMIN — DIPHENOXYLATE HYDROCHLORIDE AND ATROPINE SULFATE 1 TABLET: 2.5; .025 TABLET ORAL at 05:21

## 2020-11-21 RX ADMIN — SOTALOL HYDROCHLORIDE 120 MG: 120 TABLET ORAL at 20:37

## 2020-11-21 RX ADMIN — TACROLIMUS 4 MG: 1 CAPSULE ORAL at 20:37

## 2020-11-21 RX ADMIN — DIPHENHYDRAMINE HYDROCHLORIDE 25 MG: 25 TABLET ORAL at 04:45

## 2020-11-21 RX ADMIN — Medication 10 ML: at 20:36

## 2020-11-21 RX ADMIN — VALACYCLOVIR HYDROCHLORIDE 500 MG: 500 TABLET, FILM COATED ORAL at 10:08

## 2020-11-21 RX ADMIN — TACROLIMUS 4 MG: 1 CAPSULE ORAL at 10:07

## 2020-11-21 RX ADMIN — FLUCONAZOLE 400 MG: 200 TABLET ORAL at 10:07

## 2020-11-21 RX ADMIN — LOPERAMIDE HYDROCHLORIDE 2 MG: 2 CAPSULE ORAL at 03:00

## 2020-11-21 RX ADMIN — SODIUM CHLORIDE 15 ML: 900 IRRIGANT IRRIGATION at 13:07

## 2020-11-21 RX ADMIN — Medication 10 ML: at 09:00

## 2020-11-21 RX ADMIN — LEVOFLOXACIN 500 MG: 500 TABLET, FILM COATED ORAL at 20:37

## 2020-11-21 RX ADMIN — SIMETHICONE 80 MG: 80 TABLET, CHEWABLE ORAL at 20:43

## 2020-11-21 RX ADMIN — URSODIOL 500 MG: 250 TABLET, FILM COATED ORAL at 10:07

## 2020-11-21 RX ADMIN — ANTACID TABLETS 1000 MG: 500 TABLET, CHEWABLE ORAL at 09:58

## 2020-11-21 RX ADMIN — MAGNESIUM SULFATE HEPTAHYDRATE 4 G: 40 INJECTION, SOLUTION INTRAVENOUS at 04:49

## 2020-11-21 RX ADMIN — LISINOPRIL 40 MG: 40 TABLET ORAL at 10:07

## 2020-11-21 RX ADMIN — AMLODIPINE BESYLATE 5 MG: 5 TABLET ORAL at 10:07

## 2020-11-21 RX ADMIN — DIPHENOXYLATE HYDROCHLORIDE AND ATROPINE SULFATE 1 TABLET: 2.5; .025 TABLET ORAL at 10:07

## 2020-11-21 RX ADMIN — ANTACID TABLETS 1000 MG: 500 TABLET, CHEWABLE ORAL at 16:25

## 2020-11-21 RX ADMIN — PANTOPRAZOLE SODIUM 40 MG: 40 TABLET, DELAYED RELEASE ORAL at 06:15

## 2020-11-21 RX ADMIN — DIPHENHYDRAMINE HYDROCHLORIDE 25 MG: 25 TABLET ORAL at 12:20

## 2020-11-21 RX ADMIN — ACETAMINOPHEN 650 MG: 325 TABLET ORAL at 12:20

## 2020-11-21 RX ADMIN — URSODIOL 500 MG: 250 TABLET, FILM COATED ORAL at 20:37

## 2020-11-21 RX ADMIN — PROCHLORPERAZINE MALEATE 10 MG: 10 TABLET ORAL at 04:49

## 2020-11-21 RX ADMIN — SOTALOL HYDROCHLORIDE 120 MG: 120 TABLET ORAL at 10:08

## 2020-11-21 RX ADMIN — POTASSIUM CHLORIDE AND SODIUM CHLORIDE: 900; 150 INJECTION, SOLUTION INTRAVENOUS at 10:26

## 2020-11-21 ASSESSMENT — PAIN SCALES - GENERAL
PAINLEVEL_OUTOF10: 0

## 2020-11-21 NOTE — PROGRESS NOTES
Called down to blood bank to ask when platelets would be ready and was told they would be ready whenever. Patient requires pre-meds for platelets so I gave tylenol and benadryll. Blood bank then called back and told me the person that irradiates the platelets that Karen Padilla needs would not be here until 0630 and they could not send up platelets.

## 2020-11-21 NOTE — PROGRESS NOTES
BCC Allogeneic Progress Note    2020    Toñito Levi    :  1960    MRN:  [id number]    Referring MD: Garth Sanchez MD  [address] [facility] [address]  [address], [address]    Subjective: he reports gen weakness and fatigue      ECOG PS:  (1) Restricted in physically strenuous activity, ambulatory and able to do work of light nature    KPS: 80% Normal activity with effort; some signs or symptoms of disease    Isolation:  None     Medications    Scheduled Meds:   sodium chloride  20 mL Intravenous Once    dextrose 5% and 0.45% NaCl with KCl 20 mEq        sodium chloride  20 mL Intravenous Once    pantoprazole  40 mg Oral QAM AC    tacrolimus  4 mg Oral BID    levoFLOXacin  500 mg Oral Nightly    sodium chloride flush  10 mL Intravenous 2 times per day    Saline Mouthwash  15 mL Swish & Spit 4x Daily AC & HS    valACYclovir  500 mg Oral BID    sotalol  120 mg Oral BID    amLODIPine  5 mg Oral Daily    And    lisinopril  40 mg Oral Daily    ursodiol  500 mg Oral BID    [START ON 2020] methotrexate sodium  12.5 mg Intravenous Once    fluconazole  400 mg Oral Daily     Continuous Infusions:   0.9% NaCl with KCl 20 mEq 50 mL/hr at 20 1321    sodium chloride      sodium chloride       PRN Meds:traMADol, oxyCODONE, acetaminophen, diphenhydrAMINE, calcium carbonate, simethicone, diphenoxylate-atropine, [COMPLETED] loperamide **FOLLOWED BY** loperamide, sodium chloride, sennosides-docusate sodium, sodium chloride, sodium chloride flush, potassium chloride, magnesium sulfate, magnesium hydroxide, Saline Mouthwash, alteplase, prochlorperazine **OR** prochlorperazine, LORazepam **OR** LORazepam    ROS:  As noted above, otherwise remainder of 10-point ROS negative    Physical Exam:    I&O:      Intake/Output Summary (Last 24 hours) at 2020 1000  Last data filed at 2020 0614  Gross per 24 hour   Intake 1841 ml   Output 1100 ml   Net 741 ml Vital Signs:  /67   Pulse 93   Temp 98.6 °F (37 °C) (Oral)   Resp 16   Ht 6' 3\" (1.905 m)   Wt (!) 306 lb 3.2 oz (138.9 kg)   SpO2 100%   BMI 38.27 kg/m²     Weight:    Wt Readings from Last 3 Encounters:   11/20/20 (!) 306 lb 3.2 oz (138.9 kg)   10/08/20 (!) 325 lb (147.4 kg)   09/29/20 (!) 325 lb 13.4 oz (147.8 kg)       General: Awake, alert and oriented. HEENT: normocephalic, PERRL, no scleral erythema or icterus, Oral mucosa moist and intact, throat clear  NECK: supple  BACK: Straight  SKIN: warm dry and intact without lesions rashes or masses  CHEST: CTA bilaterally without use of accessory muscles  CV: Normal S1 S2, RRR, no MRG  ABD: NT, ND, normoactive BS, no palpable masses or hepatosplenomegaly  EXTREMITIES: without edema, denies calf tenderness  NEURO: CN II - XII grossly intact  CATHETER: Right tri-fusion ([facility], 6/11/20) - CDI    Data:   CBC:   Recent Labs     11/19/20 0328 11/20/20 0350 11/21/20  0305   WBC 0.0* 0.0* 0.1*   HGB 8.3* 8.6* 7.8*   HCT 23.7* 24.7* 22.4*   MCV 94.7 94.7 95.0   PLT 9* 10* 10*     BMP/Mag:  Recent Labs     11/19/20 0328 11/20/20 0350 11/21/20  0305   * 135* 129*   K 4.1 4.4 4.1    102 98*   CO2 23 22 22   PHOS  --  3.5  --    BUN 16 14 15   CREATININE 1.1 1.2 1.2   MG 1.60* 1.50* 1.40*     LIVP:   Recent Labs     11/19/20 0328 11/20/20  0350 11/21/20  0305   AST  --  31  --    ALT  --  72*  --    BILIDIR  --  <0.2  --    BILITOT 0.5 0.7 0.7   ALKPHOS  --  136*  --      Uric Acid:    Recent Labs     11/20/20  0350   LABURIC 4.6     Coags:   Recent Labs     11/19/20  0328   PROTIME 12.5   INR 1.08   APTT 34.9        PROBLEM LIST:         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 CMML - 1 (Dx 10/2020)  3. Atrial Fibrillation  4. CASH  5. HTN    Post Transplant Complications:  1. Nausea and Indigestion  2. Diarrhea       TREATMENT:        1. Induction: Sweetie-C & Daunorubicin (6/12/20)  2.   Consolidation:  HiDAC x 3 cycles (7/28/20, 8/29/20 & 9/22/20)  3. MUD Allo - PB BMT (DPB1 non-permissive mismatch)  Preparative Regimen:  Melphalan and Fludarabine  Date of BMT:  11/11/20  Source of stem cells:  PBPC - 4.4 x10^6 th70mcarj/kg  Donor/Recipient Blood Type:  A positive / 0 positive   Donor Sex:  Female / NMDP (DID # [id number]) - Follow FISH XY  CMV Donor / Recipient: Negative / Negative       ASSESSMENT AND PLAN:        1. AML - intermediate risk / CMML-1:  - BM bx/asp (10/14/20) - No AML, but shows CMML-1   - S/p Melphalan & Fludarabine f/b allogeneic PBSC infusion 11/11/20  - Post txp disease eval:  BM bx, no abnormal FISH, cytogenetics or mutations to follow     Day +10     2. ID:  No evidence of infection.    - Cont Levaquin, Diflucan & Valtrex ppx     Donor/Recipient CMV:  Negative / negative   - Routine surveillance is not required       3. Heme: Pancytopenia from chemotherapy  - Transfuse for Hgb < 7 and Platelets < 34N  - Plt transfusion today    4. Metabolic / CKD III: HypoNa, HypoMg, Hyperglycemia  - Baseline SCr 1.2-1.3.  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  - Cont Prograf 4 mg BID. Levels qMWF:  Lab Results   Component Value Date    TACROLEV 8.1 11/20/2020    TACROLEV 8.1 11/18/2020    TACROLEV 7.5 11/16/2020      6. VOD:  No evidence of VOD. Admission Weight: (!) 324 lb 3.2 oz (147.1 kg)  Current Weight: Weight: (!) 306 lb 3.2 oz (138.9 kg). Recent Labs     11/20/20  0350 11/21/20  0305   BILIDIR <0.2  --    BILITOT 0.7 0.7   - Cont Actigall     7. Pulmonary: H/o CASH  - PFT (10/6/20) - FEV1/FVC 71 w/ predicted ratio of 75 & DLCO 41% (corrected 84%).   He has moderate obstruction by FEV1/FVC ratio lower than predicted and also based on ATS criteria with low FEV, likely d/t morbid obesity   - Pulm clearance by Dr. Sandeep Martin  CASH:  - Cont CPAP  - Encourage IS and ambulation      8. GI / Nutrition:    Nutrition:  Appetite is good  - Cont low microbial diet  - Dietary to follow  Diarrhea:   - C. Diff (11/11/20) - negative  - Cont Imodium as needed   - Added Lomotil as needed  Nausea & Indigestion:  Ongoing  - Compazine and Ativan as needed  - Cont Pepcid bid  - Cont TUMS as needed     9. Cardiac: H/o A. Fib & HTN  - Echo (9/29/20) -  LVEF 60-65% w/ left atrial dilatation  A. Fib:  Currently in NSR  - Cont sotalol 120 mg BID  HTN: stable  - Cont amlodipine-lisinopril 5/40 mg daily    - DVT Prophylaxis: Platelets <21,705 cells/dL, - prophylactic lovenox on hold and mechanical prophylaxis with bilateral SCDs while in bed in place.   Contraindications to pharmacologic prophylaxis: None  Contraindications to mechanical prophylaxis: None     - Disposition:  D/c home once ANC > 1.0 & toxicities resolved following allogeneic transplant     Jeraldine Closs, MD     11/21/2020   10:00 AM

## 2020-11-21 NOTE — PLAN OF CARE
assess and implement POC. Call light within reach and hourly rounding in place. Problem: Venous Thromboembolism:  Goal: Will show no signs or symptoms of venous thromboembolism  Description: Will show no signs or symptoms of venous thromboembolism  Outcome: Ongoing  Note: Refusing DVT Prevention: Pt is at risk for DVT d/t decreased mobility and cancer treatment. Pt educated on importance of activity. Pt has orders for SCDs while in bed, however pt currently refusing treatment. Reviewed risks of DVT & PE development while inpatient. Provider aware of patient's refusal and re-education of importance of prophylaxis. No new orders at this time. Will continue to re-instruct patient and intervene as appropriate. Problem: PROTECTIVE PRECAUTIONS  Goal: Patient will remain free of nosocomial Infections  Outcome: Ongoing  Note: Pt remains in protective precautions. Pt educated on wearing mask when in hallways. Pt, staff, and visitors adhering to handwashing guidelines. Pt educated to shower or bathe daily with chlorhexidine and linens changed daily per protocol. Pt verbalizes understanding of low microbial diet. Will continue to monitor. Problem: Nausea/Vomiting:  Goal: Absence of nausea/vomiting  Description: C/O mild nausea without vomiting. Compazine x1 PO given as prophylaxis   Outcome: Ongoing  Note: Kaylyn Gandhi has had some nausea this shift. Will continue to monitor. Problem: Diarrhea:  Goal: Bowel elimination is within specified parameters  Description: Bowel elimination is within specified parameters  Outcome: Ongoing  Note: Kaylyn Gandhi has had 1 instance of diarrhea this shift and has had imodium 1x. Will continue to monitor. Problem: Skin Integrity:  Goal: Will show no infection signs and symptoms  Description: Will show no infection signs and symptoms  Outcome: Ongoing  Note: Kaylyn Gandhi has some scattered petechiae as well as a bruise on his left abdomen.   Will continue to monitor for additional/worsening skin breakdown. Problem: Pain:  Goal: Pain level will decrease  Description: Pain level will decrease  Outcome: Ongoing  Note: Kaylyn Gandhi has had some pain this shift from a bruise on his left abdomen, but did not want to take any pain medication. Will continue to monitor.

## 2020-11-22 LAB
ANION GAP SERPL CALCULATED.3IONS-SCNC: 11 MMOL/L (ref 3–16)
BILIRUB SERPL-MCNC: 0.6 MG/DL (ref 0–1)
BLOOD BANK DISPENSE STATUS: NORMAL
BLOOD BANK PRODUCT CODE: NORMAL
BPU ID: NORMAL
BUN BLDV-MCNC: 13 MG/DL (ref 7–20)
CALCIUM SERPL-MCNC: 8.8 MG/DL (ref 8.3–10.6)
CHLORIDE BLD-SCNC: 103 MMOL/L (ref 99–110)
CO2: 21 MMOL/L (ref 21–32)
CREAT SERPL-MCNC: 1.2 MG/DL (ref 0.8–1.3)
DESCRIPTION BLOOD BANK: NORMAL
GFR AFRICAN AMERICAN: >60
GFR NON-AFRICAN AMERICAN: >60
GLUCOSE BLD-MCNC: 114 MG/DL (ref 70–99)
HCT VFR BLD CALC: 21.8 % (ref 40.5–52.5)
HEMOGLOBIN: 7.5 G/DL (ref 13.5–17.5)
MAGNESIUM: 1.7 MG/DL (ref 1.8–2.4)
MCH RBC QN AUTO: 32.6 PG (ref 26–34)
MCHC RBC AUTO-ENTMCNC: 34.2 G/DL (ref 31–36)
MCV RBC AUTO: 95.3 FL (ref 80–100)
PDW BLD-RTO: 17.3 % (ref 12.4–15.4)
PLATELET # BLD: 10 K/UL (ref 135–450)
PMV BLD AUTO: 9.1 FL (ref 5–10.5)
POTASSIUM SERPL-SCNC: 4 MMOL/L (ref 3.5–5.1)
RBC # BLD: 2.29 M/UL (ref 4.2–5.9)
SODIUM BLD-SCNC: 135 MMOL/L (ref 136–145)
WBC # BLD: 0.1 K/UL (ref 4–11)

## 2020-11-22 PROCEDURE — 83735 ASSAY OF MAGNESIUM: CPT

## 2020-11-22 PROCEDURE — 2580000003 HC RX 258: Performed by: INTERNAL MEDICINE

## 2020-11-22 PROCEDURE — 6370000000 HC RX 637 (ALT 250 FOR IP): Performed by: NURSE PRACTITIONER

## 2020-11-22 PROCEDURE — 2580000003 HC RX 258: Performed by: NURSE PRACTITIONER

## 2020-11-22 PROCEDURE — 2060000000 HC ICU INTERMEDIATE R&B

## 2020-11-22 PROCEDURE — 6360000002 HC RX W HCPCS: Performed by: STUDENT IN AN ORGANIZED HEALTH CARE EDUCATION/TRAINING PROGRAM

## 2020-11-22 PROCEDURE — 85025 COMPLETE CBC W/AUTO DIFF WBC: CPT

## 2020-11-22 PROCEDURE — 6370000000 HC RX 637 (ALT 250 FOR IP): Performed by: INTERNAL MEDICINE

## 2020-11-22 PROCEDURE — P9037 PLATE PHERES LEUKOREDU IRRAD: HCPCS

## 2020-11-22 PROCEDURE — 80048 BASIC METABOLIC PNL TOTAL CA: CPT

## 2020-11-22 PROCEDURE — 82247 BILIRUBIN TOTAL: CPT

## 2020-11-22 PROCEDURE — 6360000002 HC RX W HCPCS: Performed by: INTERNAL MEDICINE

## 2020-11-22 PROCEDURE — 96409 CHEMO IV PUSH SNGL DRUG: CPT

## 2020-11-22 PROCEDURE — 36430 TRANSFUSION BLD/BLD COMPNT: CPT

## 2020-11-22 PROCEDURE — 36592 COLLECT BLOOD FROM PICC: CPT

## 2020-11-22 RX ORDER — 0.9 % SODIUM CHLORIDE 0.9 %
20 INTRAVENOUS SOLUTION INTRAVENOUS ONCE
Status: COMPLETED | OUTPATIENT
Start: 2020-11-22 | End: 2020-11-22

## 2020-11-22 RX ADMIN — AMLODIPINE BESYLATE 5 MG: 5 TABLET ORAL at 09:46

## 2020-11-22 RX ADMIN — SIMETHICONE 80 MG: 80 TABLET, CHEWABLE ORAL at 09:45

## 2020-11-22 RX ADMIN — POTASSIUM CHLORIDE AND SODIUM CHLORIDE: 900; 150 INJECTION, SOLUTION INTRAVENOUS at 22:45

## 2020-11-22 RX ADMIN — SODIUM CHLORIDE 15 ML: 900 IRRIGANT IRRIGATION at 21:27

## 2020-11-22 RX ADMIN — ANTACID TABLETS 1000 MG: 500 TABLET, CHEWABLE ORAL at 18:43

## 2020-11-22 RX ADMIN — URSODIOL 500 MG: 250 TABLET, FILM COATED ORAL at 09:46

## 2020-11-22 RX ADMIN — ACETAMINOPHEN 650 MG: 325 TABLET ORAL at 11:36

## 2020-11-22 RX ADMIN — TACROLIMUS 4 MG: 1 CAPSULE ORAL at 09:45

## 2020-11-22 RX ADMIN — SIMETHICONE 80 MG: 80 TABLET, CHEWABLE ORAL at 22:45

## 2020-11-22 RX ADMIN — LOPERAMIDE HYDROCHLORIDE 2 MG: 2 CAPSULE ORAL at 13:31

## 2020-11-22 RX ADMIN — FLUCONAZOLE 400 MG: 200 TABLET ORAL at 09:45

## 2020-11-22 RX ADMIN — VALACYCLOVIR HYDROCHLORIDE 500 MG: 500 TABLET, FILM COATED ORAL at 21:27

## 2020-11-22 RX ADMIN — LEVOFLOXACIN 500 MG: 500 TABLET, FILM COATED ORAL at 21:27

## 2020-11-22 RX ADMIN — Medication 10 ML: at 09:54

## 2020-11-22 RX ADMIN — SOTALOL HYDROCHLORIDE 120 MG: 120 TABLET ORAL at 21:27

## 2020-11-22 RX ADMIN — ANTACID TABLETS 1000 MG: 500 TABLET, CHEWABLE ORAL at 13:31

## 2020-11-22 RX ADMIN — TACROLIMUS 4 MG: 1 CAPSULE ORAL at 21:27

## 2020-11-22 RX ADMIN — PROCHLORPERAZINE MALEATE 10 MG: 10 TABLET ORAL at 21:57

## 2020-11-22 RX ADMIN — METHOTREXATE 12.5 MG: 25 INJECTION, SOLUTION INTRA-ARTERIAL; INTRAMUSCULAR; INTRAVENOUS at 14:18

## 2020-11-22 RX ADMIN — SIMETHICONE 80 MG: 80 TABLET, CHEWABLE ORAL at 16:47

## 2020-11-22 RX ADMIN — ANTACID TABLETS 1000 MG: 500 TABLET, CHEWABLE ORAL at 09:45

## 2020-11-22 RX ADMIN — SODIUM CHLORIDE 15 ML: 900 IRRIGANT IRRIGATION at 07:00

## 2020-11-22 RX ADMIN — LOPERAMIDE HYDROCHLORIDE 2 MG: 2 CAPSULE ORAL at 05:01

## 2020-11-22 RX ADMIN — LISINOPRIL 40 MG: 40 TABLET ORAL at 09:46

## 2020-11-22 RX ADMIN — URSODIOL 500 MG: 250 TABLET, FILM COATED ORAL at 21:27

## 2020-11-22 RX ADMIN — SODIUM CHLORIDE 20 ML: 9 INJECTION, SOLUTION INTRAVENOUS at 11:37

## 2020-11-22 RX ADMIN — Medication 10 ML: at 21:27

## 2020-11-22 RX ADMIN — SIMETHICONE 80 MG: 80 TABLET, CHEWABLE ORAL at 03:49

## 2020-11-22 RX ADMIN — POTASSIUM CHLORIDE AND SODIUM CHLORIDE: 900; 150 INJECTION, SOLUTION INTRAVENOUS at 03:49

## 2020-11-22 RX ADMIN — LOPERAMIDE HYDROCHLORIDE 2 MG: 2 CAPSULE ORAL at 09:45

## 2020-11-22 RX ADMIN — PANTOPRAZOLE SODIUM 40 MG: 40 TABLET, DELAYED RELEASE ORAL at 09:53

## 2020-11-22 RX ADMIN — VALACYCLOVIR HYDROCHLORIDE 500 MG: 500 TABLET, FILM COATED ORAL at 09:45

## 2020-11-22 RX ADMIN — DIPHENHYDRAMINE HYDROCHLORIDE 25 MG: 25 TABLET ORAL at 11:36

## 2020-11-22 RX ADMIN — SOTALOL HYDROCHLORIDE 120 MG: 120 TABLET ORAL at 09:58

## 2020-11-22 RX ADMIN — SODIUM CHLORIDE 15 ML: 900 IRRIGANT IRRIGATION at 09:54

## 2020-11-22 ASSESSMENT — PAIN SCALES - GENERAL
PAINLEVEL_OUTOF10: 0

## 2020-11-22 NOTE — PROGRESS NOTES
[facility] Allogeneic Progress Note    2020    Sylvia Alexandre    :  1960    MRN:  [id number]    Referring MD: Sofia Thompson MD  [id number]  [address], [address] [facility]    Subjective:  He reports loose non-bloody BMs.       ECOG PS:  (1) Restricted in physically strenuous activity, ambulatory and able to do work of light nature    KPS: 80% Normal activity with effort; some signs or symptoms of disease    Isolation:  None     Medications    Scheduled Meds:   sodium chloride  20 mL Intravenous Once    sodium chloride  20 mL Intravenous Once    sodium chloride  20 mL Intravenous Once    pantoprazole  40 mg Oral QAM AC    tacrolimus  4 mg Oral BID    levoFLOXacin  500 mg Oral Nightly    sodium chloride flush  10 mL Intravenous 2 times per day    Saline Mouthwash  15 mL Swish & Spit 4x Daily AC & HS    valACYclovir  500 mg Oral BID    sotalol  120 mg Oral BID    amLODIPine  5 mg Oral Daily    And    lisinopril  40 mg Oral Daily    ursodiol  500 mg Oral BID    methotrexate sodium  12.5 mg Intravenous Once    fluconazole  400 mg Oral Daily     Continuous Infusions:   0.9% NaCl with KCl 20 mEq 50 mL/hr at 20 0349    sodium chloride      sodium chloride       PRN Meds:traMADol, oxyCODONE, acetaminophen, diphenhydrAMINE, calcium carbonate, simethicone, diphenoxylate-atropine, [COMPLETED] loperamide **FOLLOWED BY** loperamide, sodium chloride, sennosides-docusate sodium, sodium chloride, sodium chloride flush, potassium chloride, magnesium sulfate, magnesium hydroxide, Saline Mouthwash, alteplase, prochlorperazine **OR** prochlorperazine, LORazepam **OR** LORazepam    ROS:  As noted above, otherwise remainder of 10-point ROS negative    Physical Exam:    I&O:      Intake/Output Summary (Last 24 hours) at 2020 0810  Last data filed at 2020 0616  Gross per 24 hour   Intake 2381 ml   Output 2150 ml   Net 231 ml       Vital Signs:  /76 Pulse 98   Temp 98.9 °F (37.2 °C) (Oral)   Resp 18   Ht 6' 3\" (1.905 m)   Wt (!) 311 lb 3.2 oz (141.2 kg)   SpO2 100%   BMI 38.90 kg/m²     Weight:    Wt Readings from Last 3 Encounters:   11/21/20 (!) 311 lb 3.2 oz (141.2 kg)   10/08/20 (!) 325 lb (147.4 kg)   09/29/20 (!) 325 lb 13.4 oz (147.8 kg)       General: Awake, alert and oriented. HEENT: normocephalic, PERRL, no scleral erythema or icterus, Oral mucosa moist and intact, throat clear  NECK: supple  BACK: Straight  SKIN: warm dry and intact without lesions rashes or masses  CHEST: CTA bilaterally without use of accessory muscles  CV: Normal S1 S2, RRR, no MRG  ABD: NT, ND, normoactive BS, no palpable masses or hepatosplenomegaly  EXTREMITIES: without edema, denies calf tenderness  NEURO: CN II - XII grossly intact  CATHETER: Right tri-fusion ([address], 6/11/20) - CDI    Data:   CBC:   Recent Labs     11/20/20  0350 11/21/20 0305 11/22/20 0349   WBC 0.0* 0.1* 0.1*   HGB 8.6* 7.8* 7.5*   HCT 24.7* 22.4* 21.8*   MCV 94.7 95.0 95.3   PLT 10* 10* 10*     BMP/Mag:  Recent Labs     11/20/20 0350 11/21/20 0305 11/22/20 0349   * 129* 135*   K 4.4 4.1 4.0    98* 103   CO2 22 22 21   PHOS 3.5  --   --    BUN 14 15 13   CREATININE 1.2 1.2 1.2   MG 1.50* 1.40* 1.70*     LIVP:   Recent Labs     11/20/20  0350 11/21/20 0305 11/22/20 0349   AST 31  --   --    ALT 72*  --   --    BILIDIR <0.2  --   --    BILITOT 0.7 0.7 0.6   ALKPHOS 136*  --   --      Uric Acid:    Recent Labs     11/20/20  0350   LABURIC 4.6     Coags:   No results for input(s): PROTIME, INR, APTT in the last 72 hours.     PROBLEM LIST:         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 CMML - 1 (Dx 10/2020)  3. Atrial Fibrillation  4. CASH  5. HTN    Post Transplant Complications:  1. Nausea and Indigestion  2. Diarrhea       TREATMENT:        1. Induction: Sweetie-C & Daunorubicin (6/12/20)  2. Consolidation:  HiDAC x 3 cycles (7/28/20, 8/29/20 & 9/22/20)  3.   MUD Allo - PB BMT CPAP  - Encourage IS and ambulation      8. GI / Nutrition:    Nutrition:  Appetite is good  - Cont low microbial diet  - Dietary to follow    Diarrhea:  High vol diarrhea (not yes quant)    - consider re-testing for c. Diff   - C. Diff (11/11/20) - negative  - Cont Imodium as needed   - Added Lomotil as needed  Nausea & Indigestion:  Ongoing  - Compazine and Ativan as needed  - Cont Pepcid bid  - Cont TUMS as needed     9. Cardiac: H/o A. Fib & HTN  - Echo (9/29/20) -  LVEF 60-65% w/ left atrial dilatation  A. Fib:  Currently in NSR  - Cont sotalol 120 mg BID  HTN: stable  - Cont amlodipine-lisinopril 5/40 mg daily    - DVT Prophylaxis: Platelets <19,941 cells/dL, - prophylactic lovenox on hold and mechanical prophylaxis with bilateral SCDs while in bed in place.   Contraindications to pharmacologic prophylaxis: None  Contraindications to mechanical prophylaxis: None     - Disposition:  D/c home once ANC > 1.0 & toxicities resolved following allogeneic transplant     JONATHAN LEHMAN MD     11/22/2020   8:10 AM

## 2020-11-22 NOTE — PLAN OF CARE
Problem: Falls - Risk of:  Goal: Will remain free from falls  Description: Pt up ad abebe in room and halls with steady gait. Independent with ADL's. Uses call light appropriately for assistance as needed. Bed breaks on, non skid footwear on, side rails up x2, call light within reach. Hourly rounding performed per unit protocol. Will continue safety precautions. Will remain free from falls  11/22/2020 0545 by Myah Navas RN  Outcome: Ongoing  Note: Orthostatic vital signs obtained at start of shift - see flowsheet for details. Pt does not meet criteria for orthostasis. Pt is a Med fall risk. See Cinderella Hire Fall Score and ABCDS Injury Risk assessments. - Screening for Orthostasis AND not a [place] Risk per CAMEJO/ABCDS: Pt bed is in low position, side rails up, call light and belongings are in reach. Fall risk light is on outside pts room. Pt encouraged to call for assistance as needed. Will continue with hourly rounds for PO intake, pain needs, toileting and repositioning as needed. Problem: Infection - Central Venous Catheter-Associated Bloodstream Infection:  Goal: Will show no infection signs and symptoms  Description: Will show no infection signs and symptoms  11/22/2020 0545 by Myah Navas RN  Outcome: Ongoing  Note: CVC site remains free of signs/symptoms of infection. No drainage, edema, erythema, pain, or warmth noted at site. Dressing changes continue per protocol and on an as needed basis - see flowsheet. Problem: Bleeding:  Goal: Will show no signs and symptoms of excessive bleeding  Description: Will show no signs and symptoms of excessive bleeding  Outcome: Ongoing  Note: Patient's hemoglobin this AM:   Recent Labs     11/22/20  0349   HGB 7.5*     Patient's platelet count this AM:   Recent Labs     11/22/20  0349   PLT 10*    Thrombocytopenia Precautions in place. Patient showing no signs or symptoms of active bleeding. Patient verbalizes understanding of all instructions. Will continue to assess and implement POC. Call light within reach and hourly rounding in place. Problem: Venous Thromboembolism:  Goal: Will show no signs or symptoms of venous thromboembolism  Description: Will show no signs or symptoms of venous thromboembolism  Outcome: Ongoing  Note: Adherent with DVT Prevention: Pt is at risk for DVT d/t decreased mobility and cancer treatment. Pt educated on importance of activity. Pt has orders for SCDs while in bed. Pt verbalizes understanding of need for prophylaxis while inpatient. Problem: Discharge Planning:  Goal: Discharged to appropriate level of care  Description: Discharged to appropriate level of care  Outcome: Ongoing  Note: Nisreen Olivares is aware of the current plan of care. Will continue to monitor. Problem: PROTECTIVE PRECAUTIONS  Goal: Patient will remain free of nosocomial Infections  Outcome: Ongoing  Note: Pt remains in neutropenic precautions for WBC of 0.1. Pt educated on use of mask prior to leaving room. All staff and visitor following handwashing requirements this shift prior to entering room. Low microbial diet order in place and being followed. Problem: Nausea/Vomiting:  Goal: Absence of nausea/vomiting  Description: C/O mild nausea without vomiting. Compazine x1 PO given as prophylaxis   Outcome: Ongoing  Note: Rico Dillard has had no nausea or vomiting during this shift. Will continue to monitor. Problem: Skin Integrity:  Goal: Will show no infection signs and symptoms  Description: Will show no infection signs and symptoms  11/22/2020 0545 by Sal Rivera RN  Outcome: Ongoing  Note: CVC site remains free of signs/symptoms of infection. No drainage, edema, erythema, pain, or warmth noted at site. Dressing changes continue per protocol and on an as needed basis - see flowsheet.         Problem: Pain:  Goal: Pain level will decrease  Description: Pain level will decrease  11/22/2020 0545 by Sal Rivera RN  Outcome: Ongoing  Note: Macey Lehman has had no complaints of pain during this shift. Will continue to monitor.

## 2020-11-22 NOTE — PLAN OF CARE
Problem: Falls - Risk of:  Goal: Will remain free from falls  Description: Pt up ad abebe in room and halls with steady gait. Independent with ADL's. Uses call light appropriately for assistance as needed. Bed breaks on, non skid footwear on, side rails up x2, call light within reach. Hourly rounding performed per unit protocol. Will continue safety precautions. Will remain free from falls  Outcome: Ongoing  Note: Orthostatic vital signs obtained at start of shift - see flowsheet for details. Pt does not meet criteria for orthostasis. Pt is a Med fall risk. See Latrice Good Fall Score and ABCDS Injury Risk assessments. - Screening for Orthostasis AND not a [place] Risk per CAMEJO/ABCDS: Pt bed is in low position, side rails up, call light and belongings are in reach. Fall risk light is on outside pts room. Pt encouraged to call for assistance as needed. Will continue with hourly rounds for PO intake, pain needs, toileting and repositioning as needed. Problem: Infection - Central Venous Catheter-Associated Bloodstream Infection:  Goal: Will show no infection signs and symptoms  Description: Will show no infection signs and symptoms  Outcome: Ongoing  Note: CVC site remains free of signs/symptoms of infection. No drainage, edema, erythema, pain, or warmth noted at site. Dressing changes continue per protocol and on an as needed basis - see flowsheet. Compliant with [facility] Bath Protocol:  Performed CHG bath today per [facility] protocol utilizing CHG solution in the shower. CVC site cleansed with CHG wipe over dressing, skin surrounding dressing, and first 6\" of IV tubing. Pt tolerated well. Continued to encourage daily CHG bathing per [facility] protocol. Problem: Diarrhea:  Goal: Bowel elimination is within specified parameters  Description: Bowel elimination is within specified parameters  Outcome: Ongoing  Note:   Pt with 1 instance of diarrhea this shift and has had lomotil 1x. Will continue to monitor. Problem: Skin Integrity:  Goal: Will show no infection signs and symptoms  Description: Will show no infection signs and symptoms  Outcome: Ongoing  Note: CVC site remains free of signs/symptoms of infection. No drainage, edema, erythema, pain, or warmth noted at site. Dressing changes continue per protocol and on an as needed basis - see flowsheet. Compliant with [facility] Bath Protocol:  Performed CHG bath today per [facility] protocol utilizing CHG solution in the shower. CVC site cleansed with CHG wipe over dressing, skin surrounding dressing, and first 6\" of IV tubing. Pt tolerated well. Continued to encourage daily CHG bathing per [facility] protocol. Problem: Pain:  Goal: Pain level will decrease  Description: Pain level will decrease  Outcome: Ongoing  Note: No complaints of pain this shift.

## 2020-11-23 ENCOUNTER — APPOINTMENT (OUTPATIENT)
Dept: GENERAL RADIOLOGY | Age: 60
DRG: 014 | End: 2020-11-23
Attending: INTERNAL MEDICINE
Payer: COMMERCIAL

## 2020-11-23 LAB
ABO/RH: NORMAL
ALBUMIN SERPL-MCNC: 3.6 G/DL (ref 3.4–5)
ALP BLD-CCNC: 157 U/L (ref 40–129)
ALT SERPL-CCNC: 54 U/L (ref 10–40)
ANION GAP SERPL CALCULATED.3IONS-SCNC: 12 MMOL/L (ref 3–16)
ANTIBODY SCREEN: NORMAL
APTT: 33.4 SEC (ref 24.2–36.2)
AST SERPL-CCNC: 18 U/L (ref 15–37)
BILIRUB SERPL-MCNC: 0.5 MG/DL (ref 0–1)
BILIRUBIN DIRECT: <0.2 MG/DL (ref 0–0.3)
BILIRUBIN URINE: NEGATIVE
BILIRUBIN, INDIRECT: ABNORMAL MG/DL (ref 0–1)
BLOOD BANK DISPENSE STATUS: NORMAL
BLOOD BANK PRODUCT CODE: NORMAL
BLOOD, URINE: ABNORMAL
BPU ID: NORMAL
BUN BLDV-MCNC: 13 MG/DL (ref 7–20)
CALCIUM SERPL-MCNC: 8.9 MG/DL (ref 8.3–10.6)
CHLORIDE BLD-SCNC: 101 MMOL/L (ref 99–110)
CLARITY: CLEAR
CO2: 21 MMOL/L (ref 21–32)
COLOR: YELLOW
CREAT SERPL-MCNC: 1.1 MG/DL (ref 0.8–1.3)
DESCRIPTION BLOOD BANK: NORMAL
GFR AFRICAN AMERICAN: >60
GFR NON-AFRICAN AMERICAN: >60
GLUCOSE BLD-MCNC: 118 MG/DL (ref 70–99)
GLUCOSE URINE: NEGATIVE MG/DL
HCT VFR BLD CALC: 20.3 % (ref 40.5–52.5)
HEMOGLOBIN: 7 G/DL (ref 13.5–17.5)
INR BLD: 1.25 (ref 0.86–1.14)
KETONES, URINE: NEGATIVE MG/DL
LACTATE DEHYDROGENASE: 131 U/L (ref 100–190)
LEUKOCYTE ESTERASE, URINE: NEGATIVE
MAGNESIUM: 1.2 MG/DL (ref 1.8–2.4)
MCH RBC QN AUTO: 33 PG (ref 26–34)
MCHC RBC AUTO-ENTMCNC: 34.6 G/DL (ref 31–36)
MCV RBC AUTO: 95.4 FL (ref 80–100)
MICROSCOPIC EXAMINATION: YES
NITRITE, URINE: NEGATIVE
PDW BLD-RTO: 17.1 % (ref 12.4–15.4)
PH UA: 6.5 (ref 5–8)
PHOSPHORUS: 3.4 MG/DL (ref 2.5–4.9)
PLATELET # BLD: 11 K/UL (ref 135–450)
PMV BLD AUTO: 8.8 FL (ref 5–10.5)
POTASSIUM SERPL-SCNC: 3.8 MMOL/L (ref 3.5–5.1)
PROTEIN UA: NEGATIVE MG/DL
PROTHROMBIN TIME: 14.5 SEC (ref 10–13.2)
RBC # BLD: 2.12 M/UL (ref 4.2–5.9)
RBC UA: NORMAL /HPF (ref 0–4)
SODIUM BLD-SCNC: 134 MMOL/L (ref 136–145)
SPECIFIC GRAVITY UA: 1.01 (ref 1–1.03)
TACROLIMUS BLOOD: 8.4 NG/ML (ref 5–20)
TOTAL PROTEIN: 6 G/DL (ref 6.4–8.2)
URIC ACID, SERUM: 4.2 MG/DL (ref 3.5–7.2)
URINE TYPE: ABNORMAL
UROBILINOGEN, URINE: 0.2 E.U./DL
WBC # BLD: 0.2 K/UL (ref 4–11)
WBC UA: NORMAL /HPF (ref 0–5)

## 2020-11-23 PROCEDURE — 6370000000 HC RX 637 (ALT 250 FOR IP): Performed by: NURSE PRACTITIONER

## 2020-11-23 PROCEDURE — 2580000003 HC RX 258: Performed by: NURSE PRACTITIONER

## 2020-11-23 PROCEDURE — 83735 ASSAY OF MAGNESIUM: CPT

## 2020-11-23 PROCEDURE — 6370000000 HC RX 637 (ALT 250 FOR IP): Performed by: INTERNAL MEDICINE

## 2020-11-23 PROCEDURE — 84550 ASSAY OF BLOOD/URIC ACID: CPT

## 2020-11-23 PROCEDURE — 80197 ASSAY OF TACROLIMUS: CPT

## 2020-11-23 PROCEDURE — 86923 COMPATIBILITY TEST ELECTRIC: CPT

## 2020-11-23 PROCEDURE — 36592 COLLECT BLOOD FROM PICC: CPT

## 2020-11-23 PROCEDURE — 86901 BLOOD TYPING SEROLOGIC RH(D): CPT

## 2020-11-23 PROCEDURE — 6360000002 HC RX W HCPCS: Performed by: INTERNAL MEDICINE

## 2020-11-23 PROCEDURE — 36430 TRANSFUSION BLD/BLD COMPNT: CPT

## 2020-11-23 PROCEDURE — 71045 X-RAY EXAM CHEST 1 VIEW: CPT

## 2020-11-23 PROCEDURE — 85025 COMPLETE CBC W/AUTO DIFF WBC: CPT

## 2020-11-23 PROCEDURE — 85610 PROTHROMBIN TIME: CPT

## 2020-11-23 PROCEDURE — 6360000002 HC RX W HCPCS: Performed by: STUDENT IN AN ORGANIZED HEALTH CARE EDUCATION/TRAINING PROGRAM

## 2020-11-23 PROCEDURE — P9037 PLATE PHERES LEUKOREDU IRRAD: HCPCS

## 2020-11-23 PROCEDURE — 83615 LACTATE (LD) (LDH) ENZYME: CPT

## 2020-11-23 PROCEDURE — 6360000002 HC RX W HCPCS: Performed by: NURSE PRACTITIONER

## 2020-11-23 PROCEDURE — P9040 RBC LEUKOREDUCED IRRADIATED: HCPCS

## 2020-11-23 PROCEDURE — 80048 BASIC METABOLIC PNL TOTAL CA: CPT

## 2020-11-23 PROCEDURE — 86900 BLOOD TYPING SEROLOGIC ABO: CPT

## 2020-11-23 PROCEDURE — 84100 ASSAY OF PHOSPHORUS: CPT

## 2020-11-23 PROCEDURE — 81001 URINALYSIS AUTO W/SCOPE: CPT

## 2020-11-23 PROCEDURE — 80076 HEPATIC FUNCTION PANEL: CPT

## 2020-11-23 PROCEDURE — 86850 RBC ANTIBODY SCREEN: CPT

## 2020-11-23 PROCEDURE — 85730 THROMBOPLASTIN TIME PARTIAL: CPT

## 2020-11-23 PROCEDURE — 2060000000 HC ICU INTERMEDIATE R&B

## 2020-11-23 RX ORDER — DIPHENHYDRAMINE HCL 25 MG
25 TABLET ORAL EVERY 4 HOURS PRN
Status: DISCONTINUED | OUTPATIENT
Start: 2020-11-23 | End: 2020-11-30 | Stop reason: HOSPADM

## 2020-11-23 RX ORDER — ACETAMINOPHEN 325 MG/1
650 TABLET ORAL EVERY 4 HOURS PRN
Status: DISCONTINUED | OUTPATIENT
Start: 2020-11-23 | End: 2020-11-30 | Stop reason: HOSPADM

## 2020-11-23 RX ORDER — 0.9 % SODIUM CHLORIDE 0.9 %
20 INTRAVENOUS SOLUTION INTRAVENOUS ONCE
Status: DISCONTINUED | OUTPATIENT
Start: 2020-11-23 | End: 2020-11-30

## 2020-11-23 RX ADMIN — POTASSIUM CHLORIDE AND SODIUM CHLORIDE: 900; 150 INJECTION, SOLUTION INTRAVENOUS at 20:08

## 2020-11-23 RX ADMIN — ANTACID TABLETS 1000 MG: 500 TABLET, CHEWABLE ORAL at 18:45

## 2020-11-23 RX ADMIN — PANTOPRAZOLE SODIUM 40 MG: 40 TABLET, DELAYED RELEASE ORAL at 08:50

## 2020-11-23 RX ADMIN — TACROLIMUS 4 MG: 1 CAPSULE ORAL at 20:36

## 2020-11-23 RX ADMIN — SOTALOL HYDROCHLORIDE 120 MG: 120 TABLET ORAL at 08:50

## 2020-11-23 RX ADMIN — LISINOPRIL 40 MG: 40 TABLET ORAL at 08:50

## 2020-11-23 RX ADMIN — SIMETHICONE 80 MG: 80 TABLET, CHEWABLE ORAL at 11:17

## 2020-11-23 RX ADMIN — ACETAMINOPHEN 650 MG: 325 TABLET ORAL at 11:17

## 2020-11-23 RX ADMIN — Medication 10 ML: at 08:53

## 2020-11-23 RX ADMIN — LEVOFLOXACIN 500 MG: 500 TABLET, FILM COATED ORAL at 20:36

## 2020-11-23 RX ADMIN — AMLODIPINE BESYLATE 5 MG: 5 TABLET ORAL at 08:50

## 2020-11-23 RX ADMIN — URSODIOL 500 MG: 250 TABLET, FILM COATED ORAL at 20:36

## 2020-11-23 RX ADMIN — FLUCONAZOLE 400 MG: 200 TABLET ORAL at 08:50

## 2020-11-23 RX ADMIN — VALACYCLOVIR HYDROCHLORIDE 500 MG: 500 TABLET, FILM COATED ORAL at 08:50

## 2020-11-23 RX ADMIN — DIPHENOXYLATE HYDROCHLORIDE AND ATROPINE SULFATE 1 TABLET: 2.5; .025 TABLET ORAL at 11:17

## 2020-11-23 RX ADMIN — DIPHENHYDRAMINE HYDROCHLORIDE 25 MG: 25 TABLET ORAL at 11:17

## 2020-11-23 RX ADMIN — LOPERAMIDE HYDROCHLORIDE 2 MG: 2 CAPSULE ORAL at 01:02

## 2020-11-23 RX ADMIN — Medication 10 ML: at 20:35

## 2020-11-23 RX ADMIN — URSODIOL 500 MG: 250 TABLET, FILM COATED ORAL at 08:50

## 2020-11-23 RX ADMIN — VALACYCLOVIR HYDROCHLORIDE 500 MG: 500 TABLET, FILM COATED ORAL at 20:36

## 2020-11-23 RX ADMIN — MAGNESIUM SULFATE HEPTAHYDRATE 4 G: 40 INJECTION, SOLUTION INTRAVENOUS at 05:50

## 2020-11-23 RX ADMIN — SODIUM CHLORIDE 15 ML: 900 IRRIGANT IRRIGATION at 20:35

## 2020-11-23 RX ADMIN — TACROLIMUS 4 MG: 1 CAPSULE ORAL at 08:49

## 2020-11-23 RX ADMIN — SIMETHICONE 80 MG: 80 TABLET, CHEWABLE ORAL at 23:43

## 2020-11-23 RX ADMIN — ANTACID TABLETS 1000 MG: 500 TABLET, CHEWABLE ORAL at 08:49

## 2020-11-23 RX ADMIN — SOTALOL HYDROCHLORIDE 120 MG: 120 TABLET ORAL at 20:36

## 2020-11-23 ASSESSMENT — PAIN SCALES - GENERAL
PAINLEVEL_OUTOF10: 0

## 2020-11-23 NOTE — CARE COORDINATION
Type of Admission  AML  MUD ( Female Donor) T:0: 11/11/20  Prep. Regimen: Melphalan+Fludara  Day +12        Central venous catheter  Right TLC ( 9/29/20 , pre-existing from [facility])        Plan  Proceed with MUD Allogenic SCT for treatment of AML        Update  11/6/20: Planned admission for allogeneic SCT. Keeping active by use elliptical equipment in his room. 11/9/20:  Using exercise bike in room. 11/11/20 Infusion of stem cells today. maintaining activity by using elliptical bike in room. 11/16/20:  Continues to be active using elliptical bike in room, dresses daily. Niece into visit. 1/20/20:  Reports loose stools and beginning to have 'sore throat\". Attempting to eat small amounts. 11/23/20:  Stool output decreasing, reports fatigue. Pre-medications with blood products due to history of reaction. WBC is 0.2 today. Education  11/6/20:  Re-introduced myself in RN Discharge planner role. States his cousin Sheela, who is a nurse will be coming into town by Thanksgiving to act as his Care giver. Patient and caregiver have been through the educational process for allogeneic bone marrow transplantation including the allogeneic family meeting, preadmission teaching and preadmission physician office visit. , with Kayla English RN BMT Coordinator. Patient and caregiver verbalize understanding of treatment regimen, possible adverse events, length of stay, and risk and benefits of transplantation and are agreeable to proceed. Patient and caregiver have been given an opportunity to ask, and to have their questions answered to their satisfaction. Documentation for above education can be found in the [facility] office chart. 11/23/20: Discussed with Carlito that medications that have been called in are at n no charge. Reports that he has ordered a medication organizer that is to be delivered today.         Discharge  DISCHARGE ROUNDING:  Date:_11/9, 11/16, 11/23    Team members present : NP, SW, Charge RN, RN D/C Planner, [address]    Anticipated date of discharge: When [address] is >1.0 & without toxicities  Active problems/barriers to discharge: Possible change in Care giver    Home needs:     Caregivers: Maribeth Taylor ( RN)    Home medication issues: ( [facility]: [phone]) 11/18: Fluconazole 200 mg ( 2 tabs daily #60 with 3 refills  Valtrex 500 mg bid #60 3 refills  Tacrolimus 0.5 mg bid #60 with 3 refills  Tacrolimus 1 mg cap ( 3 caps bid) #180 with 3 refills  11/20 No Charge for above medications per [facility]     Patient/caregiver aware of plan?   Yes           Pending

## 2020-11-23 NOTE — PLAN OF CARE
Problem: Falls - Risk of:  Goal: Will remain free from falls  Description: Pt up ad abebe in room and halls with steady gait. Independent with ADL's. Uses call light appropriately for assistance as needed. Bed breaks on, non skid footwear on, side rails up x2, call light within reach. Hourly rounding performed per unit protocol. Will continue safety precautions. Will remain free from falls  11/22/2020 1859 by Matilda Christian RN  Outcome: Ongoing  Note: Orthostatic vital signs obtained at start of shift - see flowsheet for details. Pt does not meet criteria for orthostasis. Pt is a Med fall risk. See Cutler Mort Fall Score and ABCDS Injury Risk assessments. - Screening for Orthostasis AND not a [place] Risk per CAMEJO/ABCDS: Pt bed is in low position, side rails up, call light and belongings are in reach. Fall risk light is on outside pts room. Pt encouraged to call for assistance as needed. Will continue with hourly rounds for PO intake, pain needs, toileting and repositioning as needed. Problem: Infection - Central Venous Catheter-Associated Bloodstream Infection:  Goal: Will show no infection signs and symptoms  Description: Will show no infection signs and symptoms  11/22/2020 1859 by Matilda Christian RN  Outcome: Ongoing  Note: CVC site remains free of signs/symptoms of infection. No drainage, edema, erythema, pain, or warmth noted at site. Dressing changes continue per protocol and on an as needed basis - see flowsheet. Problem: Bleeding:  Goal: Will show no signs and symptoms of excessive bleeding  Description: Will show no signs and symptoms of excessive bleeding  11/22/2020 1859 by Matilda Christian RN  Outcome: Ongoing  Note: Patient's hemoglobin this AM:   Recent Labs     11/22/20 0349   HGB 7.5*     Patient's platelet count this AM:   Recent Labs     11/22/20 0349   PLT 10*    Thrombocytopenia Precautions in place. Patient showing no signs or symptoms of active bleeding.   Patient transfused blood products per orders - see flowsheet. Patient verbalizes understanding of all instructions. Will continue to assess and implement POC. Call light within reach and hourly rounding in place. Problem: Venous Thromboembolism:  Goal: Will show no signs or symptoms of venous thromboembolism  Description: Will show no signs or symptoms of venous thromboembolism  11/22/2020 1859 by Cameron Brunner RN  Outcome: Ongoing  Note: Adherent with DVT Prevention: Pt is at risk for DVT d/t decreased mobility and cancer treatment. Pt educated on importance of activity. Pt has orders for SCDs while in bed. Pt verbalizes understanding of need for prophylaxis while inpatient. Problem: PROTECTIVE PRECAUTIONS  Goal: Patient will remain free of nosocomial Infections  11/22/2020 1859 by Cameron Brunner RN  Note:   Pt remains in neutropenic precautions. Pt educated on use of mask prior to leaving room. All staff and visitor following handwashing requirements this shift prior to entering room. Low microbial diet order in place and being followed. Problem: Nausea/Vomiting:  Goal: Absence of nausea/vomiting  Description: C/O mild nausea without vomiting. Compazine x1 PO given as prophylaxis   11/22/2020 1859 by Cameron Brunner RN  Outcome: Ongoing  Note: No complaints of nausea or vomiting this shift. Problem: Diarrhea:  Goal: Bowel elimination is within specified parameters  Description: Bowel elimination is within specified parameters  Outcome: Ongoing  Note: Pt with loose stool. Imodium given x2 this shift. Will continue to monitor. Problem: Skin Integrity:  Goal: Will show no infection signs and symptoms  Description: Will show no infection signs and symptoms  11/22/2020 1859 by Cameron Brunner RN  Outcome: Ongoing  Note: CVC site remains free of signs/symptoms of infection. No drainage, edema, erythema, pain, or warmth noted at site.  Dressing changes continue per protocol and on an as needed basis - see flowsheet. Problem: Pain:  Goal: Pain level will decrease  Description: Pain level will decrease  11/22/2020 1859 by Kennedy Rodriguez RN  Outcome: Ongoing  Note: Pt with no complaints of pain this shift. Will continue to monitor. Problem: Nutrition  Goal: Optimal nutrition therapy  Note: Pt with decreased appetite. Will continue to encourage.

## 2020-11-23 NOTE — PLAN OF CARE
Problem: Falls - Risk of:  Goal: Will remain free from falls  Description: Pt up ad abebe in room and halls with steady gait. Independent with ADL's. Uses call light appropriately for assistance as needed. Bed breaks on, non skid footwear on, side rails up x2, call light within reach. Hourly rounding performed per unit protocol. Will continue safety precautions. Will remain free from falls  11/23/2020 7270 by Reyes Foil, RN  Outcome: Ongoing  Note: Orthostatic vital signs obtained at start of shift - see flowsheet for details. Pt does not meet criteria for orthostasis. Pt is a Med fall risk. See Ragena Papa Fall Score and ABCDS Injury Risk assessments. - Screening for Orthostasis AND not a [place] Risk per CAMEJO/ABCDS: Pt bed is in low position, side rails up, call light and belongings are in reach. Fall risk light is on outside pts room. Pt encouraged to call for assistance as needed. Will continue with hourly rounds for PO intake, pain needs, toileting and repositioning as needed. Problem: Infection - Central Venous Catheter-Associated Bloodstream Infection:  Goal: Will show no infection signs and symptoms  Description: Will show no infection signs and symptoms  11/23/2020 0642 by Reyes Foil, RN  Outcome: Ongoing  Note: CVC site remains free of signs/symptoms of infection. No drainage, edema, erythema, pain, or warmth noted at site. Dressing changes continue per protocol and on an as needed basis - see flowsheet. Problem: Bleeding:  Goal: Will show no signs and symptoms of excessive bleeding  Description: Will show no signs and symptoms of excessive bleeding  11/23/2020 7875 by Reyes Foil, RN  Outcome: Ongoing  Note: Patient's hemoglobin this AM:   Recent Labs     11/23/20  0420   HGB 7.0*     Patient's platelet count this AM:   Recent Labs     11/23/20  0420   PLT 11*    Thrombocytopenia Precautions in place. Patient showing no signs or symptoms of active bleeding.   Patient transfused blood products per orders - see flowsheet. Patient verbalizes understanding of all instructions. Will continue to assess and implement POC. Call light within reach and hourly rounding in place. Problem: Venous Thromboembolism:  Goal: Will show no signs or symptoms of venous thromboembolism  Description: Will show no signs or symptoms of venous thromboembolism  11/23/2020 0642 by Clay Alcantar RN  Outcome: Ongoing  Note: Adherent with DVT Prevention: Pt is at risk for DVT d/t decreased mobility and cancer treatment. Pt educated on importance of activity. Pt has orders for SCDs while in bed. Pt verbalizes understanding of need for prophylaxis while inpatient. Problem: Discharge Planning:  Goal: Discharged to appropriate level of care  Description: Discharged to appropriate level of care  Outcome: Ongoing  Note: Favio Colon is aware of the current plan of care. Will continue to monitor. Problem: PROTECTIVE PRECAUTIONS  Goal: Patient will remain free of nosocomial Infections  11/23/2020 0642 by Clay Alcantar RN  Outcome: Ongoing  Note: Pt remains in neutropenic precautions for WBC of 0.2. Pt educated on use of mask prior to leaving room. All staff and visitor following handwashing requirements this shift prior to entering room. Low microbial diet order in place and being followed. Problem: Nausea/Vomiting:  Goal: Absence of nausea/vomiting  Description: C/O mild nausea without vomiting. Compazine x1 PO given as prophylaxis   11/23/2020 0642 by Clay Alcantar RN  Outcome: Ongoing  Note: Favio Colon has had no complaints of nausea or vomiting during shift. Will continue to monitor. Problem: Diarrhea:  Goal: Passage of soft, formed stool  Description: Passage of soft, formed stool  Outcome: Ongoing  Note: Favio Colon had one soft Bmx1. Will continue to monitor.       Problem: Skin Integrity:  Goal: Will show no infection signs and symptoms  Description: Will show no infection signs and symptoms  11/23/2020 0642 by Francesca Salvador RN  Outcome: Ongoing  Note: CVC site remains free of signs/symptoms of infection. No drainage, edema, erythema, pain, or warmth noted at site. Dressing changes continue per protocol and on an as needed basis - see flowsheet.

## 2020-11-23 NOTE — PROGRESS NOTES
Administration: Chemotherapy drug methotrexate independently verified with Sherry Patino RN prior to administration. Acknowledgement of informed consent for chemotherapy administration verified. Original order, appropriateness of regimen, drug supplied, height, weight, BSA, dose calculations, expiration dates/times, drug appearance, and two patient identifiers were verified by both RNs. Drug checked for vesicant/irritant status and for risk of hypersensitivity. Most recent laboratory values and allergies, were reviewed. Positive, brisk blood return via CVC was confirmed prior to administration. Chest x-ray for correct line placement reviewed. Cadence Osei and Sherry Patino RN verified correct rate of chemotherapy and maintenance IV fluids. Patient was educated on chemotherapy regimen prior to administration including indication for treatment related to disease & side effects of chemotherapy drug. Patient verbalizes understanding of all instructions. Completion of Chemotherapy: Monitoring during infusion done per policy, see Flowsheets. Blood return verified before, during, and after infusion per policy; no signs of extravasation. Pt tolerated chemotherapy well and without incident. Chemotherapy infusion end time on the [facility] Will continue to monitor.

## 2020-11-23 NOTE — CARE COORDINATION
Case Management Assessment           Daily Note                 Date/ Time of Note: 11/23/2020 10:07 AM         Note completed by: Teodora Escobar    Patient Name: Shashank Mcdaniels  YOB: 1960    Diagnosis:AML (acute myeloblastic leukemia) ([address] [C92.00]  Stem cells transplant status [facility]) [Z94.84]  Patient Admission Status: Inpatient    Date of Admission:11/5/2020 10:02 AM Length of Stay: 18 GLOS:      Current Plan of Care: home  ________________________________________________________________________________________  PT AM-PAC:   / 24 per last evaluation on: none    OT AM-PAC:   / 24 per last evaluation on: none    DME Needs for discharge: none  ________________________________________________________________________________________  Discharge Plan: Home    Tentative discharge date: tbd    Current barriers to discharge: recovery from allogenic transplant    Referrals completed: Not Applicable    Resources/ information provided: Not indicated at this time  ________________________________________________________________________________________  Case Management Notes: Patient is day +13 from allogenic transplant. Having loose non-bloody BMs. Anticipating no needs at discharge. Discharge likely in 1-2 weeks. Karen Paceismael and his family were provided with choice of provider; he and his family are in agreement with the discharge plan.     Care Transition Patient: Katt Araiza Shawn, [address]  Case Management Department  Ph: [phone]  Fax: [phone]

## 2020-11-24 PROBLEM — E44.1 MILD MALNUTRITION (HCC): Status: ACTIVE | Noted: 2020-11-24

## 2020-11-24 LAB
ANION GAP SERPL CALCULATED.3IONS-SCNC: 10 MMOL/L (ref 3–16)
BILIRUB SERPL-MCNC: 0.5 MG/DL (ref 0–1)
BLOOD BANK DISPENSE STATUS: NORMAL
BLOOD BANK PRODUCT CODE: NORMAL
BPU ID: NORMAL
BUN BLDV-MCNC: 17 MG/DL (ref 7–20)
CALCIUM SERPL-MCNC: 9.2 MG/DL (ref 8.3–10.6)
CHLORIDE BLD-SCNC: 103 MMOL/L (ref 99–110)
CO2: 22 MMOL/L (ref 21–32)
CREAT SERPL-MCNC: 1.3 MG/DL (ref 0.8–1.3)
DESCRIPTION BLOOD BANK: NORMAL
GFR AFRICAN AMERICAN: >60
GFR NON-AFRICAN AMERICAN: 56
GLUCOSE BLD-MCNC: 125 MG/DL (ref 70–99)
HCT VFR BLD CALC: 23.7 % (ref 40.5–52.5)
HEMOGLOBIN: 8.2 G/DL (ref 13.5–17.5)
MAGNESIUM: 1.5 MG/DL (ref 1.8–2.4)
MCH RBC QN AUTO: 32.4 PG (ref 26–34)
MCHC RBC AUTO-ENTMCNC: 34.5 G/DL (ref 31–36)
MCV RBC AUTO: 93.9 FL (ref 80–100)
PDW BLD-RTO: 17.5 % (ref 12.4–15.4)
PLATELET # BLD: 9 K/UL (ref 135–450)
PMV BLD AUTO: 10 FL (ref 5–10.5)
POTASSIUM SERPL-SCNC: 4.4 MMOL/L (ref 3.5–5.1)
RBC # BLD: 2.53 M/UL (ref 4.2–5.9)
SODIUM BLD-SCNC: 135 MMOL/L (ref 136–145)
WBC # BLD: 0.2 K/UL (ref 4–11)

## 2020-11-24 PROCEDURE — 2580000003 HC RX 258: Performed by: INTERNAL MEDICINE

## 2020-11-24 PROCEDURE — 6370000000 HC RX 637 (ALT 250 FOR IP): Performed by: NURSE PRACTITIONER

## 2020-11-24 PROCEDURE — 82247 BILIRUBIN TOTAL: CPT

## 2020-11-24 PROCEDURE — 80048 BASIC METABOLIC PNL TOTAL CA: CPT

## 2020-11-24 PROCEDURE — 36430 TRANSFUSION BLD/BLD COMPNT: CPT

## 2020-11-24 PROCEDURE — 2580000003 HC RX 258: Performed by: NURSE PRACTITIONER

## 2020-11-24 PROCEDURE — 83735 ASSAY OF MAGNESIUM: CPT

## 2020-11-24 PROCEDURE — 36591 DRAW BLOOD OFF VENOUS DEVICE: CPT

## 2020-11-24 PROCEDURE — 6360000002 HC RX W HCPCS: Performed by: STUDENT IN AN ORGANIZED HEALTH CARE EDUCATION/TRAINING PROGRAM

## 2020-11-24 PROCEDURE — 6370000000 HC RX 637 (ALT 250 FOR IP): Performed by: INTERNAL MEDICINE

## 2020-11-24 PROCEDURE — 6360000002 HC RX W HCPCS: Performed by: INTERNAL MEDICINE

## 2020-11-24 PROCEDURE — 2060000000 HC ICU INTERMEDIATE R&B

## 2020-11-24 PROCEDURE — 85025 COMPLETE CBC W/AUTO DIFF WBC: CPT

## 2020-11-24 RX ORDER — 0.9 % SODIUM CHLORIDE 0.9 %
20 INTRAVENOUS SOLUTION INTRAVENOUS ONCE
Status: COMPLETED | OUTPATIENT
Start: 2020-11-24 | End: 2020-11-24

## 2020-11-24 RX ADMIN — SODIUM CHLORIDE 15 ML: 900 IRRIGANT IRRIGATION at 10:59

## 2020-11-24 RX ADMIN — URSODIOL 500 MG: 250 TABLET, FILM COATED ORAL at 19:57

## 2020-11-24 RX ADMIN — AMLODIPINE BESYLATE 5 MG: 5 TABLET ORAL at 09:23

## 2020-11-24 RX ADMIN — DIPHENHYDRAMINE HYDROCHLORIDE 25 MG: 25 TABLET ORAL at 05:37

## 2020-11-24 RX ADMIN — VALACYCLOVIR HYDROCHLORIDE 500 MG: 500 TABLET, FILM COATED ORAL at 09:24

## 2020-11-24 RX ADMIN — SODIUM CHLORIDE 15 ML: 900 IRRIGANT IRRIGATION at 06:01

## 2020-11-24 RX ADMIN — Medication 10 ML: at 09:23

## 2020-11-24 RX ADMIN — ANTACID TABLETS 1000 MG: 500 TABLET, CHEWABLE ORAL at 16:15

## 2020-11-24 RX ADMIN — POTASSIUM CHLORIDE AND SODIUM CHLORIDE: 900; 150 INJECTION, SOLUTION INTRAVENOUS at 16:12

## 2020-11-24 RX ADMIN — SOTALOL HYDROCHLORIDE 120 MG: 120 TABLET ORAL at 19:56

## 2020-11-24 RX ADMIN — URSODIOL 500 MG: 250 TABLET, FILM COATED ORAL at 09:23

## 2020-11-24 RX ADMIN — ACETAMINOPHEN 650 MG: 325 TABLET ORAL at 05:36

## 2020-11-24 RX ADMIN — TACROLIMUS 4 MG: 1 CAPSULE ORAL at 09:23

## 2020-11-24 RX ADMIN — SOTALOL HYDROCHLORIDE 120 MG: 120 TABLET ORAL at 09:23

## 2020-11-24 RX ADMIN — FLUCONAZOLE 400 MG: 200 TABLET ORAL at 09:23

## 2020-11-24 RX ADMIN — LEVOFLOXACIN 500 MG: 500 TABLET, FILM COATED ORAL at 19:56

## 2020-11-24 RX ADMIN — SIMETHICONE 80 MG: 80 TABLET, CHEWABLE ORAL at 11:22

## 2020-11-24 RX ADMIN — VALACYCLOVIR HYDROCHLORIDE 500 MG: 500 TABLET, FILM COATED ORAL at 19:56

## 2020-11-24 RX ADMIN — LISINOPRIL 40 MG: 40 TABLET ORAL at 09:23

## 2020-11-24 RX ADMIN — SODIUM CHLORIDE 15 ML: 900 IRRIGANT IRRIGATION at 19:58

## 2020-11-24 RX ADMIN — SODIUM CHLORIDE 15 ML: 900 IRRIGANT IRRIGATION at 16:09

## 2020-11-24 RX ADMIN — SODIUM CHLORIDE 20 ML: 9 INJECTION, SOLUTION INTRAVENOUS at 06:01

## 2020-11-24 RX ADMIN — TACROLIMUS 4 MG: 1 CAPSULE ORAL at 19:56

## 2020-11-24 RX ADMIN — ANTACID TABLETS 1000 MG: 500 TABLET, CHEWABLE ORAL at 19:56

## 2020-11-24 RX ADMIN — PANTOPRAZOLE SODIUM 40 MG: 40 TABLET, DELAYED RELEASE ORAL at 06:01

## 2020-11-24 RX ADMIN — SIMETHICONE 80 MG: 80 TABLET, CHEWABLE ORAL at 19:56

## 2020-11-24 RX ADMIN — Medication 10 ML: at 19:57

## 2020-11-24 ASSESSMENT — PAIN SCALES - GENERAL
PAINLEVEL_OUTOF10: 0

## 2020-11-24 NOTE — PLAN OF CARE
Problem: Falls - Risk of:  Goal: Will remain free from falls  Description: Pt up ad abebe in room and halls with steady gait. Independent with ADL's. Uses call light appropriately for assistance as needed. Bed breaks on, non skid footwear on, side rails up x2, call light within reach. Hourly rounding performed per unit protocol. Will continue safety precautions. Will remain free from falls  11/24/2020 0604 by Rashi Sorto RN  Outcome: Ongoing  Note: Orthostatic vital signs obtained at start of shift - see flowsheet for details. Pt does not meet criteria for orthostasis. Pt is a Med fall risk. See Kamari Deems Fall Score and ABCDS Injury Risk assessment  - Screening for Orthostasis AND not a [place] Risk per CAMEJO/ABCDS: Pt bed is in low position, side rails up, call light and belongings are in reach. Fall risk light is on outside pts room. Pt encouraged to call for assistance as needed. Will continue with hourly rounds for PO intake, pain needs, toileting and repositioning as needed. Problem: Infection - Central Venous Catheter-Associated Bloodstream Infection:  Goal: Will show no infection signs and symptoms  Description: Will show no infection signs and symptoms  11/24/2020 0604 by Rashi Sorto RN  Outcome: Ongoing  Note: CVC site remains free of signs/symptoms of infection. No drainage, edema, erythema, pain, or warmth noted at site. Dressing changes continue per protocol and on an as needed basis - see flowsheet. Problem: Bleeding:  Goal: Will show no signs and symptoms of excessive bleeding  Description: Will show no signs and symptoms of excessive bleeding  11/24/2020 0604 by Rashi Sorto RN  Outcome: Ongoing  Note: Patient's hemoglobin this AM:   Recent Labs     11/24/20  0340   HGB 8.2*     Patient's platelet count this AM:   Recent Labs     11/24/20  0340   PLT 9*    Thrombocytopenia Precautions in place. Patient showing no signs or symptoms of active bleeding.   Patient transfused blood products per orders - see flowsheet. Patient verbalizes understanding of all instructions. Will continue to assess and implement POC. Call light within reach and hourly rounding in place. Problem: Venous Thromboembolism:  Goal: Will show no signs or symptoms of venous thromboembolism  Description: Will show no signs or symptoms of venous thromboembolism  11/24/2020 0604 by Honey Gorman RN  Outcome: Ongoing  Note: Adherent with DVT Prevention: Pt is at risk for DVT d/t decreased mobility and cancer treatment. Pt educated on importance of activity. Pt has orders for SCDs while in bed. Pt verbalizes understanding of need for prophylaxis while inpatient. Problem: Discharge Planning:  Goal: Discharged to appropriate level of care  Description: Discharged to appropriate level of care  Outcome: Ongoing  Note: Brandon Kwan is aware of the current plan of care. Will continue to monitor. Problem: PROTECTIVE PRECAUTIONS  Goal: Patient will remain free of nosocomial Infections  11/24/2020 0604 by Honey Gorman RN  Outcome: Ongoing  Note: Pt remains in neutropenic precautions for WBC of 0.2. Pt educated on use of mask prior to leaving room. All staff and visitor following handwashing requirements this shift prior to entering room. Low microbial diet order in place and being followed. Problem: Nutrition  Goal: Optimal nutrition therapy  11/23/2020 1946 by Radha Pedro RN  Outcome: Ongoing  Note: Pt with decreased appetite. Will continue to encourage. Problem: Nausea/Vomiting:  Goal: Absence of nausea/vomiting  Description: C/O mild nausea without vomiting. Compazine x1 PO given as prophylaxis   11/24/2020 0604 by Honey Gorman RN  Outcome: Ongoing  Note: There has been no episodes of nausea or vomiting during this shift. Will continue to monitor.       Problem: Diarrhea:  Goal: Bowel elimination is within specified parameters  Description: Bowel elimination is within specified parameters  11/24/2020 0604 by Reyes Foil, RN  Outcome: Ongoing  Note: Nicko Ang has had no episodes of diarrhea during shift. Will continue to monitor. Problem: Skin Integrity:  Goal: Will show no infection signs and symptoms  Description: Will show no infection signs and symptoms  11/24/2020 0604 by Reyes Foil, RN  Outcome: Ongoing  Note: CVC site remains free of signs/symptoms of infection. No drainage, edema, erythema, pain, or warmth noted at site. Dressing changes continue per protocol and on an as needed basis - see flowsheet. Problem: Pain:  Goal: Pain level will decrease  Description: Pain level will decrease  11/24/2020 0604 by Reyes Foil, RN  Outcome: Ongoing  Note: Nicko Ang has had no complaints of pain during shift. Will continue to monitor.

## 2020-11-24 NOTE — PLAN OF CARE
Problem: Falls - Risk of:  Goal: Will remain free from falls  Description: Pt up ad abebe in room and halls with steady gait. Independent with ADL's. Uses call light appropriately for assistance as needed. Bed breaks on, non skid footwear on, side rails up x2, call light within reach. Hourly rounding performed per unit protocol. Will continue safety precautions. Will remain free from falls  11/23/2020 1946 by David Johnson RN  Outcome: Ongoing  Note: Orthostatic vital signs obtained at start of shift - see flowsheet for details. Pt does not meet criteria for orthostasis. Pt is a Med fall risk. See Kamari Deems Fall Score and ABCDS Injury Risk assessments. - Screening for Orthostasis AND not a [place] Risk per CAMEJO/ABCDS: Pt bed is in low position, side rails up, call light and belongings are in reach. Fall risk light is on outside pts room. Pt encouraged to call for assistance as needed. Will continue with hourly rounds for PO intake, pain needs, toileting and repositioning as needed. Problem: Infection - Central Venous Catheter-Associated Bloodstream Infection:  Goal: Will show no infection signs and symptoms  Description: Will show no infection signs and symptoms  11/23/2020 1946 by David Johnson RN  Outcome: Ongoing  Note: CVC site remains free of signs/symptoms of infection. No drainage, edema, erythema, pain, or warmth noted at site. Dressing changes continue per protocol and on an as needed basis - see flowsheet. Compliant with [facility] Bath Protocol:  Performed CHG bath today per BCC protocol utilizing CHG solution in the shower. CVC site cleansed with CHG wipe over dressing, skin surrounding dressing, and first 6\" of IV tubing. Pt tolerated well. Continued to encourage daily CHG bathing per [facility] protocol.      Problem: Bleeding:  Goal: Will show no signs and symptoms of excessive bleeding  Description: Will show no signs and symptoms of excessive bleeding  11/23/2020 1946 by David Johnson RN  Outcome: Ongoing  Note: Patient's hemoglobin this AM:   Recent Labs     11/23/20 0420   HGB 7.0*     Patient's platelet count this AM:   Recent Labs     11/23/20 0420   PLT 11*    Thrombocytopenia Precautions in place. Patient showing no signs or symptoms of active bleeding. Patient transfused blood products per orders - see flowsheet. Patient verbalizes understanding of all instructions. Will continue to assess and implement POC. Call light within reach and hourly rounding in place. Problem: Venous Thromboembolism:  Goal: Will show no signs or symptoms of venous thromboembolism  Description: Will show no signs or symptoms of venous thromboembolism  11/23/2020 1946 by Marta Cerrato RN  Outcome: Ongoing  Note: Adherent with DVT Prevention: Pt is at risk for DVT d/t decreased mobility and cancer treatment. Pt educated on importance of activity. Problem: PROTECTIVE PRECAUTIONS  Goal: Patient will remain free of nosocomial Infections  11/23/2020 1946 by Marta Cerrato RN  Note:   Pt remains in neutropenic precautions. Pt educated on use of mask prior to leaving room. All staff and visitor following handwashing requirements this shift prior to entering room. Low microbial diet order in place and being followed. Problem: Nutrition  Goal: Optimal nutrition therapy  Outcome: Ongoing  Note: Pt with decreased appetite. Will continue to encourage. Problem: Nausea/Vomiting:  Goal: Absence of nausea/vomiting  Description: C/O mild nausea without vomiting. Compazine x1 PO given as prophylaxis   11/23/2020 1946 by Marta Cerrato RN  Outcome: Ongoing  Note: No complaints of nausea or vomiting this shift. Tums given before meals. Will continue to monitor. Problem: Diarrhea:  Goal: Bowel elimination is within specified parameters  Description: Bowel elimination is within specified parameters  Outcome: Ongoing  Note: Pt with one loose/watery BM this shift. Lomotil given. Will continue to monitor. Problem: Pain:  Goal: Pain level will decrease  Description: Pain level will decrease  Outcome: Ongoing  Note: No complaints of pain this shift. Will continue to monitor.

## 2020-11-24 NOTE — PROGRESS NOTES
Resp 18   Ht 6' 3\" (1.905 m)   Wt (!) 308 lb 6.4 oz (139.9 kg)   SpO2 100%   BMI 38.55 kg/m²     Weight:    Wt Readings from Last 3 Encounters:   11/24/20 (!) 308 lb 6.4 oz (139.9 kg)   10/08/20 (!) 325 lb (147.4 kg)   09/29/20 (!) 325 lb 13.4 oz (147.8 kg)       General: Awake, alert and oriented. HEENT: normocephalic, PERRL, no scleral erythema or icterus, Oral mucosa moist and intact, throat clear  NECK: supple  BACK: Straight  SKIN: warm dry and intact without lesions rashes or masses  CHEST: CTA bilaterally without use of accessory muscles  CV: Normal S1 S2, RRR, no MRG  ABD: NT, ND, normoactive BS, no palpable masses or hepatosplenomegaly  EXTREMITIES: without edema, denies calf tenderness  NEURO: CN II - XII grossly intact  CATHETER: Right tri-fusion ([facility], 6/11/20) - CDI    Data:   CBC:   Recent Labs     11/22/20 0349 11/23/20 0420 11/24/20 0340   WBC 0.1* 0.2* 0.2*   HGB 7.5* 7.0* 8.2*   HCT 21.8* 20.3* 23.7*   MCV 95.3 95.4 93.9   PLT 10* 11* 9*     BMP/Mag:  Recent Labs     11/22/20 0349 11/23/20 0420 11/24/20 0340   * 134* 135*   K 4.0 3.8 4.4    101 103   CO2 21 21 22   PHOS  --  3.4  --    BUN 13 13 17   CREATININE 1.2 1.1 1.3   MG 1.70* 1.20* 1.50*     LIVP:   Recent Labs     11/22/20 0349 11/23/20 0420 11/24/20 0340   AST  --  18  --    ALT  --  54*  --    BILIDIR  --  <0.2  --    BILITOT 0.6 0.5 0.5   ALKPHOS  --  157*  --      Uric Acid:    Recent Labs     11/23/20 0420   LABURIC 4.2     Coags:   Recent Labs     11/23/20  0420   PROTIME 14.5*   INR 1.25*   APTT 33.4        PROBLEM LIST:         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 CMML - 1 (Dx 10/2020)  3. Atrial Fibrillation  4. CASH  5. HTN    Post Transplant Complications:  1. Nausea and Indigestion  2. Diarrhea       TREATMENT:        1. Induction: Sweetie-C & Daunorubicin (6/12/20)  2. Consolidation:  HiDAC x 3 cycles (7/28/20, 8/29/20 & 9/22/20)  3.   MUD Allo - PB BMT (DPB1 non-permissive mismatch)  Preparative Regimen:  Melphalan and Fludarabine  Date of BMT:  11/11/20  Source of stem cells:  PBPC - 4.4 x10^6 da79mvcgs/kg  Donor/Recipient Blood Type:  A positive / 0 positive   Donor Sex:  Female / NMDP (DID # [id number]) - Follow FISH XY  CMV Donor / Recipient: Negative / Negative       ASSESSMENT AND PLAN:        1. AML - intermediate risk / CMML-1:  - BM bx/asp (10/14/20) - No AML, but shows CMML-1   - S/p Melphalan & Fludarabine f/b allogeneic PBSC infusion 11/11/20  - Post txp disease eval:  BM bx, no abnormal FISH, cytogenetics or mutations to follow     Day +14     2. ID:  No evidence of infection.     - Cont Levaquin, Diflucan & Valtrex ppx     Donor/Recipient CMV:  Negative / negative    - Routine surveillance is not required       3. Heme: Pancytopenia from chemotherapy   - Transfuse for Hgb < 7 and Platelets < 01M   - Plt transfusion today  - Tylenol and Benadryl prior to each blood product. 4. Metabolic / CKD III: HypoNa, HypoMg, Hyperglycemia  - Baseline SCr 1.2-1.3.  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 Levels qMWF:  Lab Results   Component Value Date    TACROLEV 8.4 11/23/2020    TACROLEV 8.1 11/20/2020    TACROLEV 8.1 11/18/2020      6. VOD:  No evidence of VOD. Admission Weight: (!) 324 lb 3.2 oz (147.1 kg)  Current Weight: Weight: (!) 308 lb 6.4 oz (139.9 kg). Recent Labs     11/23/20  0420 11/24/20  0340   BILIDIR <0.2  --    BILITOT 0.5 0.5   - Cont Actigall     7. Pulmonary: H/o CASH  - PFT (10/6/20) - FEV1/FVC 71 w/ predicted ratio of 75 & DLCO 41% (corrected 84%).   He has moderate obstruction by FEV1/FVC ratio lower than predicted and also based on ATS criteria with low FEV, likely d/t morbid obesity   - Pulm clearance by Dr. Chel Steward  CASH:  - Cont CPAP  - Encourage IS and ambulation      8. GI / Nutrition:    Nutrition:  Appetite is good  - Cont low microbial diet  - Dietary to follow    Diarrhea:  resolved  - C. Diff (11/11/20) - negative  - Cont Imodium as needed   - Added Lomotil as needed  Nausea & Indigestion: improved     9. Cardiac: H/o A. Fib & HTN  - Echo (9/29/20) -  LVEF 60-65% w/ left atrial dilatation  A. Fib:  Currently in NSR  - Cont sotalol 120 mg BID  HTN: stable  - Cont amlodipine-lisinopril 5/40 mg daily    - DVT Prophylaxis: Platelets <97,008 cells/dL, - prophylactic lovenox on hold and mechanical prophylaxis with bilateral SCDs while in bed in place.   Contraindications to pharmacologic prophylaxis: None  Contraindications to mechanical prophylaxis: None     - Disposition:  D/c home once ANC > 1.0 & toxicities resolved following allogeneic transplant     Jhonny Wells MD     11/24/2020   10:25 AM

## 2020-11-24 NOTE — PLAN OF CARE
Problem: Falls - Risk of:  Goal: Will remain free from falls  Description: Pt up ad abebe in room and halls with steady gait. Independent with ADL's. Uses call light appropriately for assistance as needed. Bed breaks on, non skid footwear on, side rails up x2, call light within reach. Hourly rounding performed per unit protocol. Will continue safety precautions. Will remain free from falls  11/24/2020 1448 by Elaine Pat RN  Outcome: Ongoing    Pt with activity orders for up ad abebe. Encouraged pt to be up OOB as much as possible throughout the day and for all meals. Encouraged frequent short naps as necessary to preserve energy but instructed that while awake, pt should be OOB. Pt is visualized to be OOB % of the time this shift. Will continue to encourage frequent activity. Problem: Infection - Central Venous Catheter-Associated Bloodstream Infection:  Goal: Will show no infection signs and symptoms  Description: Will show no infection signs and symptoms  11/24/2020 1448 by Elaine Pat RN  Outcome: Ongoing   Pt afebrile. Will monitor. Problem: Bleeding:  Goal: Will show no signs and symptoms of excessive bleeding  Description: Will show no signs and symptoms of excessive bleeding  11/24/2020 1448 by Elaine Pat RN  Outcome: Ongoing   Patient's hemoglobin this AM:   Recent Labs     11/24/20  0340   HGB 8.2*     Patient's platelet count this AM:   Recent Labs     11/24/20  0340   PLT 9*    Thrombocytopenia Precautions in place. Patient showing no signs or symptoms of active bleeding. Patient received transfusions per orders prior to this shift. Patient verbalizes understanding of all instructions. Will continue to assess and implement POC. Call light within reach and hourly rounding in place.       Problem: Discharge Planning:  Goal: Discharged to appropriate level of care  Description: Discharged to appropriate level of care  11/24/2020 1448 by Elaine Pat RN  Outcome: Ongoing   Pt verbalize understanding of treatment plan. Will monitor. Problem: PROTECTIVE PRECAUTIONS  Goal: Patient will remain free of nosocomial Infections  11/24/2020 1448 by Uday Dai RN  Outcome: Ongoing   Pt verbalize understanding of precautions. Problem: Nutrition  Goal: Optimal nutrition therapy  Outcome: Ongoing   Encourage small frequent meals. Will monitor. Problem: Nausea/Vomiting:  Goal: Absence of nausea/vomiting  Description: C/O mild nausea without vomiting. Compazine x1 PO given as prophylaxis   11/24/2020 1448 by Uday Dai RN  Outcome: Ongoing   Pt denies nausea at this time. Encourage pt to call out with any needs or status changes. Will monitor. Problem: Skin Integrity:  Goal: Will show no infection signs and symptoms  Description: Will show no infection signs and symptoms  11/24/2020 1448 by Uday Dai RN  Outcome: Ongoing   Pt has generalized bruising. Will monitor. Problem: Pain:  Goal: Pain level will decrease  Description: Pain level will decrease  11/24/2020 1448 by Uday Dai RN  Outcome: Ongoing   Pt having some abdominal cramping. Pt medicated with simethicone. Pt verbalize relief. Will monitor.

## 2020-11-24 NOTE — PROGRESS NOTES
NUTRITION ASSESSMENT  Admission Date: 11/5/2020     Type and Reason for Visit: Reassess    NUTRITION RECOMMENDATIONS:   1. PO Diet: Continue current diet low microbial.    2. Nutrition Education: 11/6 provided verbal review of food safety s/p BMT and dietary recommendations. 3. ONS: pt declining all at this time. Will revisit if nutritional status worsens. NUTRITION ASSESSMENT:  Nutritionally stable through BMT recovery, taking in % of most meals. RD notes pt w/some nausea and recently diarrhea, however this does not appear to be deterring him from adequacy of his nutrition at this time. RD does note pt w/signicant weight loss throughout admission of 15+ lb, indicating pt w/mild malnutrition from increased metabolic needs s/p BMT. MALNUTRITION ASSESSMENT  Context of Malnutrition: Acute Illness(on chronic)   Malnutrition Status: Mild malnutrition  Findings of the 6 clinical characteristics of malnutrition (Minimum of 2 out of 6 clinical characteristics is required to make the diagnosis of moderate or severe Protein Calorie Malnutrition based on AND/ASPEN Guidelines):  Energy Intake: Less than/equal to 75% of estimated energy requirements    Energy Intake Time: Greater than or equal to 7 days    Weight Loss %: 4.6%   Weight loss Time: Greater than or equal to 7 days    Due to current CDC guidelines recommending 6-ft distancing for social isolation for COVID19 prevention, physical aspects of the malnutrition assessment were withheld at this time.      NUTRITION DIAGNOSIS  Problem: Problem #1: Increased nutrient needs  Etiology: Increased demand for nutrient  Signs & Symptoms: chemot; ALLO bmt     NUTRITION INTERVENTION  Food and/or Nutrient Delivery:Continue Current diet   Nutrition education/counseling/coordination of care: Education Initiated     NUTRITION MONITORING & EVALUATION:  Evaluation:Progress towards goal declining   Goals:Goals: pt will maintain adequate po nutrition s/p BMT by consuming greater than 75% of meals offered through admission to promote meeting increased nutrient needs. Monitoring: Diet Tolerance , Meal Intake  or Pertinent Labs      OBJECTIVE DATA:  · Nutrition-Focused Physical Findings: loose bm this AM; imodium PRN  · Wounds None      Past Medical History:   Diagnosis Date    Arthritis     knees    Cancer ([facility] [address]     AML    History of blood transfusion     Hyperlipidemia     Hypertension         ANTHROPOMETRICS  Current Height: 6' 3\" (190.5 cm)  Current Weight: (!) 308 lb 6.4 oz (139.9 kg)    Admission weight: (!) 324 lb 3.2 oz (147.1 kg)  Ideal Bodyweight 196 lb    Usual Bodyweight 325 lb per emr   Weight Changes -15 lb within past week       BMI BMI (Calculated): 38.6    Wt Readings from Last 50 Encounters:   11/24/20 (!) 308 lb 6.4 oz (139.9 kg)   10/08/20 (!) 325 lb (147.4 kg)   09/29/20 (!) 325 lb 13.4 oz (147.8 kg)       COMPARATIVE STANDARDS  Estimated Total Kcals/Day : 15-18 Current Bodyweight (140 kg) 6366-6704 kcal    Estimated Total Protein (g/day) : 1.5-1.8 Ideal Bodyweight  (89.1 kg) 134-160g/day  Estimated Daily Total Fluid (ml/day): 1517-2532 mL per day     Food / Nutrition-Related History  Pre-Admission / Home Diet:  Pre-Admission/Home Diet: General   Home Supplements / Herbals:   none noted  Food Restrictions / Cultural Requests:   none noted    Current Nutrition Therapies   DIET GENERAL;     PO Intake: %  PO Supplement: None   PO Supplement Intake: None   IVF: NS + 20 mEq KCl @ 50 ml/hr     NUTRITION RISK LEVEL: Risk Level:  Moderate     Evert Rivera RD, LD  [place]:  [phone]  Office:  [phone]

## 2020-11-25 LAB
ALBUMIN SERPL-MCNC: 3.6 G/DL (ref 3.4–5)
ALP BLD-CCNC: 194 U/L (ref 40–129)
ALT SERPL-CCNC: 88 U/L (ref 10–40)
ANION GAP SERPL CALCULATED.3IONS-SCNC: 9 MMOL/L (ref 3–16)
AST SERPL-CCNC: 45 U/L (ref 15–37)
BILIRUB SERPL-MCNC: 0.4 MG/DL (ref 0–1)
BILIRUBIN DIRECT: <0.2 MG/DL (ref 0–0.3)
BILIRUBIN, INDIRECT: ABNORMAL MG/DL (ref 0–1)
BUN BLDV-MCNC: 16 MG/DL (ref 7–20)
CALCIUM SERPL-MCNC: 9.3 MG/DL (ref 8.3–10.6)
CHLORIDE BLD-SCNC: 103 MMOL/L (ref 99–110)
CO2: 23 MMOL/L (ref 21–32)
CREAT SERPL-MCNC: 1.2 MG/DL (ref 0.8–1.3)
GFR AFRICAN AMERICAN: >60
GFR NON-AFRICAN AMERICAN: >60
GLUCOSE BLD-MCNC: 114 MG/DL (ref 70–99)
HCT VFR BLD CALC: 23.4 % (ref 40.5–52.5)
HEMOGLOBIN: 8.1 G/DL (ref 13.5–17.5)
LACTATE DEHYDROGENASE: 164 U/L (ref 100–190)
MAGNESIUM: 1.2 MG/DL (ref 1.8–2.4)
MCH RBC QN AUTO: 32.4 PG (ref 26–34)
MCHC RBC AUTO-ENTMCNC: 34.8 G/DL (ref 31–36)
MCV RBC AUTO: 93.2 FL (ref 80–100)
PDW BLD-RTO: 17.3 % (ref 12.4–15.4)
PHOSPHORUS: 3.7 MG/DL (ref 2.5–4.9)
PLATELET # BLD: 12 K/UL (ref 135–450)
PMV BLD AUTO: 9.3 FL (ref 5–10.5)
POTASSIUM SERPL-SCNC: 4 MMOL/L (ref 3.5–5.1)
RBC # BLD: 2.51 M/UL (ref 4.2–5.9)
SODIUM BLD-SCNC: 135 MMOL/L (ref 136–145)
TACROLIMUS BLOOD: 8.9 NG/ML (ref 5–20)
TOTAL PROTEIN: 6.2 G/DL (ref 6.4–8.2)
URIC ACID, SERUM: 4.3 MG/DL (ref 3.5–7.2)
WBC # BLD: 0.2 K/UL (ref 4–11)

## 2020-11-25 PROCEDURE — 6370000000 HC RX 637 (ALT 250 FOR IP): Performed by: NURSE PRACTITIONER

## 2020-11-25 PROCEDURE — 83735 ASSAY OF MAGNESIUM: CPT

## 2020-11-25 PROCEDURE — 80048 BASIC METABOLIC PNL TOTAL CA: CPT

## 2020-11-25 PROCEDURE — 83615 LACTATE (LD) (LDH) ENZYME: CPT

## 2020-11-25 PROCEDURE — 6360000002 HC RX W HCPCS: Performed by: INTERNAL MEDICINE

## 2020-11-25 PROCEDURE — 6370000000 HC RX 637 (ALT 250 FOR IP): Performed by: INTERNAL MEDICINE

## 2020-11-25 PROCEDURE — 80076 HEPATIC FUNCTION PANEL: CPT

## 2020-11-25 PROCEDURE — 36592 COLLECT BLOOD FROM PICC: CPT

## 2020-11-25 PROCEDURE — 84100 ASSAY OF PHOSPHORUS: CPT

## 2020-11-25 PROCEDURE — 6360000002 HC RX W HCPCS: Performed by: NURSE PRACTITIONER

## 2020-11-25 PROCEDURE — 85025 COMPLETE CBC W/AUTO DIFF WBC: CPT

## 2020-11-25 PROCEDURE — 80197 ASSAY OF TACROLIMUS: CPT

## 2020-11-25 PROCEDURE — 6360000002 HC RX W HCPCS: Performed by: STUDENT IN AN ORGANIZED HEALTH CARE EDUCATION/TRAINING PROGRAM

## 2020-11-25 PROCEDURE — 2060000000 HC ICU INTERMEDIATE R&B

## 2020-11-25 PROCEDURE — 2580000003 HC RX 258: Performed by: NURSE PRACTITIONER

## 2020-11-25 PROCEDURE — 84550 ASSAY OF BLOOD/URIC ACID: CPT

## 2020-11-25 RX ADMIN — VALACYCLOVIR HYDROCHLORIDE 500 MG: 500 TABLET, FILM COATED ORAL at 21:29

## 2020-11-25 RX ADMIN — LISINOPRIL 40 MG: 40 TABLET ORAL at 08:41

## 2020-11-25 RX ADMIN — TACROLIMUS 4 MG: 1 CAPSULE ORAL at 08:40

## 2020-11-25 RX ADMIN — PANTOPRAZOLE SODIUM 40 MG: 40 TABLET, DELAYED RELEASE ORAL at 05:32

## 2020-11-25 RX ADMIN — TACROLIMUS 4 MG: 1 CAPSULE ORAL at 21:29

## 2020-11-25 RX ADMIN — Medication 10 ML: at 08:40

## 2020-11-25 RX ADMIN — SIMETHICONE 80 MG: 80 TABLET, CHEWABLE ORAL at 15:03

## 2020-11-25 RX ADMIN — SOTALOL HYDROCHLORIDE 120 MG: 120 TABLET ORAL at 08:40

## 2020-11-25 RX ADMIN — LEVOFLOXACIN 500 MG: 500 TABLET, FILM COATED ORAL at 21:29

## 2020-11-25 RX ADMIN — MAGNESIUM SULFATE HEPTAHYDRATE 4 G: 40 INJECTION, SOLUTION INTRAVENOUS at 05:33

## 2020-11-25 RX ADMIN — SODIUM CHLORIDE 15 ML: 900 IRRIGANT IRRIGATION at 11:55

## 2020-11-25 RX ADMIN — URSODIOL 500 MG: 250 TABLET, FILM COATED ORAL at 21:29

## 2020-11-25 RX ADMIN — LOPERAMIDE HYDROCHLORIDE 2 MG: 2 CAPSULE ORAL at 15:03

## 2020-11-25 RX ADMIN — Medication 10 ML: at 21:30

## 2020-11-25 RX ADMIN — SODIUM CHLORIDE 15 ML: 900 IRRIGANT IRRIGATION at 06:06

## 2020-11-25 RX ADMIN — AMLODIPINE BESYLATE 5 MG: 5 TABLET ORAL at 08:41

## 2020-11-25 RX ADMIN — URSODIOL 500 MG: 250 TABLET, FILM COATED ORAL at 08:42

## 2020-11-25 RX ADMIN — VALACYCLOVIR HYDROCHLORIDE 500 MG: 500 TABLET, FILM COATED ORAL at 08:41

## 2020-11-25 RX ADMIN — SODIUM CHLORIDE 15 ML: 900 IRRIGANT IRRIGATION at 15:03

## 2020-11-25 RX ADMIN — SODIUM CHLORIDE 15 ML: 900 IRRIGANT IRRIGATION at 21:30

## 2020-11-25 RX ADMIN — POTASSIUM CHLORIDE AND SODIUM CHLORIDE: 900; 150 INJECTION, SOLUTION INTRAVENOUS at 15:03

## 2020-11-25 RX ADMIN — SALINE NASAL SPRAY 1 SPRAY: 1.5 SOLUTION NASAL at 05:32

## 2020-11-25 RX ADMIN — ANTACID TABLETS 1000 MG: 500 TABLET, CHEWABLE ORAL at 13:38

## 2020-11-25 RX ADMIN — ANTACID TABLETS 1000 MG: 500 TABLET, CHEWABLE ORAL at 18:29

## 2020-11-25 RX ADMIN — SOTALOL HYDROCHLORIDE 120 MG: 120 TABLET ORAL at 21:29

## 2020-11-25 RX ADMIN — SIMETHICONE 80 MG: 80 TABLET, CHEWABLE ORAL at 21:29

## 2020-11-25 RX ADMIN — FLUCONAZOLE 400 MG: 200 TABLET ORAL at 08:41

## 2020-11-25 ASSESSMENT — PAIN SCALES - GENERAL
PAINLEVEL_OUTOF10: 0

## 2020-11-25 NOTE — PLAN OF CARE
Problem: Nutrition  Goal: Optimal nutrition therapy  11/25/2020 1432 by Denver Irwin, RD, LD  Outcome: Ongoing  Note: Nutrition Problem #1: Increased nutrient needs  Intervention: Food and/or Nutrient Delivery: Continue Current Diet  Nutritional Goals: pt will maintain adequate po nutrition s/p BMT by consuming greater than 75% of meals offered through admission to promote meeting increased nutrient needs.

## 2020-11-25 NOTE — PLAN OF CARE
Problem: Falls - Risk of:  Goal: Will remain free from falls  Description: Pt up ad abebe in room and halls with steady gait. Independent with ADL's. Uses call light appropriately for assistance as needed. Bed breaks on, non skid footwear on, side rails up x2, call light within reach. Hourly rounding performed per unit protocol. Will continue safety precautions. Will remain free from falls  Outcome: Ongoing   Orthostatic vital signs obtained at start of shift - see flowsheet for details. Pt does not meet criteria for orthostasis. Pt is a Med fall risk. See Elenora Dianne Fall Score and ABCDS Injury Risk assessments. - Screening for Orthostasis AND not a [place] Risk per CAMEJO/ABCDS: Pt bed is in low position, side rails up, call light and belongings are in reach. Fall risk light is on outside pts room. Pt encouraged to call for assistance as needed. Will continue with hourly rounds for PO intake, pain needs, toileting and repositioning as needed. Problem: Infection - Central Venous Catheter-Associated Bloodstream Infection:  Goal: Will show no infection signs and symptoms  Description: Will show no infection signs and symptoms  Outcome: Ongoing   CVC site remains free of signs/symptoms of infection. No drainage, edema, erythema, pain, or warmth noted at site. Dressing changes continue per protocol and on an as needed basis - see flowsheet. Problem: Bleeding:  Goal: Will show no signs and symptoms of excessive bleeding  Description: Will show no signs and symptoms of excessive bleeding  Outcome: Ongoing   Patient's hemoglobin this AM:   Recent Labs     11/25/20  0348   HGB 8.1*     Patient's platelet count this AM:   Recent Labs     11/25/20  0348   PLT 12*    Thrombocytopenia Precautions in place. Patient showing no signs or symptoms of active bleeding. Transfusion not indicated at this time. Patient verbalizes understanding of all instructions. Will continue to assess and implement POC.  Call light within reach and hourly rounding in place. Problem: Venous Thromboembolism:  Goal: Will show no signs or symptoms of venous thromboembolism  Description: Will show no signs or symptoms of venous thromboembolism  Outcome: Ongoing   Patient ambulatory when OOB. Educated on importance of ambulation in order to prevent DVTs, verbalized understanding. Problem: PROTECTIVE PRECAUTIONS  Goal: Patient will remain free of nosocomial Infections  Outcome: Ongoing   WBC 0.2. Patient educated on importance of handwashing and mask use when OOB. Problem: Diarrhea:  Goal: Bowel elimination is within specified parameters  Description: Bowel elimination is within specified parameters  Outcome: Ongoing   Patient denies diarrhea, per patient formed bowel movement. Problem: Pain:  Goal: Control of acute pain  Description: Control of acute pain  Outcome: Ongoing   Patient with gas pain, reports relief with PRN simethicone (see MAR).

## 2020-11-25 NOTE — PROGRESS NOTES
[facility] Allogeneic Progress Note    2020    Read Heady    :  1960    MRN:  [id number]    Referring MD: Jorge Hartman MD  [id number]  [address], [address]    Subjective: Feeling good today. No GI complaints.   Eating at every meal.     ECOG PS:  (1) Restricted in physically strenuous activity, ambulatory and able to do work of light nature    KPS: 80% Normal activity with effort; some signs or symptoms of disease    Isolation:  None     Medications    Scheduled Meds:   sodium chloride  20 mL Intravenous Once    sodium chloride  20 mL Intravenous Once    sodium chloride  20 mL Intravenous Once    pantoprazole  40 mg Oral QAM AC    tacrolimus  4 mg Oral BID    levoFLOXacin  500 mg Oral Nightly    sodium chloride flush  10 mL Intravenous 2 times per day    Saline Mouthwash  15 mL Swish & Spit 4x Daily AC & HS    valACYclovir  500 mg Oral BID    sotalol  120 mg Oral BID    amLODIPine  5 mg Oral Daily    And    lisinopril  40 mg Oral Daily    ursodiol  500 mg Oral BID    fluconazole  400 mg Oral Daily     Continuous Infusions:   0.9% NaCl with KCl 20 mEq 50 mL/hr at 20 1612    sodium chloride      sodium chloride       PRN Meds:sodium chloride, acetaminophen, diphenhydrAMINE, traMADol, oxyCODONE, calcium carbonate, simethicone, diphenoxylate-atropine, [COMPLETED] loperamide **FOLLOWED BY** loperamide, sodium chloride, sennosides-docusate sodium, sodium chloride, sodium chloride flush, potassium chloride, magnesium sulfate, magnesium hydroxide, Saline Mouthwash, alteplase, prochlorperazine **OR** prochlorperazine, LORazepam **OR** LORazepam    ROS:  As noted above, otherwise remainder of 10-point ROS negative    Physical Exam:    I&O:      Intake/Output Summary (Last 24 hours) at 2020 0927  Last data filed at 2020 0844  Gross per 24 hour   Intake 2059 ml   Output 4500 ml   Net -2441 ml       Vital Signs:  /88 Pulse 90   Temp 98.8 °F (37.1 °C) (Oral)   Resp 17   Ht 6' 3\" (1.905 m)   Wt (!) 302 lb 12.8 oz (137.3 kg)   SpO2 99%   BMI 37.85 kg/m²     Weight:    Wt Readings from Last 3 Encounters:   11/25/20 (!) 302 lb 12.8 oz (137.3 kg)   10/08/20 (!) 325 lb (147.4 kg)   09/29/20 (!) 325 lb 13.4 oz (147.8 kg)       General: Awake, alert and oriented. HEENT: normocephalic, PERRL, no scleral erythema or icterus, Oral mucosa moist and intact, throat clear  NECK: supple  BACK: Straight  SKIN: warm dry and intact without lesions rashes or masses  CHEST: CTA bilaterally without use of accessory muscles  CV: Normal S1 S2, RRR, no MRG  ABD: NT, ND, normoactive BS, no palpable masses or hepatosplenomegaly  EXTREMITIES: without edema, denies calf tenderness  NEURO: CN II - XII grossly intact  CATHETER: Right tri-fusion ([facility], 6/11/20) - CDI    Data:   CBC:   Recent Labs     11/23/20 0420 11/24/20 0340 11/25/20 0348   WBC 0.2* 0.2* 0.2*   HGB 7.0* 8.2* 8.1*   HCT 20.3* 23.7* 23.4*   MCV 95.4 93.9 93.2   PLT 11* 9* 12*     BMP/Mag:  Recent Labs     11/23/20 0420 11/24/20 0340 11/25/20 0348   * 135* 135*   K 3.8 4.4 4.0    103 103   CO2 21 22 23   PHOS 3.4  --  3.7   BUN 13 17 16   CREATININE 1.1 1.3 1.2   MG 1.20* 1.50* 1.20*     LIVP:   Recent Labs     11/23/20 0420 11/24/20 0340 11/25/20  0348   AST 18  --  45*   ALT 54*  --  88*   BILIDIR <0.2  --  <0.2   BILITOT 0.5 0.5 0.4   ALKPHOS 157*  --  194*     Uric Acid:    Recent Labs     11/25/20  0348   LABURIC 4.3     Coags:   Recent Labs     11/23/20  0420   PROTIME 14.5*   INR 1.25*   APTT 33.4        PROBLEM LIST:         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 CMML - 1 (Dx 10/2020)  3. Atrial Fibrillation  4. CASH  5. HTN    Post Transplant Complications:  1. Nausea and Indigestion  2. Diarrhea       TREATMENT:        1. Induction: Sweetie-C & Daunorubicin (6/12/20)  2. Consolidation:  HiDAC x 3 cycles (7/28/20, 8/29/20 & 9/22/20)  3.   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 AML - intermediate risk / CMML-1:  - BM bx/asp (10/14/20) - No AML, but shows CMML-1   - S/p Melphalan & Fludarabine f/b allogeneic PBSC infusion 11/11/20  - Post txp disease eval:  BM bx, no abnormal FISH, cytogenetics or mutations to follow     Day +15     2. ID:  No evidence of infection.     - Cont Levaquin, Diflucan & Valtrex ppx     Donor/Recipient CMV:  Negative / negative    - Routine surveillance is not required       3. Heme: Pancytopenia from chemotherapy   - Transfuse for Hgb < 7 and Platelets < 25B   - No transfusion today  - Tylenol and Benadryl prior to each blood product. 4. Metabolic / CKD III: HypoNa, HypoMg  - Baseline SCr 1.2-1.3.  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 Levels qMWF:  Lab Results   Component Value Date    TACROLEV 8.4 11/23/2020    TACROLEV 8.1 11/20/2020    TACROLEV 8.1 11/18/2020      6. VOD:  No evidence of VOD. Admission Weight: (!) 324 lb 3.2 oz (147.1 kg)  Current Weight: Weight: (!) 302 lb 12.8 oz (137.3 kg). Recent Labs     11/25/20  0348   BILIDIR <0.2   BILITOT 0.4   - Cont Actigall     7. Pulmonary: H/o CASH  - PFT (10/6/20) - FEV1/FVC 71 w/ predicted ratio of 75 & DLCO 41% (corrected 84%).   He has moderate obstruction by FEV1/FVC ratio lower than predicted and also based on ATS criteria with low FEV, likely d/t morbid obesity   - Pulm clearance by Dr. Brenda Alvarado  CASH:  - Cont CPAP  - Encourage IS and ambulation      8. GI / Nutrition: Nutrition:  Appetite is good  - Cont low microbial diet  - Dietary to follow    Diarrhea:  resolved  - C. Diff (11/11/20) - negative  - Cont Imodium as needed   - Added Lomotil as needed  Nausea & Indigestion: improved     9. Cardiac: H/o A. Fib & HTN  - Echo (9/29/20) -  LVEF 60-65% w/ left atrial dilatation  A. Fib:  Currently in NSR  - Cont sotalol 120 mg BID  HTN: stable  - Cont amlodipine-lisinopril 5/40 mg daily    - DVT Prophylaxis: Platelets <64,139 cells/dL, - prophylactic lovenox on hold and mechanical prophylaxis with bilateral SCDs while in bed in place.   Contraindications to pharmacologic prophylaxis: None  Contraindications to mechanical prophylaxis: None     - Disposition:  D/c home once ANC > 1.0 & toxicities resolved following allogeneic transplant     LEANDRA Salazar - CNP    11/25/2020   9:27 AM

## 2020-11-25 NOTE — PLAN OF CARE
Problem: Falls - Risk of:  Goal: Will remain free from falls    Orthostatic vital signs obtained at start of shift - see flowsheet for details. Pt does not meet criteria for orthostasis. Pt is a Med fall risk. See Emma Bulla Fall Score and ABCDS Injury Risk assessments. - Screening for Orthostasis AND not a [place] Risk per CAMEJO/ABCDS: Pt bed is in low position, side rails up, call light and belongings are in reach. Fall risk light is on outside pts room. Pt encouraged to call for assistance as needed. Will continue with hourly rounds for PO intake, pain needs, toileting and repositioning as needed. Orthostatic vital signs obtained at start of shift - see flowsheet for details. Pt does not meet criteria for orthostasis. Pt is a Med fall risk. See Emma Bulla Fall Score and ABCDS Injury Risk assessments. Problem: Infection - Central Venous Catheter-Associated Bloodstream Infection:  Goal: Will show no infection signs and symptoms  Description: Will show no infection signs and symptoms  11/25/2020 1227 by Juli Cruz RN  Outcome: Ongoing    CVC site remains free of signs/symptoms of infection. No drainage, edema, erythema, pain, or warmth noted at site. Dressing changes continue per protocol and on an as needed basis - see flowsheet. Compliant with [facility] Bath Protocol:  Performed CHG bath today per [facility] protocol utilizing CHG solution in the shower. CVC site cleansed with CHG wipe over dressing, skin surrounding dressing, and first 6\" of IV tubing. Pt tolerated well. Continued to encourage daily CHG bathing per [facility] protocol.       Problem: Bleeding:  Goal: Will show no signs and symptoms of excessive bleeding  Description: Will show no signs and symptoms of excessive bleeding  11/25/2020 1227 by Juli Cruz RN  Outcome: Ongoing    Patient's hemoglobin this AM:   Recent Labs     11/25/20 0348   HGB 8.1*     Patient's platelet count this AM:   Recent Labs     11/25/20 0348   PLT 12* Thrombocytopenia not present at this time. Patient showing no signs or symptoms of active bleeding. Transfusion not indicated at this time. Problem: Venous Thromboembolism:  Goal: Will show no signs or symptoms of venous thromboembolism  Description: Will show no signs or symptoms of venous thromboembolism  11/25/2020 1227 by Rosalina Lovelace RN  Outcome: Ongoing    Adherent with DVT Prevention: Pt is at risk for DVT d/t decreased mobility and cancer treatment. Pt educated on importance of activity. Pt verbalizes understanding of need for prophylaxis while inpatient. Problem: Discharge Planning:  Goal: Discharged to appropriate level of care  Description: Discharged to appropriate level of care  11/25/2020 1227 by Rosalina Lovelace RN  Outcome: Ongoing     Pt verbalizes understanding of POC. Problem: Nausea/Vomiting:  Goal: Absence of nausea/vomiting  Description: C/O mild nausea without vomiting. Compazine x1 PO given as prophylaxis   11/25/2020 1227 by Rosalina Lovelace RN  Outcome: Ongoing    No c/o n/v this shift. Problem: Diarrhea:  Goal: Bowel elimination is within specified parameters  Description: Bowel elimination is within specified parameters  11/25/2020 1227 by Rosalina Lovelace RN  Outcome: Ongoing     No c/o diarrhea this shift. Problem: Skin Integrity:  Goal: Will show no infection signs and symptoms  Description: Will show no infection signs and symptoms  11/25/2020 1227 by Rosalina Lovelace RN  Outcome: Ongoing    Skin remains intact, bruise to R hand. Problem: Pain:  Goal: Pain level will decrease  Description: Pain level will decrease  11/25/2020 1227 by Rosalina Lovelace RN  Outcome: Ongoing     No c/o pain.

## 2020-11-25 NOTE — PROGRESS NOTES
Clinical Pharmacy Progress Note    Patient Name: Carolann Chacon  YOB: 1960  Diagnosis: AML -MelFlu SHEILA f/b MUD PBSC Allogeneic HSCT     GVHD Prophylaxis:  Tacrolimus (Prograf)    · Adjusted according to levels - drawn via red lumen  Methotrexate 12.5 mg IVPush days +1 (11/12), +3 (11/14), +6 (11/17), +11 (11/22)     Tacrolimus (Prograf) goal level:  8-15 ng/mL    Date SCr Bili Prograf Dose Prograf Level Adjustments / Comments   11/5; d-6 1.3 0.5 3 mg po bid -- Oral tacrolimus starts 11/8 (day-3) with first tacrolimus trough level on 11/12 (day+1) followed by MWF thereafter. 11/12; d+1 0.9 0.6 3 mg po bid 4.3 Increase tacrolimus to 4 mg po bid (additional 1 mg now). Next level Fri 11/13.   11/13, d+2 1.0 0.5 4mg PO BID  6.1 Tacrolimus level slightly subtherapeutic based on range above. Dose increased yesterday - would not anticipate steady state achieved. Will continue current management and re-check level on Monday, 11/16 11/16; d5 0.9 0.5 4 mg po bid 7.5 Tacrolimus level increasing appropriately. No further increase in dose at this time. Next tacrolimus level Wed 11/18.   11/18; d7 1.1 0.4 4 mg po bid 8.1 No change. Next tacrolimus level Friday, 11/20.    11/20; d9 1.2 0.7 4 mg po bid 8.1 No change. Next tacrolimus level Monday, 11/23.    11/23; d12 1.1 0.5 4 mg po bid 8.4 No change. 11/25; d14 1.2 0.4 4 mg po bid 8.9 Tacrolimus very stable. No change. Next level Fri 11/27. Please call with questions. Dylan Otero Pharm. [facility] Clinical Pharmacist  Wireless:  [phone]  11/25/2020 8:12 AM

## 2020-11-26 LAB
ANION GAP SERPL CALCULATED.3IONS-SCNC: 9 MMOL/L (ref 3–16)
APTT: 35.9 SEC (ref 24.2–36.2)
BILIRUB SERPL-MCNC: 0.4 MG/DL (ref 0–1)
BUN BLDV-MCNC: 17 MG/DL (ref 7–20)
CALCIUM SERPL-MCNC: 9.2 MG/DL (ref 8.3–10.6)
CHLORIDE BLD-SCNC: 104 MMOL/L (ref 99–110)
CO2: 24 MMOL/L (ref 21–32)
CREAT SERPL-MCNC: 1.2 MG/DL (ref 0.8–1.3)
GFR AFRICAN AMERICAN: >60
GFR NON-AFRICAN AMERICAN: >60
GLUCOSE BLD-MCNC: 112 MG/DL (ref 70–99)
HCT VFR BLD CALC: 23.2 % (ref 40.5–52.5)
HEMOGLOBIN: 8.1 G/DL (ref 13.5–17.5)
INR BLD: 1.09 (ref 0.86–1.14)
MAGNESIUM: 1.5 MG/DL (ref 1.8–2.4)
MCH RBC QN AUTO: 32.5 PG (ref 26–34)
MCHC RBC AUTO-ENTMCNC: 35 G/DL (ref 31–36)
MCV RBC AUTO: 92.9 FL (ref 80–100)
PDW BLD-RTO: 17.2 % (ref 12.4–15.4)
PLATELET # BLD: 13 K/UL (ref 135–450)
PMV BLD AUTO: 9.2 FL (ref 5–10.5)
POTASSIUM SERPL-SCNC: 4.4 MMOL/L (ref 3.5–5.1)
PROTHROMBIN TIME: 12.6 SEC (ref 10–13.2)
RBC # BLD: 2.5 M/UL (ref 4.2–5.9)
SODIUM BLD-SCNC: 137 MMOL/L (ref 136–145)
WBC # BLD: 0.3 K/UL (ref 4–11)

## 2020-11-26 PROCEDURE — 2060000000 HC ICU INTERMEDIATE R&B

## 2020-11-26 PROCEDURE — 6360000002 HC RX W HCPCS: Performed by: INTERNAL MEDICINE

## 2020-11-26 PROCEDURE — 85025 COMPLETE CBC W/AUTO DIFF WBC: CPT

## 2020-11-26 PROCEDURE — 6360000002 HC RX W HCPCS: Performed by: STUDENT IN AN ORGANIZED HEALTH CARE EDUCATION/TRAINING PROGRAM

## 2020-11-26 PROCEDURE — 6370000000 HC RX 637 (ALT 250 FOR IP): Performed by: NURSE PRACTITIONER

## 2020-11-26 PROCEDURE — 82247 BILIRUBIN TOTAL: CPT

## 2020-11-26 PROCEDURE — 6370000000 HC RX 637 (ALT 250 FOR IP): Performed by: INTERNAL MEDICINE

## 2020-11-26 PROCEDURE — 36592 COLLECT BLOOD FROM PICC: CPT

## 2020-11-26 PROCEDURE — 85610 PROTHROMBIN TIME: CPT

## 2020-11-26 PROCEDURE — 80048 BASIC METABOLIC PNL TOTAL CA: CPT

## 2020-11-26 PROCEDURE — 85730 THROMBOPLASTIN TIME PARTIAL: CPT

## 2020-11-26 PROCEDURE — 83735 ASSAY OF MAGNESIUM: CPT

## 2020-11-26 PROCEDURE — 2580000003 HC RX 258: Performed by: NURSE PRACTITIONER

## 2020-11-26 RX ADMIN — FLUCONAZOLE 400 MG: 200 TABLET ORAL at 08:54

## 2020-11-26 RX ADMIN — LEVOFLOXACIN 500 MG: 500 TABLET, FILM COATED ORAL at 21:30

## 2020-11-26 RX ADMIN — VALACYCLOVIR HYDROCHLORIDE 500 MG: 500 TABLET, FILM COATED ORAL at 21:31

## 2020-11-26 RX ADMIN — TACROLIMUS 4 MG: 1 CAPSULE ORAL at 08:55

## 2020-11-26 RX ADMIN — URSODIOL 500 MG: 250 TABLET, FILM COATED ORAL at 08:54

## 2020-11-26 RX ADMIN — Medication 10 ML: at 21:31

## 2020-11-26 RX ADMIN — SIMETHICONE 80 MG: 80 TABLET, CHEWABLE ORAL at 21:31

## 2020-11-26 RX ADMIN — SOTALOL HYDROCHLORIDE 120 MG: 120 TABLET ORAL at 08:55

## 2020-11-26 RX ADMIN — SIMETHICONE 80 MG: 80 TABLET, CHEWABLE ORAL at 03:56

## 2020-11-26 RX ADMIN — SIMETHICONE 80 MG: 80 TABLET, CHEWABLE ORAL at 13:27

## 2020-11-26 RX ADMIN — SODIUM CHLORIDE 15 ML: 900 IRRIGANT IRRIGATION at 21:33

## 2020-11-26 RX ADMIN — URSODIOL 500 MG: 250 TABLET, FILM COATED ORAL at 21:30

## 2020-11-26 RX ADMIN — POTASSIUM CHLORIDE AND SODIUM CHLORIDE: 900; 150 INJECTION, SOLUTION INTRAVENOUS at 23:39

## 2020-11-26 RX ADMIN — ANTACID TABLETS 1000 MG: 500 TABLET, CHEWABLE ORAL at 14:56

## 2020-11-26 RX ADMIN — SOTALOL HYDROCHLORIDE 120 MG: 120 TABLET ORAL at 21:31

## 2020-11-26 RX ADMIN — DIPHENOXYLATE HYDROCHLORIDE AND ATROPINE SULFATE 1 TABLET: 2.5; .025 TABLET ORAL at 14:56

## 2020-11-26 RX ADMIN — AMLODIPINE BESYLATE 5 MG: 5 TABLET ORAL at 08:54

## 2020-11-26 RX ADMIN — TACROLIMUS 4 MG: 1 CAPSULE ORAL at 21:30

## 2020-11-26 RX ADMIN — PANTOPRAZOLE SODIUM 40 MG: 40 TABLET, DELAYED RELEASE ORAL at 06:27

## 2020-11-26 RX ADMIN — LISINOPRIL 40 MG: 40 TABLET ORAL at 08:54

## 2020-11-26 RX ADMIN — VALACYCLOVIR HYDROCHLORIDE 500 MG: 500 TABLET, FILM COATED ORAL at 08:54

## 2020-11-26 ASSESSMENT — PAIN SCALES - GENERAL
PAINLEVEL_OUTOF10: 0

## 2020-11-26 NOTE — PLAN OF CARE
Problem: Falls - Risk of:  Goal: Will remain free from falls  Description: Pt up ad abebe in room and halls with steady gait. Independent with ADL's. Uses call light appropriately for assistance as needed. Bed breaks on, non skid footwear on, side rails up x2, call light within reach. Hourly rounding performed per unit protocol. Will continue safety precautions. Will remain free from falls  Outcome: Ongoing   Orthostatic vital signs obtained at start of shift - see flowsheet for details. Pt does not meet criteria for orthostasis. Pt is a Med fall risk. See Terald Median Fall Score and ABCDS Injury Risk assessments. - Screening for Orthostasis AND not a [place] Risk per CAMEJO/ABCDS: Pt bed is in low position, side rails up, call light and belongings are in reach. Fall risk light is on outside pts room. Pt encouraged to call for assistance as needed. Will continue with hourly rounds for PO intake, pain needs, toileting and repositioning as needed. Problem: Infection - Central Venous Catheter-Associated Bloodstream Infection:  Goal: Will show no infection signs and symptoms  Description: Will show no infection signs and symptoms  Outcome: Ongoing   CVC site remains free of signs/symptoms of infection. No drainage, edema, erythema, pain, or warmth noted at site. Dressing changes continue per protocol and on an as needed basis - see flowsheet. Compliant with [facility] Bath Protocol:  Performed CHG bath yesterday per [facility] protocol utilizing CHG solution in the shower. CVC site cleansed with CHG wipe over dressing, skin surrounding dressing, and first 6\" of IV tubing. Pt tolerated well. Continued to encourage daily CHG bathing per [facility] protocol.     Problem: Bleeding:  Goal: Will show no signs and symptoms of excessive bleeding  Description: Will show no signs and symptoms of excessive bleeding  Outcome: Ongoing     Patient's hemoglobin this AM:   Recent Labs     11/26/20  0345   HGB 8.1*     Patient's platelet count this AM:   Recent Labs     11/26/20  0345   PLT 13*    Thrombocytopenia Precautions in place. Patient showing no signs or symptoms of active bleeding. Transfusion not indicated at this time. Patient verbalizes understanding of all instructions. Will continue to assess and implement POC. Call light within reach and hourly rounding in place. Problem: Venous Thromboembolism:  Goal: Will show no signs or symptoms of venous thromboembolism  Description: Will show no signs or symptoms of venous thromboembolism  Outcome: Ongoing   Refusing DVT Prevention: Pt is at risk for DVT d/t decreased mobility and cancer treatment. Pt educated on importance of activity. Pt has orders for SCDs while in bed, however pt currently refusing treatment. Pt's plaelets less than 20. Reviewed risks of DVT & PE development while inpatient. Provider aware of patient's refusal and re-education of importance of prophylaxis. No new orders at this time. Will continue to re-instruct patient and intervene as appropriate. Problem: Discharge Planning:  Goal: Discharged to appropriate level of care  Description: Discharged to appropriate level of care  Outcome: Ongoing   Pt verbalizes correct understanding of current plan of care; pt denies further questions concerning plan of care at this time; will continue to monitor. Problem: PROTECTIVE PRECAUTIONS  Goal: Patient will remain free of nosocomial Infections  Outcome: Ongoing   Pt remains afebrile this shift; pt room surfaces wiped down with bleach wipes this shift; pt and staff following appropriate hand hygiene and PPE protocols for infection prevention and/or isolation precautions in place at this time. Will continue to monitor. Problem: Nutrition  Goal: Optimal nutrition therapy  11/26/2020 0233 by Tejas Madrid RN  Outcome: Ongoing   See I/O for meal percentage intake; will continue to monitor.     Problem: Nausea/Vomiting:  Goal: Absence of nausea/vomiting  Description: C/O mild nausea without vomiting. Compazine x1 PO given as prophylaxis   Outcome: Ongoing   Pt denies nausea this shift; pt able to tolerate oral intake of meds and fluids and food without emesis. Will continue to monitor. Problem: Diarrhea:  Goal: Bowel elimination is within specified parameters  Description: Bowel elimination is within specified parameters  Outcome: Ongoing   Pt without frequent, watery stools this shift. Will continue to monitor. See I/Os. Problem: Skin Integrity:  Goal: Will show no infection signs and symptoms  Description: Will show no infection signs and symptoms  Outcome: Ongoing   Pt shows no new skin integrity issues this shift. Will continue to monitor. Problem: Pain:  Goal: Pain level will decrease  Description: Pain level will decrease  Outcome: Ongoing   Pt demonstrates correct use of 0-10 pain scale; pt verbalizes understanding to notify RN staff of any changes in pain condition, new onset pain, and need for pain medication; pt denies need for this shift.

## 2020-11-26 NOTE — PLAN OF CARE
Problem: Falls - Risk of:  Goal: Will remain free from falls  Description: Pt up ad abebe in room and halls with steady gait. Independent with ADL's. Uses call light appropriately for assistance as needed. Bed breaks on, non skid footwear on, side rails up x2, call light within reach. Hourly rounding performed per unit protocol. Will continue safety precautions. Will remain free from falls  11/26/2020 1449 by Ángela Monroy RN  Outcome: Met This Shift  Note: Orthostatic vital signs obtained at start of shift - see flowsheet for details. Pt does not meet criteria for orthostasis. Pt is a Med fall risk. See Lawayne Fraction Fall Score and ABCDS Injury Risk assessments. Problem: Infection - Central Venous Catheter-Associated Bloodstream Infection:  Goal: Will show no infection signs and symptoms  Description: Will show no infection signs and symptoms  11/26/2020 1449 by Ángela Monroy RN  Outcome: Met This Shift  Note: CVC site remains free of signs/symptoms of infection. No drainage, edema, erythema, pain, or warmth noted at site. Dressing changes continue per protocol and on an as needed basis - see flowsheet. Compliant with [facility] Bath Protocol:  Performed CHG bath today per [facility] protocol utilizing CHG solution in the shower. CVC site cleansed with CHG wipe over dressing, skin surrounding dressing, and first 6\" of IV tubing. Pt tolerated well. Continued to encourage daily CHG bathing per [facility] [facility] protocol. Problem: Bleeding:  Goal: Will show no signs and symptoms of excessive bleeding  Description: Will show no signs and symptoms of excessive bleeding  11/26/2020 1449 by Ángela Monroy RN  Outcome: Met This Shift  Note: No signs of bleeding noted. No transfusions required per protocol.       Problem: Venous Thromboembolism:  Goal: Will show no signs or symptoms of venous thromboembolism  Description: Will show no signs or symptoms of venous thromboembolism  11/26/2020 1449 by Ángela Monroy RN  Outcome: Met This Shift  Note: Ambulatory, low platelets     Problem: PROTECTIVE PRECAUTIONS  Goal: Patient will remain free of nosocomial Infections  11/26/2020 1449 by Moraima Johnson RN  Outcome: Met This Shift  Note: Pt remains afebrile this shift. VSS. Pt educated on infection prevention and continues to follow protective precautions appropriately. Will continue to monitor. Problem: Nausea/Vomiting:  Goal: Absence of nausea/vomiting  Description: C/O mild nausea without vomiting. Compazine x1 PO given as prophylaxis   11/26/2020 1449 by Moraima Johnson RN  Outcome: Met This Shift  Note: Denies nausea and vomiting,  eating and drinking well. Problem: Skin Integrity:  Goal: Will show no infection signs and symptoms  Description: Will show no infection signs and symptoms  11/26/2020 1449 by Moraima Johnson RN  Outcome: Met This Shift  Note: CVC site remains free of signs/symptoms of infection. No drainage, edema, erythema, pain, or warmth noted at site. Dressing changes continue per protocol and on an as needed basis - see flowsheet. Compliant with [facility] Bath Protocol:  Performed CHG bath today per [facility] protocol utilizing CHG solution in the shower. CVC site cleansed with CHG wipe over dressing, skin surrounding dressing, and first 6\" of IV tubing. Pt tolerated well. Continued to encourage daily CHG bathing per [facility] protocol. Problem: Pain:  Goal: Pain level will decrease  Description: Pain level will decrease  11/26/2020 1449 by Moraima Johnson RN  Outcome: Met This Shift  Note: Pt offers no c/o pain     Problem: Diarrhea:  Goal: Bowel elimination is within specified parameters  Description: Bowel elimination is within specified parameters  11/26/2020 1449 by Moraima Johnson RN  Outcome: Ongoing  Note: Small amounts of loose stool.   Lomotil given x1 upon request

## 2020-11-26 NOTE — PROGRESS NOTES
BCC Allogeneic Progress Note    2020    Carolann Chacon    :  1960    MRN:  [id number]    Referring MD: Josh Oh MD  [address] [facility] [address]  [address], [address] [facility]    Subjective: Feeling good today. No GI complaints. Eating at every meal.  No BM x 24 hours.       ECOG PS:  (1) Restricted in physically strenuous activity, ambulatory and able to do work of light nature    KPS: 80% Normal activity with effort; some signs or symptoms of disease    Isolation:  None     Medications    Scheduled Meds:   sodium chloride  20 mL Intravenous Once    sodium chloride  20 mL Intravenous Once    sodium chloride  20 mL Intravenous Once    pantoprazole  40 mg Oral QAM AC    tacrolimus  4 mg Oral BID    levoFLOXacin  500 mg Oral Nightly    sodium chloride flush  10 mL Intravenous 2 times per day    Saline Mouthwash  15 mL Swish & Spit 4x Daily AC & HS    valACYclovir  500 mg Oral BID    sotalol  120 mg Oral BID    amLODIPine  5 mg Oral Daily    And    lisinopril  40 mg Oral Daily    ursodiol  500 mg Oral BID    fluconazole  400 mg Oral Daily     Continuous Infusions:   0.9% NaCl with KCl 20 mEq 50 mL/hr at 20 1503    sodium chloride      sodium chloride       PRN Meds:sodium chloride, acetaminophen, diphenhydrAMINE, traMADol, oxyCODONE, calcium carbonate, simethicone, diphenoxylate-atropine, [COMPLETED] loperamide **FOLLOWED BY** loperamide, sodium chloride, sennosides-docusate sodium, sodium chloride, sodium chloride flush, potassium chloride, magnesium sulfate, magnesium hydroxide, Saline Mouthwash, alteplase, prochlorperazine **OR** prochlorperazine, LORazepam **OR** LORazepam    ROS:  As noted above, otherwise remainder of 10-point ROS negative    Physical Exam:    I&O:      Intake/Output Summary (Last 24 hours) at 2020 0957  Last data filed at 2020 0844  Gross per 24 hour   Intake 1790 ml   Output 3475 ml   Net -1685 ml       Vital Signs: /75   Pulse 80   Temp 97.9 °F (36.6 °C) (Oral)   Resp 20   Ht 6' 3\" (1.905 m)   Wt 298 lb 12.8 oz (135.5 kg)   SpO2 100%   BMI 37.35 kg/m²     Weight:    Wt Readings from Last 3 Encounters:   11/26/20 298 lb 12.8 oz (135.5 kg)   10/08/20 (!) 325 lb (147.4 kg)   09/29/20 (!) 325 lb 13.4 oz (147.8 kg)       General: Awake, alert and oriented. HEENT: normocephalic, PERRL, no scleral erythema or icterus, Oral mucosa moist and intact, throat clear  NECK: supple  BACK: Straight  SKIN: warm dry and intact without lesions rashes or masses  CHEST: CTA bilaterally without use of accessory muscles  CV: Normal S1 S2, RRR, no MRG  ABD: NT, ND, normoactive BS, no palpable masses or hepatosplenomegaly  EXTREMITIES: without edema, denies calf tenderness  NEURO: CN II - XII grossly intact  CATHETER: Right tri-fusion ([address], 6/11/20) - CDI    Data:   CBC:   Recent Labs     11/24/20 0340 11/25/20 0348 11/26/20 0345   WBC 0.2* 0.2* 0.3*   HGB 8.2* 8.1* 8.1*   HCT 23.7* 23.4* 23.2*   MCV 93.9 93.2 92.9   PLT 9* 12* 13*     BMP/Mag:  Recent Labs     11/24/20  0340 11/25/20 0348 11/26/20 0345   * 135* 137   K 4.4 4.0 4.4    103 104   CO2 22 23 24   PHOS  --  3.7  --    BUN 17 16 17   CREATININE 1.3 1.2 1.2   MG 1.50* 1.20* 1.50*     LIVP:   Recent Labs     11/24/20 0340 11/25/20 0348 11/26/20 0345   AST  --  45*  --    ALT  --  88*  --    BILIDIR  --  <0.2  --    BILITOT 0.5 0.4 0.4   ALKPHOS  --  194*  --      Uric Acid:    Recent Labs     11/25/20  0348   LABURIC 4.3     Coags:   Recent Labs     11/26/20  0345   PROTIME 12.6   INR 1.09   APTT 35.9        PROBLEM LIST:         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 CMML - 1 (Dx 10/2020)  3. Atrial Fibrillation  4. CASH  5. HTN    Post Transplant Complications:  1. Nausea and Indigestion  2. Diarrhea       TREATMENT:        1. Induction: Sweetie-C & Daunorubicin (6/12/20)  2. Consolidation:  HiDAC x 3 cycles (7/28/20, 8/29/20 & 9/22/20)  3. 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 AML - intermediate risk / CMML-1:  - BM bx/asp (10/14/20) - No AML, but shows CMML-1   - S/p Melphalan & Fludarabine f/b allogeneic PBSC infusion 11/11/20  - Post txp disease eval:  BM bx, no abnormal FISH, cytogenetics or mutations to follow     Day +16     2. ID:  No evidence of infection.     - Cont Levaquin, Diflucan & Valtrex ppx     Donor/Recipient CMV:  Negative / negative    - Routine surveillance is not required       3. Heme: Pancytopenia from chemotherapy   - Transfuse for Hgb < 7 and Platelets < 25J   - No transfusion today  - Tylenol and Benadryl prior to each blood product. 4. Metabolic / CKD III: HypoNa, HypoMg  - Baseline SCr 1.2-1.3.  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 Levels qMWF:  Lab Results   Component Value Date    TACROLEV 8.9 11/25/2020    TACROLEV 8.4 11/23/2020    TACROLEV 8.1 11/20/2020      6. VOD:  No evidence of VOD. Admission Weight: (!) 324 lb 3.2 oz (147.1 kg)  Current Weight: Weight: 298 lb 12.8 oz (135.5 kg). Recent Labs     11/25/20  0348 11/26/20  0345   BILIDIR <0.2  --    BILITOT 0.4 0.4   - Cont Actigall     7. Pulmonary: H/o CASH  - PFT (10/6/20) - FEV1/FVC 71 w/ predicted ratio of 75 & DLCO 41% (corrected 84%).   He has moderate obstruction by FEV1/FVC ratio lower than predicted and also based on ATS criteria with low FEV, likely d/t morbid obesity   - Pulm clearance by Dr. Itzel De Oliveira  CASH:  - Cont CPAP  - Encourage IS and ambulation      8. GI / Nutrition:    Nutrition:  Appetite is good  - Cont low microbial diet  - Dietary to follow    Diarrhea:  resolved  - C. Diff (11/11/20) - negative  - Cont Imodium as needed   - Added Lomotil as needed  Nausea & Indigestion: improved     9. Cardiac: H/o A. Fib & HTN  - Echo (9/29/20) -  LVEF 60-65% w/ left atrial dilatation  A. Fib:  Currently in NSR  - Cont sotalol 120 mg BID  HTN: stable  - Cont amlodipine-lisinopril 5/40 mg daily    - DVT Prophylaxis: Platelets <19,812 cells/dL, - prophylactic lovenox on hold and mechanical prophylaxis with bilateral SCDs while in bed in place.   Contraindications to pharmacologic prophylaxis: None  Contraindications to mechanical prophylaxis: None     - Disposition:  D/c home once ANC > 1.0 & toxicities resolved following allogeneic transplant     Nellie Long MD    11/26/2020   9:57 AM

## 2020-11-27 LAB
ALBUMIN SERPL-MCNC: 3.7 G/DL (ref 3.4–5)
ALP BLD-CCNC: 193 U/L (ref 40–129)
ALT SERPL-CCNC: 90 U/L (ref 10–40)
ANION GAP SERPL CALCULATED.3IONS-SCNC: 10 MMOL/L (ref 3–16)
AST SERPL-CCNC: 30 U/L (ref 15–37)
BILIRUB SERPL-MCNC: 0.3 MG/DL (ref 0–1)
BILIRUBIN DIRECT: <0.2 MG/DL (ref 0–0.3)
BILIRUBIN, INDIRECT: ABNORMAL MG/DL (ref 0–1)
BLOOD BANK DISPENSE STATUS: NORMAL
BLOOD BANK PRODUCT CODE: NORMAL
BPU ID: NORMAL
BUN BLDV-MCNC: 17 MG/DL (ref 7–20)
CALCIUM SERPL-MCNC: 9.1 MG/DL (ref 8.3–10.6)
CHLORIDE BLD-SCNC: 103 MMOL/L (ref 99–110)
CO2: 24 MMOL/L (ref 21–32)
CREAT SERPL-MCNC: 1.3 MG/DL (ref 0.8–1.3)
DESCRIPTION BLOOD BANK: NORMAL
GFR AFRICAN AMERICAN: >60
GFR NON-AFRICAN AMERICAN: 56
GLUCOSE BLD-MCNC: 108 MG/DL (ref 70–99)
HCT VFR BLD CALC: 24.3 % (ref 40.5–52.5)
HEMOGLOBIN: 8.4 G/DL (ref 13.5–17.5)
LACTATE DEHYDROGENASE: 147 U/L (ref 100–190)
MAGNESIUM: 1.2 MG/DL (ref 1.8–2.4)
MCH RBC QN AUTO: 32 PG (ref 26–34)
MCHC RBC AUTO-ENTMCNC: 34.6 G/DL (ref 31–36)
MCV RBC AUTO: 92.7 FL (ref 80–100)
PDW BLD-RTO: 17.1 % (ref 12.4–15.4)
PHOSPHORUS: 4.1 MG/DL (ref 2.5–4.9)
PLATELET # BLD: 10 K/UL (ref 135–450)
PMV BLD AUTO: 9.9 FL (ref 5–10.5)
POTASSIUM SERPL-SCNC: 4.6 MMOL/L (ref 3.5–5.1)
RBC # BLD: 2.62 M/UL (ref 4.2–5.9)
SODIUM BLD-SCNC: 137 MMOL/L (ref 136–145)
TACROLIMUS BLOOD: 6.8 NG/ML (ref 5–20)
TOTAL PROTEIN: 6.3 G/DL (ref 6.4–8.2)
URIC ACID, SERUM: 4.6 MG/DL (ref 3.5–7.2)
WBC # BLD: 0.5 K/UL (ref 4–11)

## 2020-11-27 PROCEDURE — 2060000000 HC ICU INTERMEDIATE R&B

## 2020-11-27 PROCEDURE — 6370000000 HC RX 637 (ALT 250 FOR IP): Performed by: INTERNAL MEDICINE

## 2020-11-27 PROCEDURE — 6370000000 HC RX 637 (ALT 250 FOR IP): Performed by: NURSE PRACTITIONER

## 2020-11-27 PROCEDURE — 2580000003 HC RX 258: Performed by: INTERNAL MEDICINE

## 2020-11-27 PROCEDURE — 80048 BASIC METABOLIC PNL TOTAL CA: CPT

## 2020-11-27 PROCEDURE — 6360000002 HC RX W HCPCS: Performed by: INTERNAL MEDICINE

## 2020-11-27 PROCEDURE — 84100 ASSAY OF PHOSPHORUS: CPT

## 2020-11-27 PROCEDURE — P9037 PLATE PHERES LEUKOREDU IRRAD: HCPCS

## 2020-11-27 PROCEDURE — 80197 ASSAY OF TACROLIMUS: CPT

## 2020-11-27 PROCEDURE — 83735 ASSAY OF MAGNESIUM: CPT

## 2020-11-27 PROCEDURE — 85025 COMPLETE CBC W/AUTO DIFF WBC: CPT

## 2020-11-27 PROCEDURE — 80076 HEPATIC FUNCTION PANEL: CPT

## 2020-11-27 PROCEDURE — 36592 COLLECT BLOOD FROM PICC: CPT

## 2020-11-27 PROCEDURE — 2580000003 HC RX 258: Performed by: NURSE PRACTITIONER

## 2020-11-27 PROCEDURE — 36430 TRANSFUSION BLD/BLD COMPNT: CPT

## 2020-11-27 PROCEDURE — 84550 ASSAY OF BLOOD/URIC ACID: CPT

## 2020-11-27 PROCEDURE — 6360000002 HC RX W HCPCS: Performed by: NURSE PRACTITIONER

## 2020-11-27 PROCEDURE — 83615 LACTATE (LD) (LDH) ENZYME: CPT

## 2020-11-27 RX ORDER — SODIUM CHLORIDE 9 MG/ML
INJECTION, SOLUTION INTRAVENOUS CONTINUOUS
Status: DISCONTINUED | OUTPATIENT
Start: 2020-11-27 | End: 2020-11-30

## 2020-11-27 RX ORDER — 0.9 % SODIUM CHLORIDE 0.9 %
20 INTRAVENOUS SOLUTION INTRAVENOUS ONCE
Status: COMPLETED | OUTPATIENT
Start: 2020-11-27 | End: 2020-11-27

## 2020-11-27 RX ADMIN — SODIUM CHLORIDE: 9 INJECTION, SOLUTION INTRAVENOUS at 09:16

## 2020-11-27 RX ADMIN — SODIUM CHLORIDE 15 ML: 900 IRRIGANT IRRIGATION at 09:18

## 2020-11-27 RX ADMIN — AMLODIPINE BESYLATE 5 MG: 5 TABLET ORAL at 09:13

## 2020-11-27 RX ADMIN — VALACYCLOVIR HYDROCHLORIDE 500 MG: 500 TABLET, FILM COATED ORAL at 20:41

## 2020-11-27 RX ADMIN — SODIUM CHLORIDE 20 ML: 9 INJECTION, SOLUTION INTRAVENOUS at 05:46

## 2020-11-27 RX ADMIN — LEVOFLOXACIN 500 MG: 500 TABLET, FILM COATED ORAL at 20:46

## 2020-11-27 RX ADMIN — SODIUM CHLORIDE 15 ML: 900 IRRIGANT IRRIGATION at 20:40

## 2020-11-27 RX ADMIN — SODIUM CHLORIDE 15 ML: 900 IRRIGANT IRRIGATION at 17:39

## 2020-11-27 RX ADMIN — LOPERAMIDE HYDROCHLORIDE 2 MG: 2 CAPSULE ORAL at 15:37

## 2020-11-27 RX ADMIN — URSODIOL 500 MG: 250 TABLET, FILM COATED ORAL at 20:41

## 2020-11-27 RX ADMIN — Medication 10 ML: at 20:40

## 2020-11-27 RX ADMIN — URSODIOL 500 MG: 250 TABLET, FILM COATED ORAL at 09:13

## 2020-11-27 RX ADMIN — DIPHENHYDRAMINE HYDROCHLORIDE 25 MG: 25 TABLET ORAL at 05:07

## 2020-11-27 RX ADMIN — SOTALOL HYDROCHLORIDE 120 MG: 120 TABLET ORAL at 20:44

## 2020-11-27 RX ADMIN — MAGNESIUM SULFATE HEPTAHYDRATE 4 G: 40 INJECTION, SOLUTION INTRAVENOUS at 05:45

## 2020-11-27 RX ADMIN — FLUCONAZOLE 400 MG: 200 TABLET ORAL at 09:13

## 2020-11-27 RX ADMIN — PROCHLORPERAZINE MALEATE 10 MG: 10 TABLET ORAL at 00:05

## 2020-11-27 RX ADMIN — PANTOPRAZOLE SODIUM 40 MG: 40 TABLET, DELAYED RELEASE ORAL at 06:11

## 2020-11-27 RX ADMIN — Medication 10 ML: at 09:16

## 2020-11-27 RX ADMIN — TACROLIMUS 4.5 MG: 1 CAPSULE ORAL at 20:41

## 2020-11-27 RX ADMIN — VALACYCLOVIR HYDROCHLORIDE 500 MG: 500 TABLET, FILM COATED ORAL at 09:13

## 2020-11-27 RX ADMIN — ACETAMINOPHEN 650 MG: 325 TABLET ORAL at 05:07

## 2020-11-27 RX ADMIN — SODIUM CHLORIDE 15 ML: 900 IRRIGANT IRRIGATION at 11:46

## 2020-11-27 RX ADMIN — SOTALOL HYDROCHLORIDE 120 MG: 120 TABLET ORAL at 09:14

## 2020-11-27 RX ADMIN — LISINOPRIL 40 MG: 40 TABLET ORAL at 09:13

## 2020-11-27 RX ADMIN — TACROLIMUS 4 MG: 1 CAPSULE ORAL at 09:13

## 2020-11-27 ASSESSMENT — PAIN SCALES - GENERAL
PAINLEVEL_OUTOF10: 0

## 2020-11-27 NOTE — PROGRESS NOTES
Clinical Pharmacy Progress Note    Patient Name: Kisha Gordon  YOB: 1960  Diagnosis: AML -MelFlu SHEILA f/b MUD PBSC Allogeneic HSCT     GVHD Prophylaxis:  Tacrolimus (Prograf)    · Adjusted according to levels - drawn via red lumen  Methotrexate 12.5 mg IVPush days +1 (11/12), +3 (11/14), +6 (11/17), +11 (11/22)     Tacrolimus (Prograf) goal level:  8-15 ng/mL    Date SCr Bili Prograf Dose Prograf Level Adjustments / Comments   11/5; d-6 1.3 0.5 3 mg po bid -- Oral tacrolimus starts 11/8 (day-3) with first tacrolimus trough level on 11/12 (day+1) followed by MWF thereafter. 11/12; d+1 0.9 0.6 3 mg po bid 4.3 Increase tacrolimus to 4 mg po bid (additional 1 mg now). Next level Fri 11/13.   11/13, d+2 1.0 0.5 4mg PO BID  6.1 Tacrolimus level slightly subtherapeutic based on range above. Dose increased yesterday - would not anticipate steady state achieved. Will continue current management and re-check level on Monday, 11/16 11/16; d5 0.9 0.5 4 mg po bid 7.5 Tacrolimus level increasing appropriately. No further increase in dose at this time. Next tacrolimus level Wed 11/18.   11/18; d7 1.1 0.4 4 mg po bid 8.1 No change. Next tacrolimus level Friday, 11/20.    11/20; d9 1.2 0.7 4 mg po bid 8.1 No change. Next tacrolimus level Monday, 11/23.    11/23; d12 1.1 0.5 4 mg po bid 8.4 No change. 11/25; d14 1.2 0.4 4 mg po bid 8.9 Tacrolimus very stable. No change. Next level Fri 11/27.   11/27/2020; d+16 1.3 0.3 4 mg po bid 6.8 Prograf subtherapeutic- dosing will change to 4.5 mg po bid starting tonight. Levels MWF. Please call with questions. Talyor Schuster. [facility] Clinical Pharmacist  Wireless:  [phone]  11/27/2020 3:09 PM

## 2020-11-27 NOTE — PROGRESS NOTES
BCC Allogeneic Progress Note    2020    Jeananne Hashimoto    :  1960    MRN:  [id number]    Referring MD: Lyn Terrell MD  [address]  [address] [facility], [address] [facility]    Subjective: Feeling good today. No GI complaints.   Eating at every meal.       ECOG PS:  (1) Restricted in physically strenuous activity, ambulatory and able to do work of light nature    KPS: 80% Normal activity with effort; some signs or symptoms of disease    Isolation:  None     Medications    Scheduled Meds:   sodium chloride  20 mL Intravenous Once    sodium chloride  20 mL Intravenous Once    sodium chloride  20 mL Intravenous Once    pantoprazole  40 mg Oral QAM AC    tacrolimus  4 mg Oral BID    levoFLOXacin  500 mg Oral Nightly    sodium chloride flush  10 mL Intravenous 2 times per day    Saline Mouthwash  15 mL Swish & Spit 4x Daily AC & HS    valACYclovir  500 mg Oral BID    sotalol  120 mg Oral BID    amLODIPine  5 mg Oral Daily    And    lisinopril  40 mg Oral Daily    ursodiol  500 mg Oral BID    fluconazole  400 mg Oral Daily     Continuous Infusions:   0.9% NaCl with KCl 20 mEq 50 mL/hr at 20 2339    sodium chloride      sodium chloride       PRN Meds:sodium chloride, acetaminophen, diphenhydrAMINE, traMADol, oxyCODONE, calcium carbonate, simethicone, diphenoxylate-atropine, [COMPLETED] loperamide **FOLLOWED BY** loperamide, sodium chloride, sennosides-docusate sodium, sodium chloride, sodium chloride flush, potassium chloride, magnesium sulfate, magnesium hydroxide, Saline Mouthwash, alteplase, prochlorperazine **OR** prochlorperazine, LORazepam **OR** LORazepam    ROS:  As noted above, otherwise remainder of 10-point ROS negative    Physical Exam:    I&O:      Intake/Output Summary (Last 24 hours) at 2020 0730  Last data filed at 2020 0603  Gross per 24 hour   Intake 1837 ml   Output 3025 ml   Net -1188 ml       Vital Signs:  /73 Pulse 74   Temp 98.3 °F (36.8 °C) (Oral) Comment: platelets complete; 15 min from platelets reach vein  Resp 18   Ht 6' 3\" (1.905 m)   Wt 298 lb 12.8 oz (135.5 kg)   SpO2 100%   BMI 37.35 kg/m²     Weight:    Wt Readings from Last 3 Encounters:   11/26/20 298 lb 12.8 oz (135.5 kg)   10/08/20 (!) 325 lb (147.4 kg)   09/29/20 (!) 325 lb 13.4 oz (147.8 kg)       General: Awake, alert and oriented. HEENT: normocephalic, PERRL, no scleral erythema or icterus, Oral mucosa moist and intact, throat clear  NECK: supple  BACK: Straight  SKIN: warm dry and intact without lesions rashes or masses  CHEST: CTA bilaterally without use of accessory muscles  CV: Normal S1 S2, RRR, no MRG  ABD: NT, ND, normoactive BS, no palpable masses or hepatosplenomegaly  EXTREMITIES: without edema, denies calf tenderness  NEURO: CN II - XII grossly intact  CATHETER: Right tri-fusion ([address], 6/11/20) - CDI    Data:   CBC:   Recent Labs     11/25/20 0348 11/26/20 0345 11/27/20 0348   WBC 0.2* 0.3* 0.5*   HGB 8.1* 8.1* 8.4*   HCT 23.4* 23.2* 24.3*   MCV 93.2 92.9 92.7   PLT 12* 13* 10*     BMP/Mag:  Recent Labs     11/25/20 0348 11/26/20 0345 11/27/20 0348   * 137 137   K 4.0 4.4 4.6    104 103   CO2 23 24 24   PHOS 3.7  --  4.1   BUN 16 17 17   CREATININE 1.2 1.2 1.3   MG 1.20* 1.50* 1.20*     LIVP:   Recent Labs     11/25/20 0348 11/26/20 0345 11/27/20 0348   AST 45*  --  30   ALT 88*  --  90*   BILIDIR <0.2  --  <0.2   BILITOT 0.4 0.4 0.3   ALKPHOS 194*  --  193*     Uric Acid:    Recent Labs     11/27/20  0348   LABURIC 4.6     Coags:   Recent Labs     11/26/20  0345   PROTIME 12.6   INR 1.09   APTT 35.9        PROBLEM LIST:         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 CMML - 1 (Dx 10/2020)  3. Atrial Fibrillation  4. CASH  5. HTN    Post Transplant Complications:  1. Nausea and Indigestion  2. Diarrhea       TREATMENT:        1. Induction: Sweetie-C & Daunorubicin (6/12/20)  2.   Consolidation:  HiDAC x 3 cycles

## 2020-11-27 NOTE — BH NOTE
Psychology    Psychologist attended clinical rounds with team and remained after to speak with pt. He's doing well, in good spirits, counts are starting to recover. Provided emotional support and encouragement and will continue to see pt for routine visits through hospital stay.     Estella Osorio Psy.D., ABPP

## 2020-11-27 NOTE — PLAN OF CARE
Problem: Falls - Risk of:  Goal: Will remain free from falls  Description: Pt up ad abebe in room and halls with steady gait. Independent with ADL's. Uses call light appropriately for assistance as needed. Bed breaks on, non skid footwear on, side rails up x2, call light within reach. Hourly rounding performed per unit protocol. Will continue safety precautions. Will remain free from falls  11/27/2020 0304 by Monica Fontanez RN  Outcome: Ongoing   Orthostatic vital signs obtained at start of shift - see flowsheet for details. Pt does not meet criteria for orthostasis. Pt is a Med fall risk. See Guillermo Contreras Fall Score and ABCDS Injury Risk assessments. - Screening for Orthostasis AND not a [place] Risk per CAMEJO/ABCDS: Pt bed is in low position, side rails up, call light and belongings are in reach. Fall risk light is on outside pts room. Pt encouraged to call for assistance as needed. Will continue with hourly rounds for PO intake, pain needs, toileting and repositioning as needed. Problem: Infection - Central Venous Catheter-Associated Bloodstream Infection:  Goal: Will show no infection signs and symptoms  Description: Will show no infection signs and symptoms  11/27/2020 0304 by Monica Fontanez RN  Outcome: Ongoing   CVC site remains free of signs/symptoms of infection. No drainage, edema, erythema, pain, or warmth noted at site. Dressing changes continue per protocol and on an as needed basis - see flowsheet. Compliant with BCC Bath Protocol:  Performed CHG bath yesterday per [facility] protocol utilizing CHG solution in the shower. CVC site cleansed with CHG wipe over dressing, skin surrounding dressing, and first 6\" of IV tubing. Pt tolerated well. Continued to encourage daily CHG bathing per [facility] protocol.       Problem: Bleeding:  Goal: Will show no signs and symptoms of excessive bleeding  Description: Will show no signs and symptoms of excessive bleeding  11/27/2020 0304 by Monica Fontanez RN  Outcome: Ongoing     Patient's hemoglobin this AM:   Recent Labs     11/27/20 0348   HGB 8.4*     Patient's platelet count this AM:   Recent Labs     11/27/20 0348   PLT 10*    Thrombocytopenia Precautions in place. Patient showing no signs or symptoms of active bleeding. Patient transfused blood products per orders - see flowsheet. Patient verbalizes understanding of all instructions. Will continue to assess and implement POC. Call light within reach and hourly rounding in place. Problem: Venous Thromboembolism:  Goal: Will show no signs or symptoms of venous thromboembolism  Description: Will show no signs or symptoms of venous thromboembolism  11/27/2020 0304 by Keyana Roldan RN  Outcome: Ongoing   Refusing DVT Prevention: Pt is at risk for DVT d/t decreased mobility and cancer treatment. Pt educated on importance of activity. Pt has orders for SCDs while in bed, however pt currently refusing treatment. Pt platelets less than 20. Reviewed risks of DVT & PE development while inpatient. Provider aware of patient's refusal and re-education of importance of prophylaxis. No new orders at this time. Will continue to re-instruct patient and intervene as appropriate. Problem: Discharge Planning:  Goal: Discharged to appropriate level of care  Description: Discharged to appropriate level of care  Outcome: Ongoing   Pt verbalizes correct understanding of plan of care at this time. Pt denies further questions concerning plan of care at this time. Will continue to monitor. Problem: PROTECTIVE PRECAUTIONS  Goal: Patient will remain free of nosocomial Infections  11/27/2020 0304 by Keyana Roldan RN  Outcome: Ongoing   Pt remains afebrile this shift; pt room surfaces wiped down with bleach wipes this shift; pt and staff following appropriate hand hygiene and PPE protocols for infection prevention and/or isolation precautions in place at this time. Will continue to monitor.     Problem: Nutrition  Goal: Optimal nutrition therapy  Outcome: Ongoing   See I/O for meal percentage intake. Problem: Nausea/Vomiting:  Goal: Absence of nausea/vomiting  Description: C/O mild nausea without vomiting. Compazine x1 PO given as prophylaxis   11/27/2020 0304 by Robb Stewart RN  Outcome: Ongoing   Pt able to tolerate oral intake of meds/fluids/food without issue. Problem: Diarrhea:  Goal: Bowel elimination is within specified parameters  Description: Bowel elimination is within specified parameters  11/27/2020 0304 by Robb Stewart RN  Outcome: Ongoing   Pt without frequent, watery bowel movements this shift. Will continue to monitor. See I/Os. Problem: Skin Integrity:  Goal: Will show no infection signs and symptoms  Description: Will show no infection signs and symptoms  11/27/2020 0304 by Robb Stewart RN  Outcome: Ongoing   Pt shows no new signs of skin breakdown this shift. Will continue to monitor. Problem: Skin Integrity:  Goal: Absence of new skin breakdown  Description: Absence of new skin breakdown  Outcome: Ongoing     Problem: Pain:  Goal: Pain level will decrease  Description: Pain level will decrease  11/27/2020 0304 by Robb Stewart RN  Outcome: Ongoing   Pt demonstrates correct use of 0-10 pain scale; pt verbalizes understanding to notify RN staff of any changes in pain condition, new onset pain, and need for pain medication; pt denies need for pain meds this shift. Pt able to rest comfortably throughout shift. Call light in reach. Will continue to monitor.

## 2020-11-27 NOTE — PLAN OF CARE
Problem: Falls - Risk of:  Goal: Will remain free from falls  Description: Pt up ad abebe in room and halls with steady gait. Independent with ADL's. Uses call light appropriately for assistance as needed. Bed breaks on, non skid footwear on, side rails up x2, call light within reach. Hourly rounding performed per unit protocol. Will continue safety precautions. Will remain free from falls  Outcome: Ongoing  Note: Orthostatic vital signs obtained at start of shift - see flowsheet for details. Pt does not meet criteria for orthostasis. Pt is a Med fall risk. See Guillermo Contreras Fall Score and ABCDS Injury Risk assessments. - Screening for Orthostasis AND not a [place] Risk per CAMEJO/ABCDS: Pt bed is in low position, side rails up, call light and belongings are in reach. Fall risk light is on outside pts room. Pt encouraged to call for assistance as needed. Will continue with hourly rounds for PO intake, pain needs, toileting and repositioning as needed. Problem: Infection - Central Venous Catheter-Associated Bloodstream Infection:  Goal: Will show no infection signs and symptoms  Description: Will show no infection signs and symptoms  Outcome: Ongoing  Note: CVC site remains free of signs/symptoms of infection. No drainage, edema, erythema, pain, or warmth noted at site. Dressing changes continue per protocol and on an as needed basis - see flowsheet. Problem: Bleeding:  Goal: Will show no signs and symptoms of excessive bleeding  Description: Will show no signs and symptoms of excessive bleeding  Outcome: Ongoing  Note: Patient's hemoglobin this AM:   Recent Labs     11/27/20  0348   HGB 8.4*     Patient's platelet count this AM:   Recent Labs     11/27/20  0348   PLT 10*    Thrombocytopenia Precautions in place. Patient showing no signs or symptoms of active bleeding. Patient received transfusions per orders prior to this shift. Patient verbalizes understanding of all instructions.  Will continue to assess and implement POC. Call light within reach and hourly rounding in place. Problem: Venous Thromboembolism:  Goal: Will show no signs or symptoms of venous thromboembolism  Description: Will show no signs or symptoms of venous thromboembolism  Outcome: Ongoing  Note: Refusing DVT Prevention: Pt is at risk for DVT d/t decreased mobility and cancer treatment. Pt educated on importance of activity. Pt has orders for SCDs while in bed, however pt currently refusing treatment. Reviewed risks of DVT & PE development while inpatient. Provider aware of patient's refusal and re-education of importance of prophylaxis. No new orders at this time. Will continue to re-instruct patient and intervene as appropriate. Problem: Discharge Planning:  Goal: Discharged to appropriate level of care  Description: Discharged to appropriate level of care  Outcome: Ongoing  Note: Continue to discuss plan of care with Radha Patel. Problem: PROTECTIVE PRECAUTIONS  Goal: Patient will remain free of nosocomial Infections  Outcome: Ongoing  Note: Pt remains in protective precautions. Pt educated on wearing mask when in hallways. Pt, staff, and visitors adhering to handwashing guidelines. Pt educated to shower or bathe daily with chlorhexidine and linens changed daily per protocol. Pt verbalizes understanding of low microbial diet. Will continue to monitor. Problem: Nausea/Vomiting:  Goal: Absence of nausea/vomiting  Description: C/O mild nausea without vomiting. Compazine x1 PO given as prophylaxis   Outcome: Ongoing  Note: Radha Patel has not had any nausea this shift. Will continue to monitor. Problem: Diarrhea:  Goal: Bowel elimination is within specified parameters  Description: Bowel elimination is within specified parameters  Outcome: Ongoing  Note: Radha Patel had 1 occurrence or diarrhea this shift. Will continue to monitor.        Problem: Skin Integrity:  Goal: Will show no infection signs and symptoms  Description: Will show no infection signs and symptoms  Outcome: Ongoing  Note: Emily Mccullough has some scattered bruising and petechiae. Will continue to monitor. Problem: Pain:  Goal: Pain level will decrease  Description: Pain level will decrease  Outcome: Ongoing  Note: Emily Mccullough has not had any pain this shift. Will continue to monitor.

## 2020-11-28 LAB
ANION GAP SERPL CALCULATED.3IONS-SCNC: 7 MMOL/L (ref 3–16)
BILIRUB SERPL-MCNC: <0.2 MG/DL (ref 0–1)
BUN BLDV-MCNC: 17 MG/DL (ref 7–20)
CALCIUM SERPL-MCNC: 9.1 MG/DL (ref 8.3–10.6)
CHLORIDE BLD-SCNC: 102 MMOL/L (ref 99–110)
CO2: 26 MMOL/L (ref 21–32)
CREAT SERPL-MCNC: 1.2 MG/DL (ref 0.8–1.3)
GFR AFRICAN AMERICAN: >60
GFR NON-AFRICAN AMERICAN: >60
GLUCOSE BLD-MCNC: 115 MG/DL (ref 70–99)
HCT VFR BLD CALC: 22.7 % (ref 40.5–52.5)
HEMOGLOBIN: 7.9 G/DL (ref 13.5–17.5)
MAGNESIUM: 1.6 MG/DL (ref 1.8–2.4)
MCH RBC QN AUTO: 32.2 PG (ref 26–34)
MCHC RBC AUTO-ENTMCNC: 34.7 G/DL (ref 31–36)
MCV RBC AUTO: 92.8 FL (ref 80–100)
PDW BLD-RTO: 16.9 % (ref 12.4–15.4)
PLATELET # BLD: 14 K/UL (ref 135–450)
PMV BLD AUTO: 8.9 FL (ref 5–10.5)
POTASSIUM SERPL-SCNC: 4.6 MMOL/L (ref 3.5–5.1)
RBC # BLD: 2.44 M/UL (ref 4.2–5.9)
SODIUM BLD-SCNC: 135 MMOL/L (ref 136–145)
WBC # BLD: 0.5 K/UL (ref 4–11)

## 2020-11-28 PROCEDURE — 6370000000 HC RX 637 (ALT 250 FOR IP): Performed by: INTERNAL MEDICINE

## 2020-11-28 PROCEDURE — 6360000002 HC RX W HCPCS: Performed by: INTERNAL MEDICINE

## 2020-11-28 PROCEDURE — 2060000000 HC ICU INTERMEDIATE R&B

## 2020-11-28 PROCEDURE — 36592 COLLECT BLOOD FROM PICC: CPT

## 2020-11-28 PROCEDURE — 85025 COMPLETE CBC W/AUTO DIFF WBC: CPT

## 2020-11-28 PROCEDURE — 2580000003 HC RX 258: Performed by: NURSE PRACTITIONER

## 2020-11-28 PROCEDURE — 6370000000 HC RX 637 (ALT 250 FOR IP): Performed by: NURSE PRACTITIONER

## 2020-11-28 PROCEDURE — 83735 ASSAY OF MAGNESIUM: CPT

## 2020-11-28 PROCEDURE — 82247 BILIRUBIN TOTAL: CPT

## 2020-11-28 PROCEDURE — 80048 BASIC METABOLIC PNL TOTAL CA: CPT

## 2020-11-28 RX ADMIN — Medication 10 ML: at 08:41

## 2020-11-28 RX ADMIN — PANTOPRAZOLE SODIUM 40 MG: 40 TABLET, DELAYED RELEASE ORAL at 07:13

## 2020-11-28 RX ADMIN — AMLODIPINE BESYLATE 5 MG: 5 TABLET ORAL at 08:36

## 2020-11-28 RX ADMIN — SODIUM CHLORIDE: 9 INJECTION, SOLUTION INTRAVENOUS at 04:08

## 2020-11-28 RX ADMIN — TACROLIMUS 4.5 MG: 1 CAPSULE ORAL at 20:42

## 2020-11-28 RX ADMIN — SIMETHICONE 80 MG: 80 TABLET, CHEWABLE ORAL at 20:43

## 2020-11-28 RX ADMIN — SODIUM CHLORIDE 15 ML: 900 IRRIGANT IRRIGATION at 18:13

## 2020-11-28 RX ADMIN — LEVOFLOXACIN 500 MG: 500 TABLET, FILM COATED ORAL at 20:43

## 2020-11-28 RX ADMIN — Medication 10 ML: at 20:43

## 2020-11-28 RX ADMIN — SOTALOL HYDROCHLORIDE 120 MG: 120 TABLET ORAL at 08:38

## 2020-11-28 RX ADMIN — SODIUM CHLORIDE: 9 INJECTION, SOLUTION INTRAVENOUS at 20:43

## 2020-11-28 RX ADMIN — SODIUM CHLORIDE 15 ML: 900 IRRIGANT IRRIGATION at 12:23

## 2020-11-28 RX ADMIN — SOTALOL HYDROCHLORIDE 120 MG: 120 TABLET ORAL at 20:44

## 2020-11-28 RX ADMIN — LISINOPRIL 40 MG: 40 TABLET ORAL at 08:37

## 2020-11-28 RX ADMIN — VALACYCLOVIR HYDROCHLORIDE 500 MG: 500 TABLET, FILM COATED ORAL at 20:43

## 2020-11-28 RX ADMIN — URSODIOL 500 MG: 250 TABLET, FILM COATED ORAL at 20:43

## 2020-11-28 RX ADMIN — URSODIOL 500 MG: 250 TABLET, FILM COATED ORAL at 08:37

## 2020-11-28 RX ADMIN — FLUCONAZOLE 400 MG: 200 TABLET ORAL at 08:45

## 2020-11-28 RX ADMIN — VALACYCLOVIR HYDROCHLORIDE 500 MG: 500 TABLET, FILM COATED ORAL at 08:37

## 2020-11-28 RX ADMIN — TACROLIMUS 4.5 MG: 1 CAPSULE ORAL at 08:37

## 2020-11-28 RX ADMIN — SODIUM CHLORIDE 15 ML: 900 IRRIGANT IRRIGATION at 07:14

## 2020-11-28 ASSESSMENT — PAIN SCALES - GENERAL
PAINLEVEL_OUTOF10: 0

## 2020-11-28 NOTE — PLAN OF CARE
Problem: Falls - Risk of:  Goal: Will remain free from falls  Description: Pt up ad abebe in room and halls with steady gait. Independent with ADL's. Uses call light appropriately for assistance as needed. Bed breaks on, non skid footwear on, side rails up x2, call light within reach. Hourly rounding performed per unit protocol. Will continue safety precautions. Will remain free from falls  Outcome: Ongoing  Note: Orthostatic vital signs obtained at start of shift - see flowsheet for details. Pt does not meet criteria for orthostasis. Pt is a Med fall risk. See Patricio Karri Fall Score and ABCDS Injury Risk assessments. - Screening for Orthostasis AND not a Al[place] Risk per CAMEJO/ABCDS: Pt bed is in low position, side rails up, call light and belongings are in reach. Fall risk light is on outside pts room. Pt encouraged to call for assistance as needed. Will continue with hourly rounds for PO intake, pain needs, toileting and repositioning as needed. Problem: Infection - Central Venous Catheter-Associated Bloodstream Infection:  Goal: Will show no infection signs and symptoms  Description: Will show no infection signs and symptoms  Outcome: Ongoing  Note: CVC site remains free of signs/symptoms of infection. No drainage, edema, erythema, pain, or warmth noted at site. Dressing changes continue per protocol and on an as needed basis - see flowsheet. Problem: Bleeding:  Goal: Will show no signs and symptoms of excessive bleeding  Description: Will show no signs and symptoms of excessive bleeding  Outcome: Ongoing  Note:   Patient's hemoglobin this AM:   Recent Labs     11/28/20  0400   HGB 7.9*     Patient's platelet count this AM:   Recent Labs     11/28/20  0400   PLT 14*    Thrombocytopenia Precautions in place. Patient showing no signs or symptoms of active bleeding. Transfusion not indicated at this time. Patient verbalizes understanding of all instructions.  Will continue to assess and implement POC. Call light within reach and hourly rounding in place. Problem: Venous Thromboembolism:  Goal: Will show no signs or symptoms of venous thromboembolism  Description: Will show no signs or symptoms of venous thromboembolism  Outcome: Ongoing  Note: Refusing DVT Prevention: Pt is at risk for DVT d/t decreased mobility and cancer treatment. Pt educated on importance of activity. Pt has orders for SCDs while in bed, however pt currently refusing treatment. Reviewed risks of DVT & PE development while inpatient. Provider aware of patient's refusal and re-education of importance of prophylaxis. No new orders at this time. Will continue to re-instruct patient and intervene as appropriate. Problem: Discharge Planning:  Goal: Discharged to appropriate level of care  Description: Discharged to appropriate level of care  Outcome: Ongoing  Note: Continue to discuss plan of care with Shahla Doe. Problem: PROTECTIVE PRECAUTIONS  Goal: Patient will remain free of nosocomial Infections  Outcome: Ongoing  Note: Pt remains in protective precautions. Pt educated on wearing mask when in hallways. Pt, staff, and visitors adhering to handwashing guidelines. Pt educated to shower or bathe daily with chlorhexidine and linens changed daily per protocol. Pt verbalizes understanding of low microbial diet. Will continue to monitor. Problem: Nausea/Vomiting:  Goal: Absence of nausea/vomiting  Description: C/O mild nausea without vomiting. Compazine x1 PO given as prophylaxis   Outcome: Ongoing  Note: Shahla Doe has not had any nausea this shift. Will continue to monitor. Problem: Diarrhea:  Goal: Bowel elimination is within specified parameters  Description: Bowel elimination is within specified parameters  Outcome: Ongoing  Note: Shahla Doe has not had any diarrhea this shift. Will continue to monitor.          Problem: Skin Integrity:  Goal: Will show no infection signs and symptoms  Description: Will show no infection signs and symptoms  Outcome: Ongoing  Note: Johnson Basilio has some scattered bruising and petechiae. Will continue to monitor. Problem: Pain:  Goal: Pain level will decrease  Description: Pain level will decrease  Outcome: Ongoing  Note: Johnson Basilio has not had any pain this shift. Will continue to monitor.

## 2020-11-28 NOTE — PLAN OF CARE
Problem: Falls - Risk of:  Goal: Will remain free from falls  Description: Pt up ad abebe in room and halls with steady gait. Independent with ADL's. Uses call light appropriately for assistance as needed. Bed breaks on, non skid footwear on, side rails up x2, call light within reach. Hourly rounding performed per unit protocol. Will continue safety precautions.   Will remain free from falls  11/28/2020 0630 by Ethan Ferguson RN  Outcome: Ongoing    Problem: Infection - Central Venous Catheter-Associated Bloodstream Infection:  Goal: Will show no infection signs and symptoms  Description: Will show no infection signs and symptoms  11/28/2020 0630 by Ethan Ferguson RN  Outcome: Ongoing     Problem: Venous Thromboembolism:  Goal: Will show no signs or symptoms of venous thromboembolism  Description: Will show no signs or symptoms of venous thromboembolism  11/28/2020 0630 by Ethan Ferguson RN  Outcome: Ongoing     Problem: Skin Integrity:  Goal: Will show no infection signs and symptoms  Description: Will show no infection signs and symptoms  11/28/2020 0630 by Ethan Ferguson RN  Outcome: Ongoing     Problem: Pain:  Goal: Control of acute pain  Description: Control of acute pain  11/28/2020 0630 by Ethan Ferguson RN  Outcome: Ongoing

## 2020-11-28 NOTE — PROGRESS NOTES
[facility] Allogeneic Progress Note    2020    Jennifer Weaver    :  1960    MRN:  [id number]    Referring MD: Celestino Lira MD  [address] [facility], [address] [facility]    Subjective: Feeling good today. No GI complaints.   Eating at every meal.       ECOG PS:  (1) Restricted in physically strenuous activity, ambulatory and able to do work of light nature    KPS: 80% Normal activity with effort; some signs or symptoms of disease    Isolation:  None     Medications    Scheduled Meds:   tacrolimus  4.5 mg Oral BID    sodium chloride  20 mL Intravenous Once    sodium chloride  20 mL Intravenous Once    sodium chloride  20 mL Intravenous Once    pantoprazole  40 mg Oral QAM AC    levoFLOXacin  500 mg Oral Nightly    sodium chloride flush  10 mL Intravenous 2 times per day    Saline Mouthwash  15 mL Swish & Spit 4x Daily AC & HS    valACYclovir  500 mg Oral BID    sotalol  120 mg Oral BID    amLODIPine  5 mg Oral Daily    And    lisinopril  40 mg Oral Daily    ursodiol  500 mg Oral BID    fluconazole  400 mg Oral Daily     Continuous Infusions:   sodium chloride 50 mL/hr at 20 0408    sodium chloride      sodium chloride       PRN Meds:sodium chloride, acetaminophen, diphenhydrAMINE, traMADol, oxyCODONE, calcium carbonate, simethicone, diphenoxylate-atropine, [COMPLETED] loperamide **FOLLOWED BY** loperamide, sodium chloride, sennosides-docusate sodium, sodium chloride, sodium chloride flush, potassium chloride, magnesium sulfate, magnesium hydroxide, Saline Mouthwash, alteplase, prochlorperazine **OR** prochlorperazine, LORazepam **OR** LORazepam    ROS:  As noted above, otherwise remainder of 10-point ROS negative    Physical Exam:    I&O:      Intake/Output Summary (Last 24 hours) at 2020 0952  Last data filed at 2020 0847  Gross per 24 hour   Intake 2757 ml   Output 4320 ml   Net -1563 ml       Vital Signs:  /80   Pulse 81 Temp 98.3 °F (36.8 °C) (Oral)   Resp 18   Ht 6' 3\" (1.905 m)   Wt 296 lb 3.2 oz (134.4 kg)   SpO2 100%   BMI 37.02 kg/m²     Weight:    Wt Readings from Last 3 Encounters:   11/28/20 296 lb 3.2 oz (134.4 kg)   10/08/20 (!) 325 lb (147.4 kg)   09/29/20 (!) 325 lb 13.4 oz (147.8 kg)       General: Awake, alert and oriented. HEENT: normocephalic, PERRL, no scleral erythema or icterus, Oral mucosa moist and intact, throat clear  NECK: supple  BACK: Straight  SKIN: warm dry and intact without lesions rashes or masses  CHEST: CTA bilaterally without use of accessory muscles  CV: Normal S1 S2, RRR, no MRG  ABD: NT, ND, normoactive BS, no palpable masses or hepatosplenomegaly  EXTREMITIES: without edema, denies calf tenderness  NEURO: CN II - XII grossly intact  CATHETER: Right tri-fusion ([address], 6/11/20) - CDI    Data:   CBC:   Recent Labs     11/26/20 0345 11/27/20 0348 11/28/20  0400   WBC 0.3* 0.5* 0.5*   HGB 8.1* 8.4* 7.9*   HCT 23.2* 24.3* 22.7*   MCV 92.9 92.7 92.8   PLT 13* 10* 14*     BMP/Mag:  Recent Labs     11/26/20 0345 11/27/20 0348 11/28/20  0400    137 135*   K 4.4 4.6 4.6    103 102   CO2 24 24 26   PHOS  --  4.1  --    BUN 17 17 17   CREATININE 1.2 1.3 1.2   MG 1.50* 1.20* 1.60*     LIVP:   Recent Labs     11/26/20 0345 11/27/20 0348 11/28/20  0400   AST  --  30  --    ALT  --  90*  --    BILIDIR  --  <0.2  --    BILITOT 0.4 0.3 <0.2   ALKPHOS  --  193*  --      Uric Acid:    Recent Labs     11/27/20  0348   LABURIC 4.6     Coags:   Recent Labs     11/26/20  0345   PROTIME 12.6   INR 1.09   APTT 35.9        PROBLEM LIST:         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 CMML - 1 (Dx 10/2020)  3. Atrial Fibrillation  4. CASH  5. HTN    Post Transplant Complications:  1. Nausea and Indigestion  2. Diarrhea       TREATMENT:        1. Induction: Sweetie-C & Daunorubicin (6/12/20)  2. Consolidation:  HiDAC x 3 cycles (7/28/20, 8/29/20 & 9/22/20)  3.   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 AML - intermediate risk / CMML-1:  - BM bx/asp (10/14/20) - No AML, but shows CMML-1   - S/p Melphalan & Fludarabine f/b allogeneic PBSC infusion 11/11/20  - Post txp disease eval:  BM bx, no abnormal FISH, cytogenetics or mutations to follow     Day +17     2. ID:  No evidence of infection.     - Cont Levaquin, Diflucan & Valtrex ppx     Donor/Recipient CMV:  Negative / negative    - Routine surveillance is not required       3. Heme: Pancytopenia from chemotherapy   - Transfuse for Hgb < 7 and Platelets < 23T   - No transfusion today  - Tylenol and Benadryl prior to each blood product. 4. Metabolic / CKD III: HypoNa, HypoMg  - Baseline SCr 1.2-1.3.  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 Levels qMWF:  Lab Results   Component Value Date    TACROLEV 6.8 11/27/2020    TACROLEV 8.9 11/25/2020    TACROLEV 8.4 11/23/2020      6. VOD:  No evidence of VOD. Admission Weight: (!) 324 lb 3.2 oz (147.1 kg)  Current Weight: Weight: 296 lb 3.2 oz (134.4 kg). Recent Labs     11/27/20  0348 11/28/20  0400   BILIDIR <0.2  --    BILITOT 0.3 <0.2   - Cont Actigall     7. Pulmonary: H/o CASH  - PFT (10/6/20) - FEV1/FVC 71 w/ predicted ratio of 75 & DLCO 41% (corrected 84%).   He has moderate obstruction by FEV1/FVC ratio lower than predicted and also based on ATS criteria with low FEV, likely d/t morbid obesity   - Pulm clearance by Dr. Henrik Arrington  CASH:  - Cont CPAP  - Encourage IS and ambulation      8. GI / Nutrition: Nutrition:  Appetite is good  - Cont low microbial diet  - Dietary to follow    Diarrhea:  resolved  - C. Diff (11/11/20) - negative  - Cont Imodium as needed   - Added Lomotil as needed  Nausea & Indigestion: improved     9. Cardiac: H/o A. Fib & HTN  - Echo (9/29/20) -  LVEF 60-65% w/ left atrial dilatation  A. Fib:  Currently in NSR  - Cont sotalol 120 mg BID  HTN: stable  - Cont amlodipine-lisinopril 5/40 mg daily    - DVT Prophylaxis: Platelets <12,501 cells/dL, - prophylactic lovenox on hold and mechanical prophylaxis with bilateral SCDs while in bed in place. Contraindications to pharmacologic prophylaxis: None  Contraindications to mechanical prophylaxis: None     - Disposition:  D/c home once ANC > 1.0 & toxicities resolved following allogeneic transplant     Jayleen Hodges, APRN - ELIZABETH Titus.  Jevon Castro, [address] [place] [address]  [phone]      11/28/2020   9:52 AM

## 2020-11-29 LAB
ABO/RH: NORMAL
ANION GAP SERPL CALCULATED.3IONS-SCNC: 10 MMOL/L (ref 3–16)
ANISOCYTOSIS: ABNORMAL
ANTIBODY SCREEN: NORMAL
BASOPHILS ABSOLUTE: 0 K/UL (ref 0–0.2)
BASOPHILS RELATIVE PERCENT: 2 %
BILIRUB SERPL-MCNC: 0.3 MG/DL (ref 0–1)
BUN BLDV-MCNC: 18 MG/DL (ref 7–20)
CALCIUM SERPL-MCNC: 9.2 MG/DL (ref 8.3–10.6)
CHLORIDE BLD-SCNC: 105 MMOL/L (ref 99–110)
CO2: 25 MMOL/L (ref 21–32)
CREAT SERPL-MCNC: 1.4 MG/DL (ref 0.8–1.3)
EOSINOPHILS ABSOLUTE: 0 K/UL (ref 0–0.6)
EOSINOPHILS RELATIVE PERCENT: 0 %
GFR AFRICAN AMERICAN: >60
GFR NON-AFRICAN AMERICAN: 51
GLUCOSE BLD-MCNC: 107 MG/DL (ref 70–99)
HCT VFR BLD CALC: 23.2 % (ref 40.5–52.5)
HEMOGLOBIN: 8 G/DL (ref 13.5–17.5)
LYMPHOCYTES ABSOLUTE: 0.2 K/UL (ref 1–5.1)
LYMPHOCYTES RELATIVE PERCENT: 25 %
MAGNESIUM: 1.4 MG/DL (ref 1.8–2.4)
MCH RBC QN AUTO: 32.1 PG (ref 26–34)
MCHC RBC AUTO-ENTMCNC: 34.5 G/DL (ref 31–36)
MCV RBC AUTO: 93 FL (ref 80–100)
MONOCYTES ABSOLUTE: 0.3 K/UL (ref 0–1.3)
MONOCYTES RELATIVE PERCENT: 29 %
NEUTROPHILS ABSOLUTE: 0.4 K/UL (ref 1.7–7.7)
NEUTROPHILS RELATIVE PERCENT: 44 %
PDW BLD-RTO: 17 % (ref 12.4–15.4)
PLATELET # BLD: 15 K/UL (ref 135–450)
PMV BLD AUTO: 9 FL (ref 5–10.5)
POTASSIUM SERPL-SCNC: 4.4 MMOL/L (ref 3.5–5.1)
RBC # BLD: 2.5 M/UL (ref 4.2–5.9)
SODIUM BLD-SCNC: 140 MMOL/L (ref 136–145)
WBC # BLD: 0.9 K/UL (ref 4–11)

## 2020-11-29 PROCEDURE — 83735 ASSAY OF MAGNESIUM: CPT

## 2020-11-29 PROCEDURE — 6370000000 HC RX 637 (ALT 250 FOR IP): Performed by: INTERNAL MEDICINE

## 2020-11-29 PROCEDURE — 2580000003 HC RX 258: Performed by: NURSE PRACTITIONER

## 2020-11-29 PROCEDURE — 6370000000 HC RX 637 (ALT 250 FOR IP): Performed by: NURSE PRACTITIONER

## 2020-11-29 PROCEDURE — 80048 BASIC METABOLIC PNL TOTAL CA: CPT

## 2020-11-29 PROCEDURE — 2060000000 HC ICU INTERMEDIATE R&B

## 2020-11-29 PROCEDURE — 85025 COMPLETE CBC W/AUTO DIFF WBC: CPT

## 2020-11-29 PROCEDURE — 6360000002 HC RX W HCPCS: Performed by: INTERNAL MEDICINE

## 2020-11-29 PROCEDURE — 86850 RBC ANTIBODY SCREEN: CPT

## 2020-11-29 PROCEDURE — 36592 COLLECT BLOOD FROM PICC: CPT

## 2020-11-29 PROCEDURE — 86900 BLOOD TYPING SEROLOGIC ABO: CPT

## 2020-11-29 PROCEDURE — 86901 BLOOD TYPING SEROLOGIC RH(D): CPT

## 2020-11-29 PROCEDURE — 6360000002 HC RX W HCPCS: Performed by: NURSE PRACTITIONER

## 2020-11-29 PROCEDURE — 82247 BILIRUBIN TOTAL: CPT

## 2020-11-29 RX ORDER — LANOLIN ALCOHOL/MO/W.PET/CERES
CREAM (GRAM) TOPICAL PRN
Status: DISCONTINUED | OUTPATIENT
Start: 2020-11-29 | End: 2020-11-30 | Stop reason: HOSPADM

## 2020-11-29 RX ADMIN — SOTALOL HYDROCHLORIDE 120 MG: 120 TABLET ORAL at 20:59

## 2020-11-29 RX ADMIN — ANTACID TABLETS 1000 MG: 500 TABLET, CHEWABLE ORAL at 18:31

## 2020-11-29 RX ADMIN — PANTOPRAZOLE SODIUM 40 MG: 40 TABLET, DELAYED RELEASE ORAL at 06:12

## 2020-11-29 RX ADMIN — ANTACID TABLETS 1000 MG: 500 TABLET, CHEWABLE ORAL at 04:03

## 2020-11-29 RX ADMIN — URSODIOL 500 MG: 250 TABLET, FILM COATED ORAL at 09:16

## 2020-11-29 RX ADMIN — SIMETHICONE 80 MG: 80 TABLET, CHEWABLE ORAL at 04:03

## 2020-11-29 RX ADMIN — VALACYCLOVIR HYDROCHLORIDE 500 MG: 500 TABLET, FILM COATED ORAL at 20:36

## 2020-11-29 RX ADMIN — SOTALOL HYDROCHLORIDE 120 MG: 120 TABLET ORAL at 09:15

## 2020-11-29 RX ADMIN — VALACYCLOVIR HYDROCHLORIDE 500 MG: 500 TABLET, FILM COATED ORAL at 09:15

## 2020-11-29 RX ADMIN — Medication 10 ML: at 09:17

## 2020-11-29 RX ADMIN — SIMETHICONE 80 MG: 80 TABLET, CHEWABLE ORAL at 21:00

## 2020-11-29 RX ADMIN — SODIUM CHLORIDE 15 ML: 900 IRRIGANT IRRIGATION at 18:33

## 2020-11-29 RX ADMIN — LEVOFLOXACIN 500 MG: 500 TABLET, FILM COATED ORAL at 20:36

## 2020-11-29 RX ADMIN — FLUCONAZOLE 400 MG: 200 TABLET ORAL at 09:15

## 2020-11-29 RX ADMIN — TACROLIMUS 4.5 MG: 1 CAPSULE ORAL at 09:15

## 2020-11-29 RX ADMIN — MAGNESIUM SULFATE HEPTAHYDRATE 4 G: 40 INJECTION, SOLUTION INTRAVENOUS at 06:12

## 2020-11-29 RX ADMIN — SODIUM CHLORIDE: 9 INJECTION, SOLUTION INTRAVENOUS at 18:31

## 2020-11-29 RX ADMIN — TACROLIMUS 4.5 MG: 1 CAPSULE ORAL at 20:35

## 2020-11-29 RX ADMIN — LISINOPRIL 40 MG: 40 TABLET ORAL at 09:15

## 2020-11-29 RX ADMIN — AMLODIPINE BESYLATE 5 MG: 5 TABLET ORAL at 09:16

## 2020-11-29 RX ADMIN — ANTACID TABLETS 1000 MG: 500 TABLET, CHEWABLE ORAL at 10:27

## 2020-11-29 RX ADMIN — URSODIOL 500 MG: 250 TABLET, FILM COATED ORAL at 20:36

## 2020-11-29 RX ADMIN — SODIUM CHLORIDE 15 ML: 900 IRRIGANT IRRIGATION at 11:28

## 2020-11-29 RX ADMIN — Medication: at 06:16

## 2020-11-29 RX ADMIN — SODIUM CHLORIDE: 9 INJECTION, SOLUTION INTRAVENOUS at 20:36

## 2020-11-29 ASSESSMENT — PAIN SCALES - GENERAL
PAINLEVEL_OUTOF10: 0

## 2020-11-29 NOTE — PROGRESS NOTES
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 AML - intermediate risk / CMML-1:  - BM bx/asp (10/14/20) - No AML, but shows CMML-1   - S/p Melphalan & Fludarabine f/b allogeneic PBSC infusion 11/11/20  - Post txp disease eval:  BM bx, no abnormal FISH, cytogenetics or mutations to follow     Day +18     2. ID:  No evidence of infection.     - Cont Levaquin, Diflucan & Valtrex ppx     Donor/Recipient CMV:  Negative / negative    - Routine surveillance is not required       3. Heme: Pancytopenia from chemotherapy  - Transfuse for Hgb < 7 and Platelets < 09G  - No transfusion today  - Tylenol and Benadryl prior to each blood product. 4. Metabolic / CKD III: HypoNa, HypoMg, mild increase in Cr  - Baseline SCr 1.2-1.3.  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 Levels qMWF:  Lab Results   Component Value Date    TACROLEV 6.8 11/27/2020    TACROLEV 8.9 11/25/2020    TACROLEV 8.4 11/23/2020      6. VOD:  No evidence of VOD. Admission Weight: (!) 324 lb 3.2 oz (147.1 kg)  Current Weight: Weight: 296 lb 9.6 oz (134.5 kg). Recent Labs     11/27/20  0348  11/29/20  0355   BILIDIR <0.2  --   --    BILITOT 0.3   < > 0.3    < > = values in this interval not displayed. - Cont Actigall     7. Pulmonary: H/o CASH  - PFT (10/6/20) - FEV1/FVC 71 w/ predicted ratio of 75 & DLCO 41% (corrected 84%).   He has moderate obstruction by FEV1/FVC ratio lower than predicted and also based on ATS criteria with low FEV, likely d/t morbid obesity   - Pulm clearance by Dr. Chel Steward  CASH:  - Cont CPAP  - Encourage IS and ambulation      8. GI / Nutrition:    Nutrition:  Appetite is good  - Cont low microbial diet  - Dietary to follow    Diarrhea:  resolved  - C. Diff (11/11/20) - negative  - Cont Imodium as needed   - Added Lomotil as needed  Nausea & Indigestion: improved     9. Cardiac: H/o A. Fib & HTN  - Echo (9/29/20) -  LVEF 60-65% w/ left atrial dilatation  A. Fib:  Currently in NSR  - Cont sotalol 120 mg BID  HTN: stable  - Cont amlodipine-lisinopril 5/40 mg daily    - DVT Prophylaxis: Platelets <24,963 cells/dL, - prophylactic lovenox on hold and mechanical prophylaxis with bilateral SCDs while in bed in place. Contraindications to pharmacologic prophylaxis: None  Contraindications to mechanical prophylaxis: None     - Disposition:  D/c home once ANC > 1.0 & toxicities resolved following allogeneic transplant     LEANDRA Benavidez - ELIZABETH Yeboah.  [facility]  [phone]      11/29/2020   9:15 AM

## 2020-11-29 NOTE — PLAN OF CARE
Problem: Falls - Risk of:  Goal: Will remain free from falls  Description: Pt up ad abebe in room and halls with steady gait. Independent with ADL's. Uses call light appropriately for assistance as needed. Bed breaks on, non skid footwear on, side rails up x2, call light within reach. Hourly rounding performed per unit protocol. Will continue safety precautions. Will remain free from falls  Outcome: Ongoing  Note: Orthostatic vital signs obtained at start of shift - see flowsheet for details. Pt does not meet criteria for orthostasis. Pt is a Med fall risk. See Magaly Lien Fall Score and ABCDS Injury Risk assessments. - Screening for Orthostasis AND not a [place] Risk per CAMEJO/ABCDS: Pt bed is in low position, side rails up, call light and belongings are in reach. Fall risk light is on outside pts room. Pt encouraged to call for assistance as needed. Will continue with hourly rounds for PO intake, pain needs, toileting and repositioning as needed. Problem: Infection - Central Venous Catheter-Associated Bloodstream Infection:  Goal: Will show no infection signs and symptoms  Description: Will show no infection signs and symptoms  Outcome: Ongoing  Note: CVC site remains free of signs/symptoms of infection. No drainage, edema, erythema, pain, or warmth noted at site. Dressing changes continue per protocol and on an as needed basis - see flowsheet. Problem: Bleeding:  Goal: Will show no signs and symptoms of excessive bleeding  Description: Will show no signs and symptoms of excessive bleeding  Outcome: Ongoing  Note:   Patient's hemoglobin this AM:   Recent Labs     11/29/20  0355   HGB 8.0*     Patient's platelet count this AM:   Recent Labs     11/29/20  0355   PLT 15*    Thrombocytopenia Precautions in place. Patient showing no signs or symptoms of active bleeding. Transfusion not indicated at this time. Patient verbalizes understanding of all instructions.  Will continue to assess and implement POC. Call light within reach and hourly rounding in place. Problem: Venous Thromboembolism:  Goal: Will show no signs or symptoms of venous thromboembolism  Description: Will show no signs or symptoms of venous thromboembolism  Outcome: Ongoing  Note: Refusing DVT Prevention: Pt is at risk for DVT d/t decreased mobility and cancer treatment. Pt educated on importance of activity. Pt has orders for SCDs while in bed, however pt currently refusing treatment. Reviewed risks of DVT & PE development while inpatient. Provider aware of patient's refusal and re-education of importance of prophylaxis. No new orders at this time. Will continue to re-instruct patient and intervene as appropriate. Problem: Discharge Planning:  Goal: Discharged to appropriate level of care  Description: Discharged to appropriate level of care  Outcome: Ongoing  Note: Continue to discuss plan of care with Karen Padilla. Problem: PROTECTIVE PRECAUTIONS  Goal: Patient will remain free of nosocomial Infections  Outcome: Ongoing  Note: Pt remains in protective precautions. Pt educated on wearing mask when in hallways. Pt, staff, and visitors adhering to handwashing guidelines. Pt educated to shower or bathe daily with chlorhexidine and linens changed daily per protocol. Pt verbalizes understanding of low microbial diet. Will continue to monitor. Problem: Nausea/Vomiting:  Goal: Absence of nausea/vomiting  Description: C/O mild nausea without vomiting. Compazine x1 PO given as prophylaxis   Outcome: Ongoing  Note: Karen Padilla has not had any nausea this shift. Will continue to monitor. Problem: Diarrhea:  Goal: Bowel elimination is within specified parameters  Description: Bowel elimination is within specified parameters  Outcome: Ongoing  Note: Karen Padilla has had 1 occurrence of diarrhea this shift. Will continue to monitor.           Problem: Skin Integrity:  Goal: Will show no infection signs and symptoms  Description: Will show no infection signs and symptoms  Outcome: Ongoing  Note: Shani Oneal has some scattered bruising and petechiae. Will continue to monitor. Problem: Pain:  Goal: Pain level will decrease  Description: Pain level will decrease  Outcome: Ongoing  Note: Shani Oneal has not had any pain this shift. Will continue to monitor.

## 2020-11-29 NOTE — PLAN OF CARE
Problem: Falls - Risk of:  Goal: Will remain free from falls  Description: Pt up ad abebe in room and halls with steady gait. Independent with ADL's. Uses call light appropriately for assistance as needed. Bed breaks on, non skid footwear on, side rails up x2, call light within reach. Hourly rounding performed per unit protocol. Will continue safety precautions. Will remain free from falls  Outcome: Ongoing  Note: Orthostatic vital signs obtained at start of shift - see flowsheet for details. Pt does not meet criteria for orthostasis. Pt is a Med fall risk. See Elenora Dianne Fall Score and ABCDS Injury Risk assessments. - Screening for Orthostasis AND not a [place] Risk per CAMEJO/ABCDS: Pt bed is in low position, side rails up, call light and belongings are in reach. Fall risk light is on outside pts room. Pt encouraged to call for assistance as needed. Will continue with hourly rounds for PO intake, pain needs, toileting and repositioning as needed. Problem: Infection - Central Venous Catheter-Associated Bloodstream Infection:  Goal: Will show no infection signs and symptoms  Description: Will show no infection signs and symptoms  Outcome: Ongoing  Note: CVC site remains free of signs/symptoms of infection. No drainage, edema, erythema, pain, or warmth noted at site. Dressing changes continue per protocol and on an as needed basis - see flowsheet. Compliant with [facility] Bath Protocol:  Performed CHG bath today per [facility] protocol utilizing CHG solution in the shower. CVC site cleansed with CHG wipe over dressing, skin surrounding dressing, and first 6\" of IV tubing. Pt tolerated well. Continued to encourage daily CHG bathing per [facility] protocol.        Problem: Bleeding:  Goal: Will show no signs and symptoms of excessive bleeding  Description: Will show no signs and symptoms of excessive bleeding  Outcome: Ongoing     Problem: Venous Thromboembolism:  Goal: Will show no signs or symptoms of venous thromboembolism  Description: Will show no signs or symptoms of venous thromboembolism  Outcome: Ongoing     Problem: Discharge Planning:  Goal: Discharged to appropriate level of care  Description: Discharged to appropriate level of care  Outcome: Ongoing     Problem: PROTECTIVE PRECAUTIONS  Goal: Patient will remain free of nosocomial Infections  Outcome: Ongoing  Note: Pt educated on wearing mask when in hallways. Pt, staff, and visitors adhering to handwashing guidelines. Pt showers daily with chlorhexidine and linens changed daily per protocol. Pt verbalizes understanding of low microbial diet. Will continue to monitor. Problem: Nutrition  Goal: Optimal nutrition therapy  Outcome: Ongoing     Problem: Nausea/Vomiting:  Goal: Absence of nausea/vomiting  Description: C/O mild nausea without vomiting. Compazine x1 PO given as prophylaxis   Outcome: Ongoing  Note: Pt denies any nausea at this time. Problem: Diarrhea:  Goal: Bowel elimination is within specified parameters  Description: Bowel elimination is within specified parameters  Outcome: Ongoing     Problem: Skin Integrity:  Goal: Will show no infection signs and symptoms  Description: Will show no infection signs and symptoms  Outcome: Ongoing  Note: CVC site remains free of signs/symptoms of infection. No drainage, edema, erythema, pain, or warmth noted at site. Dressing changes continue per protocol and on an as needed basis - see flowsheet. Compliant with [facility] Bath Protocol:  Performed CHG bath today per [facility] protocol utilizing CHG solution in the shower. CVC site cleansed with CHG wipe over dressing, skin surrounding dressing, and first 6\" of IV tubing. Pt tolerated well. Continued to encourage daily CHG bathing per [employer] protocol. Problem: Pain:  Goal: Pain level will decrease  Description: Pain level will decrease  Outcome: Ongoing  Note: Pt denies any pain at this time.

## 2020-11-30 ENCOUNTER — APPOINTMENT (OUTPATIENT)
Dept: GENERAL RADIOLOGY | Age: 60
DRG: 014 | End: 2020-11-30
Attending: INTERNAL MEDICINE
Payer: COMMERCIAL

## 2020-11-30 VITALS
WEIGHT: 296 LBS | DIASTOLIC BLOOD PRESSURE: 86 MMHG | OXYGEN SATURATION: 100 % | BODY MASS INDEX: 36.8 KG/M2 | HEIGHT: 75 IN | TEMPERATURE: 98.4 F | RESPIRATION RATE: 18 BRPM | HEART RATE: 81 BPM | SYSTOLIC BLOOD PRESSURE: 117 MMHG

## 2020-11-30 LAB
ALBUMIN SERPL-MCNC: 3.6 G/DL (ref 3.4–5)
ALP BLD-CCNC: 193 U/L (ref 40–129)
ALT SERPL-CCNC: 58 U/L (ref 10–40)
AMORPHOUS: NORMAL /HPF
ANION GAP SERPL CALCULATED.3IONS-SCNC: 8 MMOL/L (ref 3–16)
ANISOCYTOSIS: ABNORMAL
APTT: 36.2 SEC (ref 24.2–36.2)
AST SERPL-CCNC: 22 U/L (ref 15–37)
BANDED NEUTROPHILS RELATIVE PERCENT: 3 % (ref 0–7)
BASOPHILS ABSOLUTE: 0 K/UL (ref 0–0.2)
BASOPHILS RELATIVE PERCENT: 0 %
BILIRUB SERPL-MCNC: <0.2 MG/DL (ref 0–1)
BILIRUBIN DIRECT: <0.2 MG/DL (ref 0–0.3)
BILIRUBIN URINE: NEGATIVE
BILIRUBIN, INDIRECT: ABNORMAL MG/DL (ref 0–1)
BLOOD, URINE: ABNORMAL
BUN BLDV-MCNC: 18 MG/DL (ref 7–20)
CALCIUM SERPL-MCNC: 9.4 MG/DL (ref 8.3–10.6)
CHLORIDE BLD-SCNC: 104 MMOL/L (ref 99–110)
CLARITY: CLEAR
CO2: 26 MMOL/L (ref 21–32)
COLOR: YELLOW
CREAT SERPL-MCNC: 1.3 MG/DL (ref 0.8–1.3)
EOSINOPHILS ABSOLUTE: 0 K/UL (ref 0–0.6)
EOSINOPHILS RELATIVE PERCENT: 0 %
GFR AFRICAN AMERICAN: >60
GFR NON-AFRICAN AMERICAN: 56
GLUCOSE BLD-MCNC: 122 MG/DL (ref 70–99)
GLUCOSE URINE: NEGATIVE MG/DL
HCT VFR BLD CALC: 23 % (ref 40.5–52.5)
HEMOGLOBIN: 8 G/DL (ref 13.5–17.5)
INR BLD: 1.05 (ref 0.86–1.14)
KETONES, URINE: NEGATIVE MG/DL
LACTATE DEHYDROGENASE: 141 U/L (ref 100–190)
LEUKOCYTE ESTERASE, URINE: NEGATIVE
LYMPHOCYTES ABSOLUTE: 0.3 K/UL (ref 1–5.1)
LYMPHOCYTES RELATIVE PERCENT: 24 %
MAGNESIUM: 1.7 MG/DL (ref 1.8–2.4)
MCH RBC QN AUTO: 32.1 PG (ref 26–34)
MCHC RBC AUTO-ENTMCNC: 34.6 G/DL (ref 31–36)
MCV RBC AUTO: 92.8 FL (ref 80–100)
MICROSCOPIC EXAMINATION: YES
MONOCYTES ABSOLUTE: 0.2 K/UL (ref 0–1.3)
MONOCYTES RELATIVE PERCENT: 13 %
MYELOCYTE PERCENT: 1 %
NEUTROPHILS ABSOLUTE: 0.8 K/UL (ref 1.7–7.7)
NEUTROPHILS RELATIVE PERCENT: 59 %
NITRITE, URINE: NEGATIVE
PDW BLD-RTO: 16.9 % (ref 12.4–15.4)
PH UA: 6 (ref 5–8)
PHOSPHORUS: 4.2 MG/DL (ref 2.5–4.9)
PLATELET # BLD: 16 K/UL (ref 135–450)
PMV BLD AUTO: 8.6 FL (ref 5–10.5)
POTASSIUM SERPL-SCNC: 4.5 MMOL/L (ref 3.5–5.1)
PROTEIN UA: NEGATIVE MG/DL
PROTHROMBIN TIME: 12.2 SEC (ref 10–13.2)
RBC # BLD: 2.48 M/UL (ref 4.2–5.9)
RBC UA: NORMAL /HPF (ref 0–4)
SODIUM BLD-SCNC: 138 MMOL/L (ref 136–145)
SPECIFIC GRAVITY UA: 1.02 (ref 1–1.03)
TACROLIMUS BLOOD: 7.2 NG/ML (ref 5–20)
TOTAL PROTEIN: 5.8 G/DL (ref 6.4–8.2)
URIC ACID, SERUM: 4.8 MG/DL (ref 3.5–7.2)
URINE TYPE: ABNORMAL
UROBILINOGEN, URINE: 0.2 E.U./DL
WBC # BLD: 1.3 K/UL (ref 4–11)
WBC UA: NORMAL /HPF (ref 0–5)

## 2020-11-30 PROCEDURE — 83615 LACTATE (LD) (LDH) ENZYME: CPT

## 2020-11-30 PROCEDURE — 6360000002 HC RX W HCPCS: Performed by: NURSE PRACTITIONER

## 2020-11-30 PROCEDURE — 6370000000 HC RX 637 (ALT 250 FOR IP): Performed by: NURSE PRACTITIONER

## 2020-11-30 PROCEDURE — 85025 COMPLETE CBC W/AUTO DIFF WBC: CPT

## 2020-11-30 PROCEDURE — 81001 URINALYSIS AUTO W/SCOPE: CPT

## 2020-11-30 PROCEDURE — 85730 THROMBOPLASTIN TIME PARTIAL: CPT

## 2020-11-30 PROCEDURE — 83735 ASSAY OF MAGNESIUM: CPT

## 2020-11-30 PROCEDURE — 84100 ASSAY OF PHOSPHORUS: CPT

## 2020-11-30 PROCEDURE — 36592 COLLECT BLOOD FROM PICC: CPT

## 2020-11-30 PROCEDURE — 84550 ASSAY OF BLOOD/URIC ACID: CPT

## 2020-11-30 PROCEDURE — 80197 ASSAY OF TACROLIMUS: CPT

## 2020-11-30 PROCEDURE — 71045 X-RAY EXAM CHEST 1 VIEW: CPT

## 2020-11-30 PROCEDURE — 80048 BASIC METABOLIC PNL TOTAL CA: CPT

## 2020-11-30 PROCEDURE — 80076 HEPATIC FUNCTION PANEL: CPT

## 2020-11-30 PROCEDURE — 6360000002 HC RX W HCPCS: Performed by: INTERNAL MEDICINE

## 2020-11-30 PROCEDURE — 6370000000 HC RX 637 (ALT 250 FOR IP): Performed by: INTERNAL MEDICINE

## 2020-11-30 PROCEDURE — 85610 PROTHROMBIN TIME: CPT

## 2020-11-30 RX ORDER — DIPHENHYDRAMINE HCL 25 MG
25 TABLET ORAL EVERY 4 HOURS PRN
COMMUNITY
Start: 2020-11-30 | End: 2020-12-30

## 2020-11-30 RX ORDER — HEPARIN SODIUM (PORCINE) LOCK FLUSH IV SOLN 100 UNIT/ML 100 UNIT/ML
1500 SOLUTION INTRAVENOUS ONCE
Status: COMPLETED | OUTPATIENT
Start: 2020-11-30 | End: 2020-11-30

## 2020-11-30 RX ORDER — LOPERAMIDE HYDROCHLORIDE 2 MG/1
2 CAPSULE ORAL 4 TIMES DAILY PRN
COMMUNITY
Start: 2020-11-30 | End: 2020-12-10

## 2020-11-30 RX ORDER — CLARITHROMYCIN 250 MG/1
250 TABLET, FILM COATED ORAL DAILY
Qty: 30 TABLET | Refills: 3 | Status: SHIPPED | OUTPATIENT
Start: 2020-11-30

## 2020-11-30 RX ORDER — FLUCONAZOLE 200 MG/1
400 TABLET ORAL DAILY
Qty: 60 TABLET | Refills: 3 | Status: SHIPPED | OUTPATIENT
Start: 2020-12-01 | End: 2021-03-11 | Stop reason: ALTCHOICE

## 2020-11-30 RX ORDER — TACROLIMUS 0.5 MG/1
CAPSULE ORAL
Qty: 60 CAPSULE | Refills: 3
Start: 2020-11-30

## 2020-11-30 RX ORDER — DAPSONE 100 MG/1
100 TABLET ORAL DAILY
Qty: 30 TABLET | Refills: 3 | Status: SHIPPED | OUTPATIENT
Start: 2020-11-30

## 2020-11-30 RX ORDER — FLUCONAZOLE 200 MG/1
400 TABLET ORAL DAILY
Qty: 14 TABLET | Refills: 0
Start: 2020-12-01 | End: 2020-11-30

## 2020-11-30 RX ADMIN — VALACYCLOVIR HYDROCHLORIDE 500 MG: 500 TABLET, FILM COATED ORAL at 08:46

## 2020-11-30 RX ADMIN — ANTACID TABLETS 1000 MG: 500 TABLET, CHEWABLE ORAL at 11:44

## 2020-11-30 RX ADMIN — PANTOPRAZOLE SODIUM 40 MG: 40 TABLET, DELAYED RELEASE ORAL at 06:24

## 2020-11-30 RX ADMIN — Medication 1500 UNITS: at 12:00

## 2020-11-30 RX ADMIN — FLUCONAZOLE 400 MG: 200 TABLET ORAL at 08:46

## 2020-11-30 RX ADMIN — TACROLIMUS 4.5 MG: 1 CAPSULE ORAL at 08:46

## 2020-11-30 RX ADMIN — URSODIOL 500 MG: 250 TABLET, FILM COATED ORAL at 08:46

## 2020-11-30 RX ADMIN — SOTALOL HYDROCHLORIDE 120 MG: 120 TABLET ORAL at 08:46

## 2020-11-30 ASSESSMENT — PAIN SCALES - GENERAL
PAINLEVEL_OUTOF10: 0

## 2020-11-30 NOTE — PROGRESS NOTES
Clinical Pharmacy Progress Note    Patient Name: Kenna Juan  YOB: 1960  Diagnosis: AML -MelFlu SHEILA f/b MUD PBSC Allogeneic HSCT     GVHD Prophylaxis:  Tacrolimus (Prograf)    · Adjusted according to levels - drawn via red lumen  Methotrexate 12.5 mg IVPush days +1 (11/12), +3 (11/14), +6 (11/17), +11 (11/22)     Tacrolimus (Prograf) goal level:  8-15 ng/mL    Date SCr Bili Prograf Dose Prograf Level Adjustments / Comments   11/5; d-6 1.3 0.5 3 mg po bid -- Oral tacrolimus starts 11/8 (day-3) with first tacrolimus trough level on 11/12 (day+1) followed by MWF thereafter. 11/12; d+1 0.9 0.6 3 mg po bid 4.3 Increase tacrolimus to 4 mg po bid (additional 1 mg now). Next level Fri 11/13.   11/13, d+2 1.0 0.5 4mg PO BID  6.1 Tacrolimus level slightly subtherapeutic based on range above. Dose increased yesterday - would not anticipate steady state achieved. Will continue current management and re-check level on Monday, 11/16 11/16; d5 0.9 0.5 4 mg po bid 7.5 Tacrolimus level increasing appropriately. No further increase in dose at this time. Next tacrolimus level Wed 11/18.   11/18; d7 1.1 0.4 4 mg po bid 8.1 No change. Next tacrolimus level Friday, 11/20.    11/20; d9 1.2 0.7 4 mg po bid 8.1 No change. Next tacrolimus level Monday, 11/23.    11/23; d12 1.1 0.5 4 mg po bid 8.4 No change. 11/25; d14 1.2 0.4 4 mg po bid 8.9 Tacrolimus very stable. No change. Next level Fri 11/27.   11/27/2020; d+16 1.3 0.3 4 mg po bid 6.8 Prograf subtherapeutic- dosing will change to 4.5 mg po bid starting tonight. Levels MWF.   11/30; d19 1.3 <0.2 4.5 mg po bid 7.2 Tacrolimus level this date reported late by lab. No change in dose. D/c home to follow-up at [facility]. Please call with questions. Taylor Rose. [facility] Clinical Pharmacist  Wireless:  [phone]  11/30/2020 8:40 AM

## 2020-11-30 NOTE — DISCHARGE SUMMARY
[facility] Discharge Summary             Attending Physician: Vanessa Roldan MD    Referring MD: Jacob Lucas MD  [address]  [address], [address]    Name: Kisha Blood :  1960  MRN:  [id number]    Admission: 2020   Discharge:   20     Date: 2020    Diagnosis on admit: AML, in remission. Admitted for allogeneic stem cell transplant    Medications:    Sandra Holcomb   Home Medication Instructions [id number]    Printed on:20 9128   Medication Information                      clarithromycin (BIAXIN) 250 MG tablet  Take 1 tablet by mouth daily             dapsone 100 MG tablet  Take 1 tablet by mouth daily             diphenhydrAMINE (BENADRYL) 25 MG tablet  Take 1 tablet by mouth every 4 hours as needed (30 minutes prior to blood products)             fluconazole (DIFLUCAN) 200 MG tablet  Take 2 tablets by mouth daily             loperamide (IMODIUM) 2 MG capsule  Take 1 capsule by mouth 4 times daily as needed for Diarrhea             promethazine (PHENERGAN) 12.5 MG tablet  Take 12.5 mg by mouth once             sotalol (BETAPACE) 120 MG tablet  Take 120 mg by mouth 2 times daily             tacrolimus (PROGRAF) 0.5 MG capsule  Using combination of 1mg capsules & 0.5mg capsules, take 4.5mg every 12 hours             ursodiol (ACTIGALL) 300 MG capsule  Take 500 mg by mouth 2 times daily             valACYclovir (VALTREX) 500 MG tablet  Take 500 mg by mouth 2 times daily                 Reason for Admission: Melphalan and Fludarabine preparative regimen followed by Matched Unrelated Allogeneic Transplant for AML in remission     Hospital Course:   Jenny Villavicencio is a 57 yo male w/ h/o AML, intermediate risk (20 w/ 24% blasts; FISH & cytogenetics WNL; CEPPA, c-kit, FLT3, NPM1, IDH 1 & IDH 2 were not detected). His PMH is also significant for atrial fibrillation, HTN & CASH. 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 He is currently in remission from AML. He was then admitted to [facility] 11/5/20 for melphalan and fludarabine preparative regimen. This was followed by a matched (10:10 w/ DPB1 non-permissive mismatch) unrelated allogeneic transplant from a female [facility] international donor. He will received PBSCs 11/11/20. His graft versus host disease prophylaxis consisted of Prograf and IV Methotrexate on days + 1, 3, 6 & 11. He tolerated all scheduled infusions well and without significant toxicity. Post-transplant he experienced the expected cytopenias, fatigue, and low-grade n/v/d. He otherwise has done exceptionally well with little other toxicity. His WBC is now recovering (ANC 0.8 today) and he is doing very well. He has remained afebrile t/o admission. He will therefore be discharged home today with close outpt f/u. He denies n/v/d/constipation. Denies SOB/LI/Chest pain. Denies fevers/chills/night sweats. He will return to [facility] tomorrow for f/u. Physical Exam:     Vital Signs:  /86   Pulse 81   Temp 98.4 °F (36.9 °C) (Oral)   Resp 18   Ht 6' 3\" (1.905 m)   Wt 296 lb (134.3 kg)   SpO2 100%   BMI 37.00 kg/m²     Weight:    Wt Readings from Last 3 Encounters:   11/30/20 296 lb (134.3 kg)   10/08/20 (!) 325 lb (147.4 kg)   09/29/20 (!) 325 lb 13.4 oz (147.8 kg)       KPS: 80% Normal activity with effort; some signs or symptoms of disease    General: Awake, alert and oriented.   HEENT: normocephalic, PERRL, no scleral erythema or icterus, Oral mucosa moist and intact, throat clear  NECK: supple  BACK: Straight  SKIN: warm dry and intact without lesions rashes or masses  CHEST: CTA bilaterally without use of accessory muscles  CV: Normal S1 S2, RRR, no MRG  ABD: NT, ND, normoactive BS, no palpable masses or hepatosplenomegaly  EXTREMITIES: without edema, denies calf tenderness  NEURO: CN II - XII grossly intact  CATHETER: Right tri-fusion ([facility], 6/11/20) - CDI    Data:   CBC:   Recent Labs

## 2020-11-30 NOTE — PLAN OF CARE
Problem: Falls - Risk of:  Goal: Will remain free from falls  Description: Pt up ad abebe in room and halls with steady gait. Independent with ADL's. Uses call light appropriately for assistance as needed. Bed breaks on, non skid footwear on, side rails up x2, call light within reach. Hourly rounding performed per unit protocol. Will continue safety precautions. Will remain free from falls  Outcome: Completed  Note: Pt up ad abebe in room and halls with steady gait. Independent with ADL's. Uses call light appropriately for assistance as needed. Bed breaks on, non skid footwear on, side rails up x2, call light within reach. Hourly rounding performed per unit protocol. Will continue safety precautions. Problem: Infection - Central Venous Catheter-Associated Bloodstream Infection:  Goal: Will show no infection signs and symptoms  Description: Will show no infection signs and symptoms  Outcome: Completed  Note: CVC site remains free of signs/symptoms of infection. No drainage, edema, erythema, pain, or warmth noted at site. Dressing changes continue per protocol and on an as needed basis - see flowsheet. Problem: Bleeding:  Goal: Will show no signs and symptoms of excessive bleeding  Description: Will show no signs and symptoms of excessive bleeding  Outcome: Completed  Note: No signs of bleeding noted. No transfusions required per protocol     Problem: Venous Thromboembolism:  Goal: Will show no signs or symptoms of venous thromboembolism  Description: Will show no signs or symptoms of venous thromboembolism  Outcome: Completed  Note: Ambulatory, low platelets     Problem: Discharge Planning:  Goal: Discharged to appropriate level of care  Description: Discharged to appropriate level of care  Outcome: Completed     Problem: PROTECTIVE PRECAUTIONS  Goal: Patient will remain free of nosocomial Infections  Outcome: Completed  Note: Pt remains afebrile this shift. VSS.  Pt educated on infection prevention and continues to follow protective precautions appropriately. Will continue to monitor. Problem: Nausea/Vomiting:  Goal: Absence of nausea/vomiting  Description: C/O mild nausea without vomiting. Compazine x1 PO given as prophylaxis   Outcome: Completed  Note: Pt denies c/o nausea or vomiting. Eating and drinking well. Problem: Diarrhea:  Goal: Bowel elimination is within specified parameters  Description: Bowel elimination is within specified parameters  Outcome: Completed  Note: Pt reports formed stools     Problem: Skin Integrity:  Goal: Will show no infection signs and symptoms  Description: Will show no infection signs and symptoms  Outcome: Completed  Note: CVC site remains free of signs/symptoms of infection. No drainage, edema, erythema, pain, or warmth noted at site. Dressing changes continue per protocol and on an as needed basis - see flowsheet.         Problem: Pain:  Goal: Pain level will decrease  Description: Pain level will decrease  Outcome: Completed  Note: Pt offers no c/o pain

## 2020-11-30 NOTE — CARE COORDINATION
Type of Admission  AML  MUD ( Female Donor) T:0: 11/11/20  Prep. Regimen: Melphalan+Fludara  Day +19        Central venous catheter  Right TLC ( 9/29/20 , pre-existing from [facility])        Plan  Proceed with MUD Allogenic SCT for treatment of AML        Update  11/6/20: Planned admission for allogeneic SCT. Keeping active by use elliptical equipment in his room. 11/9/20:  Using exercise bike in room. 11/11/20 Infusion of stem cells today. maintaining activity by using elliptical bike in room. 11/16/20:  Continues to be active using elliptical bike in room, dresses daily. Niece into visit. 1/20/20:  Reports loose stools and beginning to have 'sore throat\". Attempting to eat small amounts. 11/23/20:  Stool output decreasing, reports fatigue. Pre-medications with blood products due to history of reaction. WBC is 0.2 today. 11/30/20:  Home today with follow  Up @ [facility] tomorrow. Education  11/6/20:  Re-introduced myself in RN Discharge planner role. States his cousin Luke Can, who is a nurse will be coming into town by Thanksgiving to act as his Care giver. Patient and caregiver have been through the educational process for allogeneic bone marrow transplantation including the allogeneic family meeting, preadmission teaching and preadmission physician office visit. , with Compa Rock RN BMT Coordinator. Patient and caregiver verbalize understanding of treatment regimen, possible adverse events, length of stay, and risk and benefits of transplantation and are agreeable to proceed. Patient and caregiver have been given an opportunity to ask, and to have their questions answered to their satisfaction. Documentation for above education can be found in the [facility], [facility] office chart. 11/23/20: Discussed with Carlito that medications that have been called in are at n no charge. Reports that he has ordered a medication organizer that is to be delivered today.   11/30/20: Reviewed Charge for above medications per [facility]     Patient/caregiver aware of plan?   Yes           Pending

## 2020-11-30 NOTE — PROGRESS NOTES
[facility] Discharge Summary             Attending Physician: Jyotsna Iraheta MD    Referring MD: Case Alonso MD  [address] [facility], [address] [place] [address]    Name: Janey Ledbetter :  1960  MRN:  [id number]    Admission: 2020   Discharge:   20     Date: 2020    Diagnosis on admit: AML, in remission. Admitted for allogeneic stem cell transplant    Medications:    Tyesha Ramos   Home Medication Instructions [id number]    Printed on:20 9401   Medication Information                      clarithromycin (BIAXIN) 250 MG tablet  Take 1 tablet by mouth daily             dapsone 100 MG tablet  Take 1 tablet by mouth daily             diphenhydrAMINE (BENADRYL) 25 MG tablet  Take 1 tablet by mouth every 4 hours as needed (30 minutes prior to blood products)             fluconazole (DIFLUCAN) 200 MG tablet  Take 2 tablets by mouth daily             loperamide (IMODIUM) 2 MG capsule  Take 1 capsule by mouth 4 times daily as needed for Diarrhea             promethazine (PHENERGAN) 12.5 MG tablet  Take 12.5 mg by mouth once             sotalol (BETAPACE) 120 MG tablet  Take 120 mg by mouth 2 times daily             tacrolimus (PROGRAF) 0.5 MG capsule  Using combination of 1mg capsules & 0.5mg capsules, take 4.5mg every 12 hours             ursodiol (ACTIGALL) 300 MG capsule  Take 500 mg by mouth 2 times daily             valACYclovir (VALTREX) 500 MG tablet  Take 500 mg by mouth 2 times daily                 Reason for Admission: Melphalan and Fludarabine preparative regimen followed by Matched Unrelated Allogeneic Transplant for AML in remission     Hospital Course:   Nikkie Monique is a 55 yo male w/ h/o AML, intermediate risk (20 w/ 24% blasts; FISH & cytogenetics WNL; CEPPA, c-kit, FLT3, NPM1, IDH 1 & IDH 2 were not detected). His PMH is also significant for atrial fibrillation, HTN & CASH. 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 11/28/20  0400 11/29/20 0355 11/30/20 0345   WBC 0.5* 0.9* 1.3*   HGB 7.9* 8.0* 8.0*   HCT 22.7* 23.2* 23.0*   MCV 92.8 93.0 92.8   PLT 14* 15* 16*     BMP/Mag:  Recent Labs     11/28/20  0400 11/29/20 0355 11/30/20 0345   * 140 138   K 4.6 4.4 4.5    105 104   CO2 26 25 26   PHOS  --   --  4.2   BUN 17 18 18   CREATININE 1.2 1.4* 1.3   MG 1.60* 1.40* 1.70*     LIVP:   Recent Labs     11/28/20 0400 11/29/20 0355 11/30/20 0345   AST  --   --  22   ALT  --   --  58*   BILIDIR  --   --  <0.2   BILITOT <0.2 0.3 <0.2   ALKPHOS  --   --  193*     Uric Acid:    Recent Labs     11/30/20 0345   LABURIC 4.8     Coags:   Recent Labs     11/30/20 0345   PROTIME 12.2   INR 1.05   APTT 36.2        PROBLEM LIST:         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 CMML - 1 (Dx 10/2020)  3. Atrial Fibrillation  4. CASH  5. HTN    Post Transplant Complications:  1. Nausea and Indigestion  2. Diarrhea       TREATMENT:        1. Induction: Sweetie-C & Daunorubicin (6/12/20)  2. Consolidation:  HiDAC x 3 cycles (7/28/20, 8/29/20 & 9/22/20)  3. MUD Allo - PB BMT (DPB1 non-permissive mismatch)  Preparative Regimen:  Melphalan and Fludarabine  Date of BMT:  11/11/20  Source of stem cells:  PBPC - 4.4 x10^6 [id number]/kg  Donor/Recipient Blood Type:  A positive / 0 positive   Donor Sex:  Female / NMDP (DID # [id number]) - Follow FISH XY  CMV Donor / Recipient: Negative / Negative       ASSESSMENT AND PLAN:        1. AML - intermediate risk / CMML-1:  - BM bx/asp (10/14/20) - No AML, but shows CMML-1   - S/p Melphalan & Fludarabine f/b allogeneic PBSC infusion 11/11/20  - Post txp disease eval:  BM bx, no abnormal FISH, cytogenetics or mutations to follow     Day +19     2. ID:  No evidence of infection.    - Cont Dapsone, Biaxin, Diflucan & Valtrex ppx     Donor/Recipient CMV:  Negative / negative   - Routine surveillance is not required       3.  Heme: Pancytopenia from chemotherapy - ANC improving  - Transfuse for Hgb < 7 and Platelets < 09U  - No transfusion today  - Tylenol and Benadryl prior to blood products    4. Metabolic / CKD III: HypoNa, HypoMg  - Baseline SCr 1.2-1.3.  - Replace potassium and magnesium per PRN orders     5. Graft versus host disease:  No evidence  Previous Tx:  - S/p post-transplant MTX days 1, 3, 6 & 11    Current Tx:  - Cont Prograf 4.5 mg BID. Levels qMWF:  Lab Results   Component Value Date    TACROLEV 6.8 11/27/2020    TACROLEV 8.9 11/25/2020    TACROLEV 8.4 11/23/2020      6. VOD:  No evidence of VOD. Admission Weight: (!) 324 lb 3.2 oz (147.1 kg)  Current Weight: Weight: 296 lb 9.6 oz (134.5 kg). Recent Labs     11/30/20  0345   BILIDIR <0.2   BILITOT <0.2   - Cont Actigall     7. Pulmonary: H/o CASH  - PFT (10/6/20) - FEV1/FVC 71 w/ predicted ratio of 75 & DLCO 41% (corrected 84%). He has moderate obstruction by FEV1/FVC ratio lower than predicted and also based on ATS criteria with low FEV, likely d/t morbid obesity   - Pulm clearance by Dr. Itzel De Oliveira  CASH:  - Cont CPAP  - Encourage IS and ambulation      8. GI / Nutrition:    Nutrition:  Appetite is good  - Cont low microbial diet  - Dietary to follow    Diarrhea:  resolved  - C. Diff (11/11/20) - negative  - Cont Imodium as needed      9. Cardiac: H/o A. Fib & HTN  - Echo (9/29/20) -  LVEF 60-65% w/ left atrial dilatation  A. Fib:  Currently in NSR  - Cont sotalol 120 mg BID  HTN: will d/c meds 11/30  - D/c amlodipine-lisinopril 5/40 mg daily      - DVT Prophylaxis: Platelets <60,559 cells/dL, - prophylactic lovenox on hold and mechanical prophylaxis with bilateral SCDs while in bed in place.   Contraindications to pharmacologic prophylaxis: None  Contraindications to mechanical prophylaxis: None     - Disposition:  D/c home once ANC > 1.0 & toxicities resolved following allogeneic transplant     LEANDRA Lindsey - CNP

## 2020-12-01 NOTE — ADT AUTH CERT
Comments   Comment   [address]   [address] CANCER [place]   [address]   [id number]   [phone]     MRN: [id number]   PRIMARY PAYOR: Shamir Lala number: [id number]     Nolan Dai      [phone]   Fax [phone]    Last edited by Nolan Dai on 20 at 25 [phone]         Patient Demographics   Name  Patient ID  SSN  Gender Identity  Birth Date    Cameron Christian  [id number]    Male  [birth year] (56 yrs)      Address  Phone  Email  Employer       Darlyn Patricia C/ Layo Ramon  [phone] (J)   [phone] (C)   [phone] (M)  [email or web address]  OTHER-[employer]   [address] [facility]. .C. [facility] [employer]  [employer]  Occupation  Emp Status       [place]  [place]  --  Full Time       Reg Status  PCP  Date Last Verified  Next Review Date       Verified  Liv Earing   20       Admission Date  Discharge Date  Admitting Provider          20  Eduard Bay MD        Marital Status  Islam             Single  Non-Cheondoism         Emergency Contact 1  Emergency Contact 2  Emergency Contact 3  Emergency Contact 4    Sean Anguiano 0) [phone] Parminder Bard)  [place] [phone] Gavin Brash (7) [phone] Parminder Bard)  Valerie Stewart S) [phone] Parminder Bard)         Subscriber Details   Hospital Account [de-identified]   CVG  Subscriber Name/Sex/Relation  Subscriber   Subscriber Address/Phone  Subscriber Emp/Emp Phone    1. Brittnee Maile   [id number]  [address] Male   (Self)  1960  Darlyn Patricia, C/ Layo Jenkins 75   [phone](N)  OTHER           Utilization Reviews   LOC:Acute Adult-Bone Marrow Transplant/Stem Cell Transplant (BMT/SCT) by Edson Adames   Review Entered  Review Status    2020 12:23  In Primary      Criteria Review    REVIEW SUMMARY   Patient: Brijesh Sims   Review Number: [id number]   Review Status:  In Primary   Condition Specific: Yes   OUTCOMES   Outcome Type: Primary   REVIEW DETAILS   Product: Kuldeep Comes Adult   Subset: Bone Marrow Transplant/Stem Cell Transplant (BMT/SCT)   (Symptom or finding within 24h)   (Excludes PO medications unless noted)   [X] Select Day, One:   [X] Post-procedure Day 1-14, One:   [X] INTERMEDIATE, All:   [X] Post bone marrow or stem cell transplant   ~--Admin, IQ Admin Admin on 12- 12:22 PM--~   11/11/20 transplant date   [X] Awaiting engraftment   ~--Admin, IQ Admin Admin on 12- 12:22 PM--~   per oncology; daily rounding, labs   [X] Isolation   ~--Admin, IQ Admin Admin on 12- 12:22 PM--~   contact isolation   [X] Hydration or nutrition, >= One:   [ ] IV fluid, One:   ~--Admin, IQ Admin Admin on 12- 12:23 PM--~   0.9NS w/ 20 KCL @ 50mL/hr, IV continuous; >100kg   [X] Advancing diet as tolerated or nutritional route established   ~--Admin, IQ Admin Admin on 12- 12:23 PM--~   general diet   Version: InterQual® 2019.1   [employer]  © 2019 [employer] 6199 and/or one of its [place]. All Rights Reserved. CPT only © 2018 American Medical Association. All Rights Reserved. Additional Notes    11/25/20    vitals: 149/72, 98.4, 83, 18, 100% ra         labs: Mag 1.5    glucose 112    wbc 0.3    rbc 2.5    H/H 8.1, 23.2    RDW 17.2    Platelet 13              meds:    norvasc, 5mg, PO x1    lisinopril, 40mg, PO x1    fluconazole 400mg, PO x1    levaquin, 500mg, PO x1    immodium 2mg, PO x1    protonix 40mg, PO x1    saline mouthwash S/S, 15mL x4    sotalol 120mg, PO x2    prograf, 4mg, PO x2    ursodiol 500mg, PO x2    valtrex, 500mg, PO x2    0.9NS w/ 20 KCL @ 50mL/hr, IV continuous    tylenol 650mg, PO x1    tums x2, PO    loperamide, 2mg, PO x1 (diarrhea)    mag sulfate 4g IVPB x1    symethicone 80mg, PO x2         -------------------------------    Hem Onc    Subjective: Feeling good today. No GI complaints.  Eating at every meal.       ECOG PS: (1) Restricted in physically strenuous activity, ambulatory and able to do work of light nature       KPS: 80% Normal activity with effort; some signs or symptoms of disease         General: Awake, alert and oriented. HEENT: normocephalic, PERRL, no scleral erythema or icterus, Oral mucosa moist and intact, throat clear    NECK: supple    BACK: Straight    SKIN: warm dry and intact without lesions rashes or masses    CHEST: CTA bilaterally without use of accessory muscles    CV: Normal S1 S2, RRR, no MRG    ABD: NT, ND, normoactive BS, no palpable masses or hepatosplenomegaly    EXTREMITIES: without edema, denies calf tenderness    NEURO: CN II - XII grossly intact    CATHETER: Right tri-fusion ([facility], 6/11/20) - CDI         PROBLEM LIST:       1. 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 CMML - 1 (Dx 10/2020)    3. Atrial Fibrillation    4. CASH    5. HTN       Post Transplant Complications:    1. Nausea and Indigestion    2. Diarrhea       TREATMENT:       1. Induction: Sweetie-C & Daunorubicin (6/12/20)    2. Consolidation: HiDAC x 3 cycles (7/28/20, 8/29/20 & 9/22/20)    3. MUD Allo - PB BMT (DPB1 non-permissive mismatch)    Preparative Regimen: Melphalan and Fludarabine    Date of BMT: 11/11/20    Source of stem cells: PBPC - 4.4 x10^6 [id number]/kg    Donor/Recipient Blood Type: A positive / 0 positive    Donor Sex: Female / NMDP (DID # [id number]) - Follow FISH XY    CMV Donor / Recipient: Negative / Negative       ASSESSMENT AND PLAN:       1. AML - intermediate risk / CMML-1:    - BM bx/asp (10/14/20) - No AML, but shows CMML-1    - S/p Melphalan & Fludarabine f/b allogeneic PBSC infusion 11/11/20    - Post txp disease eval: BM bx, no abnormal FISH, cytogenetics or mutations to follow       Day +15       2. ID: No evidence of infection.    - Cont Levaquin, Diflucan & Valtrex ppx       Donor/Recipient CMV: Negative / negative    - Routine surveillance is not required       3.  Heme: Pancytopenia from chemotherapy    - Transfuse for Hgb < 7 and Platelets < 93O    - No transfusion today    - Tylenol and Benadryl prior to each blood product. 4. Metabolic / CKD III: HypoNa, HypoMg    - Baseline SCr 1.2-1.3.    - Cont IVF: NS w/20KCl at 50 mL/hr    - Replace potassium and magnesium per PRN orders       5. 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 Levels qMWF:    Lab Results    Component Value Date    TACROLEV 8.4 11/23/2020    TACROLEV 8.1 11/20/2020    TACROLEV 8.1 11/18/2020       6. VOD: No evidence of VOD. Admission Weight: (!) 324 lb 3.2 oz (147.1 kg) Current Weight: Weight: (!) 302 lb 12.8 oz (137.3 kg). Recent Labs    11/25/20    0348    BILIDIR <0.2    BILITOT 0.4    - Cont Actigall       7. Pulmonary: H/o CASH    - PFT (10/6/20) - FEV1/FVC 71 w/ predicted ratio of 75 & DLCO 41% (corrected 84%). He has moderate obstruction by FEV1/FVC ratio lower than predicted and also based on ATS criteria with low FEV, likely d/t morbid obesity    - Pulm clearance by Dr. Nell Godoy    CASH:    - Cont CPAP    - Encourage IS and ambulation       8. GI / Nutrition:    Nutrition: Appetite is good    - Cont low microbial diet    - Dietary to follow       Diarrhea: resolved    - C. Diff (11/11/20) - negative    - Cont Imodium as needed    - Added Lomotil as needed    Nausea & Indigestion: improved       9. Cardiac: H/o A. Fib & HTN    - Echo (9/29/20) - LVEF 60-65% w/ left atrial dilatation    A. Fib: Currently in NSR    - Cont sotalol 120 mg BID    HTN: stable    - Cont amlodipine-lisinopril 5/40 mg daily       - DVT Prophylaxis: Platelets <87,552 cells/dL, - prophylactic lovenox on hold and mechanical prophylaxis with bilateral SCDs while in bed in place.     Contraindications to pharmacologic prophylaxis: None    Contraindications to mechanical prophylaxis: None       - Disposition: D/c home once ANC > 1.0 & toxicities resolved following allogeneic transplant LOC:Acute Adult-Bone Marrow Transplant/Stem Cell Transplant (BMT/SCT) (12/20/2020) by Sujata Romo   Review Entered  Review Status    12/1/2020 12:21  In Primary      Criteria Review    REVIEW SUMMARY   Patient: Melissa Joseph   Review Number: [id number]   Review Status: In Primary   Condition Specific: Yes   OUTCOMES   Outcome Type: Primary   REVIEW DETAILS   Service Date: 12/20/2020   Product: Chrissie Jones Adult   Subset: Bone Marrow Transplant/Stem Cell Transplant (BMT/SCT)   (Symptom or finding within 24h)   (Excludes PO medications unless noted)   [X] Select Day, One:   [X] Post-procedure Day 1-14, One:   [X] INTERMEDIATE, All:   [X] Post bone marrow or stem cell transplant   ~--Admin, IQ Admin Admin on 12- 12:20 PM--~   11/11/20   [X] Awaiting engraftment   ~--Admin, IQ Admin Admin on 12- 12:20 PM--~   regular rounding and monitoring by oncology   [X] Isolation   ~--Admin, IQ Admin Admin on 12- 12:19 PM--~   contact isolation   [X] Hydration or nutrition, >= One:   [ ] IV fluid, One:   ~--Admin, IQ Admin Admin on 12- 12:19 PM--~   0.9NS w/ 20 KCL @ 50mL/hr, IV continuous   >100kg   [X] Advancing diet as tolerated or nutritional route established   ~--Admin, IQ Admin Admin on 12- 12:19 PM--~   general diet   Version: InterQual® 2019.1   Julio Iniguez  © 2019 [employer]199 and/or one of its [place]. All Rights Reserved. CPT only © 2018 American Medical Association. All Rights Reserved.      Additional Notes    11/20/20    vitals: 136/76, 99.8, 91, 16, 98% ra              labs:    Na 129    CL 98    Mag 1.4    Glucose 121    WBC 0.1    RBC 2.36    H/H 7.8, 22.4    RDW 17.8    Platelets 10                   meds:    norvasc, 5mg, PO x1    lisinopril, 40mg, PO x1    fluconazole 400mg, PO x1    levaquin, 500mg, PO x1    immodium 2mg, PO x3    protonix 40mg, PO x1    saline mouthwash S/S, 15mL x4    sotalol 120mg, PO x2    prograf, 4mg, PO x2 ursodiol 500mg, PO x2    valtrex, 500mg, PO x2    0.9NS w/ 20 KCL @ 50mL/hr, IV continuous    tylenol 650mg, PO x1    tums x3, PO    benadryl 25mg, PO (prior to platelet transfusion) x1    lomotil, 2.5-0.025mg, PO x3    loperamide, 2mg, PO x3 (diarrhea)    compazine, 10mg, PO x2    symethicone 80mg, PO x4    ultram 50mg, PO x1    -------------------------------------    Oncology    Subjective: he reports gen weakness and fatigue       ECOG PS: (1) Restricted in physically strenuous activity, ambulatory and able to do work of light nature       KPS: 80% Normal activity with effort; some signs or symptoms of disease         General: Awake, alert and oriented. HEENT: normocephalic, PERRL, no scleral erythema or icterus, Oral mucosa moist and intact, throat clear    NECK: supple    BACK: Straight    SKIN: warm dry and intact without lesions rashes or masses    CHEST: CTA bilaterally without use of accessory muscles    CV: Normal S1 S2, RRR, no MRG    ABD: NT, ND, normoactive BS, no palpable masses or hepatosplenomegaly    EXTREMITIES: without edema, denies calf tenderness    NEURO: CN II - XII grossly intact    CATHETER: Right tri-fusion ([facility], 6/11/20) - CDI         PROBLEM LIST:       1. 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 CMML - 1 (Dx 10/2020)    3. Atrial Fibrillation    4. CASH    5. HTN       Post Transplant Complications:    1. Nausea and Indigestion    2. Diarrhea       TREATMENT:       1. Induction: Sweetie-C & Daunorubicin (6/12/20)    2. Consolidation: HiDAC x 3 cycles (7/28/20, 8/29/20 & 9/22/20)    3. MUD Allo - PB BMT (DPB1 non-permissive mismatch)    Preparative Regimen: Melphalan and Fludarabine    Date of BMT: 11/11/20    Source of stem cells: PBPC - 4.4 x10^6 [id number]/kg    Donor/Recipient Blood Type: A positive / 0 positive    Donor Sex: Female / NMDP (DID # [id number]) - Follow FISH XY    CMV Donor / Recipient: Negative / Negative       ASSESSMENT AND PLAN:       1.  AML - intermediate risk / CMML-1:    - BM bx/asp (10/14/20) - No AML, but shows CMML-1    - S/p Melphalan & Fludarabine f/b allogeneic PBSC infusion 11/11/20    - Post txp disease eval: BM bx, no abnormal FISH, cytogenetics or mutations to follow       Day +10       2. ID: No evidence of infection.    - Cont Levaquin, Diflucan & Valtrex ppx       Donor/Recipient CMV: Negative / negative    - Routine surveillance is not required       3. Heme: Pancytopenia from chemotherapy    - Transfuse for Hgb < 7 and Platelets < 08V    - Plt transfusion today       4. Metabolic / CKD III: HypoNa, HypoMg, Hyperglycemia    - Baseline SCr 1.2-1.3.    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    - Cont Prograf 4 mg BID. Levels qMWF:    Lab Results    Component Value Date    TACROLEV 8.1 11/20/2020    TACROLEV 8.1 11/18/2020    TACROLEV 7.5 11/16/2020       6. VOD: No evidence of VOD. Admission Weight: (!) 324 lb 3.2 oz (147.1 kg) Current Weight: Weight: (!) 306 lb 3.2 oz (138.9 kg). Recent Labs    11/20/20    0350 11/21/20    0305    BILIDIR <0.2 --    BILITOT 0.7 0.7    - Cont Actigall       7. Pulmonary: H/o CASH    - PFT (10/6/20) - FEV1/FVC 71 w/ predicted ratio of 75 & DLCO 41% (corrected 84%). He has moderate obstruction by FEV1/FVC ratio lower than predicted and also based on ATS criteria with low FEV, likely d/t morbid obesity    - Pulm clearance by Dr. Ra Lynch    CASH:    - Cont CPAP    - Encourage IS and ambulation       8. GI / Nutrition:    Nutrition: Appetite is good    - Cont low microbial diet    - Dietary to follow    Diarrhea:    - C. Diff (11/11/20) - negative    - Cont Imodium as needed    - Added Lomotil as needed    Nausea & Indigestion: Ongoing    - Compazine and Ativan as needed    - Cont Pepcid bid    - Cont TUMS as needed       9. Cardiac: H/o A.  Fib & HTN    - Echo (9/29/20) - LVEF 60-65% w/ left atrial dilatation    A. Fib: Currently in NSR    - Cont sotalol 120 mg BID    HTN: stable    - Cont amlodipine-lisinopril 5/40 mg daily       - DVT Prophylaxis: Platelets <80,934 cells/dL, - prophylactic lovenox on hold and mechanical prophylaxis with bilateral SCDs while in bed in place. Contraindications to pharmacologic prophylaxis: None    Contraindications to mechanical prophylaxis: None       - Disposition: D/c home once ANC > 1.0 & toxicities resolved following allogeneic transplant            LOC:Acute Adult-Bone Marrow Transplant/Stem Cell Transplant (BMT/SCT) by Colby Hall Entered  Review Status    12/1/2020 12:17  In Primary      Criteria Review    REVIEW SUMMARY   Patient: Elena Marshall   Review Number: [id number]   Review Status: In Primary   Condition Specific: Yes   OUTCOMES   Outcome Type: Primary   REVIEW DETAILS   Product: Isabel Copas Adult   Subset: Bone Marrow Transplant/Stem Cell Transplant (BMT/SCT)   (Symptom or finding within 24h)   (Excludes PO medications unless noted)   [X] Select Day, One:   [X] Post-procedure Day 1-14, One:   [X] INTERMEDIATE, All:   [X] Post bone marrow or stem cell transplant   [X] Awaiting engraftment   ~--Admin, IQ Admin Admin on 12- 12:15 PM--~   ASSESSMENT AND PLAN:   1. AML - intermediate risk / CMML-1:   - BM bx/asp (10/14/20) - No AML, but shows CMML-1   - S/p Melphalan & Fludarabine f/b allogeneic PBSC infusion 11/11/20   - Post txp disease eval: BM bx, no abnormal FISH, cytogenetics or mutations to follow   5. 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   - Cont Prograf 4 mg BID.    Donor/Recipient CMV: Negative / negative   - Routine surveillance is not required   [X] Isolation   ~--Admin, IQ Admin Admin on 12- 12:16 PM--~   contact isolation   [X] Hydration or nutrition, >= One:   [ ] IV fluid, One:   ~--Admin, IQ Admin Admin on 12- 12:17 PM--~   0.9NS w/ 20 KCL @ 50mL/hr, IV continuous, 142.5kg   [X] Advancing diet as tolerated or nutritional route established   ~--Admin, IQ Admin Admin on 12- 12:17 PM--~   general diet   Version: InterQual® 2019.1   Twila Pitts  © 2019 [employer] and/or one of its subsidiaries. All Rights Reserved. CPT only © 2018 American Medical Association. All Rights Reserved. Additional Notes    11/15/20    vitals: 140/71, 98.0, 78, 18, 100% ra              labs: Mag 1.5    Glucose 107    Total Protein 6    ALT 96    WBC 0.1    RBC 2.71    H/H 8.8, 26.2    RDW 18.6    Platelet Count 12         meds:    norvasc, 5mg, PO x1    lisinopril, 40mg, PO x1    pepcid, 20mg, PO x2    fluconazole 400mg, PO x1    levaquin, 500mg, PO x1    saline mouthwash S/S, 15mL x3    sotalol 120mg, PO x2    prograf, 4mg, PO x2    ursodiol 500mg, PO x2    valtrex, 500mg, PO x2    0.9NS w/ 20 KCL @ 50mL/hr, IV continuous    tums x3, PO    lomotil, 2.5-0.025mg, PO x3    compazine, 10mg, PO x1    compazine, 10mg, IV x1    ---------------------------------    No MD progress note 11/15    11/16 Hem-Onc Note:    Subjective: He continues to have nausea and diarrhea, states imodium and lomotil are helping. ECOG PS: (1) Restricted in physically strenuous activity, ambulatory and able to do work of light nature       KPS: 80% Normal activity with effort; some signs or symptoms of disease       Isolation: None         General: Awake, alert and oriented.     HEENT: normocephalic, PERRL, no scleral erythema or icterus, Oral mucosa moist and intact, throat clear    NECK: supple    BACK: Straight    SKIN: warm dry and intact without lesions rashes or masses    CHEST: CTA bilaterally without use of accessory muscles    CV: Normal S1 S2, RRR, no MRG    ABD: NT, ND, normoactive BS, no palpable masses or hepatosplenomegaly    EXTREMITIES: without edema, denies calf tenderness    NEURO: CN II - XII grossly intact    CATHETER: Right tri-fusion ([facility], 6/11/20) - CDI         PROBLEM LIST:       1. 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 CMML - 1 (Dx 10/2020)    3. Atrial Fibrillation    4. CASH    5. HTN       Post Transplant Complications:    1. Nausea and Indigestion    2. Diarrhea       TREATMENT:       1. Induction: Sweetie-C & Daunorubicin (6/12/20)    2. Consolidation: HiDAC x 3 cycles (7/28/20, 8/29/20 & 9/22/20)    3. MUD Allo - PB BMT (DPB1 non-permissive mismatch)    Preparative Regimen: Melphalan and Fludarabine    Date of BMT: 11/11/20    Source of stem cells: PBPC - 4.4 x10^6 [id number]/kg    Donor/Recipient Blood Type: A positive / 0 positive    Donor Sex: Female / NMDP (DID # [id number]) - Follow FISH XY    CMV Donor / Recipient: Negative / Negative       ASSESSMENT AND PLAN:       1. AML - intermediate risk / CMML-1:    - BM bx/asp (10/14/20) - No AML, but shows CMML-1    - S/p Melphalan & Fludarabine f/b allogeneic PBSC infusion 11/11/20    - Post txp disease eval: BM bx, no abnormal FISH, cytogenetics or mutations to follow       Day + 5       2. ID: No evidence of infection.    - Cont Levaquin, Diflucan & Valtrex ppx          Donor/Recipient CMV: Negative / negative    - Routine surveillance is not required       3. Heme: Pancytopenia from chemotherapy    - Transfuse for Hgb < 7 and Platelets < 02A    - No transfusion today       4. Metabolic / CKD III: stable e-lytes and renal fxn. Hypervolemia has improved    - Baseline SCr 1.2-1.3.    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    - Cont Prograf 4 mg BID. [place] Level qMWF:    Lab Results    Component Value Date    TACROLEV 6.1 11/13/2020    TACROLEV 4.3 11/12/2020    Next MTX 11/14/20       6. VOD: No evidence of VOD.     Admission Weight: (!) 324 lb 3.2 oz (147.1 kg) Current Weight: Weight: (!) 314 lb 3.2 oz (142.5 kg). Recent Labs    11/13/20    0426 11/15/20    0320    BILIDIR <0.2 -- --    BILITOT 0.5 < > 0.6    < > = values in this interval not displayed. - Cont Actigall       7. Pulmonary: H/o CASH    - PFT (10/6/20) - FEV1/FVC 71 w/ predicted ratio of 75 & DLCO 41% (corrected 84%). He has moderate obstruction by FEV1/FVC ratio lower than predicted and also based on ATS criteria with low FEV, likely d/t morbid obesity    - Pulm clearance by Dr. Narcisa Nielsen    CASH:    - Cont CPAP    - Encourage IS and ambulation       8. GI / Nutrition:    Nutrition: Appetite is good    - Cont low microbial diet    - Dietary to follow    Diarrhea: ongoing and he states Imodium is helping    - C. Diff (11/11/20) - negative    - Cont Imodium as needed    - Added Lomotil as needed    Nausea & Indigestion: Ongoing    - Compazine and Ativan as needed    - Cont Pepcid bid    - Cont TUMS as needed       9. Cardiac: H/o A. Fib & HTN    - Echo (9/29/20) - LVEF 60-65% w/ left atrial dilatation    A. Fib: Currently in NSR    - Cont sotalol 120 mg BID    HTN: stable    - Cont amlodipine-lisinopril 5/40 mg daily             - DVT Prophylaxis: Platelets <09,652 cells/dL, - prophylactic lovenox on hold and mechanical prophylaxis with bilateral SCDs while in bed in place.     Contraindications to pharmacologic prophylaxis: None    Contraindications to mechanical prophylaxis: None

## 2020-12-09 ENCOUNTER — HOSPITAL ENCOUNTER (OUTPATIENT)
Dept: ONCOLOGY | Age: 60
Setting detail: INFUSION SERIES
Discharge: HOME | End: 2020-12-09
Payer: COMMERCIAL

## 2020-12-09 VITALS
HEART RATE: 77 BPM | TEMPERATURE: 97.7 F | SYSTOLIC BLOOD PRESSURE: 157 MMHG | RESPIRATION RATE: 17 BRPM | OXYGEN SATURATION: 100 % | DIASTOLIC BLOOD PRESSURE: 89 MMHG

## 2020-12-09 DIAGNOSIS — C92.00 ACUTE MYELOID LEUKEMIA NOT HAVING ACHIEVED REMISSION (HCC): Primary | ICD-10-CM

## 2020-12-09 LAB
ABO/RH: NORMAL
ANTIBODY SCREEN: NORMAL
BLOOD BANK DISPENSE STATUS: NORMAL
BLOOD BANK PRODUCT CODE: NORMAL
BPU ID: NORMAL
DESCRIPTION BLOOD BANK: NORMAL

## 2020-12-09 PROCEDURE — 86923 COMPATIBILITY TEST ELECTRIC: CPT

## 2020-12-09 PROCEDURE — 86850 RBC ANTIBODY SCREEN: CPT

## 2020-12-09 PROCEDURE — 36430 TRANSFUSION BLD/BLD COMPNT: CPT

## 2020-12-09 PROCEDURE — 6370000000 HC RX 637 (ALT 250 FOR IP): Performed by: INTERNAL MEDICINE

## 2020-12-09 PROCEDURE — 86900 BLOOD TYPING SEROLOGIC ABO: CPT

## 2020-12-09 PROCEDURE — 36592 COLLECT BLOOD FROM PICC: CPT

## 2020-12-09 PROCEDURE — P9040 RBC LEUKOREDUCED IRRADIATED: HCPCS

## 2020-12-09 PROCEDURE — 86901 BLOOD TYPING SEROLOGIC RH(D): CPT

## 2020-12-09 RX ORDER — DIPHENHYDRAMINE HCL 25 MG
50 TABLET ORAL ONCE
Status: DISCONTINUED | OUTPATIENT
Start: 2020-12-09 | End: 2020-12-09

## 2020-12-09 RX ORDER — SODIUM CHLORIDE 0.9 % (FLUSH) 0.9 %
20 SYRINGE (ML) INJECTION PRN
Status: CANCELLED | OUTPATIENT
Start: 2020-12-09

## 2020-12-09 RX ORDER — SODIUM CHLORIDE 9 MG/ML
INJECTION, SOLUTION INTRAVENOUS CONTINUOUS
Status: CANCELLED | OUTPATIENT
Start: 2020-12-09

## 2020-12-09 RX ORDER — HEPARIN SODIUM (PORCINE) LOCK FLUSH IV SOLN 100 UNIT/ML 100 UNIT/ML
500 SOLUTION INTRAVENOUS PRN
Status: DISCONTINUED | OUTPATIENT
Start: 2020-12-09 | End: 2020-12-10 | Stop reason: HOSPADM

## 2020-12-09 RX ORDER — DIPHENHYDRAMINE HYDROCHLORIDE 50 MG/ML
50 INJECTION INTRAMUSCULAR; INTRAVENOUS ONCE
Status: CANCELLED | OUTPATIENT
Start: 2020-12-09

## 2020-12-09 RX ORDER — ACETAMINOPHEN 325 MG/1
650 TABLET ORAL ONCE
Status: CANCELLED | OUTPATIENT
Start: 2020-12-09

## 2020-12-09 RX ORDER — HEPARIN SODIUM (PORCINE) LOCK FLUSH IV SOLN 100 UNIT/ML 100 UNIT/ML
500 SOLUTION INTRAVENOUS PRN
Status: CANCELLED | OUTPATIENT
Start: 2020-12-09

## 2020-12-09 RX ORDER — HYDROXYZINE HYDROCHLORIDE 25 MG/1
25 TABLET, FILM COATED ORAL 3 TIMES DAILY PRN
COMMUNITY

## 2020-12-09 RX ORDER — ACETAMINOPHEN 325 MG/1
650 TABLET ORAL ONCE
Status: COMPLETED | OUTPATIENT
Start: 2020-12-09 | End: 2020-12-09

## 2020-12-09 RX ORDER — METHYLPREDNISOLONE SODIUM SUCCINATE 125 MG/2ML
125 INJECTION, POWDER, LYOPHILIZED, FOR SOLUTION INTRAMUSCULAR; INTRAVENOUS ONCE
Status: CANCELLED | OUTPATIENT
Start: 2020-12-09

## 2020-12-09 RX ORDER — DIPHENHYDRAMINE HCL 25 MG
50 TABLET ORAL ONCE
Status: CANCELLED | OUTPATIENT
Start: 2020-12-09

## 2020-12-09 RX ORDER — PANTOPRAZOLE SODIUM 40 MG/1
40 GRANULE, DELAYED RELEASE ORAL
COMMUNITY

## 2020-12-09 RX ORDER — SODIUM CHLORIDE 0.9 % (FLUSH) 0.9 %
10 SYRINGE (ML) INJECTION PRN
Status: CANCELLED | OUTPATIENT
Start: 2020-12-09

## 2020-12-09 RX ORDER — 0.9 % SODIUM CHLORIDE 0.9 %
10 VIAL (ML) INJECTION ONCE
Status: CANCELLED | OUTPATIENT
Start: 2020-12-09

## 2020-12-09 RX ORDER — DIPHENHYDRAMINE HCL 25 MG
25 TABLET ORAL ONCE
Status: COMPLETED | OUTPATIENT
Start: 2020-12-09 | End: 2020-12-09

## 2020-12-09 RX ADMIN — ACETAMINOPHEN 650 MG: 325 TABLET ORAL at 12:20

## 2020-12-09 RX ADMIN — DIPHENHYDRAMINE HYDROCHLORIDE 25 MG: 25 TABLET ORAL at 12:20

## 2020-12-09 NOTE — PLAN OF CARE
Problem: Falls - Risk of:  Goal: Will remain free from falls  Description: Will remain free from falls  Outcome: Ongoing  Note: Pt is a Med fall risk. Explained fall risk precautions to pt and rationale behind their use to keep the patient safe. Belongings are in reach. Pt encouraged to notify staff for any and all assistance. Staff present in tx suite throughout entirety of pts treatment to monitor and protect from falls. Assistance provided when ambulating to restroom utilizing Stay With Me. Problem: KNOWLEDGE DEFICIT  Goal: Patient/S.O. demonstrates understanding of disease process, treatment plan, medications, and discharge instructions. Outcome: Ongoing  Note: Patient's hemoglobin this AM: 6.8  Patient's platelet count this AM: 85  Pt seen and assessed at [facility]. Seen at [facility] today for type and screen and 1 unit prbc transfusion per standing orders for above lab values per [facility]. Pre-medications administered per MD order for history of transfusion reaction- see eMAR. Blood products transfused per [facility] policy. Pt tolerated transfusion well and without incident. Pt verbalizes understanding of discharge instructions. Discharged ambulatory per self.

## 2020-12-14 ENCOUNTER — HOSPITAL ENCOUNTER (OUTPATIENT)
Dept: ONCOLOGY | Age: 60
Setting detail: INFUSION SERIES
Discharge: HOME | End: 2020-12-14
Payer: COMMERCIAL

## 2020-12-14 LAB — TACROLIMUS BLOOD: 1.4 NG/ML (ref 5–20)

## 2020-12-14 PROCEDURE — 36592 COLLECT BLOOD FROM PICC: CPT

## 2020-12-14 PROCEDURE — 80197 ASSAY OF TACROLIMUS: CPT

## 2020-12-14 NOTE — PROGRESS NOTES
Pt in OP infusion for tacrolimus level per order received from Macario Juares NP. Lab drawn via CVC without difficulty, line saline locked- see flowsheets. Pt discharged ambulatory to [facility] for treatment.

## 2020-12-19 ENCOUNTER — HOSPITAL ENCOUNTER (OUTPATIENT)
Dept: ONCOLOGY | Age: 60
Setting detail: INFUSION SERIES
Discharge: HOME | End: 2020-12-19
Payer: COMMERCIAL

## 2020-12-19 VITALS
TEMPERATURE: 97.8 F | OXYGEN SATURATION: 99 % | DIASTOLIC BLOOD PRESSURE: 91 MMHG | RESPIRATION RATE: 18 BRPM | HEART RATE: 73 BPM | SYSTOLIC BLOOD PRESSURE: 167 MMHG

## 2020-12-19 DIAGNOSIS — C92.00 ACUTE MYELOID LEUKEMIA NOT HAVING ACHIEVED REMISSION (HCC): Primary | ICD-10-CM

## 2020-12-19 PROCEDURE — 86923 COMPATIBILITY TEST ELECTRIC: CPT

## 2020-12-19 PROCEDURE — 36430 TRANSFUSION BLD/BLD COMPNT: CPT

## 2020-12-19 PROCEDURE — 86850 RBC ANTIBODY SCREEN: CPT

## 2020-12-19 PROCEDURE — 86900 BLOOD TYPING SEROLOGIC ABO: CPT

## 2020-12-19 PROCEDURE — 86901 BLOOD TYPING SEROLOGIC RH(D): CPT

## 2020-12-19 PROCEDURE — 36592 COLLECT BLOOD FROM PICC: CPT

## 2020-12-19 PROCEDURE — 6360000002 HC RX W HCPCS: Performed by: NURSE PRACTITIONER

## 2020-12-19 PROCEDURE — 6370000000 HC RX 637 (ALT 250 FOR IP): Performed by: INTERNAL MEDICINE

## 2020-12-19 PROCEDURE — P9040 RBC LEUKOREDUCED IRRADIATED: HCPCS

## 2020-12-19 RX ORDER — SODIUM CHLORIDE 9 MG/ML
INJECTION, SOLUTION INTRAVENOUS CONTINUOUS
Status: CANCELLED | OUTPATIENT
Start: 2020-12-19

## 2020-12-19 RX ORDER — METHYLPREDNISOLONE SODIUM SUCCINATE 125 MG/2ML
125 INJECTION, POWDER, LYOPHILIZED, FOR SOLUTION INTRAMUSCULAR; INTRAVENOUS ONCE
Status: CANCELLED | OUTPATIENT
Start: 2020-12-19 | End: 2020-12-19

## 2020-12-19 RX ORDER — ACETAMINOPHEN 325 MG/1
650 TABLET ORAL ONCE
Status: COMPLETED | OUTPATIENT
Start: 2020-12-19 | End: 2020-12-19

## 2020-12-19 RX ORDER — DIPHENHYDRAMINE HYDROCHLORIDE 50 MG/ML
50 INJECTION INTRAMUSCULAR; INTRAVENOUS ONCE
Status: CANCELLED | OUTPATIENT
Start: 2020-12-19 | End: 2020-12-19

## 2020-12-19 RX ORDER — DIPHENHYDRAMINE HCL 25 MG
50 TABLET ORAL ONCE
Status: CANCELLED | OUTPATIENT
Start: 2020-12-19 | End: 2020-12-19

## 2020-12-19 RX ORDER — SODIUM CHLORIDE 0.9 % (FLUSH) 0.9 %
20 SYRINGE (ML) INJECTION PRN
Status: CANCELLED | OUTPATIENT
Start: 2020-12-19

## 2020-12-19 RX ORDER — SODIUM CHLORIDE 0.9 % (FLUSH) 0.9 %
10 SYRINGE (ML) INJECTION PRN
Status: CANCELLED | OUTPATIENT
Start: 2020-12-19

## 2020-12-19 RX ORDER — DIPHENHYDRAMINE HCL 25 MG
50 TABLET ORAL ONCE
Status: COMPLETED | OUTPATIENT
Start: 2020-12-19 | End: 2020-12-19

## 2020-12-19 RX ORDER — 0.9 % SODIUM CHLORIDE 0.9 %
10 VIAL (ML) INJECTION ONCE
Status: CANCELLED | OUTPATIENT
Start: 2020-12-19 | End: 2020-12-19

## 2020-12-19 RX ORDER — HEPARIN SODIUM (PORCINE) LOCK FLUSH IV SOLN 100 UNIT/ML 100 UNIT/ML
500 SOLUTION INTRAVENOUS PRN
Status: CANCELLED | OUTPATIENT
Start: 2020-12-19

## 2020-12-19 RX ORDER — HEPARIN SODIUM (PORCINE) LOCK FLUSH IV SOLN 100 UNIT/ML 100 UNIT/ML
500 SOLUTION INTRAVENOUS PRN
Status: DISCONTINUED | OUTPATIENT
Start: 2020-12-19 | End: 2020-12-20 | Stop reason: HOSPADM

## 2020-12-19 RX ORDER — ACETAMINOPHEN 325 MG/1
650 TABLET ORAL ONCE
Status: CANCELLED | OUTPATIENT
Start: 2020-12-19 | End: 2020-12-19

## 2020-12-19 RX ADMIN — Medication 500 UNITS: at 14:09

## 2020-12-19 RX ADMIN — DIPHENHYDRAMINE HYDROCHLORIDE 50 MG: 25 TABLET ORAL at 12:05

## 2020-12-19 RX ADMIN — ACETAMINOPHEN 650 MG: 325 TABLET ORAL at 12:04

## 2020-12-19 ASSESSMENT — PAIN SCALES - GENERAL: PAINLEVEL_OUTOF10: 0

## 2020-12-28 ENCOUNTER — HOSPITAL ENCOUNTER (OUTPATIENT)
Age: 60
Setting detail: SPECIMEN
Discharge: HOME | End: 2020-12-28
Payer: COMMERCIAL

## 2020-12-28 ENCOUNTER — HOSPITAL ENCOUNTER (OUTPATIENT)
Dept: ONCOLOGY | Age: 60
Setting detail: INFUSION SERIES
Discharge: HOME | End: 2020-12-28
Payer: COMMERCIAL

## 2020-12-28 VITALS
HEART RATE: 69 BPM | TEMPERATURE: 97.5 F | DIASTOLIC BLOOD PRESSURE: 81 MMHG | SYSTOLIC BLOOD PRESSURE: 133 MMHG | RESPIRATION RATE: 18 BRPM | OXYGEN SATURATION: 100 %

## 2020-12-28 DIAGNOSIS — C92.00 ACUTE MYELOID LEUKEMIA NOT HAVING ACHIEVED REMISSION (HCC): Primary | ICD-10-CM

## 2020-12-28 LAB
ABO/RH: NORMAL
ANTIBODY SCREEN: NORMAL
DAT POLYSPECIFIC: NORMAL
HAPTOGLOBIN: 224 MG/DL (ref 30–200)
TACROLIMUS BLOOD: 2.5 NG/ML (ref 5–20)

## 2020-12-28 PROCEDURE — 80197 ASSAY OF TACROLIMUS: CPT

## 2020-12-28 PROCEDURE — 6370000000 HC RX 637 (ALT 250 FOR IP): Performed by: INTERNAL MEDICINE

## 2020-12-28 PROCEDURE — 86900 BLOOD TYPING SEROLOGIC ABO: CPT

## 2020-12-28 PROCEDURE — 86923 COMPATIBILITY TEST ELECTRIC: CPT

## 2020-12-28 PROCEDURE — 86850 RBC ANTIBODY SCREEN: CPT

## 2020-12-28 PROCEDURE — 86880 COOMBS TEST DIRECT: CPT

## 2020-12-28 PROCEDURE — P9040 RBC LEUKOREDUCED IRRADIATED: HCPCS

## 2020-12-28 PROCEDURE — 36415 COLL VENOUS BLD VENIPUNCTURE: CPT

## 2020-12-28 PROCEDURE — 36592 COLLECT BLOOD FROM PICC: CPT

## 2020-12-28 PROCEDURE — 83010 ASSAY OF HAPTOGLOBIN QUANT: CPT

## 2020-12-28 PROCEDURE — 82668 ASSAY OF ERYTHROPOIETIN: CPT

## 2020-12-28 PROCEDURE — 36430 TRANSFUSION BLD/BLD COMPNT: CPT

## 2020-12-28 PROCEDURE — 6360000002 HC RX W HCPCS: Performed by: NURSE PRACTITIONER

## 2020-12-28 PROCEDURE — 86901 BLOOD TYPING SEROLOGIC RH(D): CPT

## 2020-12-28 RX ORDER — ACETAMINOPHEN 325 MG/1
650 TABLET ORAL ONCE
Status: COMPLETED | OUTPATIENT
Start: 2020-12-28 | End: 2020-12-28

## 2020-12-28 RX ORDER — HEPARIN SODIUM (PORCINE) LOCK FLUSH IV SOLN 100 UNIT/ML 100 UNIT/ML
500 SOLUTION INTRAVENOUS PRN
Status: DISCONTINUED | OUTPATIENT
Start: 2020-12-28 | End: 2020-12-29 | Stop reason: HOSPADM

## 2020-12-28 RX ORDER — ATOVAQUONE 750 MG/5ML
1500 SUSPENSION ORAL DAILY
COMMUNITY

## 2020-12-28 RX ORDER — SODIUM CHLORIDE 0.9 % (FLUSH) 0.9 %
20 SYRINGE (ML) INJECTION PRN
Status: CANCELLED | OUTPATIENT
Start: 2020-12-28

## 2020-12-28 RX ORDER — HEPARIN SODIUM (PORCINE) LOCK FLUSH IV SOLN 100 UNIT/ML 100 UNIT/ML
500 SOLUTION INTRAVENOUS PRN
Status: CANCELLED | OUTPATIENT
Start: 2020-12-28

## 2020-12-28 RX ORDER — DIPHENHYDRAMINE HYDROCHLORIDE 50 MG/ML
50 INJECTION INTRAMUSCULAR; INTRAVENOUS ONCE
Status: CANCELLED | OUTPATIENT
Start: 2020-12-28 | End: 2020-12-28

## 2020-12-28 RX ORDER — SODIUM CHLORIDE 0.9 % (FLUSH) 0.9 %
10 SYRINGE (ML) INJECTION PRN
Status: CANCELLED | OUTPATIENT
Start: 2020-12-28

## 2020-12-28 RX ORDER — SODIUM CHLORIDE 9 MG/ML
INJECTION, SOLUTION INTRAVENOUS CONTINUOUS
Status: CANCELLED | OUTPATIENT
Start: 2020-12-28

## 2020-12-28 RX ORDER — 0.9 % SODIUM CHLORIDE 0.9 %
10 VIAL (ML) INJECTION ONCE
Status: CANCELLED | OUTPATIENT
Start: 2020-12-28 | End: 2020-12-28

## 2020-12-28 RX ORDER — METHYLPREDNISOLONE SODIUM SUCCINATE 125 MG/2ML
125 INJECTION, POWDER, LYOPHILIZED, FOR SOLUTION INTRAMUSCULAR; INTRAVENOUS ONCE
Status: CANCELLED | OUTPATIENT
Start: 2020-12-28 | End: 2020-12-28

## 2020-12-28 RX ORDER — DIPHENHYDRAMINE HCL 25 MG
50 TABLET ORAL ONCE
Status: COMPLETED | OUTPATIENT
Start: 2020-12-28 | End: 2020-12-28

## 2020-12-28 RX ORDER — ACETAMINOPHEN 325 MG/1
650 TABLET ORAL ONCE
Status: CANCELLED | OUTPATIENT
Start: 2020-12-28 | End: 2020-12-28

## 2020-12-28 RX ORDER — DIPHENHYDRAMINE HCL 25 MG
50 TABLET ORAL ONCE
Status: CANCELLED | OUTPATIENT
Start: 2020-12-28 | End: 2020-12-28

## 2020-12-28 RX ADMIN — ACETAMINOPHEN 650 MG: 325 TABLET ORAL at 12:03

## 2020-12-28 RX ADMIN — Medication 500 UNITS: at 14:01

## 2020-12-28 RX ADMIN — DIPHENHYDRAMINE HYDROCHLORIDE 25 MG: 25 TABLET ORAL at 12:03

## 2020-12-28 ASSESSMENT — PAIN SCALES - GENERAL: PAINLEVEL_OUTOF10: 0

## 2020-12-28 NOTE — PLAN OF CARE
Problem: Falls - Risk of:  Goal: Will remain free from falls  Description: Will remain free from falls  12/28/2020 1436 by Emily Brown RN  Note:    Explained fall risk precautions to pt and rationale behind their use to keep the patient safe. Belongings are in reach. Pt encouraged to notify staff for any and all assistance. Staff present in tx suite throughout entirety of pts treatment to monitor and protect from falls. Assistance provided when ambulating to restroom utilizing Stay With Me.     12/28/2020 1416 by Emily Brown RN  Note:   Explained fall risk precautions to pt and rationale behind their use to keep the patient safe. Belongings are in reach. Pt encouraged to notify staff for any and all assistance. Staff present in tx suite throughout entirety of pts treatment to monitor and protect from falls. Assistance provided when ambulating to restroom utilizing Stay With Me. Problem: KNOWLEDGE DEFICIT  Goal: Patient/S.O. demonstrates understanding of disease process, treatment plan, medications, and discharge instructions. Note: Patient's hemoglobin this AM: 6.1  Patient's platelet count this AM: No results for input(s): PLT in the last 72 hours. Pt seen and assessed at [facility]. Seen at [address] [place] [address] today for one unit of PRBC per standing orders for above lab values. Blood products transfused per [facility] policy. Pt tolerated transfusion well and without incident. Pt verbalizes understanding of discharge instructions. Discharged ambulatory to home with self.

## 2020-12-30 ENCOUNTER — HOSPITAL ENCOUNTER (OUTPATIENT)
Dept: ONCOLOGY | Age: 60
Setting detail: INFUSION SERIES
Discharge: HOME | End: 2020-12-30
Payer: COMMERCIAL

## 2020-12-30 ENCOUNTER — HOSPITAL ENCOUNTER (OUTPATIENT)
Age: 60
Setting detail: SPECIMEN
Discharge: HOME | End: 2020-12-30
Payer: COMMERCIAL

## 2020-12-30 VITALS
DIASTOLIC BLOOD PRESSURE: 82 MMHG | HEART RATE: 77 BPM | TEMPERATURE: 97.8 F | SYSTOLIC BLOOD PRESSURE: 146 MMHG | RESPIRATION RATE: 18 BRPM

## 2020-12-30 DIAGNOSIS — C92.00 ACUTE MYELOID LEUKEMIA NOT HAVING ACHIEVED REMISSION (HCC): Primary | ICD-10-CM

## 2020-12-30 LAB
BLOOD BANK DISPENSE STATUS: NORMAL
BLOOD BANK DISPENSE STATUS: NORMAL
BLOOD BANK PRODUCT CODE: NORMAL
BLOOD BANK PRODUCT CODE: NORMAL
BPU ID: NORMAL
BPU ID: NORMAL
DESCRIPTION BLOOD BANK: NORMAL
DESCRIPTION BLOOD BANK: NORMAL
TACROLIMUS BLOOD: 3.3 NG/ML (ref 5–20)

## 2020-12-30 PROCEDURE — 6360000002 HC RX W HCPCS: Performed by: NURSE PRACTITIONER

## 2020-12-30 PROCEDURE — 36430 TRANSFUSION BLD/BLD COMPNT: CPT

## 2020-12-30 PROCEDURE — 6370000000 HC RX 637 (ALT 250 FOR IP): Performed by: INTERNAL MEDICINE

## 2020-12-30 PROCEDURE — 80197 ASSAY OF TACROLIMUS: CPT

## 2020-12-30 PROCEDURE — P9040 RBC LEUKOREDUCED IRRADIATED: HCPCS

## 2020-12-30 PROCEDURE — 36415 COLL VENOUS BLD VENIPUNCTURE: CPT

## 2020-12-30 RX ORDER — SODIUM CHLORIDE 0.9 % (FLUSH) 0.9 %
20 SYRINGE (ML) INJECTION PRN
Status: CANCELLED | OUTPATIENT
Start: 2020-12-30

## 2020-12-30 RX ORDER — HEPARIN SODIUM (PORCINE) LOCK FLUSH IV SOLN 100 UNIT/ML 100 UNIT/ML
500 SOLUTION INTRAVENOUS PRN
Status: DISCONTINUED | OUTPATIENT
Start: 2020-12-30 | End: 2020-12-31 | Stop reason: HOSPADM

## 2020-12-30 RX ORDER — DIPHENHYDRAMINE HYDROCHLORIDE 50 MG/ML
50 INJECTION INTRAMUSCULAR; INTRAVENOUS ONCE
Status: CANCELLED | OUTPATIENT
Start: 2020-12-30 | End: 2020-12-30

## 2020-12-30 RX ORDER — SODIUM CHLORIDE 9 MG/ML
INJECTION, SOLUTION INTRAVENOUS CONTINUOUS
Status: CANCELLED | OUTPATIENT
Start: 2020-12-30

## 2020-12-30 RX ORDER — ACETAMINOPHEN 325 MG/1
650 TABLET ORAL ONCE
Status: CANCELLED | OUTPATIENT
Start: 2020-12-30 | End: 2020-12-30

## 2020-12-30 RX ORDER — DIPHENHYDRAMINE HCL 25 MG
50 TABLET ORAL ONCE
Status: COMPLETED | OUTPATIENT
Start: 2020-12-30 | End: 2020-12-30

## 2020-12-30 RX ORDER — DIPHENHYDRAMINE HCL 25 MG
25 TABLET ORAL ONCE
Status: CANCELLED | OUTPATIENT
Start: 2020-12-30 | End: 2020-12-30

## 2020-12-30 RX ORDER — METHYLPREDNISOLONE SODIUM SUCCINATE 125 MG/2ML
125 INJECTION, POWDER, LYOPHILIZED, FOR SOLUTION INTRAMUSCULAR; INTRAVENOUS ONCE
Status: CANCELLED | OUTPATIENT
Start: 2020-12-30 | End: 2020-12-30

## 2020-12-30 RX ORDER — ACETAMINOPHEN 325 MG/1
650 TABLET ORAL ONCE
Status: COMPLETED | OUTPATIENT
Start: 2020-12-30 | End: 2020-12-30

## 2020-12-30 RX ORDER — DIPHENHYDRAMINE HCL 25 MG
50 TABLET ORAL ONCE
Status: CANCELLED | OUTPATIENT
Start: 2020-12-30 | End: 2020-12-30

## 2020-12-30 RX ORDER — SODIUM CHLORIDE 0.9 % (FLUSH) 0.9 %
10 SYRINGE (ML) INJECTION PRN
Status: CANCELLED | OUTPATIENT
Start: 2020-12-30

## 2020-12-30 RX ORDER — 0.9 % SODIUM CHLORIDE 0.9 %
10 VIAL (ML) INJECTION ONCE
Status: CANCELLED | OUTPATIENT
Start: 2020-12-30 | End: 2020-12-30

## 2020-12-30 RX ORDER — HEPARIN SODIUM (PORCINE) LOCK FLUSH IV SOLN 100 UNIT/ML 100 UNIT/ML
500 SOLUTION INTRAVENOUS PRN
Status: CANCELLED | OUTPATIENT
Start: 2020-12-30

## 2020-12-30 RX ADMIN — Medication 500 UNITS: at 14:22

## 2020-12-30 RX ADMIN — ACETAMINOPHEN 650 MG: 325 TABLET ORAL at 11:54

## 2020-12-30 RX ADMIN — DIPHENHYDRAMINE HYDROCHLORIDE 50 MG: 25 TABLET ORAL at 11:54

## 2020-12-30 NOTE — PLAN OF CARE
Problem: KNOWLEDGE DEFICIT  Goal: Patient/S.O. demonstrates understanding of disease process, treatment plan, medications, and discharge instructions. Outcome: Ongoing  Note:   Patient's hemoglobin this AM: 6.6  Patient's platelet count this AM: 33  Pt seen and assessed at [facility]. Seen at [address] [place] [address] for pre-meds prior to 1 unit prbc transfusion per standing orders for above lab values per [facility]. Blood products transfused per [facility] policy. Pt tolerated transfusion well and without incident. Pt verbalizes understanding of discharge instructions. Discharged ambulatory per self.

## 2020-12-31 LAB — ERYTHROPOIETIN: 1136 MU/ML (ref 4–27)

## 2021-01-02 LAB
MAGNESIUM: 1.4 MG/DL (ref 1.8–2.4)
TACROLIMUS BLOOD: 4.5 NG/ML (ref 5–20)

## 2021-01-02 PROCEDURE — 83735 ASSAY OF MAGNESIUM: CPT

## 2021-01-02 PROCEDURE — 36415 COLL VENOUS BLD VENIPUNCTURE: CPT

## 2021-01-02 PROCEDURE — 80197 ASSAY OF TACROLIMUS: CPT

## 2021-01-04 ENCOUNTER — HOSPITAL ENCOUNTER (OUTPATIENT)
Age: 61
Setting detail: SPECIMEN
Discharge: HOME | End: 2021-01-04
Payer: COMMERCIAL

## 2021-01-04 LAB — TACROLIMUS BLOOD: 5.5 NG/ML (ref 5–20)

## 2021-01-04 PROCEDURE — 36415 COLL VENOUS BLD VENIPUNCTURE: CPT

## 2021-01-04 PROCEDURE — 80197 ASSAY OF TACROLIMUS: CPT

## 2021-01-06 ENCOUNTER — HOSPITAL ENCOUNTER (OUTPATIENT)
Age: 61
Setting detail: SPECIMEN
Discharge: HOME | End: 2021-01-06
Payer: COMMERCIAL

## 2021-01-06 ENCOUNTER — HOSPITAL ENCOUNTER (OUTPATIENT)
Dept: ONCOLOGY | Age: 61
Setting detail: INFUSION SERIES
Discharge: HOME | End: 2021-01-06
Payer: COMMERCIAL

## 2021-01-06 VITALS
RESPIRATION RATE: 18 BRPM | HEART RATE: 69 BPM | SYSTOLIC BLOOD PRESSURE: 135 MMHG | TEMPERATURE: 97.8 F | DIASTOLIC BLOOD PRESSURE: 85 MMHG

## 2021-01-06 DIAGNOSIS — C92.00 ACUTE MYELOID LEUKEMIA NOT HAVING ACHIEVED REMISSION (HCC): Primary | ICD-10-CM

## 2021-01-06 LAB
ABO/RH: NORMAL
ANTIBODY SCREEN: NORMAL
GLUCOSE BLD-MCNC: 157 MG/DL (ref 70–99)
PERFORMED ON: ABNORMAL
TACROLIMUS BLOOD: 6.1 NG/ML (ref 5–20)

## 2021-01-06 PROCEDURE — 86901 BLOOD TYPING SEROLOGIC RH(D): CPT

## 2021-01-06 PROCEDURE — 6370000000 HC RX 637 (ALT 250 FOR IP): Performed by: INTERNAL MEDICINE

## 2021-01-06 PROCEDURE — 36592 COLLECT BLOOD FROM PICC: CPT

## 2021-01-06 PROCEDURE — 80197 ASSAY OF TACROLIMUS: CPT

## 2021-01-06 PROCEDURE — 86850 RBC ANTIBODY SCREEN: CPT

## 2021-01-06 PROCEDURE — 6360000002 HC RX W HCPCS: Performed by: NURSE PRACTITIONER

## 2021-01-06 PROCEDURE — 36430 TRANSFUSION BLD/BLD COMPNT: CPT

## 2021-01-06 PROCEDURE — P9040 RBC LEUKOREDUCED IRRADIATED: HCPCS

## 2021-01-06 PROCEDURE — 86923 COMPATIBILITY TEST ELECTRIC: CPT

## 2021-01-06 PROCEDURE — 36415 COLL VENOUS BLD VENIPUNCTURE: CPT

## 2021-01-06 PROCEDURE — 86900 BLOOD TYPING SEROLOGIC ABO: CPT

## 2021-01-06 RX ORDER — SODIUM CHLORIDE 9 MG/ML
INJECTION, SOLUTION INTRAVENOUS CONTINUOUS
Status: CANCELLED | OUTPATIENT
Start: 2021-01-06

## 2021-01-06 RX ORDER — SODIUM CHLORIDE 0.9 % (FLUSH) 0.9 %
20 SYRINGE (ML) INJECTION PRN
Status: CANCELLED | OUTPATIENT
Start: 2021-01-06

## 2021-01-06 RX ORDER — DIPHENHYDRAMINE HYDROCHLORIDE 50 MG/ML
50 INJECTION INTRAMUSCULAR; INTRAVENOUS ONCE
Status: CANCELLED | OUTPATIENT
Start: 2021-01-06 | End: 2021-01-06

## 2021-01-06 RX ORDER — ACETAMINOPHEN 325 MG/1
650 TABLET ORAL ONCE
Status: CANCELLED | OUTPATIENT
Start: 2021-01-06 | End: 2021-01-06

## 2021-01-06 RX ORDER — 0.9 % SODIUM CHLORIDE 0.9 %
10 VIAL (ML) INJECTION ONCE
Status: CANCELLED | OUTPATIENT
Start: 2021-01-06 | End: 2021-01-06

## 2021-01-06 RX ORDER — HEPARIN SODIUM (PORCINE) LOCK FLUSH IV SOLN 100 UNIT/ML 100 UNIT/ML
500 SOLUTION INTRAVENOUS PRN
Status: CANCELLED | OUTPATIENT
Start: 2021-01-06

## 2021-01-06 RX ORDER — DIPHENHYDRAMINE HCL 25 MG
25 TABLET ORAL ONCE
Status: CANCELLED | OUTPATIENT
Start: 2021-01-06 | End: 2021-01-06

## 2021-01-06 RX ORDER — DIPHENHYDRAMINE HCL 25 MG
25 TABLET ORAL ONCE
Status: COMPLETED | OUTPATIENT
Start: 2021-01-06 | End: 2021-01-06

## 2021-01-06 RX ORDER — METHYLPREDNISOLONE SODIUM SUCCINATE 125 MG/2ML
125 INJECTION, POWDER, LYOPHILIZED, FOR SOLUTION INTRAMUSCULAR; INTRAVENOUS ONCE
Status: CANCELLED | OUTPATIENT
Start: 2021-01-06 | End: 2021-01-06

## 2021-01-06 RX ORDER — ACETAMINOPHEN 325 MG/1
650 TABLET ORAL ONCE
Status: COMPLETED | OUTPATIENT
Start: 2021-01-06 | End: 2021-01-06

## 2021-01-06 RX ORDER — SODIUM CHLORIDE 0.9 % (FLUSH) 0.9 %
20 SYRINGE (ML) INJECTION PRN
Status: DISCONTINUED | OUTPATIENT
Start: 2021-01-06 | End: 2021-01-07 | Stop reason: HOSPADM

## 2021-01-06 RX ORDER — HEPARIN SODIUM (PORCINE) LOCK FLUSH IV SOLN 100 UNIT/ML 100 UNIT/ML
500 SOLUTION INTRAVENOUS PRN
Status: DISCONTINUED | OUTPATIENT
Start: 2021-01-06 | End: 2021-01-07 | Stop reason: HOSPADM

## 2021-01-06 RX ORDER — SODIUM CHLORIDE 0.9 % (FLUSH) 0.9 %
10 SYRINGE (ML) INJECTION PRN
Status: CANCELLED | OUTPATIENT
Start: 2021-01-06

## 2021-01-06 RX ADMIN — DIPHENHYDRAMINE HYDROCHLORIDE 25 MG: 25 TABLET ORAL at 11:52

## 2021-01-06 RX ADMIN — ACETAMINOPHEN 650 MG: 325 TABLET ORAL at 11:52

## 2021-01-06 RX ADMIN — Medication 500 UNITS: at 13:49

## 2021-01-06 ASSESSMENT — PAIN SCALES - GENERAL: PAINLEVEL_OUTOF10: 0

## 2021-01-06 NOTE — PLAN OF CARE
Problem: KNOWLEDGE DEFICIT  Goal: Patient/S.O. demonstrates understanding of disease process, treatment plan, medications, and discharge instructions. Outcome: Ongoing  Note: Patient's hemoglobin this AM: 6.6   Patient's platelet count this AM: 34.0  Pt seen and assessed at [facility]. Seen at [address] [place] [address] today for a type and screen followed by 1 unit of PRBCs per standing orders for above lab values. Blood products transfused per [facility] policy. Pt tolerated transfusion well and without incident. Pt verbalizes understanding of discharge instructions. Discharged ambulatory to home.

## 2021-01-08 ENCOUNTER — HOSPITAL ENCOUNTER (OUTPATIENT)
Age: 61
Setting detail: SPECIMEN
Discharge: HOME | End: 2021-01-08
Payer: COMMERCIAL

## 2021-01-08 ENCOUNTER — HOSPITAL ENCOUNTER (OUTPATIENT)
Dept: ONCOLOGY | Age: 61
Setting detail: INFUSION SERIES
Discharge: HOME | End: 2021-01-08
Payer: COMMERCIAL

## 2021-01-08 VITALS
SYSTOLIC BLOOD PRESSURE: 144 MMHG | DIASTOLIC BLOOD PRESSURE: 92 MMHG | HEART RATE: 77 BPM | TEMPERATURE: 98.2 F | RESPIRATION RATE: 18 BRPM | OXYGEN SATURATION: 100 %

## 2021-01-08 DIAGNOSIS — C92.00 ACUTE MYELOID LEUKEMIA NOT HAVING ACHIEVED REMISSION (HCC): Primary | ICD-10-CM

## 2021-01-08 LAB
BLOOD BANK DISPENSE STATUS: NORMAL
BLOOD BANK DISPENSE STATUS: NORMAL
BLOOD BANK PRODUCT CODE: NORMAL
BLOOD BANK PRODUCT CODE: NORMAL
BPU ID: NORMAL
BPU ID: NORMAL
DESCRIPTION BLOOD BANK: NORMAL
DESCRIPTION BLOOD BANK: NORMAL
TACROLIMUS BLOOD: 5.8 NG/ML (ref 5–20)

## 2021-01-08 PROCEDURE — 6360000002 HC RX W HCPCS: Performed by: NURSE PRACTITIONER

## 2021-01-08 PROCEDURE — 6370000000 HC RX 637 (ALT 250 FOR IP): Performed by: INTERNAL MEDICINE

## 2021-01-08 PROCEDURE — P9040 RBC LEUKOREDUCED IRRADIATED: HCPCS

## 2021-01-08 PROCEDURE — 36415 COLL VENOUS BLD VENIPUNCTURE: CPT

## 2021-01-08 PROCEDURE — 80197 ASSAY OF TACROLIMUS: CPT

## 2021-01-08 PROCEDURE — 36430 TRANSFUSION BLD/BLD COMPNT: CPT

## 2021-01-08 RX ORDER — SODIUM CHLORIDE 0.9 % (FLUSH) 0.9 %
20 SYRINGE (ML) INJECTION PRN
Status: CANCELLED | OUTPATIENT
Start: 2021-01-08

## 2021-01-08 RX ORDER — SODIUM CHLORIDE 9 MG/ML
INJECTION, SOLUTION INTRAVENOUS CONTINUOUS
Status: CANCELLED | OUTPATIENT
Start: 2021-01-08

## 2021-01-08 RX ORDER — AMLODIPINE BESYLATE 5 MG/1
5 TABLET ORAL DAILY
COMMUNITY

## 2021-01-08 RX ORDER — ACETAMINOPHEN 325 MG/1
650 TABLET ORAL ONCE
Status: CANCELLED
Start: 2021-01-08 | End: 2021-01-08

## 2021-01-08 RX ORDER — HEPARIN SODIUM (PORCINE) LOCK FLUSH IV SOLN 100 UNIT/ML 100 UNIT/ML
500 SOLUTION INTRAVENOUS PRN
Status: CANCELLED | OUTPATIENT
Start: 2021-01-08

## 2021-01-08 RX ORDER — DIPHENHYDRAMINE HCL 25 MG
25 TABLET ORAL ONCE
Status: CANCELLED | OUTPATIENT
Start: 2021-01-08 | End: 2021-01-08

## 2021-01-08 RX ORDER — DIPHENHYDRAMINE HYDROCHLORIDE 50 MG/ML
50 INJECTION INTRAMUSCULAR; INTRAVENOUS ONCE
Status: CANCELLED | OUTPATIENT
Start: 2021-01-08 | End: 2021-01-08

## 2021-01-08 RX ORDER — 0.9 % SODIUM CHLORIDE 0.9 %
10 VIAL (ML) INJECTION ONCE
Status: CANCELLED | OUTPATIENT
Start: 2021-01-08 | End: 2021-01-08

## 2021-01-08 RX ORDER — DIPHENHYDRAMINE HCL 25 MG
25 TABLET ORAL ONCE
Status: CANCELLED
Start: 2021-01-08 | End: 2021-01-08

## 2021-01-08 RX ORDER — METHYLPREDNISOLONE SODIUM SUCCINATE 125 MG/2ML
125 INJECTION, POWDER, LYOPHILIZED, FOR SOLUTION INTRAMUSCULAR; INTRAVENOUS ONCE
Status: CANCELLED | OUTPATIENT
Start: 2021-01-08 | End: 2021-01-08

## 2021-01-08 RX ORDER — HEPARIN SODIUM (PORCINE) LOCK FLUSH IV SOLN 100 UNIT/ML 100 UNIT/ML
500 SOLUTION INTRAVENOUS PRN
Status: DISCONTINUED | OUTPATIENT
Start: 2021-01-08 | End: 2021-01-09 | Stop reason: HOSPADM

## 2021-01-08 RX ORDER — ACETAMINOPHEN 325 MG/1
650 TABLET ORAL ONCE
Status: COMPLETED | OUTPATIENT
Start: 2021-01-08 | End: 2021-01-08

## 2021-01-08 RX ORDER — DIPHENHYDRAMINE HCL 25 MG
25 TABLET ORAL ONCE
Status: DISCONTINUED | OUTPATIENT
Start: 2021-01-08 | End: 2021-01-09 | Stop reason: HOSPADM

## 2021-01-08 RX ORDER — PREDNISONE 10 MG/1
10 TABLET ORAL 2 TIMES DAILY
COMMUNITY

## 2021-01-08 RX ORDER — SODIUM CHLORIDE 0.9 % (FLUSH) 0.9 %
10 SYRINGE (ML) INJECTION PRN
Status: CANCELLED | OUTPATIENT
Start: 2021-01-08

## 2021-01-08 RX ORDER — ACETAMINOPHEN 325 MG/1
650 TABLET ORAL ONCE
Status: CANCELLED | OUTPATIENT
Start: 2021-01-08 | End: 2021-01-08

## 2021-01-08 RX ADMIN — Medication 500 UNITS: at 13:53

## 2021-01-08 RX ADMIN — ACETAMINOPHEN 650 MG: 325 TABLET ORAL at 11:46

## 2021-01-08 ASSESSMENT — PAIN SCALES - GENERAL: PAINLEVEL_OUTOF10: 0

## 2021-01-11 ENCOUNTER — HOSPITAL ENCOUNTER (OUTPATIENT)
Age: 61
Setting detail: SPECIMEN
Discharge: HOME | End: 2021-01-11
Payer: COMMERCIAL

## 2021-01-11 LAB — TACROLIMUS BLOOD: 3.7 NG/ML (ref 5–20)

## 2021-01-11 PROCEDURE — 88311 DECALCIFY TISSUE: CPT

## 2021-01-11 PROCEDURE — 36415 COLL VENOUS BLD VENIPUNCTURE: CPT

## 2021-01-11 PROCEDURE — 88342 IMHCHEM/IMCYTCHM 1ST ANTB: CPT

## 2021-01-11 PROCEDURE — 88313 SPECIAL STAINS GROUP 2: CPT

## 2021-01-11 PROCEDURE — 80197 ASSAY OF TACROLIMUS: CPT

## 2021-01-11 PROCEDURE — 88305 TISSUE EXAM BY PATHOLOGIST: CPT

## 2021-01-14 ENCOUNTER — HOSPITAL ENCOUNTER (OUTPATIENT)
Age: 61
Setting detail: SPECIMEN
Discharge: HOME | End: 2021-01-14
Payer: COMMERCIAL

## 2021-01-14 ENCOUNTER — HOSPITAL ENCOUNTER (OUTPATIENT)
Dept: ONCOLOGY | Age: 61
Setting detail: INFUSION SERIES
Discharge: HOME | End: 2021-01-14
Payer: COMMERCIAL

## 2021-01-14 VITALS
DIASTOLIC BLOOD PRESSURE: 88 MMHG | RESPIRATION RATE: 18 BRPM | SYSTOLIC BLOOD PRESSURE: 142 MMHG | HEART RATE: 71 BPM | TEMPERATURE: 97.6 F

## 2021-01-14 DIAGNOSIS — C92.00 ACUTE MYELOID LEUKEMIA NOT HAVING ACHIEVED REMISSION (HCC): Primary | ICD-10-CM

## 2021-01-14 LAB
ABO/RH: NORMAL
ANTIBODY SCREEN: NORMAL
BLOOD BANK DISPENSE STATUS: NORMAL
BLOOD BANK PRODUCT CODE: NORMAL
BPU ID: NORMAL
DESCRIPTION BLOOD BANK: NORMAL
TACROLIMUS BLOOD: 4.8 NG/ML (ref 5–20)

## 2021-01-14 PROCEDURE — 86850 RBC ANTIBODY SCREEN: CPT

## 2021-01-14 PROCEDURE — 36592 COLLECT BLOOD FROM PICC: CPT

## 2021-01-14 PROCEDURE — 86901 BLOOD TYPING SEROLOGIC RH(D): CPT

## 2021-01-14 PROCEDURE — 36430 TRANSFUSION BLD/BLD COMPNT: CPT

## 2021-01-14 PROCEDURE — 80197 ASSAY OF TACROLIMUS: CPT

## 2021-01-14 PROCEDURE — 36415 COLL VENOUS BLD VENIPUNCTURE: CPT

## 2021-01-14 PROCEDURE — 86900 BLOOD TYPING SEROLOGIC ABO: CPT

## 2021-01-14 PROCEDURE — 6370000000 HC RX 637 (ALT 250 FOR IP): Performed by: PHYSICIAN ASSISTANT

## 2021-01-14 PROCEDURE — P9040 RBC LEUKOREDUCED IRRADIATED: HCPCS

## 2021-01-14 PROCEDURE — 86923 COMPATIBILITY TEST ELECTRIC: CPT

## 2021-01-14 RX ORDER — HEPARIN SODIUM (PORCINE) LOCK FLUSH IV SOLN 100 UNIT/ML 100 UNIT/ML
500 SOLUTION INTRAVENOUS PRN
Status: CANCELLED | OUTPATIENT
Start: 2021-01-14

## 2021-01-14 RX ORDER — SODIUM CHLORIDE 9 MG/ML
INJECTION, SOLUTION INTRAVENOUS CONTINUOUS
Status: CANCELLED | OUTPATIENT
Start: 2021-01-14

## 2021-01-14 RX ORDER — SODIUM CHLORIDE 0.9 % (FLUSH) 0.9 %
20 SYRINGE (ML) INJECTION PRN
Status: CANCELLED | OUTPATIENT
Start: 2021-01-14

## 2021-01-14 RX ORDER — METHYLPREDNISOLONE SODIUM SUCCINATE 125 MG/2ML
125 INJECTION, POWDER, LYOPHILIZED, FOR SOLUTION INTRAMUSCULAR; INTRAVENOUS ONCE
Status: CANCELLED | OUTPATIENT
Start: 2021-01-14 | End: 2021-01-14

## 2021-01-14 RX ORDER — DIPHENHYDRAMINE HCL 25 MG
25 TABLET ORAL ONCE
Status: DISCONTINUED | OUTPATIENT
Start: 2021-01-14 | End: 2021-01-15 | Stop reason: HOSPADM

## 2021-01-14 RX ORDER — HEPARIN SODIUM (PORCINE) LOCK FLUSH IV SOLN 100 UNIT/ML 100 UNIT/ML
500 SOLUTION INTRAVENOUS PRN
Status: DISCONTINUED | OUTPATIENT
Start: 2021-01-14 | End: 2021-01-15 | Stop reason: HOSPADM

## 2021-01-14 RX ORDER — SODIUM CHLORIDE 0.9 % (FLUSH) 0.9 %
10 SYRINGE (ML) INJECTION PRN
Status: CANCELLED | OUTPATIENT
Start: 2021-01-14

## 2021-01-14 RX ORDER — ACETAMINOPHEN 325 MG/1
650 TABLET ORAL ONCE
Status: CANCELLED | OUTPATIENT
Start: 2021-01-14 | End: 2021-01-14

## 2021-01-14 RX ORDER — ACETAMINOPHEN 325 MG/1
650 TABLET ORAL ONCE
Status: COMPLETED | OUTPATIENT
Start: 2021-01-14 | End: 2021-01-14

## 2021-01-14 RX ORDER — 0.9 % SODIUM CHLORIDE 0.9 %
10 VIAL (ML) INJECTION ONCE
Status: CANCELLED | OUTPATIENT
Start: 2021-01-14 | End: 2021-01-14

## 2021-01-14 RX ORDER — ACETAMINOPHEN 325 MG/1
650 TABLET ORAL ONCE
Status: DISCONTINUED | OUTPATIENT
Start: 2021-01-14 | End: 2021-01-15 | Stop reason: HOSPADM

## 2021-01-14 RX ORDER — DIPHENHYDRAMINE HYDROCHLORIDE 50 MG/ML
50 INJECTION INTRAMUSCULAR; INTRAVENOUS ONCE
Status: CANCELLED | OUTPATIENT
Start: 2021-01-14 | End: 2021-01-14

## 2021-01-14 RX ORDER — ACETAMINOPHEN 325 MG/1
650 TABLET ORAL ONCE
Status: CANCELLED
Start: 2021-01-14 | End: 2021-01-14

## 2021-01-14 RX ORDER — DIPHENHYDRAMINE HCL 25 MG
25 TABLET ORAL ONCE
Status: CANCELLED
Start: 2021-01-14 | End: 2021-01-14

## 2021-01-14 RX ADMIN — ACETAMINOPHEN 650 MG: 325 TABLET ORAL at 13:30

## 2021-01-14 ASSESSMENT — PAIN SCALES - GENERAL: PAINLEVEL_OUTOF10: 0

## 2021-01-19 ENCOUNTER — HOSPITAL ENCOUNTER (OUTPATIENT)
Dept: ONCOLOGY | Age: 61
Setting detail: INFUSION SERIES
Discharge: HOME | End: 2021-01-19
Payer: COMMERCIAL

## 2021-01-19 ENCOUNTER — HOSPITAL ENCOUNTER (OUTPATIENT)
Age: 61
Setting detail: SPECIMEN
Discharge: HOME | End: 2021-01-19
Payer: COMMERCIAL

## 2021-01-19 VITALS
SYSTOLIC BLOOD PRESSURE: 118 MMHG | RESPIRATION RATE: 16 BRPM | TEMPERATURE: 98.3 F | OXYGEN SATURATION: 100 % | DIASTOLIC BLOOD PRESSURE: 76 MMHG | HEART RATE: 77 BPM

## 2021-01-19 DIAGNOSIS — C92.00 ACUTE MYELOID LEUKEMIA NOT HAVING ACHIEVED REMISSION (HCC): Primary | ICD-10-CM

## 2021-01-19 LAB
ABO/RH: NORMAL
ANTIBODY SCREEN: NORMAL
TACROLIMUS BLOOD: 2.8 NG/ML (ref 5–20)

## 2021-01-19 PROCEDURE — 86923 COMPATIBILITY TEST ELECTRIC: CPT

## 2021-01-19 PROCEDURE — 36430 TRANSFUSION BLD/BLD COMPNT: CPT

## 2021-01-19 PROCEDURE — 36592 COLLECT BLOOD FROM PICC: CPT

## 2021-01-19 PROCEDURE — 86901 BLOOD TYPING SEROLOGIC RH(D): CPT

## 2021-01-19 PROCEDURE — 6360000002 HC RX W HCPCS: Performed by: NURSE PRACTITIONER

## 2021-01-19 PROCEDURE — 36415 COLL VENOUS BLD VENIPUNCTURE: CPT

## 2021-01-19 PROCEDURE — 6370000000 HC RX 637 (ALT 250 FOR IP): Performed by: INTERNAL MEDICINE

## 2021-01-19 PROCEDURE — 86850 RBC ANTIBODY SCREEN: CPT

## 2021-01-19 PROCEDURE — 80197 ASSAY OF TACROLIMUS: CPT

## 2021-01-19 PROCEDURE — 86900 BLOOD TYPING SEROLOGIC ABO: CPT

## 2021-01-19 PROCEDURE — P9040 RBC LEUKOREDUCED IRRADIATED: HCPCS

## 2021-01-19 RX ORDER — SODIUM CHLORIDE 9 MG/ML
INJECTION, SOLUTION INTRAVENOUS CONTINUOUS
Status: CANCELLED | OUTPATIENT
Start: 2021-01-19

## 2021-01-19 RX ORDER — METHYLPREDNISOLONE SODIUM SUCCINATE 125 MG/2ML
125 INJECTION, POWDER, LYOPHILIZED, FOR SOLUTION INTRAMUSCULAR; INTRAVENOUS ONCE
Status: CANCELLED | OUTPATIENT
Start: 2021-01-19 | End: 2021-01-19

## 2021-01-19 RX ORDER — SODIUM CHLORIDE 0.9 % (FLUSH) 0.9 %
20 SYRINGE (ML) INJECTION PRN
Status: DISCONTINUED | OUTPATIENT
Start: 2021-01-19 | End: 2021-01-20 | Stop reason: HOSPADM

## 2021-01-19 RX ORDER — SODIUM CHLORIDE 0.9 % (FLUSH) 0.9 %
20 SYRINGE (ML) INJECTION PRN
Status: CANCELLED | OUTPATIENT
Start: 2021-01-19

## 2021-01-19 RX ORDER — DIPHENHYDRAMINE HYDROCHLORIDE 50 MG/ML
50 INJECTION INTRAMUSCULAR; INTRAVENOUS ONCE
Status: CANCELLED | OUTPATIENT
Start: 2021-01-19 | End: 2021-01-19

## 2021-01-19 RX ORDER — ACETAMINOPHEN 325 MG/1
650 TABLET ORAL ONCE
Status: COMPLETED | OUTPATIENT
Start: 2021-01-19 | End: 2021-01-19

## 2021-01-19 RX ORDER — HEPARIN SODIUM (PORCINE) LOCK FLUSH IV SOLN 100 UNIT/ML 100 UNIT/ML
500 SOLUTION INTRAVENOUS PRN
Status: DISCONTINUED | OUTPATIENT
Start: 2021-01-19 | End: 2021-01-20 | Stop reason: HOSPADM

## 2021-01-19 RX ORDER — 0.9 % SODIUM CHLORIDE 0.9 %
10 VIAL (ML) INJECTION ONCE
Status: CANCELLED | OUTPATIENT
Start: 2021-01-19 | End: 2021-01-19

## 2021-01-19 RX ORDER — ACETAMINOPHEN 325 MG/1
650 TABLET ORAL ONCE
Status: CANCELLED
Start: 2021-01-19 | End: 2021-01-19

## 2021-01-19 RX ORDER — DIPHENHYDRAMINE HCL 25 MG
25 TABLET ORAL ONCE
Status: CANCELLED
Start: 2021-01-19 | End: 2021-01-19

## 2021-01-19 RX ORDER — SODIUM CHLORIDE 9 MG/ML
INJECTION, SOLUTION INTRAVENOUS CONTINUOUS
Status: DISCONTINUED | OUTPATIENT
Start: 2021-01-19 | End: 2021-01-20 | Stop reason: HOSPADM

## 2021-01-19 RX ORDER — ACETAMINOPHEN 325 MG/1
650 TABLET ORAL ONCE
Status: CANCELLED | OUTPATIENT
Start: 2021-01-19 | End: 2021-01-19

## 2021-01-19 RX ORDER — SODIUM CHLORIDE 0.9 % (FLUSH) 0.9 %
10 SYRINGE (ML) INJECTION PRN
Status: CANCELLED | OUTPATIENT
Start: 2021-01-19

## 2021-01-19 RX ORDER — HEPARIN SODIUM (PORCINE) LOCK FLUSH IV SOLN 100 UNIT/ML 100 UNIT/ML
500 SOLUTION INTRAVENOUS PRN
Status: CANCELLED | OUTPATIENT
Start: 2021-01-19

## 2021-01-19 RX ADMIN — Medication 500 UNITS: at 15:21

## 2021-01-19 RX ADMIN — ACETAMINOPHEN 650 MG: 325 TABLET ORAL at 13:02

## 2021-01-19 ASSESSMENT — PAIN SCALES - GENERAL: PAINLEVEL_OUTOF10: 0

## 2021-01-19 NOTE — PLAN OF CARE
Problem: KNOWLEDGE DEFICIT  Goal: Patient/S.O. demonstrates understanding of disease process, treatment plan, medications, and discharge instructions. Outcome: Ongoing  Note: Patient's hemoglobin this AM:  Patient's platelet count this AM:   Pt seen and assessed at [facility]. Seen at [facility] today for a type and screen followed by 1 unit of PRBCs per standing orders for above lab values. Blood products transfused per [facility] policy. Pt tolerated transfusion well and without incident. Pt verbalizes understanding of discharge instructions. Discharged ambulatory to home.

## 2021-01-22 ENCOUNTER — HOSPITAL ENCOUNTER (OUTPATIENT)
Dept: ONCOLOGY | Age: 61
Setting detail: INFUSION SERIES
Discharge: HOME | End: 2021-01-22
Payer: COMMERCIAL

## 2021-01-22 VITALS
RESPIRATION RATE: 18 BRPM | SYSTOLIC BLOOD PRESSURE: 128 MMHG | DIASTOLIC BLOOD PRESSURE: 73 MMHG | HEART RATE: 82 BPM | OXYGEN SATURATION: 99 % | TEMPERATURE: 98.2 F

## 2021-01-22 DIAGNOSIS — C92.00 ACUTE MYELOID LEUKEMIA NOT HAVING ACHIEVED REMISSION (HCC): Primary | ICD-10-CM

## 2021-01-22 LAB
BLOOD BANK DISPENSE STATUS: NORMAL
BLOOD BANK DISPENSE STATUS: NORMAL
BLOOD BANK PRODUCT CODE: NORMAL
BLOOD BANK PRODUCT CODE: NORMAL
BPU ID: NORMAL
BPU ID: NORMAL
DESCRIPTION BLOOD BANK: NORMAL
DESCRIPTION BLOOD BANK: NORMAL

## 2021-01-22 PROCEDURE — 36430 TRANSFUSION BLD/BLD COMPNT: CPT

## 2021-01-22 PROCEDURE — 6360000002 HC RX W HCPCS: Performed by: NURSE PRACTITIONER

## 2021-01-22 PROCEDURE — 6370000000 HC RX 637 (ALT 250 FOR IP): Performed by: INTERNAL MEDICINE

## 2021-01-22 PROCEDURE — 2580000003 HC RX 258: Performed by: INTERNAL MEDICINE

## 2021-01-22 PROCEDURE — P9016 RBC LEUKOCYTES REDUCED: HCPCS

## 2021-01-22 RX ORDER — DIPHENHYDRAMINE HYDROCHLORIDE 50 MG/ML
50 INJECTION INTRAMUSCULAR; INTRAVENOUS ONCE
Status: CANCELLED | OUTPATIENT
Start: 2021-01-22 | End: 2021-01-22

## 2021-01-22 RX ORDER — SODIUM CHLORIDE 0.9 % (FLUSH) 0.9 %
20 SYRINGE (ML) INJECTION PRN
Status: DISCONTINUED | OUTPATIENT
Start: 2021-01-22 | End: 2021-01-23 | Stop reason: HOSPADM

## 2021-01-22 RX ORDER — SODIUM CHLORIDE 9 MG/ML
INJECTION, SOLUTION INTRAVENOUS CONTINUOUS
Status: CANCELLED | OUTPATIENT
Start: 2021-01-22

## 2021-01-22 RX ORDER — SODIUM CHLORIDE 0.9 % (FLUSH) 0.9 %
10 SYRINGE (ML) INJECTION PRN
Status: CANCELLED | OUTPATIENT
Start: 2021-01-22

## 2021-01-22 RX ORDER — METHYLPREDNISOLONE SODIUM SUCCINATE 125 MG/2ML
125 INJECTION, POWDER, LYOPHILIZED, FOR SOLUTION INTRAMUSCULAR; INTRAVENOUS ONCE
Status: CANCELLED | OUTPATIENT
Start: 2021-01-22 | End: 2021-01-22

## 2021-01-22 RX ORDER — SODIUM CHLORIDE 0.9 % (FLUSH) 0.9 %
20 SYRINGE (ML) INJECTION PRN
Status: CANCELLED | OUTPATIENT
Start: 2021-01-22

## 2021-01-22 RX ORDER — HEPARIN SODIUM (PORCINE) LOCK FLUSH IV SOLN 100 UNIT/ML 100 UNIT/ML
500 SOLUTION INTRAVENOUS PRN
Status: DISCONTINUED | OUTPATIENT
Start: 2021-01-22 | End: 2021-01-23 | Stop reason: HOSPADM

## 2021-01-22 RX ORDER — DIPHENHYDRAMINE HCL 25 MG
25 TABLET ORAL ONCE
Status: CANCELLED
Start: 2021-01-22 | End: 2021-01-22

## 2021-01-22 RX ORDER — HEPARIN SODIUM (PORCINE) LOCK FLUSH IV SOLN 100 UNIT/ML 100 UNIT/ML
500 SOLUTION INTRAVENOUS PRN
Status: CANCELLED | OUTPATIENT
Start: 2021-01-22

## 2021-01-22 RX ORDER — ACETAMINOPHEN 325 MG/1
650 TABLET ORAL ONCE
Status: CANCELLED | OUTPATIENT
Start: 2021-01-22 | End: 2021-01-22

## 2021-01-22 RX ORDER — ACETAMINOPHEN 325 MG/1
650 TABLET ORAL ONCE
Status: COMPLETED | OUTPATIENT
Start: 2021-01-22 | End: 2021-01-22

## 2021-01-22 RX ORDER — ACETAMINOPHEN 325 MG/1
650 TABLET ORAL ONCE
Status: CANCELLED
Start: 2021-01-22 | End: 2021-01-22

## 2021-01-22 RX ORDER — 0.9 % SODIUM CHLORIDE 0.9 %
10 VIAL (ML) INJECTION ONCE
Status: CANCELLED | OUTPATIENT
Start: 2021-01-22 | End: 2021-01-22

## 2021-01-22 RX ADMIN — ACETAMINOPHEN 650 MG: 325 TABLET ORAL at 12:52

## 2021-01-22 RX ADMIN — Medication 500 UNITS: at 15:10

## 2021-01-22 RX ADMIN — Medication 20 ML: at 15:11

## 2021-01-22 ASSESSMENT — PAIN SCALES - GENERAL: PAINLEVEL_OUTOF10: 0

## 2021-01-22 NOTE — PLAN OF CARE
Problem: Falls - Risk of:  Goal: Will remain free from falls  Description: Will remain free from falls  Outcome: Met This Shift    Pt is a Low fall risk. Explained fall risk precautions to pt and rationale behind their use to keep the patient safe. Belongings are in reach. Pt encouraged to notify staff for any and all assistance. Staff present in tx suite throughout entirety of pts treatment to monitor and protect from falls. Assistance provided when ambulating to restroom utilizing Stay With Me. Hgb: 6.9  Platelets: 25     Problem: KNOWLEDGE DEFICIT  Goal: Patient/S.O. demonstrates understanding of disease process, treatment plan, medications, and discharge instructions. Outcome: Met This Shift   Pt seen and assessed at [address] [facility] today for PRBC infusion per orders from Dr. Sandra Sierra. Infused per [facility] policy. Monitoring completed for infusion reactions - see flowsheet. Pt tolerated infusion well and without incident. Pt verbalizes understanding of discharge instructions. Discharged ambulatory to home with self.

## 2021-01-25 ENCOUNTER — HOSPITAL ENCOUNTER (OUTPATIENT)
Age: 61
Setting detail: SPECIMEN
Discharge: HOME | End: 2021-01-25
Payer: COMMERCIAL

## 2021-01-25 LAB — TACROLIMUS BLOOD: 17 NG/ML (ref 5–20)

## 2021-01-25 PROCEDURE — 36415 COLL VENOUS BLD VENIPUNCTURE: CPT

## 2021-01-25 PROCEDURE — 80197 ASSAY OF TACROLIMUS: CPT

## 2021-01-28 ENCOUNTER — HOSPITAL ENCOUNTER (OUTPATIENT)
Dept: ONCOLOGY | Age: 61
Setting detail: INFUSION SERIES
Discharge: HOME | End: 2021-01-28
Payer: COMMERCIAL

## 2021-01-28 ENCOUNTER — HOSPITAL ENCOUNTER (OUTPATIENT)
Age: 61
Discharge: HOME | End: 2021-01-28
Payer: COMMERCIAL

## 2021-01-28 VITALS
OXYGEN SATURATION: 98 % | TEMPERATURE: 98.1 F | RESPIRATION RATE: 18 BRPM | SYSTOLIC BLOOD PRESSURE: 124 MMHG | DIASTOLIC BLOOD PRESSURE: 75 MMHG | HEART RATE: 73 BPM

## 2021-01-28 DIAGNOSIS — C92.00 ACUTE MYELOID LEUKEMIA NOT HAVING ACHIEVED REMISSION (HCC): Primary | ICD-10-CM

## 2021-01-28 PROCEDURE — 36592 COLLECT BLOOD FROM PICC: CPT

## 2021-01-28 PROCEDURE — 2580000003 HC RX 258: Performed by: NURSE PRACTITIONER

## 2021-01-28 PROCEDURE — 6360000002 HC RX W HCPCS: Performed by: NURSE PRACTITIONER

## 2021-01-28 PROCEDURE — 86923 COMPATIBILITY TEST ELECTRIC: CPT

## 2021-01-28 PROCEDURE — 36430 TRANSFUSION BLD/BLD COMPNT: CPT

## 2021-01-28 PROCEDURE — P9040 RBC LEUKOREDUCED IRRADIATED: HCPCS

## 2021-01-28 PROCEDURE — 86900 BLOOD TYPING SEROLOGIC ABO: CPT

## 2021-01-28 PROCEDURE — 86850 RBC ANTIBODY SCREEN: CPT

## 2021-01-28 PROCEDURE — 86901 BLOOD TYPING SEROLOGIC RH(D): CPT

## 2021-01-28 RX ORDER — SODIUM CHLORIDE 9 MG/ML
INJECTION, SOLUTION INTRAVENOUS CONTINUOUS
Status: CANCELLED | OUTPATIENT
Start: 2021-01-28

## 2021-01-28 RX ORDER — SODIUM CHLORIDE 0.9 % (FLUSH) 0.9 %
20 SYRINGE (ML) INJECTION PRN
Status: CANCELLED | OUTPATIENT
Start: 2021-01-28

## 2021-01-28 RX ORDER — SODIUM CHLORIDE 0.9 % (FLUSH) 0.9 %
10 SYRINGE (ML) INJECTION PRN
Status: CANCELLED | OUTPATIENT
Start: 2021-01-28

## 2021-01-28 RX ORDER — 0.9 % SODIUM CHLORIDE 0.9 %
10 VIAL (ML) INJECTION ONCE
Status: CANCELLED | OUTPATIENT
Start: 2021-01-28 | End: 2021-01-28

## 2021-01-28 RX ORDER — DIPHENHYDRAMINE HYDROCHLORIDE 50 MG/ML
50 INJECTION INTRAMUSCULAR; INTRAVENOUS ONCE
Status: CANCELLED | OUTPATIENT
Start: 2021-01-28 | End: 2021-01-28

## 2021-01-28 RX ORDER — DIPHENHYDRAMINE HCL 25 MG
25 TABLET ORAL ONCE
Status: CANCELLED
Start: 2021-01-28 | End: 2021-01-28

## 2021-01-28 RX ORDER — HEPARIN SODIUM (PORCINE) LOCK FLUSH IV SOLN 100 UNIT/ML 100 UNIT/ML
500 SOLUTION INTRAVENOUS PRN
Status: CANCELLED | OUTPATIENT
Start: 2021-01-28

## 2021-01-28 RX ORDER — HEPARIN SODIUM (PORCINE) LOCK FLUSH IV SOLN 100 UNIT/ML 100 UNIT/ML
500 SOLUTION INTRAVENOUS PRN
Status: DISCONTINUED | OUTPATIENT
Start: 2021-01-28 | End: 2021-01-29 | Stop reason: HOSPADM

## 2021-01-28 RX ORDER — METHYLPREDNISOLONE SODIUM SUCCINATE 125 MG/2ML
125 INJECTION, POWDER, LYOPHILIZED, FOR SOLUTION INTRAMUSCULAR; INTRAVENOUS ONCE
Status: CANCELLED | OUTPATIENT
Start: 2021-01-28 | End: 2021-01-28

## 2021-01-28 RX ORDER — SODIUM CHLORIDE 0.9 % (FLUSH) 0.9 %
20 SYRINGE (ML) INJECTION PRN
Status: DISCONTINUED | OUTPATIENT
Start: 2021-01-28 | End: 2021-01-29 | Stop reason: HOSPADM

## 2021-01-28 RX ORDER — ACETAMINOPHEN 325 MG/1
650 TABLET ORAL ONCE
Status: CANCELLED
Start: 2021-01-28 | End: 2021-01-28

## 2021-01-28 RX ADMIN — Medication 500 UNITS: at 14:20

## 2021-01-28 RX ADMIN — Medication 20 ML: at 14:20

## 2021-01-28 NOTE — PLAN OF CARE
Problem: KNOWLEDGE DEFICIT  Goal: Patient/S.O. demonstrates understanding of disease process, treatment plan, medications, and discharge instructions. Outcome: Ongoing  Note: Patient's hemoglobin this AM: 6.6   Patient's platelet count this AM: 26.0  Pt seen and assessed at [facility]. Seen at [facility] today for a type and screen followed by 1 unit of PRBCs per standing orders for above lab values. Blood products transfused per [facility] policy. Pt tolerated transfusion well and without incident. Pt verbalizes understanding of discharge instructions. Discharged ambulatory to home.

## 2021-02-01 ENCOUNTER — HOSPITAL ENCOUNTER (OUTPATIENT)
Dept: ONCOLOGY | Age: 61
Setting detail: INFUSION SERIES
Discharge: HOME | End: 2021-02-01
Payer: COMMERCIAL

## 2021-02-01 ENCOUNTER — HOSPITAL ENCOUNTER (OUTPATIENT)
Age: 61
Setting detail: SPECIMEN
Discharge: HOME | End: 2021-02-01
Payer: COMMERCIAL

## 2021-02-01 VITALS
TEMPERATURE: 98.4 F | HEART RATE: 74 BPM | DIASTOLIC BLOOD PRESSURE: 80 MMHG | RESPIRATION RATE: 18 BRPM | OXYGEN SATURATION: 97 % | SYSTOLIC BLOOD PRESSURE: 125 MMHG

## 2021-02-01 DIAGNOSIS — C92.00 ACUTE MYELOID LEUKEMIA NOT HAVING ACHIEVED REMISSION (HCC): Primary | ICD-10-CM

## 2021-02-01 PROCEDURE — 86901 BLOOD TYPING SEROLOGIC RH(D): CPT

## 2021-02-01 PROCEDURE — 86923 COMPATIBILITY TEST ELECTRIC: CPT

## 2021-02-01 PROCEDURE — 6370000000 HC RX 637 (ALT 250 FOR IP): Performed by: PHYSICIAN ASSISTANT

## 2021-02-01 PROCEDURE — P9040 RBC LEUKOREDUCED IRRADIATED: HCPCS

## 2021-02-01 PROCEDURE — 80197 ASSAY OF TACROLIMUS: CPT

## 2021-02-01 PROCEDURE — 36430 TRANSFUSION BLD/BLD COMPNT: CPT

## 2021-02-01 PROCEDURE — 36415 COLL VENOUS BLD VENIPUNCTURE: CPT

## 2021-02-01 PROCEDURE — 86850 RBC ANTIBODY SCREEN: CPT

## 2021-02-01 PROCEDURE — 86900 BLOOD TYPING SEROLOGIC ABO: CPT

## 2021-02-01 PROCEDURE — 6360000002 HC RX W HCPCS: Performed by: NURSE PRACTITIONER

## 2021-02-01 RX ORDER — DIPHENHYDRAMINE HCL 25 MG
25 TABLET ORAL ONCE
Status: CANCELLED
Start: 2021-02-01 | End: 2021-02-01

## 2021-02-01 RX ORDER — SODIUM CHLORIDE 9 MG/ML
INJECTION, SOLUTION INTRAVENOUS CONTINUOUS
Status: CANCELLED | OUTPATIENT
Start: 2021-02-01

## 2021-02-01 RX ORDER — DIPHENHYDRAMINE HYDROCHLORIDE 50 MG/ML
50 INJECTION INTRAMUSCULAR; INTRAVENOUS ONCE
Status: CANCELLED | OUTPATIENT
Start: 2021-02-01 | End: 2021-02-01

## 2021-02-01 RX ORDER — METHYLPREDNISOLONE SODIUM SUCCINATE 125 MG/2ML
125 INJECTION, POWDER, LYOPHILIZED, FOR SOLUTION INTRAMUSCULAR; INTRAVENOUS ONCE
Status: CANCELLED | OUTPATIENT
Start: 2021-02-01 | End: 2021-02-01

## 2021-02-01 RX ORDER — HEPARIN SODIUM (PORCINE) LOCK FLUSH IV SOLN 100 UNIT/ML 100 UNIT/ML
500 SOLUTION INTRAVENOUS PRN
Status: CANCELLED | OUTPATIENT
Start: 2021-02-01

## 2021-02-01 RX ORDER — SODIUM CHLORIDE 0.9 % (FLUSH) 0.9 %
20 SYRINGE (ML) INJECTION PRN
Status: CANCELLED | OUTPATIENT
Start: 2021-02-01

## 2021-02-01 RX ORDER — ACETAMINOPHEN 325 MG/1
650 TABLET ORAL ONCE
Status: COMPLETED | OUTPATIENT
Start: 2021-02-01 | End: 2021-02-01

## 2021-02-01 RX ORDER — HEPARIN SODIUM (PORCINE) LOCK FLUSH IV SOLN 100 UNIT/ML 100 UNIT/ML
500 SOLUTION INTRAVENOUS PRN
Status: DISCONTINUED | OUTPATIENT
Start: 2021-02-01 | End: 2021-02-02 | Stop reason: HOSPADM

## 2021-02-01 RX ORDER — 0.9 % SODIUM CHLORIDE 0.9 %
10 VIAL (ML) INJECTION ONCE
Status: CANCELLED | OUTPATIENT
Start: 2021-02-01 | End: 2021-02-01

## 2021-02-01 RX ORDER — SODIUM CHLORIDE 0.9 % (FLUSH) 0.9 %
10 SYRINGE (ML) INJECTION PRN
Status: CANCELLED | OUTPATIENT
Start: 2021-02-01

## 2021-02-01 RX ORDER — ACETAMINOPHEN 325 MG/1
650 TABLET ORAL ONCE
Status: CANCELLED
Start: 2021-02-01 | End: 2021-02-01

## 2021-02-01 RX ORDER — DIPHENHYDRAMINE HCL 25 MG
25 TABLET ORAL ONCE
Status: DISCONTINUED | OUTPATIENT
Start: 2021-02-01 | End: 2021-02-02 | Stop reason: HOSPADM

## 2021-02-01 RX ADMIN — Medication 500 UNITS: at 14:37

## 2021-02-01 RX ADMIN — ACETAMINOPHEN 650 MG: 325 TABLET ORAL at 12:46

## 2021-02-01 ASSESSMENT — PAIN SCALES - GENERAL: PAINLEVEL_OUTOF10: 0

## 2021-02-01 NOTE — PLAN OF CARE
Problem: KNOWLEDGE DEFICIT  Goal: Patient/S.O. demonstrates understanding of disease process, treatment plan, medications, and discharge instructions. Note: Patient's hemoglobin this AM: 6.6  Patient's platelet count this AM: No results for input(s): PLT in the last 72 hours. Pt seen and assessed at [facility]. Seen at [facility] today for one unit of PRBC per standing orders for above lab values. Blood products transfused per [facility] policy. Pt tolerated transfusion well and without incident. Pt verbalizes understanding of discharge instructions. Discharged ambulatory to home with self. Problem: Falls - Risk of:  Goal: Will remain free from falls  Description: Will remain free from falls  Note:   Explained fall risk precautions to pt and rationale behind their use to keep the patient safe. Belongings are in reach. Pt encouraged to notify staff for any and all assistance. Staff present in tx suite throughout entirety of pts treatment to monitor and protect from falls. Assistance provided when ambulating to restroom utilizing Stay With Me.

## 2021-02-02 LAB — TACROLIMUS BLOOD: 5.1 NG/ML (ref 5–20)

## 2021-02-04 ENCOUNTER — HOSPITAL ENCOUNTER (OUTPATIENT)
Age: 61
Setting detail: SPECIMEN
Discharge: HOME | End: 2021-02-04
Payer: COMMERCIAL

## 2021-02-04 LAB
HCT VFR BLD CALC: 20 % (ref 40.5–52.5)
IMMATURE RETIC FRACT: 0.11 (ref 0.21–0.37)
RETICULOCYTE ABSOLUTE COUNT: 0 M/UL
RETICULOCYTE COUNT PCT: 0.08 % (ref 0.5–2.18)
TACROLIMUS BLOOD: 7.5 NG/ML (ref 5–20)

## 2021-02-04 PROCEDURE — 36415 COLL VENOUS BLD VENIPUNCTURE: CPT

## 2021-02-04 PROCEDURE — 80197 ASSAY OF TACROLIMUS: CPT

## 2021-02-04 PROCEDURE — 85045 AUTOMATED RETICULOCYTE COUNT: CPT

## 2021-02-04 PROCEDURE — 82668 ASSAY OF ERYTHROPOIETIN: CPT

## 2021-02-05 LAB — ERYTHROPOIETIN: 964 MU/ML (ref 4–27)

## 2021-02-08 ENCOUNTER — HOSPITAL ENCOUNTER (OUTPATIENT)
Age: 61
Setting detail: SPECIMEN
Discharge: HOME | End: 2021-02-08
Payer: COMMERCIAL

## 2021-02-08 ENCOUNTER — HOSPITAL ENCOUNTER (OUTPATIENT)
Dept: ONCOLOGY | Age: 61
Setting detail: INFUSION SERIES
Discharge: HOME | End: 2021-02-08
Payer: COMMERCIAL

## 2021-02-08 VITALS
OXYGEN SATURATION: 98 % | RESPIRATION RATE: 18 BRPM | TEMPERATURE: 97.5 F | HEART RATE: 76 BPM | SYSTOLIC BLOOD PRESSURE: 128 MMHG | DIASTOLIC BLOOD PRESSURE: 72 MMHG

## 2021-02-08 DIAGNOSIS — C92.00 ACUTE MYELOID LEUKEMIA NOT HAVING ACHIEVED REMISSION (HCC): Primary | ICD-10-CM

## 2021-02-08 LAB
ABO/RH: NORMAL
ANTIBODY SCREEN: NORMAL
TACROLIMUS BLOOD: 5.6 NG/ML (ref 5–20)

## 2021-02-08 PROCEDURE — 6360000002 HC RX W HCPCS: Performed by: NURSE PRACTITIONER

## 2021-02-08 PROCEDURE — 86901 BLOOD TYPING SEROLOGIC RH(D): CPT

## 2021-02-08 PROCEDURE — 36592 COLLECT BLOOD FROM PICC: CPT

## 2021-02-08 PROCEDURE — 86900 BLOOD TYPING SEROLOGIC ABO: CPT

## 2021-02-08 PROCEDURE — 86923 COMPATIBILITY TEST ELECTRIC: CPT

## 2021-02-08 PROCEDURE — 36430 TRANSFUSION BLD/BLD COMPNT: CPT

## 2021-02-08 PROCEDURE — 86850 RBC ANTIBODY SCREEN: CPT

## 2021-02-08 PROCEDURE — 36415 COLL VENOUS BLD VENIPUNCTURE: CPT

## 2021-02-08 PROCEDURE — 80197 ASSAY OF TACROLIMUS: CPT

## 2021-02-08 PROCEDURE — P9040 RBC LEUKOREDUCED IRRADIATED: HCPCS

## 2021-02-08 PROCEDURE — 6370000000 HC RX 637 (ALT 250 FOR IP): Performed by: PHYSICIAN ASSISTANT

## 2021-02-08 RX ORDER — SODIUM CHLORIDE 0.9 % (FLUSH) 0.9 %
20 SYRINGE (ML) INJECTION PRN
Status: CANCELLED | OUTPATIENT
Start: 2021-02-08

## 2021-02-08 RX ORDER — METHYLPREDNISOLONE SODIUM SUCCINATE 125 MG/2ML
125 INJECTION, POWDER, LYOPHILIZED, FOR SOLUTION INTRAMUSCULAR; INTRAVENOUS ONCE
Status: CANCELLED | OUTPATIENT
Start: 2021-02-08 | End: 2021-02-08

## 2021-02-08 RX ORDER — SODIUM CHLORIDE 9 MG/ML
INJECTION, SOLUTION INTRAVENOUS CONTINUOUS
Status: CANCELLED | OUTPATIENT
Start: 2021-02-08

## 2021-02-08 RX ORDER — HEPARIN SODIUM (PORCINE) LOCK FLUSH IV SOLN 100 UNIT/ML 100 UNIT/ML
500 SOLUTION INTRAVENOUS PRN
Status: CANCELLED | OUTPATIENT
Start: 2021-02-08

## 2021-02-08 RX ORDER — 0.9 % SODIUM CHLORIDE 0.9 %
10 VIAL (ML) INJECTION ONCE
Status: CANCELLED | OUTPATIENT
Start: 2021-02-08 | End: 2021-02-08

## 2021-02-08 RX ORDER — ACETAMINOPHEN 325 MG/1
650 TABLET ORAL ONCE
Status: COMPLETED | OUTPATIENT
Start: 2021-02-08 | End: 2021-02-08

## 2021-02-08 RX ORDER — DIPHENHYDRAMINE HYDROCHLORIDE 50 MG/ML
50 INJECTION INTRAMUSCULAR; INTRAVENOUS ONCE
Status: CANCELLED | OUTPATIENT
Start: 2021-02-08 | End: 2021-02-08

## 2021-02-08 RX ORDER — HEPARIN SODIUM (PORCINE) LOCK FLUSH IV SOLN 100 UNIT/ML 100 UNIT/ML
500 SOLUTION INTRAVENOUS PRN
Status: DISCONTINUED | OUTPATIENT
Start: 2021-02-08 | End: 2021-02-09 | Stop reason: HOSPADM

## 2021-02-08 RX ORDER — SODIUM CHLORIDE 0.9 % (FLUSH) 0.9 %
10 SYRINGE (ML) INJECTION PRN
Status: CANCELLED | OUTPATIENT
Start: 2021-02-08

## 2021-02-08 RX ORDER — ACETAMINOPHEN 325 MG/1
650 TABLET ORAL ONCE
Status: CANCELLED
Start: 2021-02-08 | End: 2021-02-08

## 2021-02-08 RX ORDER — DIPHENHYDRAMINE HCL 25 MG
25 TABLET ORAL ONCE
Status: CANCELLED
Start: 2021-02-08 | End: 2021-02-08

## 2021-02-08 RX ADMIN — HEPARIN 500 UNITS: 100 SYRINGE at 14:22

## 2021-02-08 RX ADMIN — ACETAMINOPHEN 650 MG: 325 TABLET ORAL at 12:33

## 2021-02-08 ASSESSMENT — PAIN SCALES - GENERAL: PAINLEVEL_OUTOF10: 0

## 2021-02-08 NOTE — PLAN OF CARE
Problem: Falls - Risk of:  Goal: Will remain free from falls  Description: Will remain free from falls  Note:    Explained fall risk precautions to pt and rationale behind their use to keep the patient safe. Belongings are in reach. Pt encouraged to notify staff for any and all assistance. Staff present in tx suite throughout entirety of pts treatment to monitor and protect from falls. Assistance provided when ambulating to restroom utilizing Stay With Me. Problem: KNOWLEDGE DEFICIT  Goal: Patient/S.O. demonstrates understanding of disease process, treatment plan, medications, and discharge instructions. Note: Patient's hemoglobin this AM: 6  Patient's platelet count this AM: No results for input(s): PLT in the last 72 hours. Pt seen and assessed at [facility]. Seen at [address] [place] [address] today for one unit of PRBC per standing orders for above lab values. Blood products transfused per [facility] policy. Pt tolerated transfusion well and without incident. Pt verbalizes understanding of discharge instructions. Discharged ambulatory to home with self.

## 2021-02-11 ENCOUNTER — HOSPITAL ENCOUNTER (OUTPATIENT)
Age: 61
Setting detail: SPECIMEN
Discharge: HOME | End: 2021-02-11
Payer: COMMERCIAL

## 2021-02-11 ENCOUNTER — HOSPITAL ENCOUNTER (OUTPATIENT)
Dept: ONCOLOGY | Age: 61
Setting detail: INFUSION SERIES
Discharge: HOME | End: 2021-02-11
Payer: COMMERCIAL

## 2021-02-11 VITALS
RESPIRATION RATE: 18 BRPM | HEART RATE: 88 BPM | OXYGEN SATURATION: 99 % | DIASTOLIC BLOOD PRESSURE: 76 MMHG | TEMPERATURE: 98.3 F | SYSTOLIC BLOOD PRESSURE: 123 MMHG

## 2021-02-11 DIAGNOSIS — C92.00 ACUTE MYELOID LEUKEMIA NOT HAVING ACHIEVED REMISSION (HCC): Primary | ICD-10-CM

## 2021-02-11 PROCEDURE — 6360000002 HC RX W HCPCS: Performed by: NURSE PRACTITIONER

## 2021-02-11 PROCEDURE — 6370000000 HC RX 637 (ALT 250 FOR IP): Performed by: PHYSICIAN ASSISTANT

## 2021-02-11 PROCEDURE — 80197 ASSAY OF TACROLIMUS: CPT

## 2021-02-11 PROCEDURE — 36415 COLL VENOUS BLD VENIPUNCTURE: CPT

## 2021-02-11 PROCEDURE — 36430 TRANSFUSION BLD/BLD COMPNT: CPT

## 2021-02-11 PROCEDURE — P9016 RBC LEUKOCYTES REDUCED: HCPCS

## 2021-02-11 RX ORDER — ACETAMINOPHEN 325 MG/1
650 TABLET ORAL ONCE
Status: CANCELLED
Start: 2021-02-11 | End: 2021-02-11

## 2021-02-11 RX ORDER — METHYLPREDNISOLONE SODIUM SUCCINATE 125 MG/2ML
125 INJECTION, POWDER, LYOPHILIZED, FOR SOLUTION INTRAMUSCULAR; INTRAVENOUS ONCE
Status: CANCELLED | OUTPATIENT
Start: 2021-02-11 | End: 2021-02-11

## 2021-02-11 RX ORDER — ACETAMINOPHEN 325 MG/1
650 TABLET ORAL ONCE
Status: COMPLETED | OUTPATIENT
Start: 2021-02-11 | End: 2021-02-11

## 2021-02-11 RX ORDER — HEPARIN SODIUM (PORCINE) LOCK FLUSH IV SOLN 100 UNIT/ML 100 UNIT/ML
500 SOLUTION INTRAVENOUS PRN
Status: CANCELLED | OUTPATIENT
Start: 2021-02-11

## 2021-02-11 RX ORDER — DIPHENHYDRAMINE HCL 25 MG
25 TABLET ORAL ONCE
Status: CANCELLED
Start: 2021-02-11 | End: 2021-02-11

## 2021-02-11 RX ORDER — SODIUM CHLORIDE 0.9 % (FLUSH) 0.9 %
10 SYRINGE (ML) INJECTION PRN
Status: CANCELLED | OUTPATIENT
Start: 2021-02-11

## 2021-02-11 RX ORDER — SODIUM CHLORIDE 9 MG/ML
INJECTION, SOLUTION INTRAVENOUS CONTINUOUS
Status: CANCELLED | OUTPATIENT
Start: 2021-02-11

## 2021-02-11 RX ORDER — DIPHENHYDRAMINE HYDROCHLORIDE 50 MG/ML
50 INJECTION INTRAMUSCULAR; INTRAVENOUS ONCE
Status: CANCELLED | OUTPATIENT
Start: 2021-02-11 | End: 2021-02-11

## 2021-02-11 RX ORDER — SODIUM CHLORIDE 0.9 % (FLUSH) 0.9 %
20 SYRINGE (ML) INJECTION PRN
Status: CANCELLED | OUTPATIENT
Start: 2021-02-11

## 2021-02-11 RX ORDER — HEPARIN SODIUM (PORCINE) LOCK FLUSH IV SOLN 100 UNIT/ML 100 UNIT/ML
500 SOLUTION INTRAVENOUS PRN
Status: DISCONTINUED | OUTPATIENT
Start: 2021-02-11 | End: 2021-02-12 | Stop reason: HOSPADM

## 2021-02-11 RX ORDER — 0.9 % SODIUM CHLORIDE 0.9 %
10 VIAL (ML) INJECTION ONCE
Status: CANCELLED | OUTPATIENT
Start: 2021-02-11 | End: 2021-02-11

## 2021-02-11 RX ADMIN — HEPARIN 500 UNITS: 100 SYRINGE at 14:55

## 2021-02-11 RX ADMIN — ACETAMINOPHEN 650 MG: 325 TABLET ORAL at 12:34

## 2021-02-11 ASSESSMENT — PAIN SCALES - GENERAL: PAINLEVEL_OUTOF10: 0

## 2021-02-11 NOTE — PLAN OF CARE
Problem: Falls - Risk of:  Goal: Will remain free from falls  Description: Will remain free from falls  Outcome: Met This Shift   Pt is a Low fall risk. Explained fall risk precautions to pt and rationale behind their use to keep the patient safe. Belongings are in reach. Pt encouraged to notify staff for any and all assistance. Staff present in tx suite throughout entirety of pts treatment to monitor and protect from falls. Assistance provided when ambulating to restroom utilizing Stay With Me. Problem: KNOWLEDGE DEFICIT  Goal: Patient/S.O. demonstrates understanding of disease process, treatment plan, medications, and discharge instructions. Outcome: Met This Shift    Hgb: 6.1    Pt seen and assessed at [address] [facility] for PRBC infusion per orders from Dr. Kem Cummings. Infused per [facility] policy. Monitoring completed for infusion reactions - see flowsheet. Pt tolerated infusion well and without incident. Pt verbalizes understanding of discharge instructions. Discharged ambulatory to home with self.

## 2021-02-12 LAB — TACROLIMUS BLOOD: 3.2 NG/ML (ref 5–20)

## 2021-02-15 ENCOUNTER — HOSPITAL ENCOUNTER (OUTPATIENT)
Age: 61
Setting detail: SPECIMEN
Discharge: HOME | End: 2021-02-15
Payer: COMMERCIAL

## 2021-02-15 ENCOUNTER — HOSPITAL ENCOUNTER (OUTPATIENT)
Dept: ONCOLOGY | Age: 61
Setting detail: INFUSION SERIES
Discharge: HOME | End: 2021-02-15
Payer: COMMERCIAL

## 2021-02-15 VITALS
DIASTOLIC BLOOD PRESSURE: 70 MMHG | SYSTOLIC BLOOD PRESSURE: 129 MMHG | RESPIRATION RATE: 18 BRPM | HEART RATE: 76 BPM | TEMPERATURE: 97.7 F

## 2021-02-15 DIAGNOSIS — C92.00 ACUTE MYELOID LEUKEMIA NOT HAVING ACHIEVED REMISSION (HCC): Primary | ICD-10-CM

## 2021-02-15 LAB
ABO/RH: NORMAL
ANTIBODY SCREEN: NORMAL

## 2021-02-15 PROCEDURE — 6370000000 HC RX 637 (ALT 250 FOR IP): Performed by: PHYSICIAN ASSISTANT

## 2021-02-15 PROCEDURE — 86901 BLOOD TYPING SEROLOGIC RH(D): CPT

## 2021-02-15 PROCEDURE — P9040 RBC LEUKOREDUCED IRRADIATED: HCPCS

## 2021-02-15 PROCEDURE — 36430 TRANSFUSION BLD/BLD COMPNT: CPT

## 2021-02-15 PROCEDURE — 6360000002 HC RX W HCPCS: Performed by: NURSE PRACTITIONER

## 2021-02-15 PROCEDURE — 86900 BLOOD TYPING SEROLOGIC ABO: CPT

## 2021-02-15 PROCEDURE — 86923 COMPATIBILITY TEST ELECTRIC: CPT

## 2021-02-15 PROCEDURE — 86850 RBC ANTIBODY SCREEN: CPT

## 2021-02-15 PROCEDURE — 36592 COLLECT BLOOD FROM PICC: CPT

## 2021-02-15 PROCEDURE — 36415 COLL VENOUS BLD VENIPUNCTURE: CPT

## 2021-02-15 PROCEDURE — 80197 ASSAY OF TACROLIMUS: CPT

## 2021-02-15 RX ORDER — SODIUM CHLORIDE 9 MG/ML
INJECTION, SOLUTION INTRAVENOUS CONTINUOUS
Status: CANCELLED | OUTPATIENT
Start: 2021-02-15

## 2021-02-15 RX ORDER — ACETAMINOPHEN 325 MG/1
650 TABLET ORAL ONCE
Status: CANCELLED
Start: 2021-02-15 | End: 2021-02-15

## 2021-02-15 RX ORDER — HEPARIN SODIUM (PORCINE) LOCK FLUSH IV SOLN 100 UNIT/ML 100 UNIT/ML
500 SOLUTION INTRAVENOUS PRN
Status: CANCELLED | OUTPATIENT
Start: 2021-02-15

## 2021-02-15 RX ORDER — METHYLPREDNISOLONE SODIUM SUCCINATE 125 MG/2ML
125 INJECTION, POWDER, LYOPHILIZED, FOR SOLUTION INTRAMUSCULAR; INTRAVENOUS ONCE
Status: CANCELLED | OUTPATIENT
Start: 2021-02-15 | End: 2021-02-15

## 2021-02-15 RX ORDER — SODIUM CHLORIDE 0.9 % (FLUSH) 0.9 %
10 SYRINGE (ML) INJECTION PRN
Status: CANCELLED | OUTPATIENT
Start: 2021-02-15

## 2021-02-15 RX ORDER — DIPHENHYDRAMINE HCL 25 MG
25 TABLET ORAL ONCE
Status: CANCELLED
Start: 2021-02-15 | End: 2021-02-15

## 2021-02-15 RX ORDER — DIPHENHYDRAMINE HYDROCHLORIDE 50 MG/ML
50 INJECTION INTRAMUSCULAR; INTRAVENOUS ONCE
Status: CANCELLED | OUTPATIENT
Start: 2021-02-15 | End: 2021-02-15

## 2021-02-15 RX ORDER — ACETAMINOPHEN 325 MG/1
650 TABLET ORAL ONCE
Status: COMPLETED | OUTPATIENT
Start: 2021-02-15 | End: 2021-02-15

## 2021-02-15 RX ORDER — 0.9 % SODIUM CHLORIDE 0.9 %
10 VIAL (ML) INJECTION ONCE
Status: CANCELLED | OUTPATIENT
Start: 2021-02-15 | End: 2021-02-15

## 2021-02-15 RX ORDER — SODIUM CHLORIDE 0.9 % (FLUSH) 0.9 %
20 SYRINGE (ML) INJECTION PRN
Status: CANCELLED | OUTPATIENT
Start: 2021-02-15

## 2021-02-15 RX ORDER — HEPARIN SODIUM (PORCINE) LOCK FLUSH IV SOLN 100 UNIT/ML 100 UNIT/ML
500 SOLUTION INTRAVENOUS PRN
Status: DISCONTINUED | OUTPATIENT
Start: 2021-02-15 | End: 2021-02-16 | Stop reason: HOSPADM

## 2021-02-15 RX ADMIN — Medication 500 UNITS: at 14:06

## 2021-02-15 RX ADMIN — ACETAMINOPHEN 650 MG: 325 TABLET ORAL at 11:34

## 2021-02-15 ASSESSMENT — PAIN SCALES - GENERAL: PAINLEVEL_OUTOF10: 0

## 2021-02-15 NOTE — PLAN OF CARE
Problem: Falls - Risk of:  Goal: Will remain free from falls  Description: Will remain free from falls  Outcome: Ongoing  Goal: Absence of physical injury  Description: Absence of physical injury  Outcome: Ongoing   Pt is a fall risk. Explained fall risk precautions to pt and rationale behind their use to keep the patient safe. Belongings are in reach. Pt encouraged to notify staff for any and all assistance. Staff present in tx suite throughout entirety of pts treatment to monitor and protect from falls. Assistance provided when ambulating to restroom utilizing Stay With Me. Problem: KNOWLEDGE DEFICIT  Goal: Patient/S.O. demonstrates understanding of disease process, treatment plan, medications, and discharge instructions. Outcome: Ongoing     Patient's hemoglobin this AM: 6.1  Patient's platelet count this AM: 47  Pt seen and assessed at [facility]. Seen at [address] [place] [address] today for 1 unit PRBC per standing orders for above lab values. Blood products transfused per [facility] policy. Pt tolerated transfusion well and without incident. Pt verbalizes understanding of discharge instructions.   Discharged ambulatory to home per self

## 2021-02-16 LAB — TACROLIMUS BLOOD: 6.4 NG/ML (ref 5–20)

## 2021-02-18 ENCOUNTER — HOSPITAL ENCOUNTER (OUTPATIENT)
Dept: ONCOLOGY | Age: 61
Setting detail: INFUSION SERIES
Discharge: HOME | End: 2021-02-18
Payer: COMMERCIAL

## 2021-02-18 VITALS
DIASTOLIC BLOOD PRESSURE: 77 MMHG | OXYGEN SATURATION: 100 % | SYSTOLIC BLOOD PRESSURE: 157 MMHG | TEMPERATURE: 97.7 F | HEART RATE: 80 BPM | RESPIRATION RATE: 18 BRPM

## 2021-02-18 DIAGNOSIS — C92.00 ACUTE MYELOID LEUKEMIA NOT HAVING ACHIEVED REMISSION (HCC): Primary | ICD-10-CM

## 2021-02-18 PROCEDURE — 6360000002 HC RX W HCPCS: Performed by: NURSE PRACTITIONER

## 2021-02-18 PROCEDURE — P9040 RBC LEUKOREDUCED IRRADIATED: HCPCS

## 2021-02-18 PROCEDURE — 6370000000 HC RX 637 (ALT 250 FOR IP): Performed by: PHYSICIAN ASSISTANT

## 2021-02-18 PROCEDURE — 36430 TRANSFUSION BLD/BLD COMPNT: CPT

## 2021-02-18 RX ORDER — SODIUM CHLORIDE 9 MG/ML
INJECTION, SOLUTION INTRAVENOUS CONTINUOUS
Status: CANCELLED | OUTPATIENT
Start: 2021-02-18

## 2021-02-18 RX ORDER — ACETAMINOPHEN 325 MG/1
650 TABLET ORAL ONCE
Status: COMPLETED | OUTPATIENT
Start: 2021-02-18 | End: 2021-02-18

## 2021-02-18 RX ORDER — DIPHENHYDRAMINE HYDROCHLORIDE 50 MG/ML
50 INJECTION INTRAMUSCULAR; INTRAVENOUS ONCE
Status: CANCELLED | OUTPATIENT
Start: 2021-02-18 | End: 2021-02-18

## 2021-02-18 RX ORDER — HEPARIN SODIUM (PORCINE) LOCK FLUSH IV SOLN 100 UNIT/ML 100 UNIT/ML
500 SOLUTION INTRAVENOUS PRN
Status: DISCONTINUED | OUTPATIENT
Start: 2021-02-18 | End: 2021-02-19 | Stop reason: HOSPADM

## 2021-02-18 RX ORDER — SODIUM CHLORIDE 0.9 % (FLUSH) 0.9 %
20 SYRINGE (ML) INJECTION PRN
Status: CANCELLED | OUTPATIENT
Start: 2021-02-18

## 2021-02-18 RX ORDER — METHYLPREDNISOLONE SODIUM SUCCINATE 125 MG/2ML
125 INJECTION, POWDER, LYOPHILIZED, FOR SOLUTION INTRAMUSCULAR; INTRAVENOUS ONCE
Status: CANCELLED | OUTPATIENT
Start: 2021-02-18 | End: 2021-02-18

## 2021-02-18 RX ORDER — HEPARIN SODIUM (PORCINE) LOCK FLUSH IV SOLN 100 UNIT/ML 100 UNIT/ML
500 SOLUTION INTRAVENOUS PRN
Status: CANCELLED | OUTPATIENT
Start: 2021-02-18

## 2021-02-18 RX ORDER — ACETAMINOPHEN 325 MG/1
650 TABLET ORAL ONCE
Status: CANCELLED
Start: 2021-02-18 | End: 2021-02-18

## 2021-02-18 RX ORDER — DIPHENHYDRAMINE HCL 25 MG
25 TABLET ORAL ONCE
Status: CANCELLED
Start: 2021-02-18 | End: 2021-02-18

## 2021-02-18 RX ORDER — SODIUM CHLORIDE 0.9 % (FLUSH) 0.9 %
20 SYRINGE (ML) INJECTION PRN
Status: DISCONTINUED | OUTPATIENT
Start: 2021-02-18 | End: 2021-02-19 | Stop reason: HOSPADM

## 2021-02-18 RX ORDER — 0.9 % SODIUM CHLORIDE 0.9 %
10 VIAL (ML) INJECTION ONCE
Status: CANCELLED | OUTPATIENT
Start: 2021-02-18 | End: 2021-02-18

## 2021-02-18 RX ORDER — SODIUM CHLORIDE 0.9 % (FLUSH) 0.9 %
10 SYRINGE (ML) INJECTION PRN
Status: CANCELLED | OUTPATIENT
Start: 2021-02-18

## 2021-02-18 RX ORDER — DIPHENHYDRAMINE HCL 25 MG
25 TABLET ORAL ONCE
Status: DISCONTINUED | OUTPATIENT
Start: 2021-02-18 | End: 2021-02-19 | Stop reason: HOSPADM

## 2021-02-18 RX ADMIN — ACETAMINOPHEN 650 MG: 325 TABLET ORAL at 12:11

## 2021-02-18 RX ADMIN — HEPARIN 500 UNITS: 100 SYRINGE at 13:47

## 2021-02-18 NOTE — PLAN OF CARE
Problem: KNOWLEDGE DEFICIT  Goal: Patient/S.O. demonstrates understanding of disease process, treatment plan, medications, and discharge instructions. Outcome: Ongoing  Note:   Patient's hemoglobin this AM: 6.6  Patient's platelet count this AM: 62  Pt seen and assessed at [facility]. Seen at [address] [place] [address] today for 1 unit prbc transfusion per standing orders for above lab values per [facility]. Blood products transfused per [place] [address] policy. Pt tolerated transfusion well and without incident. Pt verbalizes understanding of discharge instructions. Discharged ambulatory per self.

## 2021-02-22 ENCOUNTER — HOSPITAL ENCOUNTER (OUTPATIENT)
Age: 61
Setting detail: SPECIMEN
Discharge: HOME | End: 2021-02-22
Payer: COMMERCIAL

## 2021-02-22 ENCOUNTER — HOSPITAL ENCOUNTER (OUTPATIENT)
Dept: ONCOLOGY | Age: 61
Setting detail: INFUSION SERIES
Discharge: HOME | End: 2021-02-22
Payer: COMMERCIAL

## 2021-02-22 VITALS
OXYGEN SATURATION: 100 % | TEMPERATURE: 98 F | DIASTOLIC BLOOD PRESSURE: 81 MMHG | HEART RATE: 73 BPM | SYSTOLIC BLOOD PRESSURE: 132 MMHG | RESPIRATION RATE: 18 BRPM

## 2021-02-22 DIAGNOSIS — C92.00 ACUTE MYELOID LEUKEMIA NOT HAVING ACHIEVED REMISSION (HCC): Primary | ICD-10-CM

## 2021-02-22 LAB
ABO/RH: NORMAL
ANTIBODY SCREEN: NORMAL
BLOOD BANK DISPENSE STATUS: NORMAL
BLOOD BANK PRODUCT CODE: NORMAL
BPU ID: NORMAL
DESCRIPTION BLOOD BANK: NORMAL
TACROLIMUS BLOOD: 7.3 NG/ML (ref 5–20)

## 2021-02-22 PROCEDURE — 86850 RBC ANTIBODY SCREEN: CPT

## 2021-02-22 PROCEDURE — 86900 BLOOD TYPING SEROLOGIC ABO: CPT

## 2021-02-22 PROCEDURE — 6360000002 HC RX W HCPCS: Performed by: NURSE PRACTITIONER

## 2021-02-22 PROCEDURE — 80197 ASSAY OF TACROLIMUS: CPT

## 2021-02-22 PROCEDURE — 6370000000 HC RX 637 (ALT 250 FOR IP): Performed by: PHYSICIAN ASSISTANT

## 2021-02-22 PROCEDURE — 36415 COLL VENOUS BLD VENIPUNCTURE: CPT

## 2021-02-22 PROCEDURE — 36592 COLLECT BLOOD FROM PICC: CPT

## 2021-02-22 PROCEDURE — P9040 RBC LEUKOREDUCED IRRADIATED: HCPCS

## 2021-02-22 PROCEDURE — 86923 COMPATIBILITY TEST ELECTRIC: CPT

## 2021-02-22 PROCEDURE — 86901 BLOOD TYPING SEROLOGIC RH(D): CPT

## 2021-02-22 PROCEDURE — 36430 TRANSFUSION BLD/BLD COMPNT: CPT

## 2021-02-22 RX ORDER — SODIUM CHLORIDE 9 MG/ML
INJECTION, SOLUTION INTRAVENOUS CONTINUOUS
Status: CANCELLED | OUTPATIENT
Start: 2021-02-22

## 2021-02-22 RX ORDER — ACETAMINOPHEN 325 MG/1
650 TABLET ORAL ONCE
Status: COMPLETED | OUTPATIENT
Start: 2021-02-22 | End: 2021-02-22

## 2021-02-22 RX ORDER — SODIUM CHLORIDE 0.9 % (FLUSH) 0.9 %
20 SYRINGE (ML) INJECTION PRN
Status: CANCELLED | OUTPATIENT
Start: 2021-02-22

## 2021-02-22 RX ORDER — DIPHENHYDRAMINE HYDROCHLORIDE 50 MG/ML
50 INJECTION INTRAMUSCULAR; INTRAVENOUS ONCE
Status: CANCELLED | OUTPATIENT
Start: 2021-02-22 | End: 2021-02-22

## 2021-02-22 RX ORDER — METHYLPREDNISOLONE SODIUM SUCCINATE 125 MG/2ML
125 INJECTION, POWDER, LYOPHILIZED, FOR SOLUTION INTRAMUSCULAR; INTRAVENOUS ONCE
Status: CANCELLED | OUTPATIENT
Start: 2021-02-22 | End: 2021-02-22

## 2021-02-22 RX ORDER — HEPARIN SODIUM (PORCINE) LOCK FLUSH IV SOLN 100 UNIT/ML 100 UNIT/ML
500 SOLUTION INTRAVENOUS PRN
Status: CANCELLED | OUTPATIENT
Start: 2021-02-22

## 2021-02-22 RX ORDER — SODIUM CHLORIDE 0.9 % (FLUSH) 0.9 %
10 SYRINGE (ML) INJECTION PRN
Status: CANCELLED | OUTPATIENT
Start: 2021-02-22

## 2021-02-22 RX ORDER — HEPARIN SODIUM (PORCINE) LOCK FLUSH IV SOLN 100 UNIT/ML 100 UNIT/ML
500 SOLUTION INTRAVENOUS PRN
Status: DISCONTINUED | OUTPATIENT
Start: 2021-02-22 | End: 2021-02-23 | Stop reason: HOSPADM

## 2021-02-22 RX ORDER — ACETAMINOPHEN 325 MG/1
650 TABLET ORAL ONCE
Status: CANCELLED
Start: 2021-02-22 | End: 2021-02-22

## 2021-02-22 RX ORDER — DIPHENHYDRAMINE HCL 25 MG
25 TABLET ORAL ONCE
Status: CANCELLED
Start: 2021-02-22 | End: 2021-02-22

## 2021-02-22 RX ORDER — 0.9 % SODIUM CHLORIDE 0.9 %
10 VIAL (ML) INJECTION ONCE
Status: CANCELLED | OUTPATIENT
Start: 2021-02-22 | End: 2021-02-22

## 2021-02-22 RX ORDER — DIPHENHYDRAMINE HCL 25 MG
25 TABLET ORAL ONCE
Status: DISCONTINUED | OUTPATIENT
Start: 2021-02-22 | End: 2021-02-23 | Stop reason: HOSPADM

## 2021-02-22 RX ADMIN — Medication 500 UNITS: at 14:33

## 2021-02-22 RX ADMIN — ACETAMINOPHEN 650 MG: 325 TABLET ORAL at 12:49

## 2021-02-22 ASSESSMENT — PAIN SCALES - GENERAL: PAINLEVEL_OUTOF10: 0

## 2021-02-22 NOTE — PLAN OF CARE
Problem: KNOWLEDGE DEFICIT  Goal: Patient/S.O. demonstrates understanding of disease process, treatment plan, medications, and discharge instructions. Note: Patient's hemoglobin this AM: 6.7  Patient's platelet count this AM: 73  Pt seen and assessed at [facility]. Seen at 0 [facility] today for one unit of PRBC per standing orders for above lab values. Blood products transfused per [place] [id number] policy. Pt tolerated transfusion well and without incident. Pt verbalizes understanding of discharge instructions. Discharged ambulatory to home with self. Problem: Falls - Risk of:  Goal: Will remain free from falls  Description: Will remain free from falls  Note: Explained fall risk precautions to pt and rationale behind their use to keep the patient safe. Belongings are in reach. Pt encouraged to notify staff for any and all assistance. Staff present in tx suite throughout entirety of pts treatment to monitor and protect from falls. Assistance provided when ambulating to restroom utilizing Stay With Me.

## 2021-02-25 ENCOUNTER — HOSPITAL ENCOUNTER (OUTPATIENT)
Age: 61
Setting detail: SPECIMEN
Discharge: HOME | End: 2021-02-25
Payer: COMMERCIAL

## 2021-03-04 ENCOUNTER — HOSPITAL ENCOUNTER (OUTPATIENT)
Dept: ONCOLOGY | Age: 61
Setting detail: INFUSION SERIES
Discharge: HOME | End: 2021-03-04
Payer: COMMERCIAL

## 2021-03-04 ENCOUNTER — HOSPITAL ENCOUNTER (OUTPATIENT)
Age: 61
Setting detail: SPECIMEN
Discharge: HOME | End: 2021-03-04
Payer: COMMERCIAL

## 2021-03-04 VITALS
DIASTOLIC BLOOD PRESSURE: 74 MMHG | TEMPERATURE: 98.3 F | SYSTOLIC BLOOD PRESSURE: 126 MMHG | HEART RATE: 73 BPM | RESPIRATION RATE: 19 BRPM | OXYGEN SATURATION: 99 %

## 2021-03-04 DIAGNOSIS — C92.00 ACUTE MYELOID LEUKEMIA NOT HAVING ACHIEVED REMISSION (HCC): Primary | ICD-10-CM

## 2021-03-04 LAB
ABO/RH: NORMAL
ANTIBODY SCREEN: NORMAL
BLOOD BANK DISPENSE STATUS: NORMAL
BLOOD BANK DISPENSE STATUS: NORMAL
BLOOD BANK PRODUCT CODE: NORMAL
BLOOD BANK PRODUCT CODE: NORMAL
BPU ID: NORMAL
BPU ID: NORMAL
DESCRIPTION BLOOD BANK: NORMAL
DESCRIPTION BLOOD BANK: NORMAL
HCT VFR BLD CALC: 16.5 % (ref 40.5–52.5)
IMMATURE RETIC FRACT: 0.54 (ref 0.21–0.37)
RETICULOCYTE ABSOLUTE COUNT: 0.01 M/UL
RETICULOCYTE COUNT PCT: 0.41 % (ref 0.5–2.18)
TACROLIMUS BLOOD: 5.9 NG/ML (ref 5–20)

## 2021-03-04 PROCEDURE — 86923 COMPATIBILITY TEST ELECTRIC: CPT

## 2021-03-04 PROCEDURE — 36592 COLLECT BLOOD FROM PICC: CPT

## 2021-03-04 PROCEDURE — 86900 BLOOD TYPING SEROLOGIC ABO: CPT

## 2021-03-04 PROCEDURE — 86850 RBC ANTIBODY SCREEN: CPT

## 2021-03-04 PROCEDURE — 86901 BLOOD TYPING SEROLOGIC RH(D): CPT

## 2021-03-04 PROCEDURE — 6360000002 HC RX W HCPCS: Performed by: NURSE PRACTITIONER

## 2021-03-04 PROCEDURE — 36415 COLL VENOUS BLD VENIPUNCTURE: CPT

## 2021-03-04 PROCEDURE — 80197 ASSAY OF TACROLIMUS: CPT

## 2021-03-04 PROCEDURE — 96374 THER/PROPH/DIAG INJ IV PUSH: CPT

## 2021-03-04 PROCEDURE — P9040 RBC LEUKOREDUCED IRRADIATED: HCPCS

## 2021-03-04 PROCEDURE — 36430 TRANSFUSION BLD/BLD COMPNT: CPT

## 2021-03-04 PROCEDURE — 6370000000 HC RX 637 (ALT 250 FOR IP): Performed by: PHYSICIAN ASSISTANT

## 2021-03-04 PROCEDURE — 85045 AUTOMATED RETICULOCYTE COUNT: CPT

## 2021-03-04 RX ORDER — DIPHENHYDRAMINE HCL 25 MG
25 TABLET ORAL ONCE
Status: DISCONTINUED | OUTPATIENT
Start: 2021-03-04 | End: 2021-03-05 | Stop reason: HOSPADM

## 2021-03-04 RX ORDER — METHYLPREDNISOLONE SODIUM SUCCINATE 125 MG/2ML
125 INJECTION, POWDER, LYOPHILIZED, FOR SOLUTION INTRAMUSCULAR; INTRAVENOUS ONCE
Status: CANCELLED | OUTPATIENT
Start: 2021-03-04 | End: 2021-03-04

## 2021-03-04 RX ORDER — SODIUM CHLORIDE 0.9 % (FLUSH) 0.9 %
20 SYRINGE (ML) INJECTION PRN
Status: CANCELLED | OUTPATIENT
Start: 2021-03-04

## 2021-03-04 RX ORDER — FUROSEMIDE 10 MG/ML
20 INJECTION INTRAMUSCULAR; INTRAVENOUS ONCE
Status: CANCELLED | OUTPATIENT
Start: 2021-03-04 | End: 2021-03-04

## 2021-03-04 RX ORDER — DIPHENHYDRAMINE HCL 25 MG
25 TABLET ORAL ONCE
Status: CANCELLED
Start: 2021-03-04 | End: 2021-03-04

## 2021-03-04 RX ORDER — HEPARIN SODIUM (PORCINE) LOCK FLUSH IV SOLN 100 UNIT/ML 100 UNIT/ML
500 SOLUTION INTRAVENOUS PRN
Status: DISCONTINUED | OUTPATIENT
Start: 2021-03-04 | End: 2021-03-05 | Stop reason: HOSPADM

## 2021-03-04 RX ORDER — SODIUM CHLORIDE 9 MG/ML
INJECTION, SOLUTION INTRAVENOUS CONTINUOUS
Status: CANCELLED | OUTPATIENT
Start: 2021-03-04

## 2021-03-04 RX ORDER — ACETAMINOPHEN 325 MG/1
650 TABLET ORAL ONCE
Status: CANCELLED
Start: 2021-03-04 | End: 2021-03-04

## 2021-03-04 RX ORDER — HEPARIN SODIUM (PORCINE) LOCK FLUSH IV SOLN 100 UNIT/ML 100 UNIT/ML
500 SOLUTION INTRAVENOUS PRN
Status: CANCELLED | OUTPATIENT
Start: 2021-03-04

## 2021-03-04 RX ORDER — 0.9 % SODIUM CHLORIDE 0.9 %
10 VIAL (ML) INJECTION ONCE
Status: CANCELLED | OUTPATIENT
Start: 2021-03-04 | End: 2021-03-04

## 2021-03-04 RX ORDER — DIPHENHYDRAMINE HYDROCHLORIDE 50 MG/ML
50 INJECTION INTRAMUSCULAR; INTRAVENOUS ONCE
Status: CANCELLED | OUTPATIENT
Start: 2021-03-04 | End: 2021-03-04

## 2021-03-04 RX ORDER — FUROSEMIDE 10 MG/ML
20 INJECTION INTRAMUSCULAR; INTRAVENOUS ONCE
Status: COMPLETED | OUTPATIENT
Start: 2021-03-04 | End: 2021-03-04

## 2021-03-04 RX ORDER — SODIUM CHLORIDE 0.9 % (FLUSH) 0.9 %
10 SYRINGE (ML) INJECTION PRN
Status: CANCELLED | OUTPATIENT
Start: 2021-03-04

## 2021-03-04 RX ORDER — ACETAMINOPHEN 325 MG/1
650 TABLET ORAL ONCE
Status: COMPLETED | OUTPATIENT
Start: 2021-03-04 | End: 2021-03-04

## 2021-03-04 RX ADMIN — Medication 500 UNITS: at 14:06

## 2021-03-04 RX ADMIN — ACETAMINOPHEN 650 MG: 325 TABLET ORAL at 10:05

## 2021-03-04 RX ADMIN — FUROSEMIDE 20 MG: 10 INJECTION, SOLUTION INTRAMUSCULAR; INTRAVENOUS at 12:14

## 2021-03-04 ASSESSMENT — PAIN SCALES - GENERAL: PAINLEVEL_OUTOF10: 0

## 2021-03-04 NOTE — PLAN OF CARE
Problem: Falls - Risk of:  Goal: Will remain free from falls  Description: Will remain free from falls  3/4/2021 1406 by Jori Sanabria RN  Outcome: Ongoing  Note: Pt is a Med fall risk. Explained fall risk precautions to pt and rationale behind their use to keep the patient safe. Belongings are in reach. Pt encouraged to notify staff for any and all assistance. Staff present in tx suite throughout entirety of pts treatment to monitor and protect from falls. Assistance provided when ambulating to restroom utilizing Stay With Me. Problem: KNOWLEDGE DEFICIT  Goal: Patient/S.O. demonstrates understanding of disease process, treatment plan, medications, and discharge instructions. 3/4/2021 1406 by Jori Sanabria RN  Outcome: Ongoing  Note:   Patient's hemoglobin this AM: 5.6  Patient's platelet count this AM: 87  Pt seen and assessed at [facility]. Seen at [address] [facility] today for type and screen followed by 2 units prbc transfusion per standing orders for above lab values per [facility]. Pt received Lasix, 20mg IV push in between 2 units prbc transfusion. Blood products transfused per [facility] policy. Pt tolerated transfusion well and without incident. Pt verbalizes understanding of discharge instructions. Discharged ambulatory per self.

## 2021-03-11 ENCOUNTER — HOSPITAL ENCOUNTER (OUTPATIENT)
Dept: ONCOLOGY | Age: 61
Setting detail: INFUSION SERIES
Discharge: HOME | End: 2021-03-11
Payer: COMMERCIAL

## 2021-03-11 VITALS
TEMPERATURE: 98.1 F | RESPIRATION RATE: 18 BRPM | HEART RATE: 88 BPM | SYSTOLIC BLOOD PRESSURE: 125 MMHG | DIASTOLIC BLOOD PRESSURE: 78 MMHG | OXYGEN SATURATION: 99 %

## 2021-03-11 DIAGNOSIS — C92.00 ACUTE MYELOID LEUKEMIA NOT HAVING ACHIEVED REMISSION (HCC): Primary | ICD-10-CM

## 2021-03-11 LAB
ABO/RH: NORMAL
ANTIBODY SCREEN: NORMAL
BLOOD BANK DISPENSE STATUS: NORMAL
BLOOD BANK DISPENSE STATUS: NORMAL
BLOOD BANK PRODUCT CODE: NORMAL
BLOOD BANK PRODUCT CODE: NORMAL
BPU ID: NORMAL
BPU ID: NORMAL
DESCRIPTION BLOOD BANK: NORMAL
DESCRIPTION BLOOD BANK: NORMAL

## 2021-03-11 PROCEDURE — 86900 BLOOD TYPING SEROLOGIC ABO: CPT

## 2021-03-11 PROCEDURE — 2580000003 HC RX 258: Performed by: INTERNAL MEDICINE

## 2021-03-11 PROCEDURE — 36430 TRANSFUSION BLD/BLD COMPNT: CPT

## 2021-03-11 PROCEDURE — 6360000002 HC RX W HCPCS: Performed by: INTERNAL MEDICINE

## 2021-03-11 PROCEDURE — 86850 RBC ANTIBODY SCREEN: CPT

## 2021-03-11 PROCEDURE — P9040 RBC LEUKOREDUCED IRRADIATED: HCPCS

## 2021-03-11 PROCEDURE — 86923 COMPATIBILITY TEST ELECTRIC: CPT

## 2021-03-11 PROCEDURE — 6370000000 HC RX 637 (ALT 250 FOR IP): Performed by: PHYSICIAN ASSISTANT

## 2021-03-11 PROCEDURE — 86901 BLOOD TYPING SEROLOGIC RH(D): CPT

## 2021-03-11 RX ORDER — FUROSEMIDE 10 MG/ML
40 INJECTION INTRAMUSCULAR; INTRAVENOUS ONCE
Status: COMPLETED | OUTPATIENT
Start: 2021-03-11 | End: 2021-03-11

## 2021-03-11 RX ORDER — ACETAMINOPHEN 325 MG/1
650 TABLET ORAL ONCE
Status: COMPLETED | OUTPATIENT
Start: 2021-03-11 | End: 2021-03-11

## 2021-03-11 RX ORDER — HEPARIN SODIUM (PORCINE) LOCK FLUSH IV SOLN 100 UNIT/ML 100 UNIT/ML
500 SOLUTION INTRAVENOUS PRN
Status: CANCELLED | OUTPATIENT
Start: 2021-03-11

## 2021-03-11 RX ORDER — DIPHENHYDRAMINE HYDROCHLORIDE 50 MG/ML
50 INJECTION INTRAMUSCULAR; INTRAVENOUS ONCE
Status: CANCELLED | OUTPATIENT
Start: 2021-03-11 | End: 2021-03-11

## 2021-03-11 RX ORDER — SODIUM CHLORIDE 9 MG/ML
INJECTION, SOLUTION INTRAVENOUS CONTINUOUS
Status: CANCELLED | OUTPATIENT
Start: 2021-03-11

## 2021-03-11 RX ORDER — SODIUM CHLORIDE 0.9 % (FLUSH) 0.9 %
20 SYRINGE (ML) INJECTION PRN
Status: DISCONTINUED | OUTPATIENT
Start: 2021-03-11 | End: 2021-03-12 | Stop reason: HOSPADM

## 2021-03-11 RX ORDER — SODIUM CHLORIDE 0.9 % (FLUSH) 0.9 %
20 SYRINGE (ML) INJECTION PRN
Status: CANCELLED | OUTPATIENT
Start: 2021-03-11

## 2021-03-11 RX ORDER — ACETAMINOPHEN 325 MG/1
650 TABLET ORAL ONCE
Status: CANCELLED
Start: 2021-03-11 | End: 2021-03-11

## 2021-03-11 RX ORDER — METHYLPREDNISOLONE SODIUM SUCCINATE 125 MG/2ML
125 INJECTION, POWDER, LYOPHILIZED, FOR SOLUTION INTRAMUSCULAR; INTRAVENOUS ONCE
Status: CANCELLED | OUTPATIENT
Start: 2021-03-11 | End: 2021-03-11

## 2021-03-11 RX ORDER — 0.9 % SODIUM CHLORIDE 0.9 %
10 VIAL (ML) INJECTION ONCE
Status: CANCELLED | OUTPATIENT
Start: 2021-03-11 | End: 2021-03-11

## 2021-03-11 RX ORDER — SODIUM CHLORIDE 9 MG/ML
INJECTION, SOLUTION INTRAVENOUS CONTINUOUS
Status: DISCONTINUED | OUTPATIENT
Start: 2021-03-11 | End: 2021-03-12 | Stop reason: HOSPADM

## 2021-03-11 RX ORDER — VORICONAZOLE 200 MG/1
200 TABLET, FILM COATED ORAL 2 TIMES DAILY
COMMUNITY

## 2021-03-11 RX ORDER — SODIUM CHLORIDE 0.9 % (FLUSH) 0.9 %
10 SYRINGE (ML) INJECTION PRN
Status: CANCELLED | OUTPATIENT
Start: 2021-03-11

## 2021-03-11 RX ORDER — HEPARIN SODIUM (PORCINE) LOCK FLUSH IV SOLN 100 UNIT/ML 100 UNIT/ML
500 SOLUTION INTRAVENOUS PRN
Status: DISCONTINUED | OUTPATIENT
Start: 2021-03-11 | End: 2021-03-12 | Stop reason: HOSPADM

## 2021-03-11 RX ORDER — DIPHENHYDRAMINE HCL 25 MG
25 TABLET ORAL ONCE
Status: CANCELLED
Start: 2021-03-11 | End: 2021-03-11

## 2021-03-11 RX ORDER — METHYLPREDNISOLONE SODIUM SUCCINATE 125 MG/2ML
125 INJECTION, POWDER, LYOPHILIZED, FOR SOLUTION INTRAMUSCULAR; INTRAVENOUS ONCE
Status: DISCONTINUED | OUTPATIENT
Start: 2021-03-11 | End: 2021-03-12 | Stop reason: HOSPADM

## 2021-03-11 RX ORDER — DIPHENHYDRAMINE HYDROCHLORIDE 50 MG/ML
50 INJECTION INTRAMUSCULAR; INTRAVENOUS ONCE
Status: DISCONTINUED | OUTPATIENT
Start: 2021-03-11 | End: 2021-03-12 | Stop reason: HOSPADM

## 2021-03-11 RX ADMIN — ACETAMINOPHEN 650 MG: 325 TABLET ORAL at 10:40

## 2021-03-11 RX ADMIN — SODIUM CHLORIDE: 9 INJECTION, SOLUTION INTRAVENOUS at 11:00

## 2021-03-11 RX ADMIN — FUROSEMIDE 40 MG: 10 INJECTION, SOLUTION INTRAMUSCULAR; INTRAVENOUS at 13:35

## 2021-03-11 NOTE — PLAN OF CARE
Problem: Falls - Risk of:  Goal: Will remain free from falls  Description: Will remain free from falls  Outcome: Ongoing     Problem: Falls - Risk of:  Goal: Absence of physical injury  Description: Absence of physical injury  Outcome: Ongoing     Problem: KNOWLEDGE DEFICIT  Goal: Patient/S.O. demonstrates understanding of disease process, treatment plan, medications, and discharge instructions. Outcome: Ongoing  Note: Pt came in to get 2 units of RBC and received 40mg if IV lasix in between units per order. Pt tolerated well.

## 2021-03-18 ENCOUNTER — HOSPITAL ENCOUNTER (OUTPATIENT)
Dept: ONCOLOGY | Age: 61
Setting detail: INFUSION SERIES
End: 2021-03-18
Payer: COMMERCIAL

## 2021-03-18 ENCOUNTER — HOSPITAL ENCOUNTER (OUTPATIENT)
Dept: ONCOLOGY | Age: 61
Setting detail: INFUSION SERIES
Discharge: HOME | End: 2021-03-18
Payer: COMMERCIAL

## 2021-03-18 ENCOUNTER — HOSPITAL ENCOUNTER (OUTPATIENT)
Age: 61
Setting detail: SPECIMEN
Discharge: HOME | End: 2021-03-18
Payer: COMMERCIAL

## 2021-03-18 VITALS
RESPIRATION RATE: 16 BRPM | TEMPERATURE: 97.9 F | OXYGEN SATURATION: 100 % | HEART RATE: 69 BPM | SYSTOLIC BLOOD PRESSURE: 144 MMHG | DIASTOLIC BLOOD PRESSURE: 90 MMHG

## 2021-03-18 DIAGNOSIS — C92.00 ACUTE MYELOID LEUKEMIA NOT HAVING ACHIEVED REMISSION (HCC): Primary | ICD-10-CM

## 2021-03-18 LAB
ABO/RH: NORMAL
ANTIBODY SCREEN: NORMAL
BLOOD BANK DISPENSE STATUS: NORMAL
BLOOD BANK DISPENSE STATUS: NORMAL
BLOOD BANK PRODUCT CODE: NORMAL
BLOOD BANK PRODUCT CODE: NORMAL
BPU ID: NORMAL
BPU ID: NORMAL
DESCRIPTION BLOOD BANK: NORMAL
DESCRIPTION BLOOD BANK: NORMAL
TACROLIMUS BLOOD: 5.6 NG/ML (ref 5–20)

## 2021-03-18 PROCEDURE — 80197 ASSAY OF TACROLIMUS: CPT

## 2021-03-18 PROCEDURE — 86901 BLOOD TYPING SEROLOGIC RH(D): CPT

## 2021-03-18 PROCEDURE — 36430 TRANSFUSION BLD/BLD COMPNT: CPT

## 2021-03-18 PROCEDURE — 6370000000 HC RX 637 (ALT 250 FOR IP): Performed by: PHYSICIAN ASSISTANT

## 2021-03-18 PROCEDURE — 86923 COMPATIBILITY TEST ELECTRIC: CPT

## 2021-03-18 PROCEDURE — 36415 COLL VENOUS BLD VENIPUNCTURE: CPT

## 2021-03-18 PROCEDURE — 86900 BLOOD TYPING SEROLOGIC ABO: CPT

## 2021-03-18 PROCEDURE — 6360000002 HC RX W HCPCS: Performed by: NURSE PRACTITIONER

## 2021-03-18 PROCEDURE — P9040 RBC LEUKOREDUCED IRRADIATED: HCPCS

## 2021-03-18 PROCEDURE — 86850 RBC ANTIBODY SCREEN: CPT

## 2021-03-18 PROCEDURE — 2580000003 HC RX 258: Performed by: INTERNAL MEDICINE

## 2021-03-18 PROCEDURE — 36592 COLLECT BLOOD FROM PICC: CPT

## 2021-03-18 RX ORDER — HEPARIN SODIUM (PORCINE) LOCK FLUSH IV SOLN 100 UNIT/ML 100 UNIT/ML
500 SOLUTION INTRAVENOUS PRN
Status: CANCELLED | OUTPATIENT
Start: 2021-03-18

## 2021-03-18 RX ORDER — SODIUM CHLORIDE 9 MG/ML
INJECTION, SOLUTION INTRAVENOUS CONTINUOUS
Status: CANCELLED | OUTPATIENT
Start: 2021-03-18

## 2021-03-18 RX ORDER — SODIUM CHLORIDE 0.9 % (FLUSH) 0.9 %
20 SYRINGE (ML) INJECTION PRN
Status: CANCELLED | OUTPATIENT
Start: 2021-03-18

## 2021-03-18 RX ORDER — METHYLPREDNISOLONE SODIUM SUCCINATE 125 MG/2ML
125 INJECTION, POWDER, LYOPHILIZED, FOR SOLUTION INTRAMUSCULAR; INTRAVENOUS ONCE
Status: CANCELLED | OUTPATIENT
Start: 2021-03-18 | End: 2021-03-18

## 2021-03-18 RX ORDER — SODIUM CHLORIDE 0.9 % (FLUSH) 0.9 %
20 SYRINGE (ML) INJECTION PRN
Status: DISCONTINUED | OUTPATIENT
Start: 2021-03-18 | End: 2021-03-19 | Stop reason: HOSPADM

## 2021-03-18 RX ORDER — HEPARIN SODIUM (PORCINE) LOCK FLUSH IV SOLN 100 UNIT/ML 100 UNIT/ML
500 SOLUTION INTRAVENOUS PRN
Status: DISCONTINUED | OUTPATIENT
Start: 2021-03-18 | End: 2021-03-19 | Stop reason: HOSPADM

## 2021-03-18 RX ORDER — 0.9 % SODIUM CHLORIDE 0.9 %
10 VIAL (ML) INJECTION ONCE
Status: CANCELLED | OUTPATIENT
Start: 2021-03-18 | End: 2021-03-18

## 2021-03-18 RX ORDER — ACETAMINOPHEN 325 MG/1
650 TABLET ORAL ONCE
Status: COMPLETED | OUTPATIENT
Start: 2021-03-18 | End: 2021-03-18

## 2021-03-18 RX ORDER — DIPHENHYDRAMINE HCL 25 MG
25 TABLET ORAL ONCE
Status: CANCELLED
Start: 2021-03-18 | End: 2021-03-18

## 2021-03-18 RX ORDER — ACETAMINOPHEN 325 MG/1
650 TABLET ORAL ONCE
Status: CANCELLED
Start: 2021-03-18 | End: 2021-03-18

## 2021-03-18 RX ORDER — DIPHENHYDRAMINE HYDROCHLORIDE 50 MG/ML
50 INJECTION INTRAMUSCULAR; INTRAVENOUS ONCE
Status: CANCELLED | OUTPATIENT
Start: 2021-03-18 | End: 2021-03-18

## 2021-03-18 RX ORDER — SODIUM CHLORIDE 0.9 % (FLUSH) 0.9 %
10 SYRINGE (ML) INJECTION PRN
Status: CANCELLED | OUTPATIENT
Start: 2021-03-18

## 2021-03-18 RX ADMIN — Medication 500 UNITS: at 14:13

## 2021-03-18 RX ADMIN — ACETAMINOPHEN 650 MG: 325 TABLET ORAL at 10:25

## 2021-03-18 RX ADMIN — Medication 20 ML: at 14:13

## 2021-03-18 ASSESSMENT — PAIN SCALES - GENERAL: PAINLEVEL_OUTOF10: 0

## 2021-03-18 NOTE — PLAN OF CARE
Problem: Falls - Risk of:  Goal: Will remain free from falls  Description: Will remain free from falls  Outcome: Met This Shift   Pt is a Med fall risk. Explained fall risk precautions to pt and rationale behind their use to keep the patient safe. Belongings are in reach. Pt encouraged to notify staff for any and all assistance. Staff present in tx suite throughout entirety of pts treatment to monitor and protect from falls. Assistance provided when ambulating to restroom utilizing Stay With Me. Problem: KNOWLEDGE DEFICIT  Goal: Patient/S.O. demonstrates understanding of disease process, treatment plan, medications, and discharge instructions. Outcome: Met This Shift     HGB: 6.4    Pt seen and assessed at [address] [facility] today for 2 units PRBC infusion per orders from Dr. Huddleston January. Infused per [facility] policy. Monitoring completed for infusion reactions - see flowsheet. Pt tolerated infusion well and without incident. Pt verbalizes understanding of discharge instructions. Discharged ambulatory to home with self.

## 2021-03-25 ENCOUNTER — HOSPITAL ENCOUNTER (OUTPATIENT)
Dept: ONCOLOGY | Age: 61
Setting detail: INFUSION SERIES
Discharge: HOME | End: 2021-03-25
Payer: COMMERCIAL

## 2021-03-25 ENCOUNTER — HOSPITAL ENCOUNTER (OUTPATIENT)
Age: 61
Setting detail: SPECIMEN
Discharge: HOME | End: 2021-03-25
Payer: COMMERCIAL

## 2021-03-25 VITALS
HEART RATE: 74 BPM | RESPIRATION RATE: 18 BRPM | SYSTOLIC BLOOD PRESSURE: 131 MMHG | TEMPERATURE: 97.6 F | OXYGEN SATURATION: 100 % | DIASTOLIC BLOOD PRESSURE: 76 MMHG

## 2021-03-25 DIAGNOSIS — C92.00 ACUTE MYELOID LEUKEMIA NOT HAVING ACHIEVED REMISSION (HCC): Primary | ICD-10-CM

## 2021-03-25 LAB
ABO/RH: NORMAL
ANTIBODY SCREEN: NORMAL
BLOOD BANK DISPENSE STATUS: NORMAL
BLOOD BANK PRODUCT CODE: NORMAL
BPU ID: NORMAL
DESCRIPTION BLOOD BANK: NORMAL
HCT VFR BLD CALC: 20.2 % (ref 40.5–52.5)
IMMATURE RETIC FRACT: 0.38 (ref 0.21–0.37)
RETICULOCYTE ABSOLUTE COUNT: 0.01 M/UL
RETICULOCYTE COUNT PCT: 0.46 % (ref 0.5–2.18)
TACROLIMUS BLOOD: 7.1 NG/ML (ref 5–20)

## 2021-03-25 PROCEDURE — 86900 BLOOD TYPING SEROLOGIC ABO: CPT

## 2021-03-25 PROCEDURE — 86850 RBC ANTIBODY SCREEN: CPT

## 2021-03-25 PROCEDURE — 36430 TRANSFUSION BLD/BLD COMPNT: CPT

## 2021-03-25 PROCEDURE — 36415 COLL VENOUS BLD VENIPUNCTURE: CPT

## 2021-03-25 PROCEDURE — 6360000002 HC RX W HCPCS: Performed by: NURSE PRACTITIONER

## 2021-03-25 PROCEDURE — 2580000003 HC RX 258: Performed by: INTERNAL MEDICINE

## 2021-03-25 PROCEDURE — 86901 BLOOD TYPING SEROLOGIC RH(D): CPT

## 2021-03-25 PROCEDURE — P9040 RBC LEUKOREDUCED IRRADIATED: HCPCS

## 2021-03-25 PROCEDURE — 85045 AUTOMATED RETICULOCYTE COUNT: CPT

## 2021-03-25 PROCEDURE — 80197 ASSAY OF TACROLIMUS: CPT

## 2021-03-25 PROCEDURE — 36592 COLLECT BLOOD FROM PICC: CPT

## 2021-03-25 PROCEDURE — 6370000000 HC RX 637 (ALT 250 FOR IP): Performed by: PHYSICIAN ASSISTANT

## 2021-03-25 PROCEDURE — 86923 COMPATIBILITY TEST ELECTRIC: CPT

## 2021-03-25 RX ORDER — SODIUM CHLORIDE 0.9 % (FLUSH) 0.9 %
20 SYRINGE (ML) INJECTION PRN
Status: CANCELLED | OUTPATIENT
Start: 2021-03-25

## 2021-03-25 RX ORDER — SODIUM CHLORIDE 0.9 % (FLUSH) 0.9 %
20 SYRINGE (ML) INJECTION PRN
Status: DISCONTINUED | OUTPATIENT
Start: 2021-03-25 | End: 2021-03-26 | Stop reason: HOSPADM

## 2021-03-25 RX ORDER — SODIUM CHLORIDE 9 MG/ML
INJECTION, SOLUTION INTRAVENOUS CONTINUOUS
Status: CANCELLED | OUTPATIENT
Start: 2021-03-25

## 2021-03-25 RX ORDER — METHYLPREDNISOLONE SODIUM SUCCINATE 125 MG/2ML
125 INJECTION, POWDER, LYOPHILIZED, FOR SOLUTION INTRAMUSCULAR; INTRAVENOUS ONCE
Status: CANCELLED | OUTPATIENT
Start: 2021-03-25 | End: 2021-03-25

## 2021-03-25 RX ORDER — DIPHENHYDRAMINE HCL 25 MG
25 TABLET ORAL ONCE
Status: CANCELLED
Start: 2021-03-25 | End: 2021-03-25

## 2021-03-25 RX ORDER — ACETAMINOPHEN 325 MG/1
650 TABLET ORAL ONCE
Status: COMPLETED | OUTPATIENT
Start: 2021-03-25 | End: 2021-03-25

## 2021-03-25 RX ORDER — DIPHENHYDRAMINE HYDROCHLORIDE 50 MG/ML
50 INJECTION INTRAMUSCULAR; INTRAVENOUS ONCE
Status: CANCELLED | OUTPATIENT
Start: 2021-03-25 | End: 2021-03-25

## 2021-03-25 RX ORDER — HEPARIN SODIUM (PORCINE) LOCK FLUSH IV SOLN 100 UNIT/ML 100 UNIT/ML
500 SOLUTION INTRAVENOUS PRN
Status: DISCONTINUED | OUTPATIENT
Start: 2021-03-25 | End: 2021-03-26 | Stop reason: HOSPADM

## 2021-03-25 RX ORDER — SODIUM CHLORIDE 0.9 % (FLUSH) 0.9 %
10 SYRINGE (ML) INJECTION PRN
Status: CANCELLED | OUTPATIENT
Start: 2021-03-25

## 2021-03-25 RX ORDER — ACETAMINOPHEN 325 MG/1
650 TABLET ORAL ONCE
Status: CANCELLED
Start: 2021-03-25 | End: 2021-03-25

## 2021-03-25 RX ORDER — HEPARIN SODIUM (PORCINE) LOCK FLUSH IV SOLN 100 UNIT/ML 100 UNIT/ML
500 SOLUTION INTRAVENOUS PRN
Status: CANCELLED | OUTPATIENT
Start: 2021-03-25

## 2021-03-25 RX ORDER — 0.9 % SODIUM CHLORIDE 0.9 %
10 VIAL (ML) INJECTION ONCE
Status: CANCELLED | OUTPATIENT
Start: 2021-03-25 | End: 2021-03-25

## 2021-03-25 RX ADMIN — Medication 20 ML: at 12:47

## 2021-03-25 RX ADMIN — ACETAMINOPHEN 650 MG: 325 TABLET ORAL at 10:50

## 2021-03-25 RX ADMIN — Medication 500 UNITS: at 12:51

## 2021-03-25 ASSESSMENT — PAIN SCALES - GENERAL: PAINLEVEL_OUTOF10: 0

## 2021-04-01 ENCOUNTER — HOSPITAL ENCOUNTER (OUTPATIENT)
Dept: ONCOLOGY | Age: 61
Setting detail: INFUSION SERIES
Discharge: HOME | End: 2021-04-01
Payer: COMMERCIAL

## 2021-04-01 VITALS
SYSTOLIC BLOOD PRESSURE: 139 MMHG | OXYGEN SATURATION: 98 % | TEMPERATURE: 99.1 F | RESPIRATION RATE: 25 BRPM | DIASTOLIC BLOOD PRESSURE: 72 MMHG | HEART RATE: 79 BPM

## 2021-04-01 DIAGNOSIS — C92.00 ACUTE MYELOID LEUKEMIA NOT HAVING ACHIEVED REMISSION (HCC): Primary | ICD-10-CM

## 2021-04-01 LAB
A/G RATIO: 2.1 (ref 1.1–2.2)
ABO/RH: NORMAL
ALBUMIN SERPL-MCNC: 4.1 G/DL (ref 3.4–5)
ALP BLD-CCNC: 184 U/L (ref 40–129)
ALT SERPL-CCNC: 55 U/L (ref 10–40)
ANION GAP SERPL CALCULATED.3IONS-SCNC: 10 MMOL/L (ref 3–16)
ANTIBODY SCREEN: NORMAL
AST SERPL-CCNC: 25 U/L (ref 15–37)
BASOPHILS ABSOLUTE: 0 K/UL (ref 0–0.2)
BASOPHILS RELATIVE PERCENT: 0.3 %
BILIRUB SERPL-MCNC: 0.4 MG/DL (ref 0–1)
BUN BLDV-MCNC: 39 MG/DL (ref 7–20)
CALCIUM SERPL-MCNC: 8.9 MG/DL (ref 8.3–10.6)
CHLORIDE BLD-SCNC: 109 MMOL/L (ref 99–110)
CO2: 22 MMOL/L (ref 21–32)
CREAT SERPL-MCNC: 1.3 MG/DL (ref 0.8–1.3)
EOSINOPHILS ABSOLUTE: 0.1 K/UL (ref 0–0.6)
EOSINOPHILS RELATIVE PERCENT: 1.7 %
FERRITIN: 1711 NG/ML (ref 30–400)
GFR AFRICAN AMERICAN: >60
GFR NON-AFRICAN AMERICAN: 56
GLOBULIN: 2 G/DL
GLUCOSE BLD-MCNC: 155 MG/DL (ref 70–99)
HCT VFR BLD CALC: 18.3 % (ref 40.5–52.5)
HEMOGLOBIN: 6.4 G/DL (ref 13.5–17.5)
IMMATURE RETIC FRACT: 0.36 (ref 0.21–0.37)
IRON SATURATION: ABNORMAL % (ref 20–50)
IRON: 241 UG/DL (ref 59–158)
LACTATE DEHYDROGENASE: 182 U/L (ref 100–190)
LYMPHOCYTES ABSOLUTE: 0.5 K/UL (ref 1–5.1)
LYMPHOCYTES RELATIVE PERCENT: 14.2 %
MCH RBC QN AUTO: 29.2 PG (ref 26–34)
MCHC RBC AUTO-ENTMCNC: 34.6 G/DL (ref 31–36)
MCV RBC AUTO: 84.3 FL (ref 80–100)
MONOCYTES ABSOLUTE: 0.3 K/UL (ref 0–1.3)
MONOCYTES RELATIVE PERCENT: 8.7 %
NEUTROPHILS ABSOLUTE: 2.9 K/UL (ref 1.7–7.7)
NEUTROPHILS RELATIVE PERCENT: 75.1 %
PDW BLD-RTO: 14.3 % (ref 12.4–15.4)
PLATELET # BLD: 97 K/UL (ref 135–450)
PLATELET SLIDE REVIEW: ABNORMAL
PMV BLD AUTO: 7.6 FL (ref 5–10.5)
POTASSIUM SERPL-SCNC: 4.6 MMOL/L (ref 3.5–5.1)
RBC # BLD: 2.18 M/UL (ref 4.2–5.9)
RBC # BLD: NORMAL 10*6/UL
RETICULOCYTE ABSOLUTE COUNT: 0.01 M/UL
RETICULOCYTE COUNT PCT: 0.42 % (ref 0.5–2.18)
SODIUM BLD-SCNC: 141 MMOL/L (ref 136–145)
TOTAL IRON BINDING CAPACITY: ABNORMAL UG/DL (ref 260–445)
TOTAL PROTEIN: 6.1 G/DL (ref 6.4–8.2)
WBC # BLD: 3.9 K/UL (ref 4–11)

## 2021-04-01 PROCEDURE — 82728 ASSAY OF FERRITIN: CPT

## 2021-04-01 PROCEDURE — 81267 CHIMERISM ANAL NO CELL SELEC: CPT

## 2021-04-01 PROCEDURE — 86923 COMPATIBILITY TEST ELECTRIC: CPT

## 2021-04-01 PROCEDURE — 2580000003 HC RX 258: Performed by: NURSE PRACTITIONER

## 2021-04-01 PROCEDURE — 96361 HYDRATE IV INFUSION ADD-ON: CPT

## 2021-04-01 PROCEDURE — 38242 TRANSPLT ALLO LYMPHOCYTES: CPT

## 2021-04-01 PROCEDURE — 96360 HYDRATION IV INFUSION INIT: CPT

## 2021-04-01 PROCEDURE — 83615 LACTATE (LD) (LDH) ENZYME: CPT

## 2021-04-01 PROCEDURE — 86901 BLOOD TYPING SEROLOGIC RH(D): CPT

## 2021-04-01 PROCEDURE — 86850 RBC ANTIBODY SCREEN: CPT

## 2021-04-01 PROCEDURE — 83540 ASSAY OF IRON: CPT

## 2021-04-01 PROCEDURE — 6360000002 HC RX W HCPCS: Performed by: NURSE PRACTITIONER

## 2021-04-01 PROCEDURE — 81265 STR MARKERS SPECIMEN ANAL: CPT

## 2021-04-01 PROCEDURE — 38240 TRANSPLT ALLO HCT/DONOR: CPT

## 2021-04-01 PROCEDURE — 36430 TRANSFUSION BLD/BLD COMPNT: CPT

## 2021-04-01 PROCEDURE — 83550 IRON BINDING TEST: CPT

## 2021-04-01 PROCEDURE — 6370000000 HC RX 637 (ALT 250 FOR IP): Performed by: NURSE PRACTITIONER

## 2021-04-01 PROCEDURE — 85025 COMPLETE CBC W/AUTO DIFF WBC: CPT

## 2021-04-01 PROCEDURE — P9040 RBC LEUKOREDUCED IRRADIATED: HCPCS

## 2021-04-01 PROCEDURE — 80053 COMPREHEN METABOLIC PANEL: CPT

## 2021-04-01 PROCEDURE — 36592 COLLECT BLOOD FROM PICC: CPT

## 2021-04-01 PROCEDURE — 38204 BL DONOR SEARCH MANAGEMENT: CPT

## 2021-04-01 PROCEDURE — 96374 THER/PROPH/DIAG INJ IV PUSH: CPT

## 2021-04-01 PROCEDURE — 86900 BLOOD TYPING SEROLOGIC ABO: CPT

## 2021-04-01 PROCEDURE — 85045 AUTOMATED RETICULOCYTE COUNT: CPT

## 2021-04-01 RX ORDER — DIPHENHYDRAMINE HYDROCHLORIDE 50 MG/ML
25 INJECTION INTRAMUSCULAR; INTRAVENOUS PRN
Status: DISCONTINUED | OUTPATIENT
Start: 2021-04-01 | End: 2021-04-02 | Stop reason: HOSPADM

## 2021-04-01 RX ORDER — METHYLPREDNISOLONE SODIUM SUCCINATE 125 MG/2ML
125 INJECTION, POWDER, LYOPHILIZED, FOR SOLUTION INTRAMUSCULAR; INTRAVENOUS ONCE
Status: CANCELLED | OUTPATIENT
Start: 2021-04-01 | End: 2021-04-01

## 2021-04-01 RX ORDER — 0.9 % SODIUM CHLORIDE 0.9 %
10 VIAL (ML) INJECTION ONCE
Status: CANCELLED | OUTPATIENT
Start: 2021-04-01 | End: 2021-04-01

## 2021-04-01 RX ORDER — 0.9 % SODIUM CHLORIDE 0.9 %
500 INTRAVENOUS SOLUTION INTRAVENOUS ONCE
Status: COMPLETED | OUTPATIENT
Start: 2021-04-01 | End: 2021-04-01

## 2021-04-01 RX ORDER — ACETAMINOPHEN 325 MG/1
650 TABLET ORAL ONCE
Status: CANCELLED
Start: 2021-04-01 | End: 2021-04-01

## 2021-04-01 RX ORDER — ACETAMINOPHEN 325 MG/1
650 TABLET ORAL ONCE
Status: COMPLETED | OUTPATIENT
Start: 2021-04-01 | End: 2021-04-01

## 2021-04-01 RX ORDER — MORPHINE SULFATE 2 MG/ML
2 INJECTION, SOLUTION INTRAMUSCULAR; INTRAVENOUS PRN
Status: DISCONTINUED | OUTPATIENT
Start: 2021-04-01 | End: 2021-04-02 | Stop reason: HOSPADM

## 2021-04-01 RX ORDER — DIPHENHYDRAMINE HYDROCHLORIDE 50 MG/ML
50 INJECTION INTRAMUSCULAR; INTRAVENOUS ONCE
Status: CANCELLED | OUTPATIENT
Start: 2021-04-01 | End: 2021-04-01

## 2021-04-01 RX ORDER — SODIUM CHLORIDE 9 MG/ML
INJECTION, SOLUTION INTRAVENOUS CONTINUOUS
Status: CANCELLED | OUTPATIENT
Start: 2021-04-01

## 2021-04-01 RX ORDER — SODIUM CHLORIDE 0.9 % (FLUSH) 0.9 %
20 SYRINGE (ML) INJECTION PRN
Status: CANCELLED | OUTPATIENT
Start: 2021-04-01

## 2021-04-01 RX ORDER — SODIUM CHLORIDE 0.9 % (FLUSH) 0.9 %
20 SYRINGE (ML) INJECTION PRN
Status: DISCONTINUED | OUTPATIENT
Start: 2021-04-01 | End: 2021-04-02 | Stop reason: HOSPADM

## 2021-04-01 RX ORDER — HEPARIN SODIUM (PORCINE) LOCK FLUSH IV SOLN 100 UNIT/ML 100 UNIT/ML
500 SOLUTION INTRAVENOUS PRN
Status: CANCELLED | OUTPATIENT
Start: 2021-04-01

## 2021-04-01 RX ORDER — SODIUM CHLORIDE 9 MG/ML
INJECTION, SOLUTION INTRAVENOUS CONTINUOUS PRN
Status: DISCONTINUED | OUTPATIENT
Start: 2021-04-01 | End: 2021-04-02 | Stop reason: HOSPADM

## 2021-04-01 RX ORDER — SODIUM CHLORIDE 0.9 % (FLUSH) 0.9 %
10 SYRINGE (ML) INJECTION PRN
Status: CANCELLED | OUTPATIENT
Start: 2021-04-01

## 2021-04-01 RX ORDER — DIPHENHYDRAMINE HCL 25 MG
25 TABLET ORAL ONCE
Status: CANCELLED
Start: 2021-04-01 | End: 2021-04-01

## 2021-04-01 RX ORDER — HEPARIN SODIUM (PORCINE) LOCK FLUSH IV SOLN 100 UNIT/ML 100 UNIT/ML
500 SOLUTION INTRAVENOUS PRN
Status: DISCONTINUED | OUTPATIENT
Start: 2021-04-01 | End: 2021-04-02 | Stop reason: HOSPADM

## 2021-04-01 RX ORDER — DIPHENHYDRAMINE HCL 25 MG
25 TABLET ORAL ONCE
Status: COMPLETED | OUTPATIENT
Start: 2021-04-01 | End: 2021-04-01

## 2021-04-01 RX ADMIN — Medication 500 UNITS: at 15:06

## 2021-04-01 RX ADMIN — Medication 500 UNITS: at 15:04

## 2021-04-01 RX ADMIN — SODIUM CHLORIDE 500 ML: 9 INJECTION, SOLUTION INTRAVENOUS at 13:43

## 2021-04-01 RX ADMIN — SODIUM CHLORIDE 500 ML: 9 INJECTION, SOLUTION INTRAVENOUS at 10:12

## 2021-04-01 RX ADMIN — HYDROCORTISONE SODIUM SUCCINATE 100 MG: 100 INJECTION, POWDER, FOR SOLUTION INTRAMUSCULAR; INTRAVENOUS at 10:35

## 2021-04-01 RX ADMIN — DIPHENHYDRAMINE HYDROCHLORIDE 25 MG: 25 TABLET ORAL at 10:34

## 2021-04-01 RX ADMIN — Medication 500 UNITS: at 15:05

## 2021-04-01 RX ADMIN — ACETAMINOPHEN 650 MG: 325 TABLET ORAL at 10:34

## 2021-04-01 ASSESSMENT — PAIN SCALES - GENERAL
PAINLEVEL_OUTOF10: 0

## 2021-04-01 NOTE — PROCEDURES
Transplant (T0) Progress Note      Brenden Corey         Blood Type: O pos    4/1/2021          Start time: 1140  Completion time: 18    Transplant Type: Allogeneic unrelated    Product Type: Stem Cells    Product Unit Number: [id number]    Cell Count: 4.07x10^8  Volume: 401 mL      Product Manipulation:      Volume Reduction  No  Plasma Depletion  No  RBC Depletion  No    Catheter lumen used for infusion: Red             Positive Blood Return: Yes    Donor Name: Unknown                              Blood Type: A pos    Relationship: Unrelated                          Donor Sex: Female    Premeds Given: Yes    Adverse Reaction(s) and treatment: Baseline vital signs obtained prior to infusion and monitoring completed throughout per [facility] protocol - see flowsheets. All IVFs turned down to [facility] during stem cell infusion. Stem cell product(s) verified with 2nd RN Ms. Wayne Mohr prior to infusion using 2 patient identifiers. Infused via gravity with rate controlled by Chung Griggs RN per [facility] protocols. Emergency medications epinephrine, benadryl, & hydrocortisone available as needed. Emergency medical equipment available as needed including telemetry monitoring throughout infusion, supplemental O2, suction equipment, and crash cart. RN at bedside throughout infusion.    Colonel Felipe

## 2021-04-02 ENCOUNTER — HOSPITAL ENCOUNTER (OUTPATIENT)
Dept: ONCOLOGY | Age: 61
Setting detail: INFUSION SERIES
Discharge: HOME | End: 2021-04-02
Payer: COMMERCIAL

## 2021-04-02 VITALS
DIASTOLIC BLOOD PRESSURE: 94 MMHG | OXYGEN SATURATION: 99 % | RESPIRATION RATE: 16 BRPM | HEART RATE: 76 BPM | TEMPERATURE: 98.2 F | SYSTOLIC BLOOD PRESSURE: 143 MMHG

## 2021-04-02 DIAGNOSIS — C92.00 ACUTE MYELOID LEUKEMIA NOT HAVING ACHIEVED REMISSION (HCC): Primary | ICD-10-CM

## 2021-04-02 PROCEDURE — 36430 TRANSFUSION BLD/BLD COMPNT: CPT

## 2021-04-02 PROCEDURE — P9016 RBC LEUKOCYTES REDUCED: HCPCS

## 2021-04-02 PROCEDURE — P9040 RBC LEUKOREDUCED IRRADIATED: HCPCS

## 2021-04-02 PROCEDURE — 6360000002 HC RX W HCPCS: Performed by: NURSE PRACTITIONER

## 2021-04-02 PROCEDURE — 6370000000 HC RX 637 (ALT 250 FOR IP): Performed by: PHYSICIAN ASSISTANT

## 2021-04-02 RX ORDER — DIPHENHYDRAMINE HCL 25 MG
25 TABLET ORAL ONCE
Status: DISCONTINUED | OUTPATIENT
Start: 2021-04-02 | End: 2021-04-03 | Stop reason: HOSPADM

## 2021-04-02 RX ORDER — SODIUM CHLORIDE 9 MG/ML
INJECTION, SOLUTION INTRAVENOUS CONTINUOUS
Status: CANCELLED | OUTPATIENT
Start: 2021-04-02

## 2021-04-02 RX ORDER — DIPHENHYDRAMINE HYDROCHLORIDE 50 MG/ML
50 INJECTION INTRAMUSCULAR; INTRAVENOUS ONCE
Status: CANCELLED | OUTPATIENT
Start: 2021-04-02 | End: 2021-04-02

## 2021-04-02 RX ORDER — METHYLPREDNISOLONE SODIUM SUCCINATE 125 MG/2ML
125 INJECTION, POWDER, LYOPHILIZED, FOR SOLUTION INTRAMUSCULAR; INTRAVENOUS ONCE
Status: CANCELLED | OUTPATIENT
Start: 2021-04-02 | End: 2021-04-02

## 2021-04-02 RX ORDER — ACETAMINOPHEN 325 MG/1
650 TABLET ORAL ONCE
Status: COMPLETED | OUTPATIENT
Start: 2021-04-02 | End: 2021-04-02

## 2021-04-02 RX ORDER — HEPARIN SODIUM (PORCINE) LOCK FLUSH IV SOLN 100 UNIT/ML 100 UNIT/ML
500 SOLUTION INTRAVENOUS PRN
Status: CANCELLED | OUTPATIENT
Start: 2021-04-02

## 2021-04-02 RX ORDER — ACETAMINOPHEN 325 MG/1
650 TABLET ORAL ONCE
Status: CANCELLED
Start: 2021-04-02 | End: 2021-04-02

## 2021-04-02 RX ORDER — SODIUM CHLORIDE 0.9 % (FLUSH) 0.9 %
20 SYRINGE (ML) INJECTION PRN
Status: CANCELLED | OUTPATIENT
Start: 2021-04-02

## 2021-04-02 RX ORDER — DIPHENHYDRAMINE HCL 25 MG
25 TABLET ORAL ONCE
Status: CANCELLED
Start: 2021-04-02 | End: 2021-04-02

## 2021-04-02 RX ORDER — 0.9 % SODIUM CHLORIDE 0.9 %
10 VIAL (ML) INJECTION ONCE
Status: CANCELLED | OUTPATIENT
Start: 2021-04-02 | End: 2021-04-02

## 2021-04-02 RX ORDER — HEPARIN SODIUM (PORCINE) LOCK FLUSH IV SOLN 100 UNIT/ML 100 UNIT/ML
500 SOLUTION INTRAVENOUS PRN
Status: DISCONTINUED | OUTPATIENT
Start: 2021-04-02 | End: 2021-04-03 | Stop reason: HOSPADM

## 2021-04-02 RX ORDER — SODIUM CHLORIDE 0.9 % (FLUSH) 0.9 %
10 SYRINGE (ML) INJECTION PRN
Status: CANCELLED | OUTPATIENT
Start: 2021-04-02

## 2021-04-02 RX ADMIN — Medication 500 UNITS: at 14:55

## 2021-04-02 RX ADMIN — ACETAMINOPHEN 650 MG: 325 TABLET ORAL at 12:03

## 2021-04-02 NOTE — PROGRESS NOTES
[address]" Progress Note    2021     Brenden Corey    MRN: [id number]    : 1960    Referring MD: Margarette Jordan MD  [facility] [address] [place] [address]      SUBJECTIVE:  Feeling well. ECOG PS:  (0) Fully active, able to carry on all predisease performance without restriction    KPS: 100% Normal no complaints; no evidence of disease    Isolation: None    Medications    Scheduled Meds:  Continuous Infusions:  PRN Meds:. ROS:  As noted above, otherwise remainder of 10-point ROS negative    Physical Exam:     I&O:      Intake/Output Summary (Last 24 hours) at 2021 0709  Last data filed at 2021 1506  Gross per 24 hour   Intake 1010 ml   Output --   Net 1010 ml       Vital Signs:  /72   Pulse 79   Temp 99.1 °F (37.3 °C) (Oral)   Resp 25   SpO2 98%     Weight:    Wt Readings from Last 3 Encounters:   20 296 lb (134.3 kg)   10/08/20 (!) 325 lb (147.4 kg)   20 (!) 325 lb 13.4 oz (147.8 kg)         General: Awake, alert and oriented. HEENT: normocephalic, PERRL, no scleral erythema or icterus, Oral mucosa moist and intact, throat clear  NECK: supple without palpable adenopathy  BACK: Straight negative CVAT  SKIN: warm dry and intact without lesions rashes or masses  CHEST: CTA bilaterally without use of accessory muscles  CV: Normal S1 S2, RRR, no MRG  ABD: NT ND normoactive BS, no palpable masses or hepatosplenomegaly  EXTREMITIES: without edema, denies calf tenderness  NEURO: CN II - XII grossly intact  CATHETER: Rios without infection    Data    CBC:   Recent Labs     21  1000   WBC 3.9*   HGB 6.4*   HCT 18.3*   MCV 84.3   PLT 97*     BMP/Mag:  Recent Labs     21  1000      K 4.6      CO2 22   BUN 39*   CREATININE 1.3     LIVP:   Recent Labs     21  1000   AST 25   ALT 55*   BILITOT 0.4   ALKPHOS 184*     Coags: No results for input(s): PROTIME, INR, APTT in the last 72 hours.   Uric Acid No results for input(s): LABURIC in the last 72 hours. ASSESSMENT AND PLAN:             1. 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 - Day 88 BMBx (2/11/20): No evidence of dysplasia or increased blasts; 25% cellularity, erythroid hypoplasia, megakaryocytic hyperplasia, and increased stainable iron; 94% XX    Repeat engraftment prior to PBSC infusion from [facility] 4/1/21    . Transplant Type: Allogeneic unrelated     Product Type: Stem Cells     Product Unit Number: [id number]     Cell Count: 4.07x10^8             Volume: 401 mL    He tolerated the infusion well. Day +128        2. ID:  No evidence of infection.    - Cont Mepron, Biaxin, & Valtrex ppx; start Vfend (should receive 12/11/20) in place of Diflucan with HD steroids  - Parvo (12/19/20): Negative     Donor/Recipient CMV:  Negative / negative  - Routine surveillance is not required       3. Heme: Thrombocytopenia and anemia  - Transfuse for Hgb < 7 and Platelets < 35W  - Tylenol and Benadryl prior to blood products  - 1 PRBC's  4/2/21    Arrange for stem cell boost ASAP 4/2/21     4. Metabolic / CKD III: Acute katherine kidney injury.    - Baseline SCr 1.2-1.3.  - PO MagOx 400mg BID. 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   Adjust [place]ro to the therapeutic range and follow closely for the development of GVHD after Stem Shelley Boost 4/1/21    Previous Tx:  - S/p post-transplant MTX days 1, 3, 6 & 11  - S/p Prednisone 120 mg BID (12/7-12/11/20), 90mg BID (12/10-12/16/20), lower to 60 mg BID 12/14/20 secondary to diabetes out of control, 60mg daily (12/22-12/26/20), 30mg daily (12/27/20-1/4/21), 20mg daily (1/4/21 - 1/10/21), 15mg daily (1/11/21-1/17/21), 10 mg daily 1/18/21, 5mg (last dose 1/31/21)     Current Tx:  - Cont Prograf 5 mg BID (increased 2/12/21). Taper to 4 mg BID 3/4/21. Do not check levels after today. Will need to make therapeutic prior to PBSC infusion 4/1/21    6. VOD:  No evidence of VOD. - Cont Actigall - stop 4/25/21     7. Pulmonary: H/o CASH  - PFT (10/6/20) - FEV1/FVC 71 w/ predicted ratio of 75 & DLCO 41% (corrected 84%). He has moderate obstruction by FEV1/FVC ratio lower than predicted and also based on ATS criteria with low FEV, likely d/t morbid obesity   - Pulm clearance by Dr. Yuriy Patel  CASH:  - Cont CPAP  - Encourage IS and ambulation     8. GI / Nutrition:    Nutrition:  Appetite is good  - Cont low microbial diet  - Dietary to follow  - Start PPI ppx with HD steroids  Diarrhea:  resolved       9. Cardiac: H/o A. Fib & HTN  - Echo (9/29/20) -  LVEF 60-65% w/ left atrial dilatation  A. Fib:  Currently in NSR  - Cont sotalol 120 mg BID  HTN: will d/c meds 11/30  - S/p amlodipine-lisinopril 5/40 mg daily  - Cont Amlodipine 5mg daily (started 12/22/20)     He will make an appointment with cardiology.     10. Diabetes  - Change ISS to high intensity    - Off lantus; trend sugars twice daily  now with good control        Ebonie Ross MD  [facility]  Please contact me through [facility]

## 2021-04-02 NOTE — PLAN OF CARE
Problem: Falls - Risk of:  Goal: Will remain free from falls  Description: Will remain free from falls  Outcome: Ongoing  Note: Pt is a Med fall risk. Due to   Explained fall risk precautions to pt  and rationale behind their use to keep the patient safe. Belongings are in reach. Pt encouraged to notify staff for any and all assistance. Staff present in tx suite throughout entirety of pts treatment to monitor and protect from falls. Assistance provided when ambulating to restroom utilizing Stay With Me.     Goal: Absence of physical injury  Description: Absence of physical injury  Outcome: Ongoing     Problem: KNOWLEDGE DEFICIT  Goal: Patient/S.O. demonstrates understanding of disease process, treatment plan, medications, and discharge instructions. Outcome: Ongoing  Note: Pt seen and assessed at [address] [facility] for blood infusion per orders from [facility] . Infused per [facility] policy. Monitoring completed for infusion reactions - see flowsheet. Pt tolerated infusion well and without incident. Pt verbalizes understanding of discharge instructions. Discharged  to home.

## 2021-04-08 ENCOUNTER — HOSPITAL ENCOUNTER (OUTPATIENT)
Dept: ONCOLOGY | Age: 61
Setting detail: INFUSION SERIES
Discharge: HOME | End: 2021-04-08
Payer: COMMERCIAL

## 2021-04-08 ENCOUNTER — HOSPITAL ENCOUNTER (OUTPATIENT)
Age: 61
Setting detail: SPECIMEN
Discharge: HOME | End: 2021-04-08
Payer: COMMERCIAL

## 2021-04-08 VITALS
SYSTOLIC BLOOD PRESSURE: 132 MMHG | TEMPERATURE: 97.8 F | DIASTOLIC BLOOD PRESSURE: 84 MMHG | HEART RATE: 70 BPM | RESPIRATION RATE: 18 BRPM

## 2021-04-08 DIAGNOSIS — C92.00 ACUTE MYELOID LEUKEMIA NOT HAVING ACHIEVED REMISSION (HCC): Primary | ICD-10-CM

## 2021-04-08 LAB
ABO/RH: NORMAL
ANTIBODY SCREEN: NORMAL
BLOOD BANK DISPENSE STATUS: NORMAL
BLOOD BANK PRODUCT CODE: NORMAL
BPU ID: NORMAL
DESCRIPTION BLOOD BANK: NORMAL
TACROLIMUS BLOOD: 5.7 NG/ML (ref 5–20)

## 2021-04-08 PROCEDURE — 36430 TRANSFUSION BLD/BLD COMPNT: CPT

## 2021-04-08 PROCEDURE — 36591 DRAW BLOOD OFF VENOUS DEVICE: CPT

## 2021-04-08 PROCEDURE — 6370000000 HC RX 637 (ALT 250 FOR IP): Performed by: PHYSICIAN ASSISTANT

## 2021-04-08 PROCEDURE — 86850 RBC ANTIBODY SCREEN: CPT

## 2021-04-08 PROCEDURE — 6360000002 HC RX W HCPCS: Performed by: NURSE PRACTITIONER

## 2021-04-08 PROCEDURE — P9040 RBC LEUKOREDUCED IRRADIATED: HCPCS

## 2021-04-08 PROCEDURE — 86900 BLOOD TYPING SEROLOGIC ABO: CPT

## 2021-04-08 PROCEDURE — 80197 ASSAY OF TACROLIMUS: CPT

## 2021-04-08 PROCEDURE — 36415 COLL VENOUS BLD VENIPUNCTURE: CPT

## 2021-04-08 PROCEDURE — 86901 BLOOD TYPING SEROLOGIC RH(D): CPT

## 2021-04-08 PROCEDURE — 86923 COMPATIBILITY TEST ELECTRIC: CPT

## 2021-04-08 RX ORDER — 0.9 % SODIUM CHLORIDE 0.9 %
10 VIAL (ML) INJECTION ONCE
Status: CANCELLED | OUTPATIENT
Start: 2021-04-08 | End: 2021-04-08

## 2021-04-08 RX ORDER — DIPHENHYDRAMINE HYDROCHLORIDE 50 MG/ML
50 INJECTION INTRAMUSCULAR; INTRAVENOUS ONCE
Status: CANCELLED | OUTPATIENT
Start: 2021-04-08 | End: 2021-04-08

## 2021-04-08 RX ORDER — ACETAMINOPHEN 325 MG/1
650 TABLET ORAL ONCE
Status: COMPLETED | OUTPATIENT
Start: 2021-04-08 | End: 2021-04-08

## 2021-04-08 RX ORDER — SODIUM CHLORIDE 9 MG/ML
INJECTION, SOLUTION INTRAVENOUS CONTINUOUS
Status: CANCELLED | OUTPATIENT
Start: 2021-04-08

## 2021-04-08 RX ORDER — SODIUM CHLORIDE 0.9 % (FLUSH) 0.9 %
20 SYRINGE (ML) INJECTION PRN
Status: CANCELLED | OUTPATIENT
Start: 2021-04-08

## 2021-04-08 RX ORDER — ACETAMINOPHEN 325 MG/1
650 TABLET ORAL ONCE
Status: CANCELLED
Start: 2021-04-08 | End: 2021-04-08

## 2021-04-08 RX ORDER — METHYLPREDNISOLONE SODIUM SUCCINATE 125 MG/2ML
125 INJECTION, POWDER, LYOPHILIZED, FOR SOLUTION INTRAMUSCULAR; INTRAVENOUS ONCE
Status: CANCELLED | OUTPATIENT
Start: 2021-04-08 | End: 2021-04-08

## 2021-04-08 RX ORDER — HEPARIN SODIUM (PORCINE) LOCK FLUSH IV SOLN 100 UNIT/ML 100 UNIT/ML
500 SOLUTION INTRAVENOUS PRN
Status: CANCELLED | OUTPATIENT
Start: 2021-04-08

## 2021-04-08 RX ORDER — HEPARIN SODIUM (PORCINE) LOCK FLUSH IV SOLN 100 UNIT/ML 100 UNIT/ML
500 SOLUTION INTRAVENOUS PRN
Status: DISCONTINUED | OUTPATIENT
Start: 2021-04-08 | End: 2021-04-09 | Stop reason: HOSPADM

## 2021-04-08 RX ORDER — DIPHENHYDRAMINE HCL 25 MG
25 TABLET ORAL ONCE
Status: CANCELLED
Start: 2021-04-08 | End: 2021-04-08

## 2021-04-08 RX ORDER — SODIUM CHLORIDE 0.9 % (FLUSH) 0.9 %
10 SYRINGE (ML) INJECTION PRN
Status: CANCELLED | OUTPATIENT
Start: 2021-04-08

## 2021-04-08 RX ADMIN — Medication 500 UNITS: at 12:49

## 2021-04-08 RX ADMIN — ACETAMINOPHEN 650 MG: 325 TABLET ORAL at 10:45

## 2021-04-08 NOTE — PLAN OF CARE
Problem: Falls - Risk of:  Goal: Will remain free from falls  Description: Will remain free from falls  Outcome: Ongoing  Note: Pt is a medium fall risk. Due to disease  Explained fall risk precautions to pt and rationale behind their use to keep the patient safe. Belongings are in reach. Pt encouraged to notify staff for any and all assistance. Staff present in tx suite throughout entirety of pts treatment to monitor and protect from falls. Assistance provided when ambulating to restroom utilizing Stay With Me.     Goal: Absence of physical injury  Description: Absence of physical injury  Outcome: Ongoing     Problem: KNOWLEDGE DEFICIT  Goal: Patient/S.O. demonstrates understanding of disease process, treatment plan, medications, and discharge instructions. Outcome: Ongoing  Note: Pt seen and assessed at [address] [facility] for 1 unit of PRBC  infusion per orders from Dr. Danish Pickard for hgb of 6.4. Infused per [facility] policy. Monitoring completed for infusion reactions - see flowsheet. Pt tolerated infusion well and without incident. Pt verbalizes understanding of discharge instructions. Discharged  to home .

## 2021-04-15 ENCOUNTER — HOSPITAL ENCOUNTER (OUTPATIENT)
Age: 61
Setting detail: SPECIMEN
Discharge: HOME | End: 2021-04-15
Payer: COMMERCIAL

## 2021-04-15 LAB — TACROLIMUS BLOOD: 6.6 NG/ML (ref 5–20)

## 2021-04-15 PROCEDURE — 36415 COLL VENOUS BLD VENIPUNCTURE: CPT

## 2021-04-15 PROCEDURE — 80197 ASSAY OF TACROLIMUS: CPT

## 2021-04-22 ENCOUNTER — HOSPITAL ENCOUNTER (OUTPATIENT)
Age: 61
Setting detail: SPECIMEN
Discharge: HOME | End: 2021-04-22
Payer: COMMERCIAL

## 2021-04-22 LAB — TACROLIMUS BLOOD: 9.8 NG/ML (ref 5–20)

## 2021-04-22 PROCEDURE — 80197 ASSAY OF TACROLIMUS: CPT

## 2021-04-22 PROCEDURE — 36415 COLL VENOUS BLD VENIPUNCTURE: CPT

## 2021-04-30 ENCOUNTER — HOSPITAL ENCOUNTER (OUTPATIENT)
Age: 61
Setting detail: SPECIMEN
Discharge: HOME | End: 2021-04-30
Payer: COMMERCIAL

## 2021-04-30 PROCEDURE — 36415 COLL VENOUS BLD VENIPUNCTURE: CPT

## 2021-04-30 PROCEDURE — 80197 ASSAY OF TACROLIMUS: CPT

## 2021-05-03 LAB — TACROLIMUS BLOOD: 8 NG/ML (ref 5–20)

## 2021-05-14 ENCOUNTER — HOSPITAL ENCOUNTER (OUTPATIENT)
Age: 61
Discharge: HOME | End: 2021-05-14
Payer: COMMERCIAL

## 2021-05-14 PROCEDURE — 88271 CYTOGENETICS DNA PROBE: CPT

## 2021-05-14 PROCEDURE — 80197 ASSAY OF TACROLIMUS: CPT

## 2021-05-14 PROCEDURE — 88275 CYTOGENETICS 100-300: CPT

## 2021-05-14 PROCEDURE — 88237 TISSUE CULTURE BONE MARROW: CPT

## 2021-05-14 PROCEDURE — 36415 COLL VENOUS BLD VENIPUNCTURE: CPT

## 2021-05-15 LAB — TACROLIMUS BLOOD: 8 NG/ML (ref 5–20)

## 2021-06-15 NOTE — PRE-PROCEDURE INSTRUCTIONS
Called patient about procedure. Told to be here at 1030 for procedure at 1200. NPO after midnight, but can take morning medication with sips of water, patient stated they are not on blood thinners. To have a responsible adult be with patient take them home and stay with them afterwards, if they do not get admitted to [address] [place] [address]. And if available bring current list of medications. No other questions or concerns.

## 2021-06-16 ENCOUNTER — HOSPITAL ENCOUNTER (OUTPATIENT)
Dept: INTERVENTIONAL RADIOLOGY/VASCULAR | Age: 61
Discharge: HOME | End: 2021-06-16
Payer: COMMERCIAL

## 2021-06-16 VITALS — WEIGHT: 296 LBS | BODY MASS INDEX: 36.8 KG/M2 | HEIGHT: 75 IN | TEMPERATURE: 97.9 F

## 2021-06-16 DIAGNOSIS — C92.01 ACUTE MYELOBLASTIC LEUKEMIA IN REMISSION (HCC): ICD-10-CM

## 2021-06-16 PROCEDURE — 36589 REMOVAL TUNNELED CV CATH: CPT | Performed by: RADIOLOGY

## 2021-06-16 PROCEDURE — 2500000003 HC RX 250 WO HCPCS

## 2021-06-16 PROCEDURE — 77001 FLUOROGUIDE FOR VEIN DEVICE: CPT | Performed by: RADIOLOGY

## 2021-11-05 ENCOUNTER — HOSPITAL ENCOUNTER (OUTPATIENT)
Age: 61
Setting detail: SPECIMEN
Discharge: HOME | End: 2021-11-05
Payer: COMMERCIAL

## 2021-11-05 PROCEDURE — 80197 ASSAY OF TACROLIMUS: CPT

## 2021-11-05 PROCEDURE — 36415 COLL VENOUS BLD VENIPUNCTURE: CPT

## 2021-11-06 LAB — TACROLIMUS BLOOD: 5.1 NG/ML (ref 5–20)

## 2021-11-11 ENCOUNTER — HOSPITAL ENCOUNTER (OUTPATIENT)
Age: 61
Setting detail: SPECIMEN
Discharge: HOME | End: 2021-11-11
Payer: COMMERCIAL

## 2021-11-11 LAB — TACROLIMUS BLOOD: 6.9 NG/ML (ref 5–20)

## 2021-11-11 PROCEDURE — 80197 ASSAY OF TACROLIMUS: CPT

## 2021-11-11 PROCEDURE — 36415 COLL VENOUS BLD VENIPUNCTURE: CPT

## 2021-11-18 ENCOUNTER — HOSPITAL ENCOUNTER (OUTPATIENT)
Age: 61
Setting detail: SPECIMEN
Discharge: HOME | End: 2021-11-18

## 2021-11-30 ENCOUNTER — HOSPITAL ENCOUNTER (OUTPATIENT)
Age: 61
Setting detail: SPECIMEN
Discharge: HOME | End: 2021-11-30
Payer: COMMERCIAL

## 2021-11-30 PROCEDURE — 36415 COLL VENOUS BLD VENIPUNCTURE: CPT

## 2021-11-30 PROCEDURE — 80197 ASSAY OF TACROLIMUS: CPT

## 2021-12-01 LAB — TACROLIMUS BLOOD: 2.1 NG/ML (ref 5–20)

## 2021-12-29 ENCOUNTER — HOSPITAL ENCOUNTER (OUTPATIENT)
Age: 61
Setting detail: SPECIMEN
Discharge: HOME | End: 2021-12-29
Payer: COMMERCIAL

## 2021-12-29 PROCEDURE — 36415 COLL VENOUS BLD VENIPUNCTURE: CPT

## 2021-12-29 PROCEDURE — 80197 ASSAY OF TACROLIMUS: CPT

## 2021-12-30 LAB — TACROLIMUS BLOOD: 3.4 NG/ML (ref 5–20)

## 2022-01-20 ENCOUNTER — HOSPITAL ENCOUNTER (OUTPATIENT)
Age: 62
Setting detail: SPECIMEN
Discharge: HOME | End: 2022-01-20
Payer: COMMERCIAL

## 2022-01-20 PROCEDURE — 36415 COLL VENOUS BLD VENIPUNCTURE: CPT

## 2022-01-20 PROCEDURE — 80197 ASSAY OF TACROLIMUS: CPT

## 2022-01-21 LAB — TACROLIMUS BLOOD: 12.9 NG/ML (ref 5–20)

## 2022-02-18 ENCOUNTER — HOSPITAL ENCOUNTER (OUTPATIENT)
Age: 62
Setting detail: SPECIMEN
Discharge: HOME | End: 2022-02-18
Payer: COMMERCIAL

## 2022-02-18 PROCEDURE — 80197 ASSAY OF TACROLIMUS: CPT

## 2022-02-18 PROCEDURE — 36415 COLL VENOUS BLD VENIPUNCTURE: CPT

## 2022-02-19 LAB — TACROLIMUS BLOOD: 2.1 NG/ML (ref 5–20)

## 2022-03-18 ENCOUNTER — HOSPITAL ENCOUNTER (OUTPATIENT)
Age: 62
Setting detail: SPECIMEN
Discharge: HOME | End: 2022-03-18
Payer: COMMERCIAL

## 2022-03-18 PROCEDURE — 36415 COLL VENOUS BLD VENIPUNCTURE: CPT

## 2022-03-18 PROCEDURE — 80197 ASSAY OF TACROLIMUS: CPT

## 2022-03-19 LAB — TACROLIMUS BLOOD: 2.6 NG/ML (ref 5–20)

## 2022-04-14 ENCOUNTER — HOSPITAL ENCOUNTER (OUTPATIENT)
Age: 62
Setting detail: SPECIMEN
Discharge: HOME | End: 2022-04-14

## 2022-04-14 LAB — TACROLIMUS BLOOD: 2.1 NG/ML (ref 5–20)

## 2022-04-14 PROCEDURE — 36415 COLL VENOUS BLD VENIPUNCTURE: CPT

## 2022-04-14 PROCEDURE — 80197 ASSAY OF TACROLIMUS: CPT

## 2022-09-26 ENCOUNTER — HOSPITAL ENCOUNTER (OUTPATIENT)
Age: 62
Setting detail: SPECIMEN
Discharge: HOME | End: 2022-09-26
Payer: COMMERCIAL

## 2022-09-26 PROCEDURE — 80197 ASSAY OF TACROLIMUS: CPT

## 2022-09-26 PROCEDURE — 36415 COLL VENOUS BLD VENIPUNCTURE: CPT

## 2022-09-27 LAB — TACROLIMUS BLOOD: 13.8 NG/ML (ref 5–20)

## 2022-12-23 ENCOUNTER — HOSPITAL ENCOUNTER (OUTPATIENT)
Age: 62
Setting detail: SPECIMEN
Discharge: HOME | End: 2022-12-23
Payer: COMMERCIAL

## 2022-12-23 LAB — TACROLIMUS BLOOD: 7.1 NG/ML (ref 5–20)

## 2022-12-23 PROCEDURE — 80197 ASSAY OF TACROLIMUS: CPT

## 2023-03-06 ENCOUNTER — HOSPITAL ENCOUNTER (OUTPATIENT)
Dept: GENERAL RADIOLOGY | Age: 63
End: 2023-03-06
Payer: COMMERCIAL

## 2023-03-06 ENCOUNTER — HOSPITAL ENCOUNTER (OUTPATIENT)
Dept: GENERAL RADIOLOGY | Age: 63
Discharge: HOME | End: 2023-03-06
Payer: COMMERCIAL

## 2023-03-06 DIAGNOSIS — C92.01 ACUTE MYELOBLASTIC LEUKEMIA IN REMISSION (HCC): ICD-10-CM

## 2023-03-06 DIAGNOSIS — R05.9 COMPLAINING OF COUGH: ICD-10-CM

## 2023-03-06 PROCEDURE — 71046 X-RAY EXAM CHEST 2 VIEWS: CPT

## 2023-05-02 ENCOUNTER — HOSPITAL ENCOUNTER (OUTPATIENT)
Age: 63
Setting detail: SPECIMEN
Discharge: HOME | End: 2023-05-02

## 2023-05-09 ENCOUNTER — HOSPITAL ENCOUNTER (OUTPATIENT)
Age: 63
Setting detail: SPECIMEN
Discharge: HOME | End: 2023-05-09
Payer: COMMERCIAL

## 2023-05-09 PROCEDURE — 80197 ASSAY OF TACROLIMUS: CPT

## 2023-05-09 PROCEDURE — 36415 COLL VENOUS BLD VENIPUNCTURE: CPT

## 2023-05-10 LAB — TACROLIMUS BLOOD: 1.7 NG/ML (ref 5–20)

## 2023-05-30 ENCOUNTER — HOSPITAL ENCOUNTER (OUTPATIENT)
Age: 63
Setting detail: SPECIMEN
Discharge: HOME | End: 2023-05-30

## 2023-10-09 NOTE — PROGRESS NOTES
"Subjective:     CC: Presents to establish    HPI:   Josee presents today with    Presents to establish  Former patient of FAN Wyman   Last seen 3/27/2023 for dysuria/cystitis/vaginal discharge changes  Are labs from 2021  #Dyslipidemia-, cholesterol 2 7    Problem   Onychomycosis   Tinea Pedis   Dyslipidemia   Obesity (Bmi 30-39.9)    This is a chronic condition.  Max weight:  192  Current weight: 184  She feels healthy weight is 170.   Discussed     Family History of Diabetes Mellitus    Significant         Health Maintenance:     ROS:  Review of Systems   Constitutional:  Negative for chills, fever and weight loss.   HENT:  Negative for hearing loss.    Respiratory:  Negative for shortness of breath and wheezing.    Cardiovascular:  Negative for chest pain and palpitations.   Gastrointestinal:  Negative for nausea and vomiting.   Genitourinary:  Negative for frequency and urgency.   Skin:  Negative for rash.   Neurological:  Negative for dizziness and headaches.       Objective:     Exam:  /62 (BP Location: Right arm, Patient Position: Sitting, BP Cuff Size: Adult)   Pulse 72   Temp (!) 35.6 °C (96.1 °F)   Resp 18   Ht 1.6 m (5' 3\")   Wt 83.5 kg (184 lb)   SpO2 97%   BMI 32.59 kg/m²  Body mass index is 32.59 kg/m².    Physical Exam  Constitutional:       Appearance: Normal appearance.   Cardiovascular:      Rate and Rhythm: Normal rate and regular rhythm.   Pulmonary:      Effort: Pulmonary effort is normal.      Breath sounds: Normal breath sounds.   Musculoskeletal:      Cervical back: Normal range of motion and neck supple.   Lymphadenopathy:      Cervical: No cervical adenopathy.   Neurological:      Mental Status: She is alert.           Labs:     Assessment & Plan:     24 y.o. female with the following -     1. Onychomycosis  Chronic, stable  Right fourth and fifth toe  Discussed adverse effect of medication  Discussed options for this Licart versus oral patient would like to " [facility]CC Allogeneic Progress Note    2020    Han Sams    :  1960    MRN:  [id number]    Referring MD: Tanika Willingham MD  [address]  [address], [address]    Subjective:  Diarrhea is better. Still eating. No new complaints.      ECOG PS:  (1) Restricted in physically strenuous activity, ambulatory and able to do work of light nature    KPS: 80% Normal activity with effort; some signs or symptoms of disease    Isolation:  None     Medications    Scheduled Meds:   sodium chloride  20 mL Intravenous Once    sodium chloride  20 mL Intravenous Once    sodium chloride  20 mL Intravenous Once    pantoprazole  40 mg Oral QAM AC    tacrolimus  4 mg Oral BID    levoFLOXacin  500 mg Oral Nightly    sodium chloride flush  10 mL Intravenous 2 times per day    Saline Mouthwash  15 mL Swish & Spit 4x Daily AC & HS    valACYclovir  500 mg Oral BID    sotalol  120 mg Oral BID    amLODIPine  5 mg Oral Daily    And    lisinopril  40 mg Oral Daily    ursodiol  500 mg Oral BID    fluconazole  400 mg Oral Daily     Continuous Infusions:   0.9% NaCl with KCl 20 mEq 50 mL/hr at 20 2245    sodium chloride      sodium chloride       PRN Meds:traMADol, oxyCODONE, acetaminophen, diphenhydrAMINE, calcium carbonate, simethicone, diphenoxylate-atropine, [COMPLETED] loperamide **FOLLOWED BY** loperamide, sodium chloride, sennosides-docusate sodium, sodium chloride, sodium chloride flush, potassium chloride, magnesium sulfate, magnesium hydroxide, Saline Mouthwash, alteplase, prochlorperazine **OR** prochlorperazine, LORazepam **OR** LORazepam    ROS:  As noted above, otherwise remainder of 10-point ROS negative    Physical Exam:    I&O:      Intake/Output Summary (Last 24 hours) at 2020 1014  Last data filed at 2020 0617  Gross per 24 hour   Intake 3131 ml   Output 1575 ml   Net 1556 ml       Vital Signs:  /73   Pulse 87   Temp 97.9 °F proceed with oral medications  - terbinafine (LAMISIL) 250 MG Tab; Take 1 Tablet by mouth every day for 90 days.  Dispense: 90 Tablet; Refill: 0    2. Tinea pedis of both feet  Chronic, stable  Tinea pedis of right foot recommend topical therapy with OTC clotrimazole cream and clotrimazole powder, conservative management, patient will be on terbinafine 250 daily x12 weeks  - terbinafine (LAMISIL) 250 MG Tab; Take 1 Tablet by mouth every day for 90 days.  Dispense: 90 Tablet; Refill: 0    3. Dyslipidemia  Chronic, stable  Diagnosis carryover from prior  We will obtain lipid for evaluation  - HEMOGLOBIN A1C; Future  - Lipid Profile; Future    4. Obesity (BMI 30-39.9)  Chronic, stable  Discussed with patient the plan to lose 1 to 2 pounds per month through appropriate diet and exercise  Follow-up in 6 months reviewed labs  Plan  - Patient identified as having weight management issue.  Appropriate orders and counseling given.  - CBC WITHOUT DIFFERENTIAL; Future  - HEMOGLOBIN A1C; Future  - Lipid Profile; Future  - TSH WITH REFLEX TO FT4; Future  - Comp Metabolic Panel; Future    5. Family history of diabetes mellitus  Significant family history of diabetes mellitus and 1 parent and multiple aunts and uncles  Denies signs and symptoms hyperglycemia  Reviewed signs and symptoms of hyperglycemia  We will obtain labs        Return in about 6 months (around 4/9/2024) for weight/exercise/lab review.    Please note that this dictation was created using voice recognition software. I have made every reasonable attempt to correct obvious errors, but I expect that there are errors of grammar and possibly content that I did not discover before finalizing the note.         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 ID:  No evidence of infection.     - Cont Levaquin, Diflucan & Valtrex ppx     Donor/Recipient CMV:  Negative / negative    - Routine surveillance is not required       3. Heme: Pancytopenia from chemotherapy   - Transfuse for Hgb < 7 and Platelets < 41S   - PRBC transfusion today  - Tylenol and Benadryl prior to each blood product. 4. Metabolic / CKD III: HypoNa, HypoMg, Hyperglycemia  - Baseline SCr 1.2-1.3.  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 Levels qMWF:  Lab Results   Component Value Date    TACROLEV 8.1 11/20/2020    TACROLEV 8.1 11/18/2020    TACROLEV 7.5 11/16/2020      6. VOD:  No evidence of VOD. Admission Weight: (!) 324 lb 3.2 oz (147.1 kg)  Current Weight: Weight: (!) 308 lb 6.4 oz (139.9 kg). Recent Labs     11/23/20  0420   BILIDIR <0.2   BILITOT 0.5   - Cont Actigall     7. Pulmonary: H/o CASH  - PFT (10/6/20) - FEV1/FVC 71 w/ predicted ratio of 75 & DLCO 41% (corrected 84%).   He has moderate obstruction by FEV1/FVC ratio lower than predicted and also based on ATS criteria with low FEV, likely d/t morbid obesity   - Pulm clearance by Dr. Lewis Shepard  CASH:  - Cont CPAP  - Encourage IS and ambulation      8. GI / Nutrition:    Nutrition:  Appetite is good  - Cont low microbial diet  - Dietary to follow    Diarrhea:  High vol diarrhea (not yes quant)    - consider re-testing for c. Diff   - C. Diff (11/11/20) - negative  - Cont Imodium as needed   - Added Lomotil as needed  Nausea & Indigestion:  Ongoing  - Compazine and Ativan as needed  - Cont Pepcid bid  - Cont TUMS as needed     9. Cardiac: H/o A. Fib & HTN  - Echo (9/29/20) -  LVEF 60-65% w/ left atrial dilatation  A. Fib:  Currently in NSR  - Cont sotalol 120 mg BID  HTN: stable  - Cont amlodipine-lisinopril 5/40 mg daily    - DVT Prophylaxis: Platelets <53,157 cells/dL, - prophylactic lovenox on hold and mechanical prophylaxis with bilateral SCDs while in bed in place.   Contraindications to pharmacologic prophylaxis: None  Contraindications to mechanical prophylaxis: None     - Disposition:  D/c home once ANC > 1.0 & toxicities resolved following allogeneic transplant     Josee Boggs MD     11/23/2020   10:14 AM

## 2025-01-28 ENCOUNTER — HOSPITAL ENCOUNTER (EMERGENCY)
Age: 65
Discharge: HOME | End: 2025-01-28
Attending: STUDENT IN AN ORGANIZED HEALTH CARE EDUCATION/TRAINING PROGRAM
Payer: MEDICARE

## 2025-01-28 VITALS
BODY MASS INDEX: 34.63 KG/M2 | TEMPERATURE: 98.3 F | HEIGHT: 76 IN | DIASTOLIC BLOOD PRESSURE: 87 MMHG | OXYGEN SATURATION: 96 % | SYSTOLIC BLOOD PRESSURE: 153 MMHG | RESPIRATION RATE: 18 BRPM | WEIGHT: 284.4 LBS | HEART RATE: 73 BPM

## 2025-01-28 DIAGNOSIS — R73.9 HYPERGLYCEMIA: Primary | ICD-10-CM

## 2025-01-28 LAB
ALBUMIN SERPL-MCNC: 3.9 G/DL (ref 3.4–5)
ALBUMIN/GLOB SERPL: 1.1 {RATIO} (ref 1.1–2.2)
ALP SERPL-CCNC: 474 U/L (ref 40–129)
ALT SERPL-CCNC: 358 U/L (ref 10–40)
ANION GAP SERPL CALCULATED.3IONS-SCNC: 16 MMOL/L (ref 3–16)
ANISOCYTOSIS BLD QL SMEAR: ABNORMAL
AST SERPL-CCNC: 174 U/L (ref 15–37)
BASOPHILS # BLD: 0 K/UL (ref 0–0.2)
BASOPHILS NFR BLD: 0 %
BILIRUB SERPL-MCNC: 4 MG/DL (ref 0–1)
BILIRUB UR QL STRIP.AUTO: ABNORMAL
BUN SERPL-MCNC: 33 MG/DL (ref 7–20)
CALCIUM SERPL-MCNC: 10.2 MG/DL (ref 8.3–10.6)
CHLORIDE SERPL-SCNC: 89 MMOL/L (ref 99–110)
CHP ED QC CHECK: YES
CHP ED QC CHECK: YES
CLARITY UR: CLEAR
CO2 SERPL-SCNC: 24 MMOL/L (ref 21–32)
COLOR UR: YELLOW
CREAT SERPL-MCNC: 1.3 MG/DL (ref 0.8–1.3)
DACRYOCYTES BLD QL SMEAR: ABNORMAL
DEPRECATED RDW RBC AUTO: 14.1 % (ref 12.4–15.4)
EOSINOPHIL # BLD: 0.1 K/UL (ref 0–0.6)
EOSINOPHIL NFR BLD: 2 %
GFR SERPLBLD CREATININE-BSD FMLA CKD-EPI: 60 ML/MIN/{1.73_M2}
GLUCOSE BLD-MCNC: 304 MG/DL
GLUCOSE BLD-MCNC: 304 MG/DL (ref 70–99)
GLUCOSE BLD-MCNC: 349 MG/DL
GLUCOSE BLD-MCNC: 349 MG/DL (ref 70–99)
GLUCOSE SERPL-MCNC: 358 MG/DL (ref 70–99)
GLUCOSE UR STRIP.AUTO-MCNC: >=1000 MG/DL
HCT VFR BLD AUTO: 40.2 % (ref 40.5–52.5)
HGB BLD-MCNC: 13.4 G/DL (ref 13.5–17.5)
HGB UR QL STRIP.AUTO: NEGATIVE
HYPOCHROMIA BLD QL SMEAR: ABNORMAL
KETONES UR STRIP.AUTO-MCNC: NEGATIVE MG/DL
LEUKOCYTE ESTERASE UR QL STRIP.AUTO: NEGATIVE
LYMPHOCYTES # BLD: 1.1 K/UL (ref 1–5.1)
LYMPHOCYTES NFR BLD: 22 %
MACROCYTES BLD QL SMEAR: ABNORMAL
MCH RBC QN AUTO: 31.6 PG (ref 26–34)
MCHC RBC AUTO-ENTMCNC: 33.5 G/DL (ref 31–36)
MCV RBC AUTO: 94.6 FL (ref 80–100)
MONOCYTES # BLD: 0.5 K/UL (ref 0–1.3)
MONOCYTES NFR BLD: 9 %
NEUTROPHILS # BLD: 3.4 K/UL (ref 1.7–7.7)
NEUTROPHILS NFR BLD: 67 %
NITRITE UR QL STRIP.AUTO: NEGATIVE
PERFORMED ON: ABNORMAL
PERFORMED ON: ABNORMAL
PH UR STRIP.AUTO: 6.5 [PH] (ref 5–8)
PLATELET # BLD AUTO: 298 K/UL (ref 135–450)
PMV BLD AUTO: 8.6 FL (ref 5–10.5)
POLYCHROMASIA BLD QL SMEAR: ABNORMAL
POTASSIUM SERPL-SCNC: 3.6 MMOL/L (ref 3.5–5.1)
PROT SERPL-MCNC: 7.3 G/DL (ref 6.4–8.2)
PROT UR STRIP.AUTO-MCNC: NEGATIVE MG/DL
RBC # BLD AUTO: 4.25 M/UL (ref 4.2–5.9)
SODIUM SERPL-SCNC: 129 MMOL/L (ref 136–145)
SP GR UR STRIP.AUTO: 1.01 (ref 1–1.03)
SPHEROCYTES BLD QL SMEAR: ABNORMAL
STOMATOCYTES BLD QL SMEAR: ABNORMAL
TARGETS BLD QL SMEAR: ABNORMAL
UA COMPLETE W REFLEX CULTURE PNL UR: ABNORMAL
UA DIPSTICK W REFLEX MICRO PNL UR: ABNORMAL
URN SPEC COLLECT METH UR: ABNORMAL
UROBILINOGEN UR STRIP-ACNC: 0.2 E.U./DL
WBC # BLD AUTO: 5 K/UL (ref 4–11)

## 2025-01-28 PROCEDURE — 96360 HYDRATION IV INFUSION INIT: CPT

## 2025-01-28 PROCEDURE — 80053 COMPREHEN METABOLIC PANEL: CPT

## 2025-01-28 PROCEDURE — 99284 EMERGENCY DEPT VISIT MOD MDM: CPT

## 2025-01-28 PROCEDURE — 85025 COMPLETE CBC W/AUTO DIFF WBC: CPT

## 2025-01-28 PROCEDURE — 2580000003 HC RX 258

## 2025-01-28 PROCEDURE — 81003 URINALYSIS AUTO W/O SCOPE: CPT

## 2025-01-28 RX ORDER — 0.9 % SODIUM CHLORIDE 0.9 %
1000 INTRAVENOUS SOLUTION INTRAVENOUS ONCE
Status: DISCONTINUED | OUTPATIENT
Start: 2025-01-28 | End: 2025-01-28 | Stop reason: SDUPTHER

## 2025-01-28 RX ORDER — 0.9 % SODIUM CHLORIDE 0.9 %
1000 INTRAVENOUS SOLUTION INTRAVENOUS ONCE
Status: COMPLETED | OUTPATIENT
Start: 2025-01-28 | End: 2025-01-28

## 2025-01-28 RX ADMIN — SODIUM CHLORIDE 1000 ML: 9 INJECTION, SOLUTION INTRAVENOUS at 17:19

## 2025-01-28 ASSESSMENT — ENCOUNTER SYMPTOMS
DIARRHEA: 0
COUGH: 0
VOMITING: 0
CONSTIPATION: 0
SHORTNESS OF BREATH: 0
NAUSEA: 0
WHEEZING: 0
RHINORRHEA: 0

## 2025-01-28 ASSESSMENT — LIFESTYLE VARIABLES
HOW MANY STANDARD DRINKS CONTAINING ALCOHOL DO YOU HAVE ON A TYPICAL DAY: 3 OR 4
HOW OFTEN DO YOU HAVE A DRINK CONTAINING ALCOHOL: 4 OR MORE TIMES A WEEK

## 2025-01-28 ASSESSMENT — PAIN - FUNCTIONAL ASSESSMENT: PAIN_FUNCTIONAL_ASSESSMENT: 0-10

## 2025-01-28 ASSESSMENT — PAIN SCALES - GENERAL: PAINLEVEL_OUTOF10: 0

## 2025-01-28 NOTE — ED PROVIDER NOTES
THE [facility]  EMERGENCY DEPARTMENT ENCOUNTER          [facility] RESIDENT NOTE       Date of evaluation: 1/28/2025    Chief Complaint     Hyperglycemia (Pt c/o high BS.Pt checked after sxs of feeling sluggish, thirsty, and peeing more than usual. Pt had taken steroids 2 years ago for a long period of time and never had high BS.)      History of Present Illness     Caden Singleton is a 64 y.o. 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 He states he was taken off one of his GVD medications in December (ruxolitinib) but is still taking belumosudil. About 3 weeks ago he began to experience polyuria, peeing about every 2 hours while he is awake, and fatigue. He also endorses feeling itchy all over, which is a constant problem. He has tried taking antihistamines for this without relief. He measured his blood sugar at home a few times and the readings have been in the low 400s and upper 300s. He denies any infectious symptoms including fever, chills, respiratory symptoms, skin wounds/rashes, burning/itching with urination.     He has no personal or family history of T2DM. He was on insulin a few years ago while taking high-dose prednisone for his GVD, but aside from then his BG readings have mostly been in the 120-130 range, including as recently as 10/2024. He states his diet is fairly healthy, low-carbohydrate and avoiding sweets.     ASSESSMENT / PLAN  (MEDICAL DECISION MAKING)     INITIAL VITALS: BP: (!) 162/100, Temp: 98.3 °F (36.8 °C), Pulse: 87, Respirations: 18, SpO2: 98 %     Caden Singleton is a 64 y.o. male presenting with hyperglycemia.      Is this patient to be included in the SEP-1 core measure? No Exclusion criteria - the patient is NOT to be included

## 2025-01-28 NOTE — ED PROVIDER NOTES
ED Attending Attestation Note     Date of evaluation: 1/28/2025    This patient was seen by the resident.  I have seen and examined the patient, agree with the workup, evaluation, management and diagnosis. The care plan has been discussed.  My assessment reveals with history of AML status post chemo and stem cell transplant 2020 who presents with 3 weeks of malaise and frequent urination, and was found to have elevated blood sugar readings at home.  Patient is hyperglycemic at 358 on his BMP with a pseudohyponatremia at 129.  No evidence of DKA.  He clinically appears well, is afebrile and besides frequent urination has no other notable complaints.  His abdomen is benign.  He has a normal neurologic exam.     Caden Walden MD  01/28/25 5557

## 2025-01-29 NOTE — DISCHARGE INSTRUCTIONS
You were evaluated today for hyperglycemia.  We believe that you have new onset type 2 diabetes.  We spoke with your PCP who stated that his office would get into contact with you about further instructions.  We recommend following up with him as type 2 diabetes can be managed as an outpatient.    We also found that your liver numbers were elevated.  We are not sure as to the significance of this, as your liver numbers are always elevated.  This can also be managed by either your PCP or [facility] as an outpatient.    Until then we recommend continuing with the low-carb diet, avoiding sugar, sweets, other foods that will elevate your blood sugar, as well as sugary drinks like juice or soda.  If your symptoms get worse or if you have any new symptoms to the point where you feel you need to be emergently evaluated by physician please return to the emergency department.  Otherwise be sure to keep your follow-up appointment with your PCP.

## 2025-02-03 ENCOUNTER — HOSPITAL ENCOUNTER (OUTPATIENT)
Dept: CT IMAGING | Age: 65
Discharge: HOME | End: 2025-02-03
Attending: INTERNAL MEDICINE
Payer: MEDICARE

## 2025-02-03 DIAGNOSIS — R94.5 ABNORMAL RESULTS OF LIVER FUNCTION STUDIES: ICD-10-CM

## 2025-02-03 PROCEDURE — 6360000004 HC RX CONTRAST MEDICATION: Performed by: INTERNAL MEDICINE

## 2025-02-03 PROCEDURE — 74160 CT ABDOMEN W/CONTRAST: CPT

## 2025-02-03 RX ORDER — IOPAMIDOL 755 MG/ML
75 INJECTION, SOLUTION INTRAVASCULAR
Status: COMPLETED | OUTPATIENT
Start: 2025-02-03 | End: 2025-02-03

## 2025-02-03 RX ADMIN — IOPAMIDOL 75 ML: 755 INJECTION, SOLUTION INTRAVENOUS at 12:44

## 2025-02-05 ENCOUNTER — HOSPITAL ENCOUNTER (INPATIENT)
Age: 65
LOS: 6 days | Discharge: HOME | DRG: 441 | End: 2025-02-11
Attending: INTERNAL MEDICINE | Admitting: INTERNAL MEDICINE
Payer: MEDICARE

## 2025-02-05 PROBLEM — D89.813: Status: ACTIVE | Noted: 2025-02-05

## 2025-02-05 PROBLEM — T86.99: Status: ACTIVE | Noted: 2025-02-05

## 2025-02-05 PROBLEM — D89.813 GVHD (GRAFT VERSUS HOST DISEASE) (HCC): Status: ACTIVE | Noted: 2025-02-05

## 2025-02-05 LAB
APTT BLD: 27 SEC (ref 22.1–36.4)
BILIRUB UR QL STRIP.AUTO: ABNORMAL
CLARITY UR: CLEAR
COLOR UR: YELLOW
GLUCOSE BLD-MCNC: 227 MG/DL (ref 70–99)
GLUCOSE BLD-MCNC: 260 MG/DL (ref 70–99)
GLUCOSE UR STRIP.AUTO-MCNC: NEGATIVE MG/DL
HGB UR QL STRIP.AUTO: NEGATIVE
INR PPP: 0.86 (ref 0.85–1.15)
KETONES UR STRIP.AUTO-MCNC: NEGATIVE MG/DL
LDH SERPL L TO P-CCNC: 239 U/L (ref 100–190)
LEUKOCYTE ESTERASE UR QL STRIP.AUTO: NEGATIVE
MAGNESIUM SERPL-MCNC: 1.92 MG/DL (ref 1.8–2.4)
NITRITE UR QL STRIP.AUTO: NEGATIVE
PERFORMED ON: ABNORMAL
PERFORMED ON: ABNORMAL
PH UR STRIP.AUTO: 6.5 [PH] (ref 5–8)
PHOSPHATE SERPL-MCNC: 2.3 MG/DL (ref 2.5–4.9)
PROT UR STRIP.AUTO-MCNC: NEGATIVE MG/DL
PROTHROMBIN TIME: 11.9 SEC (ref 11.9–14.9)
SP GR UR STRIP.AUTO: 1.01 (ref 1–1.03)
UA DIPSTICK W REFLEX MICRO PNL UR: ABNORMAL
URATE SERPL-MCNC: 6.6 MG/DL (ref 3.5–7.2)
URN SPEC COLLECT METH UR: ABNORMAL
UROBILINOGEN UR STRIP-ACNC: 0.2 E.U./DL

## 2025-02-05 PROCEDURE — 2580000003 HC RX 258: Performed by: NURSE PRACTITIONER

## 2025-02-05 PROCEDURE — 2060000000 HC ICU INTERMEDIATE R&B

## 2025-02-05 PROCEDURE — 84100 ASSAY OF PHOSPHORUS: CPT

## 2025-02-05 PROCEDURE — 83615 LACTATE (LD) (LDH) ENZYME: CPT

## 2025-02-05 PROCEDURE — 81003 URINALYSIS AUTO W/O SCOPE: CPT

## 2025-02-05 PROCEDURE — 6370000000 HC RX 637 (ALT 250 FOR IP): Performed by: NURSE PRACTITIONER

## 2025-02-05 PROCEDURE — 6360000002 HC RX W HCPCS: Performed by: NURSE PRACTITIONER

## 2025-02-05 PROCEDURE — 83735 ASSAY OF MAGNESIUM: CPT

## 2025-02-05 PROCEDURE — 85610 PROTHROMBIN TIME: CPT

## 2025-02-05 PROCEDURE — 93005 ELECTROCARDIOGRAM TRACING: CPT | Performed by: NURSE PRACTITIONER

## 2025-02-05 PROCEDURE — 85730 THROMBOPLASTIN TIME PARTIAL: CPT

## 2025-02-05 PROCEDURE — 84550 ASSAY OF BLOOD/URIC ACID: CPT

## 2025-02-05 PROCEDURE — 2500000003 HC RX 250 WO HCPCS: Performed by: NURSE PRACTITIONER

## 2025-02-05 PROCEDURE — 87799 DETECT AGENT NOS DNA QUANT: CPT

## 2025-02-05 RX ORDER — SODIUM CHLORIDE 0.9 % (FLUSH) 0.9 %
5-40 SYRINGE (ML) INJECTION PRN
Status: DISCONTINUED | OUTPATIENT
Start: 2025-02-05 | End: 2025-02-11 | Stop reason: HOSPADM

## 2025-02-05 RX ORDER — INSULIN LISPRO 100 [IU]/ML
4 INJECTION, SOLUTION INTRAVENOUS; SUBCUTANEOUS ONCE
Status: COMPLETED | OUTPATIENT
Start: 2025-02-05 | End: 2025-02-05

## 2025-02-05 RX ORDER — MAGNESIUM SULFATE IN WATER 40 MG/ML
2000 INJECTION, SOLUTION INTRAVENOUS PRN
Status: DISCONTINUED | OUTPATIENT
Start: 2025-02-05 | End: 2025-02-11 | Stop reason: HOSPADM

## 2025-02-05 RX ORDER — GLUCAGON 1 MG/ML
1 KIT INJECTION PRN
Status: DISCONTINUED | OUTPATIENT
Start: 2025-02-05 | End: 2025-02-11 | Stop reason: HOSPADM

## 2025-02-05 RX ORDER — ENOXAPARIN SODIUM 100 MG/ML
40 INJECTION SUBCUTANEOUS EVERY EVENING
Status: CANCELLED | OUTPATIENT
Start: 2025-02-05

## 2025-02-05 RX ORDER — DEXTROSE MONOHYDRATE 100 MG/ML
INJECTION, SOLUTION INTRAVENOUS CONTINUOUS PRN
Status: DISCONTINUED | OUTPATIENT
Start: 2025-02-05 | End: 2025-02-11 | Stop reason: HOSPADM

## 2025-02-05 RX ORDER — PANTOPRAZOLE SODIUM 40 MG/1
40 TABLET, DELAYED RELEASE ORAL
Status: DISCONTINUED | OUTPATIENT
Start: 2025-02-06 | End: 2025-02-11 | Stop reason: HOSPADM

## 2025-02-05 RX ORDER — LANOLIN ALCOHOL/MO/W.PET/CERES
400 CREAM (GRAM) TOPICAL DAILY
Status: DISCONTINUED | OUTPATIENT
Start: 2025-02-06 | End: 2025-02-05

## 2025-02-05 RX ORDER — SODIUM CHLORIDE 9 MG/ML
INJECTION, SOLUTION INTRAVENOUS CONTINUOUS
Status: DISCONTINUED | OUTPATIENT
Start: 2025-02-05 | End: 2025-02-07

## 2025-02-05 RX ORDER — INSULIN LISPRO 100 [IU]/ML
0-16 INJECTION, SOLUTION INTRAVENOUS; SUBCUTANEOUS
Status: DISCONTINUED | OUTPATIENT
Start: 2025-02-05 | End: 2025-02-11 | Stop reason: HOSPADM

## 2025-02-05 RX ORDER — VALACYCLOVIR HYDROCHLORIDE 500 MG/1
500 TABLET, FILM COATED ORAL 2 TIMES DAILY
Status: DISCONTINUED | OUTPATIENT
Start: 2025-02-05 | End: 2025-02-11 | Stop reason: HOSPADM

## 2025-02-05 RX ORDER — INSULIN GLARGINE 100 [IU]/ML
55 INJECTION, SOLUTION SUBCUTANEOUS NIGHTLY
Status: DISCONTINUED | OUTPATIENT
Start: 2025-02-05 | End: 2025-02-08

## 2025-02-05 RX ORDER — LANOLIN ALCOHOL/MO/W.PET/CERES
400 CREAM (GRAM) TOPICAL 2 TIMES DAILY
Status: CANCELLED | OUTPATIENT
Start: 2025-02-05

## 2025-02-05 RX ORDER — SOTALOL HYDROCHLORIDE 120 MG/1
60 TABLET ORAL EVERY MORNING
Status: DISCONTINUED | OUTPATIENT
Start: 2025-02-06 | End: 2025-02-11 | Stop reason: HOSPADM

## 2025-02-05 RX ORDER — MAGNESIUM SULFATE IN WATER 40 MG/ML
4000 INJECTION, SOLUTION INTRAVENOUS PRN
Status: DISCONTINUED | OUTPATIENT
Start: 2025-02-05 | End: 2025-02-05 | Stop reason: DRUGHIGH

## 2025-02-05 RX ORDER — SODIUM CHLORIDE 9 MG/ML
INJECTION, SOLUTION INTRAVENOUS CONTINUOUS PRN
Status: DISCONTINUED | OUTPATIENT
Start: 2025-02-05 | End: 2025-02-11 | Stop reason: HOSPADM

## 2025-02-05 RX ORDER — FLUCONAZOLE 200 MG/1
200 TABLET ORAL DAILY
Status: ON HOLD | COMMUNITY
End: 2025-02-11 | Stop reason: HOSPADM

## 2025-02-05 RX ORDER — SODIUM CHLORIDE 9 MG/ML
INJECTION, SOLUTION INTRAVENOUS PRN
Status: DISCONTINUED | OUTPATIENT
Start: 2025-02-05 | End: 2025-02-11 | Stop reason: HOSPADM

## 2025-02-05 RX ORDER — DIPHENHYDRAMINE HCL 25 MG
25 TABLET ORAL EVERY 6 HOURS PRN
Status: DISCONTINUED | OUTPATIENT
Start: 2025-02-05 | End: 2025-02-11 | Stop reason: HOSPADM

## 2025-02-05 RX ORDER — SULFAMETHOXAZOLE AND TRIMETHOPRIM 400; 80 MG/1; MG/1
1 TABLET ORAL DAILY
COMMUNITY

## 2025-02-05 RX ORDER — POTASSIUM CHLORIDE 29.8 MG/ML
20 INJECTION INTRAVENOUS PRN
Status: DISCONTINUED | OUTPATIENT
Start: 2025-02-05 | End: 2025-02-05 | Stop reason: DRUGHIGH

## 2025-02-05 RX ORDER — SOTALOL HYDROCHLORIDE 120 MG/1
120 TABLET ORAL NIGHTLY
Status: DISCONTINUED | OUTPATIENT
Start: 2025-02-05 | End: 2025-02-11 | Stop reason: HOSPADM

## 2025-02-05 RX ORDER — LANOLIN ALCOHOL/MO/W.PET/CERES
400 CREAM (GRAM) TOPICAL DAILY
Status: DISCONTINUED | OUTPATIENT
Start: 2025-02-05 | End: 2025-02-11 | Stop reason: HOSPADM

## 2025-02-05 RX ORDER — SOTALOL HYDROCHLORIDE 120 MG/1
60 TABLET ORAL DAILY
Status: ON HOLD | COMMUNITY
End: 2025-02-11 | Stop reason: HOSPADM

## 2025-02-05 RX ORDER — POTASSIUM CHLORIDE 7.45 MG/ML
10 INJECTION INTRAVENOUS PRN
Status: DISCONTINUED | OUTPATIENT
Start: 2025-02-05 | End: 2025-02-11 | Stop reason: HOSPADM

## 2025-02-05 RX ORDER — POTASSIUM CHLORIDE 1500 MG/1
40 TABLET, EXTENDED RELEASE ORAL PRN
Status: DISCONTINUED | OUTPATIENT
Start: 2025-02-05 | End: 2025-02-11 | Stop reason: HOSPADM

## 2025-02-05 RX ORDER — URSODIOL 250 MG/1
500 TABLET, FILM COATED ORAL 2 TIMES DAILY
Status: DISCONTINUED | OUTPATIENT
Start: 2025-02-05 | End: 2025-02-11 | Stop reason: HOSPADM

## 2025-02-05 RX ORDER — SODIUM CHLORIDE 0.9 % (FLUSH) 0.9 %
5-40 SYRINGE (ML) INJECTION EVERY 12 HOURS SCHEDULED
Status: DISCONTINUED | OUTPATIENT
Start: 2025-02-05 | End: 2025-02-11 | Stop reason: HOSPADM

## 2025-02-05 RX ADMIN — SODIUM CHLORIDE, PRESERVATIVE FREE 10 ML: 5 INJECTION INTRAVENOUS at 23:13

## 2025-02-05 RX ADMIN — INSULIN LISPRO 8 UNITS: 100 INJECTION, SOLUTION INTRAVENOUS; SUBCUTANEOUS at 21:53

## 2025-02-05 RX ADMIN — URSODIOL 500 MG: 250 TABLET, FILM COATED ORAL at 21:31

## 2025-02-05 RX ADMIN — INSULIN LISPRO 4 UNITS: 100 INJECTION, SOLUTION INTRAVENOUS; SUBCUTANEOUS at 20:09

## 2025-02-05 RX ADMIN — VALACYCLOVIR HYDROCHLORIDE 500 MG: 500 TABLET, FILM COATED ORAL at 23:06

## 2025-02-05 RX ADMIN — Medication 400 MG: at 23:06

## 2025-02-05 RX ADMIN — WATER 125 MG: 1 INJECTION INTRAMUSCULAR; INTRAVENOUS; SUBCUTANEOUS at 20:09

## 2025-02-05 RX ADMIN — SOTALOL HYDROCHLORIDE 120 MG: 120 TABLET ORAL at 23:06

## 2025-02-05 RX ADMIN — INSULIN GLARGINE 55 UNITS: 100 INJECTION, SOLUTION SUBCUTANEOUS at 21:31

## 2025-02-05 RX ADMIN — SODIUM CHLORIDE: 9 INJECTION, SOLUTION INTRAVENOUS at 18:02

## 2025-02-05 NOTE — H&P
BCC History and Physical       Attending Physician: Phillip Conteh MD    Primary Care: Chucho Conley MD       Referring MD: Phillip Conteh MD  [address] [place] [address] [place] [address]    Name: Caden Singleton :  1960  MRN:  [id number]    Admission: (Not on file)      Date: 2025    Reason for Admission: Possible liver GVHD    History of Present Illness: Mr. Singleton is a 64-year-old man with h/o AML, intermediate risk (20 w/ 24% blasts; FISH & cytogenetics WNL; CEPPA, c-kit, FLT3, NPM1, IDH 1 & IDH 2 were not detected). His PMH is also significant for atrial fibrillation, HTN & CASH. He is S/p  melphalan and fludarabine preparative regimen followed by a matched (10:10 w/ DPB1 non-permissive mismatch) unrelated [facility]. 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 He continued to follow up at [facility].     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 CT Abdomen showed hepatic steatosis with no biliary obstruction. No other acute GVHD symptoms. We will consult IR for Liver biopsy tomorrow morning to establish pathology for possible Liver GVHD. He denies n/v/d/constipation. Denies SOB/LI/Chest pain. Denies fevers/chills/night sweats.       Past Surgical History:   Procedure Laterality Date    COLONOSCOPY      ENDOSCOPY, COLON, DIAGNOSTIC      HERNIA REPAIR      age 3    KNEE ARTHROSCOPY      SKIN BIOPSY      mole       Past Medical History:   Diagnosis Date    Arthritis     knees    Cancer (HCC)     AML    History of blood transfusion     Hyperlipidemia     Hypertension        Prior to Admission medications    Medication Sig Start Date End Date Taking? Authorizing Provider   voriconazole (VFEND)  is good   - Started PPI ppx with HD steroids  - Start low microbial diet  - Dietician to follow closely     9. Cardiac: H/o A. Fib & HTN (154/90 today)  - Echo (9/29/20) -  LVEF 60-65% w/ left atrial dilatation  A. Fib:  Currently in NSR  - Cont sotalol 120 mg BID  HTN:    - S/p amlodipine-lisinopril 5/40 mg daily  - S/p Amlodipine 5mg daily (started 12/22/20; stopped 3/18/22 with edema).  - S/p Lisinopril 5 mg daily (follow K closely), d/c 4/14/22 with pruritis  - Cont hydrochlorothiazide 50 mg daily 4/14/22      10. Diabetes:   - Cont Lantus 55 units nightly  - Start High Dose SSI      11. Fe overload:  - Ferritin 5/30/23: 1128. Repeat 8/30/23: 1514. Repeat Ferritin (9/27/23): 1311, 6/5/24 - ferritin 917 with 37% sat.  - Begin phlebotomy when Hgb > 11 - (started 9/3/21)        - DVT Prophylaxis: Platelets >50,000 cells/dL, - daily lovenox prophylaxis ordered  Contraindications to pharmacologic prophylaxis: None  Contraindications to mechanical prophylaxis: None  D/C'ed Lovenox for this evening, due to possible liver bx tomorrow morning. Restart tomorrow evening.       - Disposition: Uncertain at this time.    The patient was seen and examined by Dr. Conteh. This admission history and physical has been discussed and agreed upon by Dr. Conteh.      LEANDRA Pratt - NP  Phillip Conteh MD  Hematology, Bone marrow transplant and Cellular therapy  [facility]

## 2025-02-06 ENCOUNTER — APPOINTMENT (OUTPATIENT)
Dept: CT IMAGING | Age: 65
DRG: 441 | End: 2025-02-06
Attending: INTERNAL MEDICINE
Payer: MEDICARE

## 2025-02-06 LAB
ALBUMIN SERPL-MCNC: 3.4 G/DL (ref 3.4–5)
ALP SERPL-CCNC: 530 U/L (ref 40–129)
ALT SERPL-CCNC: 237 U/L (ref 10–40)
ANION GAP SERPL CALCULATED.3IONS-SCNC: 14 MMOL/L (ref 3–16)
ANISOCYTOSIS BLD QL SMEAR: ABNORMAL
APTT BLD: 25.7 SEC (ref 22.1–36.4)
AST SERPL-CCNC: 131 U/L (ref 15–37)
BASOPHILS # BLD: 0 K/UL (ref 0–0.2)
BASOPHILS NFR BLD: 0 %
BILIRUB DIRECT SERPL-MCNC: 6.3 MG/DL (ref 0–0.3)
BILIRUB INDIRECT SERPL-MCNC: 2.3 MG/DL (ref 0–1)
BILIRUB SERPL-MCNC: 8.6 MG/DL (ref 0–1)
BUN SERPL-MCNC: 33 MG/DL (ref 7–20)
CALCIUM SERPL-MCNC: 9.4 MG/DL (ref 8.3–10.6)
CHLORIDE SERPL-SCNC: 95 MMOL/L (ref 99–110)
CO2 SERPL-SCNC: 20 MMOL/L (ref 21–32)
CREAT SERPL-MCNC: 1.2 MG/DL (ref 0.8–1.3)
DEPRECATED RDW RBC AUTO: 14.4 % (ref 12.4–15.4)
DEPRECATED RDW RBC AUTO: 14.9 % (ref 12.4–15.4)
EKG ATRIAL RATE: 73 BPM
EKG DIAGNOSIS: NORMAL
EKG P AXIS: 61 DEGREES
EKG P-R INTERVAL: 180 MS
EKG Q-T INTERVAL: 402 MS
EKG QRS DURATION: 96 MS
EKG QTC CALCULATION (BAZETT): 442 MS
EKG R AXIS: 37 DEGREES
EKG T AXIS: 57 DEGREES
EKG VENTRICULAR RATE: 73 BPM
EOSINOPHIL # BLD: 0 K/UL (ref 0–0.6)
EOSINOPHIL NFR BLD: 0 %
FIBRINOGEN PPP-MCNC: 530 MG/DL (ref 227–534)
GFR SERPLBLD CREATININE-BSD FMLA CKD-EPI: 66 ML/MIN/{1.73_M2}
GLUCOSE BLD-MCNC: 348 MG/DL (ref 70–99)
GLUCOSE BLD-MCNC: 348 MG/DL (ref 70–99)
GLUCOSE BLD-MCNC: 351 MG/DL (ref 70–99)
GLUCOSE BLD-MCNC: 383 MG/DL (ref 70–99)
GLUCOSE BLD-MCNC: 429 MG/DL (ref 70–99)
GLUCOSE BLD-MCNC: 434 MG/DL (ref 70–99)
GLUCOSE SERPL-MCNC: 339 MG/DL (ref 70–99)
HCT VFR BLD AUTO: 36.1 % (ref 40.5–52.5)
HCT VFR BLD AUTO: 37.7 % (ref 40.5–52.5)
HGB BLD-MCNC: 11.7 G/DL (ref 13.5–17.5)
HGB BLD-MCNC: 12.1 G/DL (ref 13.5–17.5)
INR PPP: 0.94 (ref 0.85–1.15)
LDH SERPL L TO P-CCNC: 191 U/L (ref 100–190)
LYMPHOCYTES # BLD: 0.5 K/UL (ref 1–5.1)
LYMPHOCYTES NFR BLD: 13 %
MACROCYTES BLD QL SMEAR: ABNORMAL
MAGNESIUM SERPL-MCNC: 1.97 MG/DL (ref 1.8–2.4)
MCH RBC QN AUTO: 31.2 PG (ref 26–34)
MCH RBC QN AUTO: 31.7 PG (ref 26–34)
MCHC RBC AUTO-ENTMCNC: 32.1 G/DL (ref 31–36)
MCHC RBC AUTO-ENTMCNC: 32.4 G/DL (ref 31–36)
MCV RBC AUTO: 97.3 FL (ref 80–100)
MCV RBC AUTO: 97.8 FL (ref 80–100)
MONOCYTES # BLD: 0 K/UL (ref 0–1.3)
MONOCYTES NFR BLD: 0 %
NEUTROPHILS # BLD: 2.7 K/UL (ref 1.7–7.7)
NEUTROPHILS NFR BLD: 84 %
PERFORMED ON: ABNORMAL
PHOSPHATE SERPL-MCNC: 2.7 MG/DL (ref 2.5–4.9)
PLATELET # BLD AUTO: 298 K/UL (ref 135–450)
PLATELET # BLD AUTO: 309 K/UL (ref 135–450)
PMV BLD AUTO: 8.9 FL (ref 5–10.5)
PMV BLD AUTO: 9.4 FL (ref 5–10.5)
POTASSIUM SERPL-SCNC: 3.5 MMOL/L (ref 3.5–5.1)
PROT SERPL-MCNC: 6.6 G/DL (ref 6.4–8.2)
PROTHROMBIN TIME: 12.8 SEC (ref 11.9–14.9)
RBC # BLD AUTO: 3.7 M/UL (ref 4.2–5.9)
RBC # BLD AUTO: 3.87 M/UL (ref 4.2–5.9)
SODIUM SERPL-SCNC: 129 MMOL/L (ref 136–145)
TARGETS BLD QL SMEAR: ABNORMAL
URATE SERPL-MCNC: 6.5 MG/DL (ref 3.5–7.2)
VARIANT LYMPHS NFR BLD MANUAL: 3 % (ref 0–6)
WBC # BLD AUTO: 3.2 K/UL (ref 4–11)
WBC # BLD AUTO: 8.1 K/UL (ref 4–11)

## 2025-02-06 PROCEDURE — 93010 ELECTROCARDIOGRAM REPORT: CPT | Performed by: INTERNAL MEDICINE

## 2025-02-06 PROCEDURE — 6360000002 HC RX W HCPCS: Performed by: NURSE PRACTITIONER

## 2025-02-06 PROCEDURE — 6360000002 HC RX W HCPCS: Performed by: RADIOLOGY

## 2025-02-06 PROCEDURE — 85025 COMPLETE CBC W/AUTO DIFF WBC: CPT

## 2025-02-06 PROCEDURE — 2060000000 HC ICU INTERMEDIATE R&B

## 2025-02-06 PROCEDURE — 84550 ASSAY OF BLOOD/URIC ACID: CPT

## 2025-02-06 PROCEDURE — 85730 THROMBOPLASTIN TIME PARTIAL: CPT

## 2025-02-06 PROCEDURE — 83036 HEMOGLOBIN GLYCOSYLATED A1C: CPT

## 2025-02-06 PROCEDURE — 2500000003 HC RX 250 WO HCPCS: Performed by: NURSE PRACTITIONER

## 2025-02-06 PROCEDURE — 6370000000 HC RX 637 (ALT 250 FOR IP): Performed by: NURSE PRACTITIONER

## 2025-02-06 PROCEDURE — 47000 NEEDLE BIOPSY OF LIVER PERQ: CPT

## 2025-02-06 PROCEDURE — 83735 ASSAY OF MAGNESIUM: CPT

## 2025-02-06 PROCEDURE — 86603 ADENOVIRUS ANTIBODY: CPT

## 2025-02-06 PROCEDURE — 36415 COLL VENOUS BLD VENIPUNCTURE: CPT

## 2025-02-06 PROCEDURE — 80048 BASIC METABOLIC PNL TOTAL CA: CPT

## 2025-02-06 PROCEDURE — 6370000000 HC RX 637 (ALT 250 FOR IP): Performed by: INTERNAL MEDICINE

## 2025-02-06 PROCEDURE — 85384 FIBRINOGEN ACTIVITY: CPT

## 2025-02-06 PROCEDURE — 0FB23ZX EXCISION OF LEFT LOBE LIVER, PERCUTANEOUS APPROACH, DIAGNOSTIC: ICD-10-PCS | Performed by: RADIOLOGY

## 2025-02-06 PROCEDURE — 2580000003 HC RX 258: Performed by: NURSE PRACTITIONER

## 2025-02-06 PROCEDURE — 85027 COMPLETE CBC AUTOMATED: CPT

## 2025-02-06 PROCEDURE — 88313 SPECIAL STAINS GROUP 2: CPT

## 2025-02-06 PROCEDURE — 83615 LACTATE (LD) (LDH) ENZYME: CPT

## 2025-02-06 PROCEDURE — 77012 CT SCAN FOR NEEDLE BIOPSY: CPT

## 2025-02-06 PROCEDURE — 88307 TISSUE EXAM BY PATHOLOGIST: CPT

## 2025-02-06 PROCEDURE — 80076 HEPATIC FUNCTION PANEL: CPT

## 2025-02-06 PROCEDURE — 85610 PROTHROMBIN TIME: CPT

## 2025-02-06 PROCEDURE — 84100 ASSAY OF PHOSPHORUS: CPT

## 2025-02-06 RX ORDER — LIDOCAINE HYDROCHLORIDE 10 MG/ML
INJECTION, SOLUTION EPIDURAL; INFILTRATION; INTRACAUDAL; PERINEURAL PRN
Status: COMPLETED | OUTPATIENT
Start: 2025-02-06 | End: 2025-02-06

## 2025-02-06 RX ORDER — SULFAMETHOXAZOLE AND TRIMETHOPRIM 400; 80 MG/1; MG/1
1 TABLET ORAL DAILY
Status: DISCONTINUED | OUTPATIENT
Start: 2025-02-06 | End: 2025-02-11 | Stop reason: HOSPADM

## 2025-02-06 RX ORDER — OXYCODONE HYDROCHLORIDE 5 MG/1
5 TABLET ORAL EVERY 6 HOURS PRN
Status: DISCONTINUED | OUTPATIENT
Start: 2025-02-06 | End: 2025-02-11 | Stop reason: HOSPADM

## 2025-02-06 RX ORDER — FENTANYL CITRATE 50 UG/ML
INJECTION, SOLUTION INTRAMUSCULAR; INTRAVENOUS PRN
Status: COMPLETED | OUTPATIENT
Start: 2025-02-06 | End: 2025-02-06

## 2025-02-06 RX ORDER — OXYCODONE HYDROCHLORIDE 5 MG/1
10 TABLET ORAL EVERY 6 HOURS PRN
Status: DISCONTINUED | OUTPATIENT
Start: 2025-02-06 | End: 2025-02-11 | Stop reason: HOSPADM

## 2025-02-06 RX ORDER — MIDAZOLAM HYDROCHLORIDE 1 MG/ML
INJECTION, SOLUTION INTRAMUSCULAR; INTRAVENOUS PRN
Status: COMPLETED | OUTPATIENT
Start: 2025-02-06 | End: 2025-02-06

## 2025-02-06 RX ADMIN — VALACYCLOVIR HYDROCHLORIDE 500 MG: 500 TABLET, FILM COATED ORAL at 08:21

## 2025-02-06 RX ADMIN — INSULIN GLARGINE 55 UNITS: 100 INJECTION, SOLUTION SUBCUTANEOUS at 20:26

## 2025-02-06 RX ADMIN — SOTALOL HYDROCHLORIDE 120 MG: 120 TABLET ORAL at 20:30

## 2025-02-06 RX ADMIN — URSODIOL 500 MG: 250 TABLET, FILM COATED ORAL at 20:26

## 2025-02-06 RX ADMIN — INSULIN LISPRO 16 UNITS: 100 INJECTION, SOLUTION INTRAVENOUS; SUBCUTANEOUS at 21:51

## 2025-02-06 RX ADMIN — Medication 400 MG: at 08:21

## 2025-02-06 RX ADMIN — SODIUM CHLORIDE, PRESERVATIVE FREE 10 ML: 5 INJECTION INTRAVENOUS at 08:22

## 2025-02-06 RX ADMIN — URSODIOL 500 MG: 250 TABLET, FILM COATED ORAL at 08:21

## 2025-02-06 RX ADMIN — INSULIN LISPRO 12 UNITS: 100 INJECTION, SOLUTION INTRAVENOUS; SUBCUTANEOUS at 16:05

## 2025-02-06 RX ADMIN — LIDOCAINE HYDROCHLORIDE 10 ML: 10 INJECTION, SOLUTION EPIDURAL; INFILTRATION; INTRACAUDAL; PERINEURAL at 09:27

## 2025-02-06 RX ADMIN — OXYCODONE HYDROCHLORIDE 10 MG: 5 TABLET ORAL at 10:36

## 2025-02-06 RX ADMIN — WATER 125 MG: 1 INJECTION INTRAMUSCULAR; INTRAVENOUS; SUBCUTANEOUS at 18:20

## 2025-02-06 RX ADMIN — SODIUM CHLORIDE: 9 INJECTION, SOLUTION INTRAVENOUS at 06:42

## 2025-02-06 RX ADMIN — INSULIN LISPRO 16 UNITS: 100 INJECTION, SOLUTION INTRAVENOUS; SUBCUTANEOUS at 10:50

## 2025-02-06 RX ADMIN — VALACYCLOVIR HYDROCHLORIDE 500 MG: 500 TABLET, FILM COATED ORAL at 20:26

## 2025-02-06 RX ADMIN — SODIUM CHLORIDE: 9 INJECTION, SOLUTION INTRAVENOUS at 10:00

## 2025-02-06 RX ADMIN — SODIUM CHLORIDE 15 ML: 900 IRRIGANT IRRIGATION at 20:32

## 2025-02-06 RX ADMIN — SODIUM CHLORIDE, PRESERVATIVE FREE 10 ML: 5 INJECTION INTRAVENOUS at 20:31

## 2025-02-06 RX ADMIN — FENTANYL CITRATE 50 MCG: 50 INJECTION, SOLUTION INTRAMUSCULAR; INTRAVENOUS at 09:23

## 2025-02-06 RX ADMIN — SOTALOL HYDROCHLORIDE 60 MG: 120 TABLET ORAL at 08:36

## 2025-02-06 RX ADMIN — WATER 125 MG: 1 INJECTION INTRAMUSCULAR; INTRAVENOUS; SUBCUTANEOUS at 06:21

## 2025-02-06 RX ADMIN — MICAFUNGIN SODIUM 150 MG: 100 INJECTION, POWDER, LYOPHILIZED, FOR SOLUTION INTRAVENOUS at 10:35

## 2025-02-06 RX ADMIN — SULFAMETHOXAZOLE AND TRIMETHOPRIM 1 TABLET: 400; 80 TABLET ORAL at 10:36

## 2025-02-06 RX ADMIN — POTASSIUM BICARBONATE 40 MEQ: 782 TABLET, EFFERVESCENT ORAL at 13:10

## 2025-02-06 RX ADMIN — MIDAZOLAM HYDROCHLORIDE 1 MG: 1 INJECTION, SOLUTION INTRAMUSCULAR; INTRAVENOUS at 09:23

## 2025-02-06 RX ADMIN — PANTOPRAZOLE SODIUM 40 MG: 40 TABLET, DELAYED RELEASE ORAL at 06:20

## 2025-02-06 ASSESSMENT — PAIN DESCRIPTION - PAIN TYPE: TYPE: SURGICAL PAIN

## 2025-02-06 ASSESSMENT — PAIN SCALES - GENERAL
PAINLEVEL_OUTOF10: 7
PAINLEVEL_OUTOF10: 0

## 2025-02-06 ASSESSMENT — PAIN DESCRIPTION - LOCATION: LOCATION: ABDOMEN

## 2025-02-06 ASSESSMENT — PAIN - FUNCTIONAL ASSESSMENT
PAIN_FUNCTIONAL_ASSESSMENT: ACTIVITIES ARE NOT PREVENTED
PAIN_FUNCTIONAL_ASSESSMENT: NONE - DENIES PAIN

## 2025-02-06 ASSESSMENT — PAIN DESCRIPTION - DESCRIPTORS: DESCRIPTORS: DISCOMFORT

## 2025-02-06 ASSESSMENT — PAIN DESCRIPTION - ORIENTATION: ORIENTATION: UPPER

## 2025-02-06 ASSESSMENT — PAIN DESCRIPTION - ONSET: ONSET: OTHER (COMMENT)

## 2025-02-06 ASSESSMENT — PAIN DESCRIPTION - FREQUENCY: FREQUENCY: CONTINUOUS

## 2025-02-06 NOTE — PROGRESS NOTES
Patient admitted to room 3508. Patient ambulatory on admission. Vitals stable, patient alert and oriented x4. Labs collected and sent to lab. Skin assessment performed. Admission navigator completed.

## 2025-02-06 NOTE — PROGRESS NOTES
[facility] Progress Note    2025     Caden Singleton    MRN: [id number]    : 1960    Referring MD: Phillip Conteh MD  [address] [facility]  [address] [place] [address]      SUBJECTIVE:  No new complaints.  To have liver biopsy today. Started on Solu Medrol.      ECOG PS:  (2) Ambulatory and capable of self care, unable to carry out work activity, up and about > 50% or waking hours    KPS: 80% Normal activity with effort; some signs or symptoms of disease    Isolation: None    Medications    Scheduled Meds:   sodium chloride flush  5-40 mL IntraVENous 2 times per day    Saline Mouthwash  15 mL Swish & Spit 4x Daily AC & HS    methylPREDNISolone  125 mg IntraVENous Q12H    insulin lispro  0-16 Units SubCUTAneous 4x Daily AC & HS    insulin glargine  55 Units SubCUTAneous Nightly    ursodiol  500 mg Oral BID    pantoprazole  40 mg Oral QAM AC    valACYclovir  500 mg Oral BID    sotalol  120 mg Oral Nightly    sotalol  60 mg Oral QAM    magnesium oxide  400 mg Oral Daily     Continuous Infusions:   sodium chloride      sodium chloride      sodium chloride 75 mL/hr at 25 0642    dextrose       PRN Meds:.sodium chloride, sodium chloride flush, sodium chloride, Saline Mouthwash, ALTEplase (CATHFLO) 2 mg in sterile water 2 mL injection, diphenhydrAMINE, glucose, dextrose bolus **OR** dextrose bolus, glucagon (rDNA), dextrose, potassium chloride **OR** potassium alternative oral replacement **OR** potassium chloride, magnesium sulfate    ROS:  As noted above, otherwise remainder of 10-point ROS negative    Physical Exam:     I&O:    Intake/Output Summary (Last 24 hours) at 2025 0741  Last data filed at 2025 0644  Gross per 24 hour   Intake 2210 ml   Output 1950 ml   Net 260 ml       Vital Signs:  BP (!) 143/87   Pulse 83   Temp 97.8 °F (36.6 °C) (Oral)   Resp 15   Ht 1.93 m (6' 4\")   Wt 123.5 kg (272 lb 4.3 oz)   SpO2 95%   BMI 33.14 kg/m²     Weight:    Wt Readings from Last 3  no abnormal FISH, cytogenetics or mutations to follow     - Day 33 BMBx (12/14/20): normocellular with minimal evidence of dyspoiesis; the blast count appears significantly decreased however the monocytes continue to show significant CD56 aberrancy; engraftment 97%   - Day 60 (1/10/20): No AML; engraftment 97%  Review of the red cell population shows an occasional teardrop cell with micro-spherocyte.  - Day 88 BMBx (2/11/20): No evidence of dysplasia or increased blasts; 25% cellularity, erythroid hypoplasia, megakaryocytic hyperplasia, and increased stainable iron; 94% XX      Engraftment FISH XY(Repeat Q 3 months):   (stem cell boost 4/1/21)  - 7/8/21: 100% engrafted  - 10/14/21: 100% engrafted  - 11/18/21: 100% engrafted  - 3/18/22: 100% engrafted  - 8/10/22: 100%   - 1/27/23: 100%   - 4/3/23: 100%   - 7/31/23: 100%   - 12/13/23: 100% donor   - 2/7/24: 100%  - 6/5/24: 100%        2. ID:   Patient is currently afebrile without evidence of infection.   - Cont Bactrim SS daily, Valtrex ppx   - Start Micafungin 150 mg daily while on HD steroids- no azoles d/t elevated LFTs  - Parvo (12/19/20): Negative    - Hep panel, HIV, CMV, EBV negative.   - Send Adeno PCR (2/5/25): Pending     Donor/Recipient CMV:  Negative / negative  - Routine surveillance is not required          3. Heme:  Mild anemia  - Transfuse for Hgb < 7 and Platelets < 10 K.   - No transfusion today           4. Metabolic / CKD III: Hyperglycemia  - Baseline SCr 1.2-1.42.     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  Oral GVHD, skin approximately 25% (improving with areas of normal skin color mixed with erythema.  Previously diffuse erythema) (face, and chest and back) involvement and probably liver (LFT's improving). Rash resolved at 1/20/22. Onset of cGVHD with sclerodermal changes involving 36% of the body 5/16/22.    He previously had  steatosis with no biliary obstruction. No other acute GVHD symptoms.  - Start Solumedrol 1 mg/kg bid, resume Jakafi at 5 mg bid x 3 days and if tolerate it well, increase to 10 mg bid. Might consider resume low dose tacrolimus too as steroid sparing agent down the road if path is positive for GVHD.   -Stat IR consult for Liver biopsy to establish pathology for possible - Liver GVHD- pending 2/6/25  -Monitor for steroid induced hyperglycemia- use Lantus 55u sc qhs and HD SSI. Carb restricted diet  -Hold Rezurock, refractory to it.   -Resume Actigall as well        6. VOD: Possible liver GVHD admission 2/5/25  - S/p Actigall (stopped 4/25/21)  - Resume Actigall 2/5/25     7. Pulmonary: H/o CASH  - PFT (10/6/20) - FEV1/FVC 71 w/ predicted ratio of 75 & DLCO 41% (corrected 84%).  He has moderate obstruction by FEV1/FVC ratio lower than predicted and also based on ATS criteria with low FEV, likely d/t morbid obesity   - Pulm clearance by Dr. Aguilar  CASH:  - Cont CPAP     8. GI / Nutrition:    Nutrition:  Appetite is good   - Started PPI ppx with HD steroids  - Start low microbial diet  - Dietician to follow closely     9. Cardiac: H/o A. Fib & HTN (154/90 today)  - Echo (9/29/20) -  LVEF 60-65% w/ left atrial dilatation  A. Fib:  Currently in NSR  - Cont sotalol 60 mg Q AM and 120 mg Q HS  HTN:    - S/p amlodipine-lisinopril 5/40 mg daily  - S/p Amlodipine 5mg daily (started 12/22/20; stopped 3/18/22 with edema).  - S/p Lisinopril 5 mg daily (follow K closely), d/c 4/14/22 with pruritis  - s/p  hydrochlorothiazide 50 mg daily     10. Diabetes:   - Cont Lantus 55 units nightly  - Start High Dose SSI      11. Fe overload:  - Ferritin 5/30/23: 1128. Repeat 8/30/23: 1514. Repeat Ferritin (9/27/23): 1311, 6/5/24 - ferritin 917 with 37% sat.  - Begin phlebotomy when Hgb > 11 - (started 9/3/21)    - DVT Prophylaxis: Platelets >50,000 cells/dL, - daily lovenox prophylaxis ordered  Contraindications to pharmacologic  prophylaxis: None  Contraindications to mechanical prophylaxis: None    - Disposition: Unknown at this time    The patient was seen and examined by Dr. Jackson Hodges, APRN - CNP    Kiko Paz MD  [facility]  [phone]

## 2025-02-06 NOTE — CARE COORDINATION
Case Management Assessment  Initial Evaluation    Date/Time of Evaluation: 2/6/2025 11:52 AM  Assessment Completed by: ELIZABETH Martinez   for [facility] [place] ([facility])  [employer] Mobile: [phone]    If patient is discharged prior to next notation, then this note serves as note for discharge by case management.    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 Yes    Chart Reviewed: Yes      History Provided by: Patient  Patient Orientation: Alert and Oriented    Patient Cognition: Alert    Hospitalization in the last 30 days (Readmission):  No    If yes, Readmission Assessment in CM Navigator will be completed.    Advance Directives:      Code Status: Full Code   Patient's Primary Decision Maker is:  (Pt stated he has POA and was told they were on file. No documents located in epic)      Discharge Planning:    Patient lives with: Alone Type of Home: House  Primary Care Giver: Self  Patient Support Systems include: Family Members   Current Financial resources: Medicare, Other (Comment) (Medical mutual secondary)  Current community resources: None  Current services prior to admission: Other (Comment) ([facility])            Current DME:              Type of Home Care services:  None    ADLS  Prior functional level: Independent in ADLs/IADLs  Current functional level: Independent in ADLs/IADLs    PT AM-PAC:   /24  OT AM-PAC:   /24    Family can provide assistance at DC: Yes  Would you like Case Management to discuss the discharge plan with any other family members/significant others, and if so, who?  Yes  Plans to Return to Present Housing: Yes  Other Identified Issues/Barriers to RETURNING to current housing: n/a  Potential Assistance needed at discharge: N/A            Potential DME:    Patient expects to discharge to: House  Plan for transportation at discharge:      Financial    Payor: MEDICARE / Plan: MEDICARE PART A AND B / Product Type: *No Product type* /     Does insurance require precert for SNF: No    Potential assistance Purchasing Medications: No  Meds-to-Beds request:        [id number] - [place], [place] - [address] - P [phone] - F [phone] [address] [facility] [address] [facility] [address]  Phone: [phone] Fax: [phone]      Notes:    Factors facilitating achievement of predicted outcomes: Family support, Friend support, Motivated, Cooperative, Pleasant, Sense of humor, and Good insight into deficits    Barriers to discharge: Medical complications    Additional Case Management Notes: Chart review completed. Spoke with pt at bedside. Pt stated he lives alone and is independent with his ADL's. He drives and works as a realtor. He said his car is in the parking garage so he plans on driving himself home. He denied needs for skilled home care for RN/PT/OT and all SW needs.     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

## 2025-02-06 NOTE — PLAN OF CARE
Problem: Safety - Adult  Goal: Free from fall injury  Outcome: Progressing  Note: Orthostatic vital signs obtained at start of shift - see flowsheet for details.  Pt does not meet criteria for orthostasis.  Pt is a Med fall risk. See Camejo Fall Score and ABCDS Injury Risk assessments.   - Screening for Orthostasis AND not a [place] Risk per CAMEJO/ABCDS: Pt bed is in low position, side rails up, call light and belongings are in reach.  Fall risk light is on outside pts room.  Pt encouraged to call for assistance as needed. Will continue with hourly rounds for PO intake, pain needs, toileting and repositioning as needed.

## 2025-02-06 NOTE — SEDATION DOCUMENTATION
IMAGING SERVICES NURSING PROGRESS NOTE    Procedure:  Liver Bx  February 6, 2025  Caden Singleton      Allergies:    Allergies   Allergen Reactions    Iodine Other (See Comments)     Patient does not remember the reaction. Happened as a child when he used it on a wound.      Mercury Rash    Pcn [Penicillins] Rash       Vitals:    02/06/25 0924   BP: 119/71   Pulse: 85   Resp: 16   Temp:    SpO2: 97%       Recent lab work reviewed with MD: yes   Procedure explained to patient by MD: yes   Informed consent obtained:yes  Family with patient: In pt    Mental Status:  Normal  Readiness to learn:  Yes  Barriers to learning: No    Pain Assessment Pre-Procedure:  Pain Present:  no  Pain Score:  0  Pain Quality/Description:  none    Time out Procedure Verification with:  [x] RN  [x] Physician  [x] Patient  [x] Other: CT Technologist  Procedure site marked, if applicable:  Yes    Note: Patient arrived A & O x 4, denies pain, breathing easily on room air, Spoke to Dr. Chucho Lan prior to procedure.  Procedural sedation: Put pt on 2L of O2 via nasal canula  Fentanyl:  50 mcg  Versed:    1 mg  Post Procedureal Note:  Patient tolerated procedure well.  Breathing easily on room air.  Report given to [facility] RN.  Patient transported in stable conditon to room 3508.    Pain Assessment Post-Procedure:  Pain Present:  no  Pain Score:  0  Pain Quality/Description:  none    Plan of Care Goals:  Safety measures met:  Yes  Patient understands explanation of procedure:  Yes    Time in:  0924  Time out:  0939  Nancy Michelle RN.  R.N. 2/6/2025

## 2025-02-06 NOTE — PROGRESS NOTES
0727: Spoke with MARY Alvarenga in radiology who stated patient would come down around 0900. Stated patient could get all morning medications except for blood thinner.  0744: spoke with SHELTON Clemens about pt's blood glucose. No further orders at this time.   0758: Pt stated labs were already drawn. Verified with lab department, Reina. Notified SHELTON Salinas and IR.  0900: pt down to IR. SHELTON Salinas requested RN to call when pt was back in room.  1005 received call from [facility] Pt was back in room.   1006:  RN called SHELTON Salinas about patient being back in room.  1009: RN to pt room. SHELTON Salinas and MD Jackson rounding on the patient. Patient in chair. After rounds, pt was instructed to stay in bed for 1 hour.  1043: RN notified NP about blood sugar. No further orders at this time.  1100 Patient spoke with his outpatient pharmacy about jakafi refill  1215: down to pharmacy to verify jakafi. JESUS Sahni stated to clarify order with MD. Spoke with SHELTON Clemens about Jakafi order. SHELTON Clemens stated to ask JESUS Rodriguez about Jakafi. Jennifer not in office; voicemail left.  1530: received call from Jennifer while in another patient's room. Spoke with Jennifer in her office about Jakafi order.   1600: Spoke with SHELTON Salinas about Jakafi orders. Orders for jakafi placed.  1628: SHELTON Jara ordered jakafi for today. Previous order unable to be used for current dosage on hand.  1830: 1600 labs dropped from 12.1-11.7. Per order, do not need to notify NP.

## 2025-02-06 NOTE — PRE SEDATION
Patient:  Caden Singleton   :   1960      Relevant clinical history, particularly as it involves the pending procedure, was reviewed and discussed.    The procedure including risks, benefits, and alternatives was discussed at length with the patient (or designated family member) and all questions were answered.  Informed consent to proceed with the procedure was given.    Vital signs were monitored and documented by the Radiology nurse.    Targeted physical examination  Heart : regular rate and rhythm  Lungs : clear, breathing easily  Condition : stable    [facility] nurses notes reviewed and agreed.    Past Medical History:        Diagnosis Date    Arthritis     knees    Cancer (HCC)     AML    History of blood transfusion     Hyperlipidemia     Hypertension        Past Surgical History:           Procedure Laterality Date    COLONOSCOPY      ENDOSCOPY, COLON, DIAGNOSTIC      HERNIA REPAIR      age 3    KNEE ARTHROSCOPY      SKIN BIOPSY      mole       Allergies:  Iodine, Mercury, and Pcn [penicillins]    Medications:   Home Meds  No current facility-administered medications on file prior to encounter.     Current Outpatient Medications on File Prior to Encounter   Medication Sig Dispense Refill    sotalol (BETAPACE) 120 MG tablet Take 0.5 tablets by mouth daily      fluconazole (DIFLUCAN) 200 MG tablet Take 1 tablet by mouth daily      sulfamethoxazole-trimethoprim (BACTRIM;SEPTRA) 400-80 MG per tablet Take 1 tablet by mouth daily      Magnesium 400 MG CAPS Take 400 mg by mouth daily BID      pantoprazole sodium (PROTONIX) 40 MG PACK packet Take 1 packet by mouth every morning (before breakfast)      ursodiol (ACTIGALL) 300 MG capsule Take 500 mg by mouth 2 times daily      sotalol (BETAPACE) 120 MG tablet Take 1 tablet by mouth every 24 hours Pt takes nightly      valACYclovir (VALTREX) 500 MG tablet Take 1 tablet by mouth 2 times daily      voriconazole (VFEND) 200 MG tablet Take 200 mg by mouth 2  times daily (Patient not taking: Reported on 2/5/2025)      amLODIPine (NORVASC) 5 MG tablet Take 5 mg by mouth daily      predniSONE (DELTASONE) 10 MG tablet Take 10 mg by mouth 2 times daily      atovaquone (MEPRON) 750 MG/5ML suspension Take 1,500 mg by mouth daily      hydrOXYzine (ATARAX) 25 MG tablet Take 25 mg by mouth 3 times daily as needed for Itching (Patient not taking: Reported on 2/5/2025)      tacrolimus (PROGRAF) 0.5 MG capsule Using combination of 1mg capsules & 0.5mg capsules, take 4.5mg every 12 hours (Patient taking differently: Take 3 mg by mouth 2 times daily take 3mg every 12 hours, 2mg in evening) 60 capsule 3    dapsone 100 MG tablet Take 1 tablet by mouth daily 30 tablet 3    clarithromycin (BIAXIN) 250 MG tablet Take 1 tablet by mouth daily 30 tablet 3    promethazine (PHENERGAN) 12.5 MG tablet Take 12.5 mg by mouth once         Current Meds  ruxolitinib phosphate (JAKAFI) 5 MG tablet TABS 5 mg (Patient Supplied), BID  0.9 % sodium chloride infusion, Continuous PRN  sodium chloride flush 0.9 % injection 5-40 mL, 2 times per day  sodium chloride flush 0.9 % injection 5-40 mL, PRN  0.9 % sodium chloride infusion, PRN  Saline Mouthwash 15 mL, 4x Daily AC & HS  Saline Mouthwash 15 mL, Q4H PRN  ALTEplase (CATHFLO) 2 mg in sterile water 2 mL injection, PRN  diphenhydrAMINE (BENADRYL) tablet 25 mg, Q6H PRN  0.9 % sodium chloride infusion, Continuous  methylPREDNISolone sodium succ (SOLU-MEDROL) 125 mg in sterile water 2 mL injection, Q12H  glucose chewable tablet 16 g, PRN  dextrose bolus 10% 125 mL, PRN   Or  dextrose bolus 10% 250 mL, PRN  glucagon injection 1 mg, PRN  dextrose 10 % infusion, Continuous PRN  insulin lispro (HUMALOG,ADMELOG) injection vial 0-16 Units, 4x Daily AC & HS  insulin glargine (LANTUS) injection vial 55 Units, Nightly  ursodiol (ACTIGALL) tablet 500 mg, BID  pantoprazole (PROTONIX) tablet 40 mg, QAM AC  potassium chloride (KLOR-CON M) extended release tablet 40 mEq,  PRN   Or  potassium bicarb-citric acid (EFFER-K) effervescent tablet 40 mEq, PRN   Or  potassium chloride 10 mEq/100 mL IVPB (Peripheral Line), PRN  magnesium sulfate 2000 mg in 50 mL IVPB premix, PRN  valACYclovir (VALTREX) tablet 500 mg, BID  sotalol (BETAPACE) tablet 120 mg, Nightly  sotalol (BETAPACE) tablet 60 mg, QAM  magnesium oxide (MAG-OX) tablet 400 mg, Daily          ASA 2 - Patient with mild systemic disease with no functional limitations    II (soft palate, uvula, fauces visible)    Activity:  2 - Able to move 4 extremities voluntarily on command  Respiration:  2 - Able to breathe deeply and cough freely  Circulation:  2 - BP+/- 20mmHg of normal  Consciousness:  2 - Fully awake  Oxygen Saturation (color):  2 - Able to maintain oxygen saturation >92% on room air    Sedation : Moderate sedation planned    HPI / Treatment plan : CT guided non target liver biopsy       Electronically signed by LEANDRA Arnett - CNP on 2/6/25 at 9:11 AM EST

## 2025-02-06 NOTE — BRIEF OP NOTE
Brief Postoperative Note    Caden Singleton  YOB: 1960  [id number]    Pre-operative Diagnosis: Abnormal liver function testing in need of liver biopsy.    Post-operative Diagnosis: Same    Procedure: CT guided biopsy of the left lobe of the liver    Anesthesia: moderate    Surgeons/Assistants: Chucho Lan    Estimated Blood Loss: Minimal    Complications: none    Specimens: were  obtained      Chucho Lan MD MD  2/6/2025

## 2025-02-07 LAB
ALBUMIN SERPL-MCNC: 3.3 G/DL (ref 3.4–5)
ALP SERPL-CCNC: 479 U/L (ref 40–129)
ALT SERPL-CCNC: 238 U/L (ref 10–40)
ANION GAP SERPL CALCULATED.3IONS-SCNC: 10 MMOL/L (ref 3–16)
APTT BLD: 23 SEC (ref 22.1–36.4)
AST SERPL-CCNC: 128 U/L (ref 15–37)
BASOPHILS # BLD: 0 K/UL (ref 0–0.2)
BASOPHILS NFR BLD: 0 %
BILIRUB DIRECT SERPL-MCNC: 6.2 MG/DL (ref 0–0.3)
BILIRUB INDIRECT SERPL-MCNC: 2.3 MG/DL (ref 0–1)
BILIRUB SERPL-MCNC: 8.5 MG/DL (ref 0–1)
BUN SERPL-MCNC: 40 MG/DL (ref 7–20)
CALCIUM SERPL-MCNC: 8.5 MG/DL (ref 8.3–10.6)
CHLORIDE SERPL-SCNC: 100 MMOL/L (ref 99–110)
CO2 SERPL-SCNC: 21 MMOL/L (ref 21–32)
CREAT SERPL-MCNC: 1.4 MG/DL (ref 0.8–1.3)
DACRYOCYTES BLD QL SMEAR: ABNORMAL
DEPRECATED RDW RBC AUTO: 14.5 % (ref 12.4–15.4)
EOSINOPHIL # BLD: 0 K/UL (ref 0–0.6)
EOSINOPHIL NFR BLD: 0 %
EST. AVERAGE GLUCOSE BLD GHB EST-MCNC: 197.3 MG/DL
FIBRINOGEN PPP-MCNC: 457 MG/DL (ref 227–534)
GFR SERPLBLD CREATININE-BSD FMLA CKD-EPI: 55 ML/MIN/{1.73_M2}
GLUCOSE BLD-MCNC: 213 MG/DL (ref 70–99)
GLUCOSE BLD-MCNC: 267 MG/DL (ref 70–99)
GLUCOSE BLD-MCNC: 366 MG/DL (ref 70–99)
GLUCOSE BLD-MCNC: 374 MG/DL (ref 70–99)
GLUCOSE SERPL-MCNC: 349 MG/DL (ref 70–99)
HBA1C MFR BLD: 8.5 %
HCT VFR BLD AUTO: 33.2 % (ref 40.5–52.5)
HGB BLD-MCNC: 10.7 G/DL (ref 13.5–17.5)
INR PPP: 0.95 (ref 0.85–1.15)
LDH SERPL L TO P-CCNC: 203 U/L (ref 100–190)
LYMPHOCYTES # BLD: 0.6 K/UL (ref 1–5.1)
LYMPHOCYTES NFR BLD: 7 %
MACROCYTES BLD QL SMEAR: ABNORMAL
MCH RBC QN AUTO: 31.5 PG (ref 26–34)
MCHC RBC AUTO-ENTMCNC: 32.3 G/DL (ref 31–36)
MCV RBC AUTO: 97.4 FL (ref 80–100)
MICROCYTES BLD QL SMEAR: ABNORMAL
MONOCYTES # BLD: 0.2 K/UL (ref 0–1.3)
MONOCYTES NFR BLD: 2 %
NEUTROPHILS # BLD: 6.8 K/UL (ref 1.7–7.7)
NEUTROPHILS NFR BLD: 90 %
PERFORMED ON: ABNORMAL
PHOSPHATE SERPL-MCNC: 3.1 MG/DL (ref 2.5–4.9)
PLATELET # BLD AUTO: 300 K/UL (ref 135–450)
PMV BLD AUTO: 9.1 FL (ref 5–10.5)
POTASSIUM SERPL-SCNC: 4 MMOL/L (ref 3.5–5.1)
PROT SERPL-MCNC: 6 G/DL (ref 6.4–8.2)
PROTHROMBIN TIME: 12.9 SEC (ref 11.9–14.9)
RBC # BLD AUTO: 3.41 M/UL (ref 4.2–5.9)
SCHISTOCYTES BLD QL SMEAR: ABNORMAL
SODIUM SERPL-SCNC: 131 MMOL/L (ref 136–145)
TARGETS BLD QL SMEAR: ABNORMAL
URATE SERPL-MCNC: 6.2 MG/DL (ref 3.5–7.2)
VARIANT LYMPHS NFR BLD MANUAL: 1 % (ref 0–6)
WBC # BLD AUTO: 7.5 K/UL (ref 4–11)

## 2025-02-07 PROCEDURE — 85730 THROMBOPLASTIN TIME PARTIAL: CPT

## 2025-02-07 PROCEDURE — 6360000002 HC RX W HCPCS: Performed by: INTERNAL MEDICINE

## 2025-02-07 PROCEDURE — 6360000002 HC RX W HCPCS: Performed by: NURSE PRACTITIONER

## 2025-02-07 PROCEDURE — 85025 COMPLETE CBC W/AUTO DIFF WBC: CPT

## 2025-02-07 PROCEDURE — 84550 ASSAY OF BLOOD/URIC ACID: CPT

## 2025-02-07 PROCEDURE — 6370000000 HC RX 637 (ALT 250 FOR IP): Performed by: NURSE PRACTITIONER

## 2025-02-07 PROCEDURE — 36415 COLL VENOUS BLD VENIPUNCTURE: CPT

## 2025-02-07 PROCEDURE — 85384 FIBRINOGEN ACTIVITY: CPT

## 2025-02-07 PROCEDURE — 85610 PROTHROMBIN TIME: CPT

## 2025-02-07 PROCEDURE — 80048 BASIC METABOLIC PNL TOTAL CA: CPT

## 2025-02-07 PROCEDURE — 83615 LACTATE (LD) (LDH) ENZYME: CPT

## 2025-02-07 PROCEDURE — 2500000003 HC RX 250 WO HCPCS: Performed by: INTERNAL MEDICINE

## 2025-02-07 PROCEDURE — 6370000000 HC RX 637 (ALT 250 FOR IP)

## 2025-02-07 PROCEDURE — 2580000003 HC RX 258: Performed by: NURSE PRACTITIONER

## 2025-02-07 PROCEDURE — 2500000003 HC RX 250 WO HCPCS: Performed by: NURSE PRACTITIONER

## 2025-02-07 PROCEDURE — 2060000000 HC ICU INTERMEDIATE R&B

## 2025-02-07 PROCEDURE — 84100 ASSAY OF PHOSPHORUS: CPT

## 2025-02-07 PROCEDURE — 80076 HEPATIC FUNCTION PANEL: CPT

## 2025-02-07 PROCEDURE — 6370000000 HC RX 637 (ALT 250 FOR IP): Performed by: INTERNAL MEDICINE

## 2025-02-07 RX ORDER — INSULIN GLARGINE 100 [IU]/ML
15 INJECTION, SOLUTION SUBCUTANEOUS EVERY MORNING
Status: DISCONTINUED | OUTPATIENT
Start: 2025-02-07 | End: 2025-02-08

## 2025-02-07 RX ORDER — ENOXAPARIN SODIUM 100 MG/ML
30 INJECTION SUBCUTANEOUS 2 TIMES DAILY
Status: DISCONTINUED | OUTPATIENT
Start: 2025-02-07 | End: 2025-02-11 | Stop reason: HOSPADM

## 2025-02-07 RX ORDER — MINERAL OIL AND PETROLATUM 150; 830 MG/G; MG/G
OINTMENT OPHTHALMIC PRN
Status: DISCONTINUED | OUTPATIENT
Start: 2025-02-07 | End: 2025-02-11 | Stop reason: HOSPADM

## 2025-02-07 RX ADMIN — INSULIN LISPRO 8 UNITS: 100 INJECTION, SOLUTION INTRAVENOUS; SUBCUTANEOUS at 21:35

## 2025-02-07 RX ADMIN — SODIUM CHLORIDE 15 ML: 900 IRRIGANT IRRIGATION at 08:37

## 2025-02-07 RX ADMIN — ENOXAPARIN SODIUM 30 MG: 100 INJECTION SUBCUTANEOUS at 21:36

## 2025-02-07 RX ADMIN — SODIUM CHLORIDE 15 ML: 900 IRRIGANT IRRIGATION at 11:42

## 2025-02-07 RX ADMIN — MICAFUNGIN SODIUM 150 MG: 100 INJECTION, POWDER, LYOPHILIZED, FOR SOLUTION INTRAVENOUS at 08:34

## 2025-02-07 RX ADMIN — PANTOPRAZOLE SODIUM 40 MG: 40 TABLET, DELAYED RELEASE ORAL at 06:38

## 2025-02-07 RX ADMIN — MINERAL OIL, PETROLATUM: 425; 573 OINTMENT OPHTHALMIC at 22:23

## 2025-02-07 RX ADMIN — WATER 125 MG: 1 INJECTION INTRAMUSCULAR; INTRAVENOUS; SUBCUTANEOUS at 18:16

## 2025-02-07 RX ADMIN — SOTALOL HYDROCHLORIDE 60 MG: 120 TABLET ORAL at 10:26

## 2025-02-07 RX ADMIN — SODIUM CHLORIDE 15 ML: 900 IRRIGANT IRRIGATION at 15:03

## 2025-02-07 RX ADMIN — VALACYCLOVIR HYDROCHLORIDE 500 MG: 500 TABLET, FILM COATED ORAL at 08:32

## 2025-02-07 RX ADMIN — INSULIN LISPRO 16 UNITS: 100 INJECTION, SOLUTION INTRAVENOUS; SUBCUTANEOUS at 07:47

## 2025-02-07 RX ADMIN — SODIUM CHLORIDE, PRESERVATIVE FREE 10 ML: 5 INJECTION INTRAVENOUS at 20:49

## 2025-02-07 RX ADMIN — ENOXAPARIN SODIUM 30 MG: 100 INJECTION SUBCUTANEOUS at 10:19

## 2025-02-07 RX ADMIN — SULFAMETHOXAZOLE AND TRIMETHOPRIM 1 TABLET: 400; 80 TABLET ORAL at 08:31

## 2025-02-07 RX ADMIN — INSULIN GLARGINE 55 UNITS: 100 INJECTION, SOLUTION SUBCUTANEOUS at 21:36

## 2025-02-07 RX ADMIN — URSODIOL 500 MG: 250 TABLET, FILM COATED ORAL at 08:32

## 2025-02-07 RX ADMIN — SOTALOL HYDROCHLORIDE 120 MG: 120 TABLET ORAL at 20:48

## 2025-02-07 RX ADMIN — INSULIN LISPRO 4 UNITS: 100 INJECTION, SOLUTION INTRAVENOUS; SUBCUTANEOUS at 11:42

## 2025-02-07 RX ADMIN — VALACYCLOVIR HYDROCHLORIDE 500 MG: 500 TABLET, FILM COATED ORAL at 20:48

## 2025-02-07 RX ADMIN — SODIUM CHLORIDE 15 ML: 900 IRRIGANT IRRIGATION at 21:39

## 2025-02-07 RX ADMIN — INSULIN GLARGINE 15 UNITS: 100 INJECTION, SOLUTION SUBCUTANEOUS at 09:08

## 2025-02-07 RX ADMIN — SODIUM CHLORIDE, PRESERVATIVE FREE 10 ML: 5 INJECTION INTRAVENOUS at 08:31

## 2025-02-07 RX ADMIN — WATER 125 MG: 1 INJECTION INTRAMUSCULAR; INTRAVENOUS; SUBCUTANEOUS at 06:35

## 2025-02-07 RX ADMIN — INSULIN LISPRO 16 UNITS: 100 INJECTION, SOLUTION INTRAVENOUS; SUBCUTANEOUS at 16:35

## 2025-02-07 RX ADMIN — Medication 400 MG: at 08:31

## 2025-02-07 RX ADMIN — URSODIOL 500 MG: 250 TABLET, FILM COATED ORAL at 20:48

## 2025-02-07 NOTE — CARE COORDINATION
Attended rounds.     Pt plans on returning home when able with no needs from SW.     SW will follow but anticipates no needs.     Evangelina QUARLES, ELIZABETH   for [facility] [place] ([facility])  [employer]: [phone]

## 2025-02-07 NOTE — CARE COORDINATION
Verified with [facility], patient brother picked  up medication Thadfai today and is bringing it into the patient.     12:45 medication arrived at the pateint room.  RN Abena updated.  NP updated and new order placed.

## 2025-02-07 NOTE — PSYCHOTHERAPY
Behavioral Health Intervention Note    Met with patient briefly. Patient denied behavioral health needs today.     Marta Flood Psy.D., MSCP  Clinical Psychologist

## 2025-02-07 NOTE — PLAN OF CARE
Problem: Safety - Adult  Goal: Free from fall injury  Outcome: Progressing    Pt with activity orders for up ad abebe.  Encouraged pt to be up OOB as much as possible throughout the day and for all meals.  Encouraged frequent short naps as necessary to preserve energy but instructed that while awake, pt should be OOB.    Encouraged pt to ambulate in halls.  Pt seen up ad abebe in halls several times this shift. Pt is visualized to be OOB % of the time this shift.  Will continue to encourage frequent activity.      Problem: Pain  Goal: Verbalizes/displays adequate comfort level or baseline comfort level  Outcome: Progressing   Pt denies pain at this time. Encourage pt to call out with any needs or status changes.     Problem: ABCDS Injury Assessment  Goal: Absence of physical injury  Outcome: Progressing   Patient continues with high blood sugars from steroids. MD aware. Lantus added today.

## 2025-02-07 NOTE — PROGRESS NOTES
[facility] Progress Note    2025     Caden Singleton    MRN: [id number]    : 1960    Referring MD: Phillip Conteh MD  [facility]  [address] [place] [address]      Interval History:  - Liver biopsy yesterday in IR, results pending  - BS poorly controlled, will add morning Lantus.  - No new complaints otherwise.      ECOG PS:  (2) Ambulatory and capable of self care, unable to carry out work activity, up and about > 50% or waking hours    KPS: 80% Normal activity with effort; some signs or symptoms of disease    Isolation: None    Medications    Scheduled Meds:   sulfamethoxazole-trimethoprim  1 tablet Oral Daily    micafungin  150 mg IntraVENous Daily    ruxolitinib phosphate  10 mg Oral Daily    sodium chloride flush  5-40 mL IntraVENous 2 times per day    Saline Mouthwash  15 mL Swish & Spit 4x Daily AC & HS    methylPREDNISolone  125 mg IntraVENous Q12H    insulin lispro  0-16 Units SubCUTAneous 4x Daily AC & HS    insulin glargine  55 Units SubCUTAneous Nightly    ursodiol  500 mg Oral BID    pantoprazole  40 mg Oral QAM AC    valACYclovir  500 mg Oral BID    sotalol  120 mg Oral Nightly    sotalol  60 mg Oral QAM    magnesium oxide  400 mg Oral Daily     Continuous Infusions:   sodium chloride      sodium chloride      sodium chloride 75 mL/hr at 25 1530    dextrose       PRN Meds:.oxyCODONE **OR** oxyCODONE, sodium chloride, sodium chloride flush, sodium chloride, Saline Mouthwash, ALTEplase (CATHFLO) 2 mg in sterile water 2 mL injection, diphenhydrAMINE, glucose, dextrose bolus **OR** dextrose bolus, glucagon (rDNA), dextrose, potassium chloride **OR** potassium alternative oral replacement **OR** potassium chloride, magnesium sulfate    ROS:  As noted above, otherwise remainder of 10-point ROS negative    Physical Exam:     I&O:    Intake/Output Summary (Last 24 hours) at 2025 0736  Last data filed at 2025 0643  Gross per 24 hour   Intake 1156 ml   Output 2900 ml  in NSR  - Cont sotalol 60 mg Q AM and 120 mg Q HS  HTN:    - S/p amlodipine-lisinopril 5/40 mg daily  - S/p Amlodipine 5mg daily (started 12/22/20; stopped 3/18/22 with edema).  - S/p Lisinopril 5 mg daily (follow K closely), d/c 4/14/22 with pruritis  - s/p  hydrochlorothiazide 50 mg daily     10. Diabetes:   - Cont Lantus 55 units nightly  - Start High Dose SSI      11. Fe overload:  - Ferritin 5/30/23: 1128. Repeat 8/30/23: 1514. Repeat Ferritin (9/27/23): 1311, 6/5/24 - ferritin 917 with 37% sat.  - Begin phlebotomy when Hgb > 11 - (started 9/3/21)    - DVT Prophylaxis: Platelets >50,000 cells/dL, - daily lovenox prophylaxis ordered  Contraindications to pharmacologic prophylaxis: None  Contraindications to mechanical prophylaxis: None    - Disposition: Unknown at this time    The patient was seen and examined by Dr. Jackson Jara, APRN - CNP    Kiko Paz MD  [facility]  [phone]

## 2025-02-08 LAB
ALBUMIN SERPL-MCNC: 3.2 G/DL (ref 3.4–5)
ALP SERPL-CCNC: 457 U/L (ref 40–129)
ALT SERPL-CCNC: 252 U/L (ref 10–40)
ANION GAP SERPL CALCULATED.3IONS-SCNC: 11 MMOL/L (ref 3–16)
APTT BLD: 24.9 SEC (ref 22.1–36.4)
AST SERPL-CCNC: 139 U/L (ref 15–37)
BASOPHILS # BLD: 0 K/UL (ref 0–0.2)
BASOPHILS NFR BLD: 0 %
BILIRUB DIRECT SERPL-MCNC: 6.5 MG/DL (ref 0–0.3)
BILIRUB INDIRECT SERPL-MCNC: 2.3 MG/DL (ref 0–1)
BILIRUB SERPL-MCNC: 8.8 MG/DL (ref 0–1)
BUN SERPL-MCNC: 36 MG/DL (ref 7–20)
CALCIUM SERPL-MCNC: 8.5 MG/DL (ref 8.3–10.6)
CHLORIDE SERPL-SCNC: 101 MMOL/L (ref 99–110)
CO2 SERPL-SCNC: 20 MMOL/L (ref 21–32)
CREAT SERPL-MCNC: 1.3 MG/DL (ref 0.8–1.3)
DACRYOCYTES BLD QL SMEAR: ABNORMAL
DEPRECATED RDW RBC AUTO: 15 % (ref 12.4–15.4)
EOSINOPHIL # BLD: 0 K/UL (ref 0–0.6)
EOSINOPHIL NFR BLD: 0 %
FIBRINOGEN PPP-MCNC: 395 MG/DL (ref 227–534)
GFR SERPLBLD CREATININE-BSD FMLA CKD-EPI: 60 ML/MIN/{1.73_M2}
GLUCOSE BLD-MCNC: 260 MG/DL (ref 70–99)
GLUCOSE BLD-MCNC: 262 MG/DL (ref 70–99)
GLUCOSE BLD-MCNC: 292 MG/DL (ref 70–99)
GLUCOSE BLD-MCNC: 351 MG/DL (ref 70–99)
GLUCOSE SERPL-MCNC: 308 MG/DL (ref 70–99)
HCT VFR BLD AUTO: 32.6 % (ref 40.5–52.5)
HGB BLD-MCNC: 10.5 G/DL (ref 13.5–17.5)
INR PPP: 0.93 (ref 0.85–1.15)
LDH SERPL L TO P-CCNC: 192 U/L (ref 100–190)
LYMPHOCYTES # BLD: 0.8 K/UL (ref 1–5.1)
LYMPHOCYTES NFR BLD: 12 %
MACROCYTES BLD QL SMEAR: ABNORMAL
MCH RBC QN AUTO: 31.4 PG (ref 26–34)
MCHC RBC AUTO-ENTMCNC: 32.1 G/DL (ref 31–36)
MCV RBC AUTO: 97.6 FL (ref 80–100)
MONOCYTES # BLD: 0.2 K/UL (ref 0–1.3)
MONOCYTES NFR BLD: 3 %
NEUTROPHILS # BLD: 5.5 K/UL (ref 1.7–7.7)
NEUTROPHILS NFR BLD: 85 %
PERFORMED ON: ABNORMAL
PHOSPHATE SERPL-MCNC: 2.9 MG/DL (ref 2.5–4.9)
PLATELET # BLD AUTO: 277 K/UL (ref 135–450)
PLATELET BLD QL SMEAR: ADEQUATE
PMV BLD AUTO: 8.8 FL (ref 5–10.5)
POTASSIUM SERPL-SCNC: 3.8 MMOL/L (ref 3.5–5.1)
PROT SERPL-MCNC: 5.7 G/DL (ref 6.4–8.2)
PROTHROMBIN TIME: 12.7 SEC (ref 11.9–14.9)
RBC # BLD AUTO: 3.34 M/UL (ref 4.2–5.9)
SLIDE REVIEW: ABNORMAL
SODIUM SERPL-SCNC: 132 MMOL/L (ref 136–145)
STOMATOCYTES BLD QL SMEAR: ABNORMAL
TARGETS BLD QL SMEAR: ABNORMAL
URATE SERPL-MCNC: 5.1 MG/DL (ref 3.5–7.2)
WBC # BLD AUTO: 6.5 K/UL (ref 4–11)

## 2025-02-08 PROCEDURE — 2060000000 HC ICU INTERMEDIATE R&B

## 2025-02-08 PROCEDURE — 6370000000 HC RX 637 (ALT 250 FOR IP): Performed by: NURSE PRACTITIONER

## 2025-02-08 PROCEDURE — 85384 FIBRINOGEN ACTIVITY: CPT

## 2025-02-08 PROCEDURE — 2580000003 HC RX 258: Performed by: NURSE PRACTITIONER

## 2025-02-08 PROCEDURE — 6360000002 HC RX W HCPCS: Performed by: INTERNAL MEDICINE

## 2025-02-08 PROCEDURE — 36592 COLLECT BLOOD FROM PICC: CPT

## 2025-02-08 PROCEDURE — 6360000002 HC RX W HCPCS: Performed by: NURSE PRACTITIONER

## 2025-02-08 PROCEDURE — 6370000000 HC RX 637 (ALT 250 FOR IP): Performed by: INTERNAL MEDICINE

## 2025-02-08 PROCEDURE — 2500000003 HC RX 250 WO HCPCS: Performed by: INTERNAL MEDICINE

## 2025-02-08 PROCEDURE — 80076 HEPATIC FUNCTION PANEL: CPT

## 2025-02-08 PROCEDURE — 85610 PROTHROMBIN TIME: CPT

## 2025-02-08 PROCEDURE — 83615 LACTATE (LD) (LDH) ENZYME: CPT

## 2025-02-08 PROCEDURE — 2500000003 HC RX 250 WO HCPCS: Performed by: NURSE PRACTITIONER

## 2025-02-08 PROCEDURE — 36415 COLL VENOUS BLD VENIPUNCTURE: CPT

## 2025-02-08 PROCEDURE — 85025 COMPLETE CBC W/AUTO DIFF WBC: CPT

## 2025-02-08 PROCEDURE — 6370000000 HC RX 637 (ALT 250 FOR IP)

## 2025-02-08 PROCEDURE — 80048 BASIC METABOLIC PNL TOTAL CA: CPT

## 2025-02-08 PROCEDURE — 84550 ASSAY OF BLOOD/URIC ACID: CPT

## 2025-02-08 PROCEDURE — 85730 THROMBOPLASTIN TIME PARTIAL: CPT

## 2025-02-08 PROCEDURE — 84100 ASSAY OF PHOSPHORUS: CPT

## 2025-02-08 RX ORDER — INSULIN GLARGINE 100 [IU]/ML
35 INJECTION, SOLUTION SUBCUTANEOUS NIGHTLY
Status: DISCONTINUED | OUTPATIENT
Start: 2025-02-08 | End: 2025-02-11 | Stop reason: HOSPADM

## 2025-02-08 RX ORDER — INSULIN GLARGINE 100 [IU]/ML
15 INJECTION, SOLUTION SUBCUTANEOUS ONCE
Status: COMPLETED | OUTPATIENT
Start: 2025-02-08 | End: 2025-02-08

## 2025-02-08 RX ORDER — INSULIN GLARGINE 100 [IU]/ML
35 INJECTION, SOLUTION SUBCUTANEOUS EVERY MORNING
Status: DISCONTINUED | OUTPATIENT
Start: 2025-02-09 | End: 2025-02-11 | Stop reason: HOSPADM

## 2025-02-08 RX ADMIN — SOTALOL HYDROCHLORIDE 60 MG: 120 TABLET ORAL at 08:16

## 2025-02-08 RX ADMIN — INSULIN GLARGINE 15 UNITS: 100 INJECTION, SOLUTION SUBCUTANEOUS at 11:52

## 2025-02-08 RX ADMIN — RUXOLITINIB 5 MG: 5 TABLET ORAL at 21:07

## 2025-02-08 RX ADMIN — INSULIN LISPRO 8 UNITS: 100 INJECTION, SOLUTION INTRAVENOUS; SUBCUTANEOUS at 21:28

## 2025-02-08 RX ADMIN — VALACYCLOVIR HYDROCHLORIDE 500 MG: 500 TABLET, FILM COATED ORAL at 21:05

## 2025-02-08 RX ADMIN — INSULIN GLARGINE 15 UNITS: 100 INJECTION, SOLUTION SUBCUTANEOUS at 08:16

## 2025-02-08 RX ADMIN — SOTALOL HYDROCHLORIDE 120 MG: 120 TABLET ORAL at 21:06

## 2025-02-08 RX ADMIN — PANTOPRAZOLE SODIUM 40 MG: 40 TABLET, DELAYED RELEASE ORAL at 06:13

## 2025-02-08 RX ADMIN — WATER 125 MG: 1 INJECTION INTRAMUSCULAR; INTRAVENOUS; SUBCUTANEOUS at 06:13

## 2025-02-08 RX ADMIN — VALACYCLOVIR HYDROCHLORIDE 500 MG: 500 TABLET, FILM COATED ORAL at 08:16

## 2025-02-08 RX ADMIN — RUXOLITINIB 5 MG: 5 TABLET ORAL at 08:15

## 2025-02-08 RX ADMIN — URSODIOL 500 MG: 250 TABLET, FILM COATED ORAL at 21:05

## 2025-02-08 RX ADMIN — INSULIN LISPRO 16 UNITS: 100 INJECTION, SOLUTION INTRAVENOUS; SUBCUTANEOUS at 17:33

## 2025-02-08 RX ADMIN — URSODIOL 500 MG: 250 TABLET, FILM COATED ORAL at 08:16

## 2025-02-08 RX ADMIN — WATER 125 MG: 1 INJECTION INTRAMUSCULAR; INTRAVENOUS; SUBCUTANEOUS at 17:33

## 2025-02-08 RX ADMIN — ENOXAPARIN SODIUM 30 MG: 100 INJECTION SUBCUTANEOUS at 08:16

## 2025-02-08 RX ADMIN — INSULIN LISPRO 8 UNITS: 100 INJECTION, SOLUTION INTRAVENOUS; SUBCUTANEOUS at 11:51

## 2025-02-08 RX ADMIN — ENOXAPARIN SODIUM 30 MG: 100 INJECTION SUBCUTANEOUS at 21:05

## 2025-02-08 RX ADMIN — Medication 400 MG: at 08:16

## 2025-02-08 RX ADMIN — INSULIN LISPRO 8 UNITS: 100 INJECTION, SOLUTION INTRAVENOUS; SUBCUTANEOUS at 08:15

## 2025-02-08 RX ADMIN — SULFAMETHOXAZOLE AND TRIMETHOPRIM 1 TABLET: 400; 80 TABLET ORAL at 08:16

## 2025-02-08 RX ADMIN — SODIUM CHLORIDE, PRESERVATIVE FREE 10 ML: 5 INJECTION INTRAVENOUS at 08:16

## 2025-02-08 RX ADMIN — MICAFUNGIN SODIUM 150 MG: 100 INJECTION, POWDER, LYOPHILIZED, FOR SOLUTION INTRAVENOUS at 08:23

## 2025-02-08 RX ADMIN — INSULIN GLARGINE 35 UNITS: 100 INJECTION, SOLUTION SUBCUTANEOUS at 21:19

## 2025-02-08 ASSESSMENT — PAIN SCALES - GENERAL: PAINLEVEL_OUTOF10: 0

## 2025-02-08 NOTE — PROGRESS NOTES
[facility] Progress Note    2025     Caden Singleton    MRN: [id number]    : 1960    Referring MD: Phillip Conteh MD  [facility] [address] [place] [address]      Interval History:  - Liver biopsy yesterday in IR, results pending  - BS poorly controlled, will add morning Lantus.  - No new complaints otherwise.      ECOG PS:  (2) Ambulatory and capable of self care, unable to carry out work activity, up and about > 50% or waking hours    KPS: 80% Normal activity with effort; some signs or symptoms of disease    Isolation: None    Medications    Scheduled Meds:   insulin glargine  15 Units SubCUTAneous QAM    enoxaparin  30 mg SubCUTAneous BID    [START ON 2025] methylPREDNISolone  90 mg IntraVENous Q12H    ruxolitinib phosphate  5 mg Oral BID    sulfamethoxazole-trimethoprim  1 tablet Oral Daily    micafungin  150 mg IntraVENous Daily    sodium chloride flush  5-40 mL IntraVENous 2 times per day    Saline Mouthwash  15 mL Swish & Spit 4x Daily AC & HS    methylPREDNISolone  125 mg IntraVENous Q12H    insulin lispro  0-16 Units SubCUTAneous 4x Daily AC & HS    insulin glargine  55 Units SubCUTAneous Nightly    ursodiol  500 mg Oral BID    pantoprazole  40 mg Oral QAM AC    valACYclovir  500 mg Oral BID    sotalol  120 mg Oral Nightly    sotalol  60 mg Oral QAM    magnesium oxide  400 mg Oral Daily     Continuous Infusions:   sodium chloride      sodium chloride      dextrose       PRN Meds:.artificial tears, oxyCODONE **OR** oxyCODONE, sodium chloride, sodium chloride flush, sodium chloride, Saline Mouthwash, ALTEplase (CATHFLO) 2 mg in sterile water 2 mL injection, diphenhydrAMINE, glucose, dextrose bolus **OR** dextrose bolus, glucagon (rDNA), dextrose, potassium chloride **OR** potassium alternative oral replacement **OR** potassium chloride, magnesium sulfate    ROS:  As noted above, otherwise remainder of 10-point ROS negative    Physical Exam:     I&O:    Intake/Output Summary

## 2025-02-08 NOTE — PLAN OF CARE
Problem: Safety - Adult  Goal: Free from fall injury  2/8/2025 1458 by Joycelyn Carmona, RN  Outcome: Progressing  Flowsheets (Taken 2/8/2025 1458)  Free From Fall Injury: Instruct family/caregiver on patient safety     Problem: ABCDS Injury Assessment  Goal: Absence of physical injury  Outcome: Progressing  Flowsheets (Taken 2/8/2025 1458)  Absence of Physical Injury: Implement safety measures based on patient assessment     Problem: Pain  Goal: Verbalizes/displays adequate comfort level or baseline comfort level  2/8/2025 1458 by Joycelyn Carmona, RN  Outcome: Progressing  Flowsheets (Taken 2/8/2025 1458)  Verbalizes/displays adequate comfort level or baseline comfort level: Encourage patient to monitor pain and request assistance

## 2025-02-08 NOTE — PLAN OF CARE
Problem: Safety - Adult  Goal: Free from fall injury  2/8/2025 0323 by Rita Marc RN  Outcome: Progressing  Note: Orthostatic vital signs obtained at start of shift - see flowsheet for details.  Pt does not meet criteria for orthostasis.  Pt is a Med fall risk. See Camejo Fall Score and ABCDS Injury Risk assessments.   - Screening for Orthostasis AND not a [place] Risk per CAMEJO/ABCDS: Pt bed is in low position, side rails up, call light and belongings are in reach.  Fall risk light is on outside pts room.  Pt encouraged to call for assistance as needed. Will continue with hourly rounds for PO intake, pain needs, toileting and repositioning as needed.       Problem: Pain  Goal: Verbalizes/displays adequate comfort level or baseline comfort level  2/8/2025 0323 by Rita Marc RN  Outcome: Progressing  Note: Patient denies pain.

## 2025-02-09 LAB
ALBUMIN SERPL-MCNC: 3.3 G/DL (ref 3.4–5)
ALP SERPL-CCNC: 446 U/L (ref 40–129)
ALT SERPL-CCNC: 270 U/L (ref 10–40)
ANION GAP SERPL CALCULATED.3IONS-SCNC: 11 MMOL/L (ref 3–16)
APTT BLD: 25.3 SEC (ref 22.1–36.4)
AST SERPL-CCNC: 134 U/L (ref 15–37)
BASOPHILS # BLD: 0 K/UL (ref 0–0.2)
BASOPHILS NFR BLD: 0 %
BILIRUB DIRECT SERPL-MCNC: 5.7 MG/DL (ref 0–0.3)
BILIRUB INDIRECT SERPL-MCNC: 2.1 MG/DL (ref 0–1)
BILIRUB SERPL-MCNC: 7.8 MG/DL (ref 0–1)
BUN SERPL-MCNC: 33 MG/DL (ref 7–20)
CALCIUM SERPL-MCNC: 8.6 MG/DL (ref 8.3–10.6)
CHLORIDE SERPL-SCNC: 100 MMOL/L (ref 99–110)
CO2 SERPL-SCNC: 23 MMOL/L (ref 21–32)
CREAT SERPL-MCNC: 1.1 MG/DL (ref 0.8–1.3)
DEPRECATED RDW RBC AUTO: 15 % (ref 12.4–15.4)
EOSINOPHIL # BLD: 0 K/UL (ref 0–0.6)
EOSINOPHIL NFR BLD: 0 %
FIBRINOGEN PPP-MCNC: 358 MG/DL (ref 227–534)
GFR SERPLBLD CREATININE-BSD FMLA CKD-EPI: 74 ML/MIN/{1.73_M2}
GLUCOSE BLD-MCNC: 163 MG/DL (ref 70–99)
GLUCOSE BLD-MCNC: 243 MG/DL (ref 70–99)
GLUCOSE BLD-MCNC: 398 MG/DL (ref 70–99)
GLUCOSE BLD-MCNC: 435 MG/DL (ref 70–99)
GLUCOSE SERPL-MCNC: 149 MG/DL (ref 70–99)
HCT VFR BLD AUTO: 34.1 % (ref 40.5–52.5)
HGB BLD-MCNC: 11 G/DL (ref 13.5–17.5)
INR PPP: 0.9 (ref 0.85–1.15)
LDH SERPL L TO P-CCNC: 230 U/L (ref 100–190)
LYMPHOCYTES # BLD: 0.9 K/UL (ref 1–5.1)
LYMPHOCYTES NFR BLD: 13 %
MCH RBC QN AUTO: 31.6 PG (ref 26–34)
MCHC RBC AUTO-ENTMCNC: 32.3 G/DL (ref 31–36)
MCV RBC AUTO: 97.7 FL (ref 80–100)
METAMYELOCYTES NFR BLD MANUAL: 1 %
MICROCYTES BLD QL SMEAR: ABNORMAL
MISCELLANEOUS LAB TEST ORDER: NORMAL
MONOCYTES # BLD: 0.5 K/UL (ref 0–1.3)
MONOCYTES NFR BLD: 7 %
NEUTROPHILS # BLD: 5.5 K/UL (ref 1.7–7.7)
NEUTROPHILS NFR BLD: 78 %
NEUTS BAND NFR BLD MANUAL: 1 % (ref 0–7)
PERFORMED ON: ABNORMAL
PHOSPHATE SERPL-MCNC: 2.9 MG/DL (ref 2.5–4.9)
PLATELET # BLD AUTO: 291 K/UL (ref 135–450)
PLATELET BLD QL SMEAR: ADEQUATE
PMV BLD AUTO: 9.4 FL (ref 5–10.5)
POTASSIUM SERPL-SCNC: 3.7 MMOL/L (ref 3.5–5.1)
PROT SERPL-MCNC: 5.9 G/DL (ref 6.4–8.2)
PROTHROMBIN TIME: 12.4 SEC (ref 11.9–14.9)
RBC # BLD AUTO: 3.49 M/UL (ref 4.2–5.9)
SCHISTOCYTES BLD QL SMEAR: ABNORMAL
SLIDE REVIEW: ABNORMAL
SODIUM SERPL-SCNC: 134 MMOL/L (ref 136–145)
STOMATOCYTES BLD QL SMEAR: ABNORMAL
TARGETS BLD QL SMEAR: ABNORMAL
URATE SERPL-MCNC: 4.6 MG/DL (ref 3.5–7.2)
WBC # BLD AUTO: 6.9 K/UL (ref 4–11)

## 2025-02-09 PROCEDURE — 2500000003 HC RX 250 WO HCPCS: Performed by: NURSE PRACTITIONER

## 2025-02-09 PROCEDURE — 80076 HEPATIC FUNCTION PANEL: CPT

## 2025-02-09 PROCEDURE — 2060000000 HC ICU INTERMEDIATE R&B

## 2025-02-09 PROCEDURE — 6360000002 HC RX W HCPCS: Performed by: INTERNAL MEDICINE

## 2025-02-09 PROCEDURE — 6370000000 HC RX 637 (ALT 250 FOR IP)

## 2025-02-09 PROCEDURE — 6370000000 HC RX 637 (ALT 250 FOR IP): Performed by: NURSE PRACTITIONER

## 2025-02-09 PROCEDURE — 85730 THROMBOPLASTIN TIME PARTIAL: CPT

## 2025-02-09 PROCEDURE — 84550 ASSAY OF BLOOD/URIC ACID: CPT

## 2025-02-09 PROCEDURE — 85384 FIBRINOGEN ACTIVITY: CPT

## 2025-02-09 PROCEDURE — 85610 PROTHROMBIN TIME: CPT

## 2025-02-09 PROCEDURE — 84100 ASSAY OF PHOSPHORUS: CPT

## 2025-02-09 PROCEDURE — 80048 BASIC METABOLIC PNL TOTAL CA: CPT

## 2025-02-09 PROCEDURE — 2500000003 HC RX 250 WO HCPCS: Performed by: INTERNAL MEDICINE

## 2025-02-09 PROCEDURE — 85025 COMPLETE CBC W/AUTO DIFF WBC: CPT

## 2025-02-09 PROCEDURE — 6360000002 HC RX W HCPCS: Performed by: NURSE PRACTITIONER

## 2025-02-09 PROCEDURE — 36415 COLL VENOUS BLD VENIPUNCTURE: CPT

## 2025-02-09 PROCEDURE — 83615 LACTATE (LD) (LDH) ENZYME: CPT

## 2025-02-09 PROCEDURE — 2580000003 HC RX 258: Performed by: NURSE PRACTITIONER

## 2025-02-09 RX ORDER — ZOLPIDEM TARTRATE 5 MG/1
5 TABLET ORAL NIGHTLY PRN
Status: DISCONTINUED | OUTPATIENT
Start: 2025-02-09 | End: 2025-02-11 | Stop reason: HOSPADM

## 2025-02-09 RX ADMIN — SOTALOL HYDROCHLORIDE 60 MG: 120 TABLET ORAL at 08:20

## 2025-02-09 RX ADMIN — VALACYCLOVIR HYDROCHLORIDE 500 MG: 500 TABLET, FILM COATED ORAL at 08:05

## 2025-02-09 RX ADMIN — ENOXAPARIN SODIUM 30 MG: 100 INJECTION SUBCUTANEOUS at 08:05

## 2025-02-09 RX ADMIN — INSULIN GLARGINE 35 UNITS: 100 INJECTION, SOLUTION SUBCUTANEOUS at 08:07

## 2025-02-09 RX ADMIN — PANTOPRAZOLE SODIUM 40 MG: 40 TABLET, DELAYED RELEASE ORAL at 06:34

## 2025-02-09 RX ADMIN — SODIUM CHLORIDE, PRESERVATIVE FREE 10 ML: 5 INJECTION INTRAVENOUS at 08:06

## 2025-02-09 RX ADMIN — MICAFUNGIN SODIUM 150 MG: 100 INJECTION, POWDER, LYOPHILIZED, FOR SOLUTION INTRAVENOUS at 08:17

## 2025-02-09 RX ADMIN — SODIUM CHLORIDE, PRESERVATIVE FREE 10 ML: 5 INJECTION INTRAVENOUS at 02:13

## 2025-02-09 RX ADMIN — RUXOLITINIB 5 MG: 5 TABLET ORAL at 08:08

## 2025-02-09 RX ADMIN — SODIUM CHLORIDE 15 ML: 900 IRRIGANT IRRIGATION at 02:13

## 2025-02-09 RX ADMIN — ENOXAPARIN SODIUM 30 MG: 100 INJECTION SUBCUTANEOUS at 20:47

## 2025-02-09 RX ADMIN — RUXOLITINIB 5 MG: 5 TABLET ORAL at 20:46

## 2025-02-09 RX ADMIN — URSODIOL 500 MG: 250 TABLET, FILM COATED ORAL at 20:47

## 2025-02-09 RX ADMIN — VALACYCLOVIR HYDROCHLORIDE 500 MG: 500 TABLET, FILM COATED ORAL at 20:47

## 2025-02-09 RX ADMIN — URSODIOL 500 MG: 250 TABLET, FILM COATED ORAL at 08:05

## 2025-02-09 RX ADMIN — SOTALOL HYDROCHLORIDE 120 MG: 120 TABLET ORAL at 20:46

## 2025-02-09 RX ADMIN — Medication 400 MG: at 08:05

## 2025-02-09 RX ADMIN — INSULIN LISPRO 4 UNITS: 100 INJECTION, SOLUTION INTRAVENOUS; SUBCUTANEOUS at 11:56

## 2025-02-09 RX ADMIN — SULFAMETHOXAZOLE AND TRIMETHOPRIM 1 TABLET: 400; 80 TABLET ORAL at 08:05

## 2025-02-09 RX ADMIN — INSULIN GLARGINE 35 UNITS: 100 INJECTION, SOLUTION SUBCUTANEOUS at 20:47

## 2025-02-09 RX ADMIN — INSULIN LISPRO 16 UNITS: 100 INJECTION, SOLUTION INTRAVENOUS; SUBCUTANEOUS at 16:57

## 2025-02-09 RX ADMIN — INSULIN LISPRO 16 UNITS: 100 INJECTION, SOLUTION INTRAVENOUS; SUBCUTANEOUS at 20:57

## 2025-02-09 RX ADMIN — WATER 90 MG: 1 INJECTION INTRAMUSCULAR; INTRAVENOUS; SUBCUTANEOUS at 20:48

## 2025-02-09 RX ADMIN — WATER 90 MG: 1 INJECTION INTRAMUSCULAR; INTRAVENOUS; SUBCUTANEOUS at 08:06

## 2025-02-09 NOTE — PROGRESS NOTES
Dr. DEEDEE Andrews notified of blood glucose of 398 at 1623. MD instructed RN to give sliding scale insulin- no new orders at this time.

## 2025-02-09 NOTE — PROGRESS NOTES
[facility] Progress Note    2025     Caden Singleton    MRN: [id number]    : 1960    Referring MD: Phillip Conteh MD  [facility] [address] [place] [address]      Interval History:  - Liver biopsy yesterday in IR, results pending  - BS improving this morning    ECOG PS:  (2) Ambulatory and capable of self care, unable to carry out work activity, up and about > 50% or waking hours    KPS: 80% Normal activity with effort; some signs or symptoms of disease    Isolation: None    Medications    Scheduled Meds:   insulin glargine  35 Units SubCUTAneous Nightly    insulin glargine  35 Units SubCUTAneous QAM    enoxaparin  30 mg SubCUTAneous BID    methylPREDNISolone  90 mg IntraVENous Q12H    ruxolitinib phosphate  5 mg Oral BID    sulfamethoxazole-trimethoprim  1 tablet Oral Daily    micafungin  150 mg IntraVENous Daily    sodium chloride flush  5-40 mL IntraVENous 2 times per day    Saline Mouthwash  15 mL Swish & Spit 4x Daily AC & HS    insulin lispro  0-16 Units SubCUTAneous 4x Daily AC & HS    ursodiol  500 mg Oral BID    pantoprazole  40 mg Oral QAM AC    valACYclovir  500 mg Oral BID    sotalol  120 mg Oral Nightly    sotalol  60 mg Oral QAM    magnesium oxide  400 mg Oral Daily     Continuous Infusions:   sodium chloride      sodium chloride      dextrose       PRN Meds:.artificial tears, oxyCODONE **OR** oxyCODONE, sodium chloride, sodium chloride flush, sodium chloride, Saline Mouthwash, ALTEplase (CATHFLO) 2 mg in sterile water 2 mL injection, diphenhydrAMINE, glucose, dextrose bolus **OR** dextrose bolus, glucagon (rDNA), dextrose, potassium chloride **OR** potassium alternative oral replacement **OR** potassium chloride, magnesium sulfate    ROS:  As noted above, otherwise remainder of 10-point ROS negative    Physical Exam:     I&O:    Intake/Output Summary (Last 24 hours) at 2025 0852  Last data filed at 2025 0638  Gross per 24 hour   Intake 1500 ml   Output 3650 ml  Echo (9/29/20) -  LVEF 60-65% w/ left atrial dilatation  A. Fib:  Currently in NSR  - Cont sotalol 60 mg Q AM and 120 mg Q HS  HTN:    - S/p amlodipine-lisinopril 5/40 mg daily  - S/p Amlodipine 5mg daily (started 12/22/20; stopped 3/18/22 with edema).  - S/p Lisinopril 5 mg daily (follow K closely), d/c 4/14/22 with pruritis  - s/p  hydrochlorothiazide 50 mg daily     10. Diabetes:   - Cont Lantus 55 units nightly  - Start High Dose SSI      11. Fe overload:  - Ferritin 5/30/23: 1128. Repeat 8/30/23: 1514. Repeat Ferritin (9/27/23): 1311, 6/5/24 - ferritin 917 with 37% sat.  - Begin phlebotomy when Hgb > 11 - (started 9/3/21)    12. Insomnia:  - 2/2 Steroids  - PRN ambien for sleep    - DVT Prophylaxis: Platelets >50,000 cells/dL, - daily lovenox prophylaxis ordered  Contraindications to pharmacologic prophylaxis: None  Contraindications to mechanical prophylaxis: None    - Disposition: Unknown at this time    The patient was seen and examined by Dr. Andrews.    Lily Jara APRN - CNP

## 2025-02-09 NOTE — PLAN OF CARE
Problem: Safety - Adult  Goal: Free from fall injury  Outcome: Progressing  Orthostatic vital signs obtained at start of shift - see flowsheet for details.  Pt does not meet criteria for orthostasis.  Pt is a Med fall risk. See Nation Fall Score and ABCDS Injury Risk assessments. Pt bed is in low position, side rails up, call light and belongings are in reach.  Fall risk light is on outside pts room.  Pt encouraged to call for assistance as needed. Will continue with hourly rounds for PO intake, pain needs, toileting and repositioning as needed.       Problem: ABCDS Injury Assessment  Goal: Absence of physical injury  Outcome: Progressing  No new physical injuries noted.      Problem: Pain  Goal: Verbalizes/displays adequate comfort level or baseline comfort level  Outcome: Progressing  Pt did not report pain this shift. Pt educated on importance of calling for pain meds when in pain. Pt verbalized understanding.

## 2025-02-10 ENCOUNTER — APPOINTMENT (OUTPATIENT)
Dept: GENERAL RADIOLOGY | Age: 65
DRG: 441 | End: 2025-02-10
Attending: INTERNAL MEDICINE
Payer: MEDICARE

## 2025-02-10 LAB
ALBUMIN SERPL-MCNC: 3 G/DL (ref 3.4–5)
ALP SERPL-CCNC: 440 U/L (ref 40–129)
ALT SERPL-CCNC: 266 U/L (ref 10–40)
ANION GAP SERPL CALCULATED.3IONS-SCNC: 11 MMOL/L (ref 3–16)
ANISOCYTOSIS BLD QL SMEAR: ABNORMAL
APTT BLD: 23.7 SEC (ref 22.1–36.4)
AST SERPL-CCNC: 114 U/L (ref 15–37)
BACTERIA URNS QL MICRO: ABNORMAL /HPF
BASOPHILS # BLD: 0 K/UL (ref 0–0.2)
BASOPHILS NFR BLD: 0 %
BILIRUB DIRECT SERPL-MCNC: 4.6 MG/DL (ref 0–0.3)
BILIRUB INDIRECT SERPL-MCNC: 1.5 MG/DL (ref 0–1)
BILIRUB SERPL-MCNC: 6.1 MG/DL (ref 0–1)
BILIRUB UR QL STRIP.AUTO: ABNORMAL
BUN SERPL-MCNC: 38 MG/DL (ref 7–20)
CALCIUM SERPL-MCNC: 8.4 MG/DL (ref 8.3–10.6)
CHLORIDE SERPL-SCNC: 99 MMOL/L (ref 99–110)
CLARITY UR: CLEAR
CO2 SERPL-SCNC: 22 MMOL/L (ref 21–32)
COLOR UR: YELLOW
CREAT SERPL-MCNC: 1.1 MG/DL (ref 0.8–1.3)
DACRYOCYTES BLD QL SMEAR: ABNORMAL
DEPRECATED RDW RBC AUTO: 15.2 % (ref 12.4–15.4)
EOSINOPHIL # BLD: 0 K/UL (ref 0–0.6)
EOSINOPHIL NFR BLD: 0 %
EPI CELLS #/AREA URNS HPF: ABNORMAL /HPF (ref 0–5)
FIBRINOGEN PPP-MCNC: 316 MG/DL (ref 227–534)
GFR SERPLBLD CREATININE-BSD FMLA CKD-EPI: 74 ML/MIN/{1.73_M2}
GLUCOSE BLD-MCNC: 193 MG/DL (ref 70–99)
GLUCOSE BLD-MCNC: 240 MG/DL (ref 70–99)
GLUCOSE BLD-MCNC: 314 MG/DL (ref 70–99)
GLUCOSE BLD-MCNC: 319 MG/DL (ref 70–99)
GLUCOSE SERPL-MCNC: 152 MG/DL (ref 70–99)
GLUCOSE UR STRIP.AUTO-MCNC: 100 MG/DL
HCT VFR BLD AUTO: 37.6 % (ref 40.5–52.5)
HGB BLD-MCNC: 11.9 G/DL (ref 13.5–17.5)
HGB UR QL STRIP.AUTO: NEGATIVE
INR PPP: 0.88 (ref 0.85–1.15)
KETONES UR STRIP.AUTO-MCNC: NEGATIVE MG/DL
LDH SERPL L TO P-CCNC: 229 U/L (ref 100–190)
LEUKOCYTE ESTERASE UR QL STRIP.AUTO: NEGATIVE
LYMPHOCYTES # BLD: 2 K/UL (ref 1–5.1)
LYMPHOCYTES NFR BLD: 26 %
MACROCYTES BLD QL SMEAR: ABNORMAL
MAGNESIUM SERPL-MCNC: 1.93 MG/DL (ref 1.8–2.4)
MCH RBC QN AUTO: 31 PG (ref 26–34)
MCHC RBC AUTO-ENTMCNC: 31.6 G/DL (ref 31–36)
MCV RBC AUTO: 98.1 FL (ref 80–100)
MONOCYTES # BLD: 0.1 K/UL (ref 0–1.3)
MONOCYTES NFR BLD: 2 %
NEUTROPHILS # BLD: 4.7 K/UL (ref 1.7–7.7)
NEUTROPHILS NFR BLD: 69 %
NITRITE UR QL STRIP.AUTO: NEGATIVE
PERFORMED ON: ABNORMAL
PH UR STRIP.AUTO: 6.5 [PH] (ref 5–8)
PHOSPHATE SERPL-MCNC: 3.3 MG/DL (ref 2.5–4.9)
PLATELET # BLD AUTO: 313 K/UL (ref 135–450)
PMV BLD AUTO: 9.1 FL (ref 5–10.5)
POLYCHROMASIA BLD QL SMEAR: ABNORMAL
POTASSIUM SERPL-SCNC: 3.5 MMOL/L (ref 3.5–5.1)
PROT SERPL-MCNC: 5.7 G/DL (ref 6.4–8.2)
PROT UR STRIP.AUTO-MCNC: ABNORMAL MG/DL
PROTHROMBIN TIME: 12.2 SEC (ref 11.9–14.9)
RBC # BLD AUTO: 3.83 M/UL (ref 4.2–5.9)
RBC #/AREA URNS HPF: ABNORMAL /HPF (ref 0–4)
SCHISTOCYTES BLD QL SMEAR: ABNORMAL
SODIUM SERPL-SCNC: 132 MMOL/L (ref 136–145)
SP GR UR STRIP.AUTO: 1.02 (ref 1–1.03)
STOMATOCYTES BLD QL SMEAR: ABNORMAL
TARGETS BLD QL SMEAR: ABNORMAL
UA DIPSTICK W REFLEX MICRO PNL UR: YES
URATE SERPL-MCNC: 4.9 MG/DL (ref 3.5–7.2)
URN SPEC COLLECT METH UR: ABNORMAL
UROBILINOGEN UR STRIP-ACNC: 1 E.U./DL
VARIANT LYMPHS NFR BLD MANUAL: 3 % (ref 0–6)
WBC # BLD AUTO: 6.8 K/UL (ref 4–11)
WBC #/AREA URNS HPF: ABNORMAL /HPF (ref 0–5)

## 2025-02-10 PROCEDURE — 84100 ASSAY OF PHOSPHORUS: CPT

## 2025-02-10 PROCEDURE — 85384 FIBRINOGEN ACTIVITY: CPT

## 2025-02-10 PROCEDURE — 2500000003 HC RX 250 WO HCPCS: Performed by: INTERNAL MEDICINE

## 2025-02-10 PROCEDURE — 6370000000 HC RX 637 (ALT 250 FOR IP): Performed by: NURSE PRACTITIONER

## 2025-02-10 PROCEDURE — 2060000000 HC ICU INTERMEDIATE R&B

## 2025-02-10 PROCEDURE — 2580000003 HC RX 258: Performed by: NURSE PRACTITIONER

## 2025-02-10 PROCEDURE — 83735 ASSAY OF MAGNESIUM: CPT

## 2025-02-10 PROCEDURE — 85730 THROMBOPLASTIN TIME PARTIAL: CPT

## 2025-02-10 PROCEDURE — 6360000002 HC RX W HCPCS: Performed by: NURSE PRACTITIONER

## 2025-02-10 PROCEDURE — 71045 X-RAY EXAM CHEST 1 VIEW: CPT

## 2025-02-10 PROCEDURE — 36415 COLL VENOUS BLD VENIPUNCTURE: CPT

## 2025-02-10 PROCEDURE — 84550 ASSAY OF BLOOD/URIC ACID: CPT

## 2025-02-10 PROCEDURE — 6370000000 HC RX 637 (ALT 250 FOR IP)

## 2025-02-10 PROCEDURE — 6360000002 HC RX W HCPCS: Performed by: INTERNAL MEDICINE

## 2025-02-10 PROCEDURE — 85025 COMPLETE CBC W/AUTO DIFF WBC: CPT

## 2025-02-10 PROCEDURE — 83615 LACTATE (LD) (LDH) ENZYME: CPT

## 2025-02-10 PROCEDURE — 85610 PROTHROMBIN TIME: CPT

## 2025-02-10 PROCEDURE — 80048 BASIC METABOLIC PNL TOTAL CA: CPT

## 2025-02-10 PROCEDURE — 81001 URINALYSIS AUTO W/SCOPE: CPT

## 2025-02-10 PROCEDURE — 2500000003 HC RX 250 WO HCPCS: Performed by: NURSE PRACTITIONER

## 2025-02-10 PROCEDURE — 80076 HEPATIC FUNCTION PANEL: CPT

## 2025-02-10 RX ORDER — 0.9 % SODIUM CHLORIDE 0.9 %
1000 INTRAVENOUS SOLUTION INTRAVENOUS ONCE
Status: COMPLETED | OUTPATIENT
Start: 2025-02-10 | End: 2025-02-11

## 2025-02-10 RX ADMIN — INSULIN LISPRO 12 UNITS: 100 INJECTION, SOLUTION INTRAVENOUS; SUBCUTANEOUS at 11:34

## 2025-02-10 RX ADMIN — URSODIOL 500 MG: 250 TABLET, FILM COATED ORAL at 20:54

## 2025-02-10 RX ADMIN — URSODIOL 500 MG: 250 TABLET, FILM COATED ORAL at 08:55

## 2025-02-10 RX ADMIN — INSULIN LISPRO 12 UNITS: 100 INJECTION, SOLUTION INTRAVENOUS; SUBCUTANEOUS at 16:52

## 2025-02-10 RX ADMIN — RUXOLITINIB 5 MG: 5 TABLET ORAL at 08:56

## 2025-02-10 RX ADMIN — MICAFUNGIN SODIUM 150 MG: 100 INJECTION, POWDER, LYOPHILIZED, FOR SOLUTION INTRAVENOUS at 08:53

## 2025-02-10 RX ADMIN — SODIUM CHLORIDE 15 ML: 900 IRRIGANT IRRIGATION at 16:53

## 2025-02-10 RX ADMIN — SODIUM CHLORIDE 1000 ML: 9 INJECTION, SOLUTION INTRAVENOUS at 22:20

## 2025-02-10 RX ADMIN — INSULIN LISPRO 4 UNITS: 100 INJECTION, SOLUTION INTRAVENOUS; SUBCUTANEOUS at 07:46

## 2025-02-10 RX ADMIN — VALACYCLOVIR HYDROCHLORIDE 500 MG: 500 TABLET, FILM COATED ORAL at 20:54

## 2025-02-10 RX ADMIN — SODIUM CHLORIDE, PRESERVATIVE FREE 10 ML: 5 INJECTION INTRAVENOUS at 08:55

## 2025-02-10 RX ADMIN — INSULIN LISPRO 4 UNITS: 100 INJECTION, SOLUTION INTRAVENOUS; SUBCUTANEOUS at 21:10

## 2025-02-10 RX ADMIN — INSULIN GLARGINE 35 UNITS: 100 INJECTION, SOLUTION SUBCUTANEOUS at 20:40

## 2025-02-10 RX ADMIN — ENOXAPARIN SODIUM 30 MG: 100 INJECTION SUBCUTANEOUS at 08:56

## 2025-02-10 RX ADMIN — SODIUM CHLORIDE 15 ML: 900 IRRIGANT IRRIGATION at 11:34

## 2025-02-10 RX ADMIN — VALACYCLOVIR HYDROCHLORIDE 500 MG: 500 TABLET, FILM COATED ORAL at 08:55

## 2025-02-10 RX ADMIN — SOTALOL HYDROCHLORIDE 60 MG: 120 TABLET ORAL at 08:55

## 2025-02-10 RX ADMIN — ENOXAPARIN SODIUM 30 MG: 100 INJECTION SUBCUTANEOUS at 20:39

## 2025-02-10 RX ADMIN — Medication 400 MG: at 08:55

## 2025-02-10 RX ADMIN — WATER 90 MG: 1 INJECTION INTRAMUSCULAR; INTRAVENOUS; SUBCUTANEOUS at 08:44

## 2025-02-10 RX ADMIN — SODIUM CHLORIDE, PRESERVATIVE FREE 10 ML: 5 INJECTION INTRAVENOUS at 20:55

## 2025-02-10 RX ADMIN — RUXOLITINIB 5 MG: 5 TABLET ORAL at 20:46

## 2025-02-10 RX ADMIN — PANTOPRAZOLE SODIUM 40 MG: 40 TABLET, DELAYED RELEASE ORAL at 07:09

## 2025-02-10 RX ADMIN — WATER 90 MG: 1 INJECTION INTRAMUSCULAR; INTRAVENOUS; SUBCUTANEOUS at 20:40

## 2025-02-10 RX ADMIN — INSULIN GLARGINE 35 UNITS: 100 INJECTION, SOLUTION SUBCUTANEOUS at 07:46

## 2025-02-10 RX ADMIN — SULFAMETHOXAZOLE AND TRIMETHOPRIM 1 TABLET: 400; 80 TABLET ORAL at 08:55

## 2025-02-10 ASSESSMENT — PAIN SCALES - GENERAL: PAINLEVEL_OUTOF10: 0

## 2025-02-10 NOTE — PLAN OF CARE
Problem: Safety - Adult  Goal: Free from fall injury  2/10/2025 1847 by Abena Harrell, RN  Outcome: Progressing    Pt with activity orders for up ad abebe.  Encouraged pt to be up OOB as much as possible throughout the day and for all meals.  Encouraged frequent short naps as necessary to preserve energy but instructed that while awake, pt should be OOB.    Encouraged pt to ambulate in halls.  Pt seen up ad abebe in halls several times this shift. Pt is visualized to be OOB % of the time this shift.  Will continue to encourage frequent activity.      Problem: Pain  Goal: Verbalizes/displays adequate comfort level or baseline comfort level  Outcome: Progressing   Pt denies pain at this time. Encourage pt to call out with any needs or status changes.     Problem: ABCDS Injury Assessment  Goal: Absence of physical injury  2/10/2025 1847 by Abena Harrell, RN  Outcome: Progressing   CVC site remains free of signs/symptoms of infection. No drainage, edema, erythema, pain, or warmth noted at site. Dressing changes continue per protocol and on an as needed basis - see flowsheet.       Performed CHG bath today per [facility] protocol utilizing CHG solution in the shower.  CVC site cleansed with CHG wipe over dressing, skin surrounding dressing, and first 6\" of IV tubing.  Pt tolerated well.  Continued to encourage daily CHG bathing per [facility] protocol.

## 2025-02-10 NOTE — PROGRESS NOTES
Provider Jayleen MOBLEY notified via [employer] that the patient's blood glucose was 435 at 2044. He received his scheduled 35 units of Lantus as well as 16 units sliding scale. No other interventions ordered. Plan of care ongoing.

## 2025-02-10 NOTE — PROGRESS NOTES
[facility] Progress Note    2/10/2025     Caden Singleton    MRN: [id number]    : 1960    Referring MD: Phillip Conteh MD  [facility] [address] [place] [address]      Interval History:  - Liver biopsy on 25 in IR, results pending  - Hyperglycemia improving: Lantus adjusted yesterday   -LFTs remain elevated but trending down   -Methylprednisolone:- 90 mg Q 12 hours (tapered last 25).  Plan to taper q 3 days      ECOG PS:  (2) Ambulatory and capable of self care, unable to carry out work activity, up and about > 50% or waking hours    KPS: 80% Normal activity with effort; some signs or symptoms of disease    Isolation: None    Medications    Scheduled Meds:   insulin glargine  35 Units SubCUTAneous Nightly    insulin glargine  35 Units SubCUTAneous QAM    enoxaparin  30 mg SubCUTAneous BID    methylPREDNISolone  90 mg IntraVENous Q12H    ruxolitinib phosphate  5 mg Oral BID    sulfamethoxazole-trimethoprim  1 tablet Oral Daily    micafungin  150 mg IntraVENous Daily    sodium chloride flush  5-40 mL IntraVENous 2 times per day    Saline Mouthwash  15 mL Swish & Spit 4x Daily AC & HS    insulin lispro  0-16 Units SubCUTAneous 4x Daily AC & HS    ursodiol  500 mg Oral BID    pantoprazole  40 mg Oral QAM AC    valACYclovir  500 mg Oral BID    sotalol  120 mg Oral Nightly    sotalol  60 mg Oral QAM    magnesium oxide  400 mg Oral Daily     Continuous Infusions:   sodium chloride      sodium chloride      dextrose       PRN Meds:.zolpidem, artificial tears, oxyCODONE **OR** oxyCODONE, sodium chloride, sodium chloride flush, sodium chloride, Saline Mouthwash, ALTEplase (CATHFLO) 2 mg in sterile water 2 mL injection, diphenhydrAMINE, glucose, dextrose bolus **OR** dextrose bolus, glucagon (rDNA), dextrose, potassium chloride **OR** potassium alternative oral replacement **OR** potassium chloride, magnesium sulfate    ROS:  As noted above, otherwise remainder of 10-point ROS

## 2025-02-10 NOTE — PLAN OF CARE
Problem: Safety - Adult  Goal: Free from fall injury  2/10/2025 0512 by Katherine Acosta, RN  Outcome: Progressing  Flowsheets (Taken 2/10/2025 0512)  Free From Fall Injury: Instruct family/caregiver on patient safety     Problem: ABCDS Injury Assessment  Goal: Absence of physical injury  2/10/2025 0512 by Katherine Acosta, RN  Outcome: Progressing  Flowsheets (Taken 2/10/2025 0512)  Absence of Physical Injury: Implement safety measures based on patient assessment

## 2025-02-10 NOTE — CARE COORDINATION
Discussed plan of care with treatment team in rounds.     Pt will return home when discharged with no SW needs.     SW will follow    ELIZABETH Martinez   for [facility] ([facility])  [employer] Mobile: [phone]

## 2025-02-11 VITALS
HEART RATE: 101 BPM | SYSTOLIC BLOOD PRESSURE: 131 MMHG | DIASTOLIC BLOOD PRESSURE: 92 MMHG | TEMPERATURE: 97.9 F | WEIGHT: 277.34 LBS | RESPIRATION RATE: 18 BRPM | BODY MASS INDEX: 33.77 KG/M2 | HEIGHT: 76 IN | OXYGEN SATURATION: 98 %

## 2025-02-11 LAB
ALBUMIN SERPL-MCNC: 2.8 G/DL (ref 3.4–5)
ALP SERPL-CCNC: 397 U/L (ref 40–129)
ALT SERPL-CCNC: 276 U/L (ref 10–40)
ANION GAP SERPL CALCULATED.3IONS-SCNC: 10 MMOL/L (ref 3–16)
APTT BLD: 23.8 SEC (ref 22.1–36.4)
AST SERPL-CCNC: 114 U/L (ref 15–37)
BASOPHILS # BLD: 0 K/UL (ref 0–0.2)
BASOPHILS NFR BLD: 0 %
BILIRUB DIRECT SERPL-MCNC: 3.6 MG/DL (ref 0–0.3)
BILIRUB INDIRECT SERPL-MCNC: 1.3 MG/DL (ref 0–1)
BILIRUB SERPL-MCNC: 4.9 MG/DL (ref 0–1)
BUN SERPL-MCNC: 40 MG/DL (ref 7–20)
CALCIUM SERPL-MCNC: 8.2 MG/DL (ref 8.3–10.6)
CHLORIDE SERPL-SCNC: 99 MMOL/L (ref 99–110)
CO2 SERPL-SCNC: 23 MMOL/L (ref 21–32)
CREAT SERPL-MCNC: 1.2 MG/DL (ref 0.8–1.3)
DEPRECATED RDW RBC AUTO: 15 % (ref 12.4–15.4)
EOSINOPHIL # BLD: 0 K/UL (ref 0–0.6)
EOSINOPHIL NFR BLD: 0.3 %
FIBRINOGEN PPP-MCNC: 305 MG/DL (ref 227–534)
GFR SERPLBLD CREATININE-BSD FMLA CKD-EPI: 66 ML/MIN/{1.73_M2}
GLUCOSE BLD-MCNC: 219 MG/DL (ref 70–99)
GLUCOSE BLD-MCNC: 271 MG/DL (ref 70–99)
GLUCOSE SERPL-MCNC: 255 MG/DL (ref 70–99)
HCT VFR BLD AUTO: 36.2 % (ref 40.5–52.5)
HGB BLD-MCNC: 11.5 G/DL (ref 13.5–17.5)
INR PPP: 0.86 (ref 0.85–1.15)
LDH SERPL L TO P-CCNC: 208 U/L (ref 100–190)
LYMPHOCYTES # BLD: 3.8 K/UL (ref 1–5.1)
LYMPHOCYTES NFR BLD: 53.2 %
MCH RBC QN AUTO: 31.2 PG (ref 26–34)
MCHC RBC AUTO-ENTMCNC: 31.6 G/DL (ref 31–36)
MCV RBC AUTO: 98.7 FL (ref 80–100)
MONOCYTES # BLD: 2.5 K/UL (ref 0–1.3)
MONOCYTES NFR BLD: 34.1 %
NEUTROPHILS # BLD: 0.9 K/UL (ref 1.7–7.7)
NEUTROPHILS NFR BLD: 12.4 %
PERFORMED ON: ABNORMAL
PERFORMED ON: ABNORMAL
PHOSPHATE SERPL-MCNC: 3.7 MG/DL (ref 2.5–4.9)
PLATELET # BLD AUTO: 287 K/UL (ref 135–450)
PMV BLD AUTO: 9.3 FL (ref 5–10.5)
POTASSIUM SERPL-SCNC: 3.4 MMOL/L (ref 3.5–5.1)
PROT SERPL-MCNC: 5.2 G/DL (ref 6.4–8.2)
PROTHROMBIN TIME: 12 SEC (ref 11.9–14.9)
RBC # BLD AUTO: 3.67 M/UL (ref 4.2–5.9)
SODIUM SERPL-SCNC: 132 MMOL/L (ref 136–145)
URATE SERPL-MCNC: 5.1 MG/DL (ref 3.5–7.2)
WBC # BLD AUTO: 7.2 K/UL (ref 4–11)

## 2025-02-11 PROCEDURE — 6370000000 HC RX 637 (ALT 250 FOR IP): Performed by: NURSE PRACTITIONER

## 2025-02-11 PROCEDURE — 84550 ASSAY OF BLOOD/URIC ACID: CPT

## 2025-02-11 PROCEDURE — 85384 FIBRINOGEN ACTIVITY: CPT

## 2025-02-11 PROCEDURE — 6370000000 HC RX 637 (ALT 250 FOR IP)

## 2025-02-11 PROCEDURE — 80076 HEPATIC FUNCTION PANEL: CPT

## 2025-02-11 PROCEDURE — 2500000003 HC RX 250 WO HCPCS: Performed by: NURSE PRACTITIONER

## 2025-02-11 PROCEDURE — 80048 BASIC METABOLIC PNL TOTAL CA: CPT

## 2025-02-11 PROCEDURE — 85025 COMPLETE CBC W/AUTO DIFF WBC: CPT

## 2025-02-11 PROCEDURE — 6370000000 HC RX 637 (ALT 250 FOR IP): Performed by: INTERNAL MEDICINE

## 2025-02-11 PROCEDURE — 36415 COLL VENOUS BLD VENIPUNCTURE: CPT

## 2025-02-11 PROCEDURE — 85730 THROMBOPLASTIN TIME PARTIAL: CPT

## 2025-02-11 PROCEDURE — 83615 LACTATE (LD) (LDH) ENZYME: CPT

## 2025-02-11 PROCEDURE — 85610 PROTHROMBIN TIME: CPT

## 2025-02-11 PROCEDURE — 84100 ASSAY OF PHOSPHORUS: CPT

## 2025-02-11 RX ORDER — INSULIN GLARGINE 100 [IU]/ML
50 INJECTION, SOLUTION SUBCUTANEOUS NIGHTLY
Qty: 5 ADJUSTABLE DOSE PRE-FILLED PEN SYRINGE | Refills: 0 | Status: SHIPPED | OUTPATIENT
Start: 2025-02-11

## 2025-02-11 RX ORDER — PREDNISONE 20 MG/1
90 TABLET ORAL 2 TIMES DAILY
Qty: 270 TABLET | Refills: 0 | Status: SHIPPED | OUTPATIENT
Start: 2025-02-11 | End: 2025-03-13

## 2025-02-11 RX ORDER — SOTALOL HYDROCHLORIDE 120 MG/1
60 TABLET ORAL EVERY MORNING
Qty: 60 TABLET | Refills: 3 | Status: SHIPPED | OUTPATIENT
Start: 2025-02-11

## 2025-02-11 RX ORDER — SOTALOL HYDROCHLORIDE 120 MG/1
120 TABLET ORAL NIGHTLY
Qty: 60 TABLET | Refills: 3 | Status: SHIPPED | OUTPATIENT
Start: 2025-02-11

## 2025-02-11 RX ADMIN — SULFAMETHOXAZOLE AND TRIMETHOPRIM 1 TABLET: 400; 80 TABLET ORAL at 09:35

## 2025-02-11 RX ADMIN — INSULIN LISPRO 4 UNITS: 100 INJECTION, SOLUTION INTRAVENOUS; SUBCUTANEOUS at 07:46

## 2025-02-11 RX ADMIN — INSULIN GLARGINE 35 UNITS: 100 INJECTION, SOLUTION SUBCUTANEOUS at 07:46

## 2025-02-11 RX ADMIN — Medication 400 MG: at 09:35

## 2025-02-11 RX ADMIN — PANTOPRAZOLE SODIUM 40 MG: 40 TABLET, DELAYED RELEASE ORAL at 07:10

## 2025-02-11 RX ADMIN — VALACYCLOVIR HYDROCHLORIDE 500 MG: 500 TABLET, FILM COATED ORAL at 09:35

## 2025-02-11 RX ADMIN — SOTALOL HYDROCHLORIDE 60 MG: 120 TABLET ORAL at 09:37

## 2025-02-11 RX ADMIN — POTASSIUM CHLORIDE 40 MEQ: 1500 TABLET, EXTENDED RELEASE ORAL at 07:10

## 2025-02-11 RX ADMIN — ISAVUCONAZONIUM SULFATE 372 MG: 186 CAPSULE ORAL at 09:42

## 2025-02-11 RX ADMIN — RUXOLITINIB 5 MG: 5 TABLET ORAL at 09:37

## 2025-02-11 RX ADMIN — SODIUM CHLORIDE, PRESERVATIVE FREE 10 ML: 5 INJECTION INTRAVENOUS at 09:36

## 2025-02-11 RX ADMIN — PREDNISONE 90 MG: 50 TABLET ORAL at 09:42

## 2025-02-11 RX ADMIN — URSODIOL 500 MG: 250 TABLET, FILM COATED ORAL at 09:35

## 2025-02-11 RX ADMIN — SODIUM CHLORIDE 15 ML: 900 IRRIGANT IRRIGATION at 11:51

## 2025-02-11 NOTE — DISCHARGE INSTRUCTIONS
[facility] Discharge Instructions    Call for Questions/Concerns:  [phone]) [facility] office  The phone number listed above is available 24 hrs/7 days per week  [facility] is open M-F 8am-4:30pm; Sat-Sun/Holidays 8am-1pm    Symptoms to Report Immediately:    Fever of 100.5 or greater  Vomiting without relief after use of anti-nausea medication  Severe abdominal cramping  Diarrhea: More than 3 loose, watery bowel movements in a 24 hour period  Unusual or excessive bleeding from your mouth, nose, rectum, bladder or vagina  Sudden onset of shortness of breath or chest pain  Signs/symptoms of infection: redness, warmth, swelling-particularly to central line site    Report to Physician's office within 24 hours:    Pain not relieved by pain medication  Change in urination-odor, cloudiness, frequency, or pain with urination  Flu-like symptoms  Skin changes-rash, hives, redness or peeling of skin    Additional Instructions:    Avoid people with colds, flu-like symptoms, or any sign of infection  Drink plenty of fluids-attempt to consume 2-3 liters ( ounces) of fluids/24 hour period  Bring all of your medications with you to your doctor's appointments  Bring your current medicine list to each hospital and office visit        2/11/2025 10:33 AM  Letha Joseph            My Discharge Checklist    Here at the [facility], we want to make sure you have the help you will need once you leave the hospital.  We are going to go over your discharge instructions with you. We give these to you in writing so you will have a reference if you have questions about symptoms or problems to look for after you leave the hospital.     We know you want to feel better and get home soon. Please answer these questions so we can be sure you have what you need, your questions are answered, and you feel prepared for discharge.    Yes No Do you understand your diagnosis?  Yes No Do you know when and who you

## 2025-02-11 NOTE — DISCHARGE SUMMARY
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     Procedures: Routine chest x-ray, laboratories, EKG, IV fluid hydration, Panculture for fevers, IV antimicrobial therapy, Respiratory therapy, Oxygen therapy, Blood Product Infusions    Consultations:     Medications:      Medication List        START taking these medications      isavuconazonium Sulfate 186 MG capsule  Commonly known as: CRESEMBA  Take 2 capsules by mouth daily  Notes to patient: Anti fungal medication     Lantus SoloStar 100 UNIT/ML injection pen  Generic drug: insulin glargine  Inject 50 Units into the skin nightly  Notes to patient: Insulin Glargine  (Lantus)  USE--  Treatment of diabetes or high blood sugar  SIDE EFFECTS--  Low blood sugar, irritation at injection site            CHANGE how you take these medications      predniSONE 20 MG tablet  Commonly known as: DELTASONE  Take 4.5 tablets by mouth 2 times daily  What changed:   medication strength  how much to take  Notes to patient: Prednisone  USE--  Reduce inflammation  SIDE EFFECTS--  Upset stomach; trouble sleeping; high blood sugars     * sotalol 120 MG tablet  Commonly known as: BETAPACE  Take 1 tablet by mouth at bedtime  What changed:   when to take this  additional instructions     * sotalol 120 MG tablet  Commonly known as: BETAPACE  Take 0.5 tablets by mouth every morning  What changed: when to take this           * This list has 2 medication(s) that are the same as other medications prescribed for you. Read the directions carefully, and ask your doctor or other care provider to review them with you.                CONTINUE taking these medications      Magnesium 400 MG Caps    cytogenetics WNL; CEPPA, c-kit, FLT3, NPM1, IDH 1 & IDH 2 were not detected). His PMH is also significant for atrial fibrillation, HTN & CASH. 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 He continued to follow up at [facility].      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 CT Abdomen showed hepatic steatosis with no biliary obstruction. No other acute GVHD symptoms. Consult IR for Liver biopsy to establish pathology for possible Liver GVHD.    A liver core needle biopsy was performed on 2/6/25 and consistent with graft verses host disease. At time of admission he was started on methylprednisolone 2mg/kg which continues to be tapered. On day of discharge he was tapered again by approximately 20%, and started on prednisone 90 mg PO BID. He will need increased fungal coverage while he is on high-dose steroids. His LFTs remain elevated. So, he was started on Cresemba since it is the only azole that doesn't increase LFTs.     He will discharge today and follow-up at [facility] on Thursday 2/13/26 for labs and a provider evaluation.       Physical Exam:     Vital Signs:  /87   Pulse 93   Temp 98.7 °F (37.1 °C) (Oral)   Resp 18   Ht 1.93 m (6' 4\")   Wt 125.8 kg (277 lb 5.4 oz)   SpO2 98%   BMI 33.76 kg/m²     Weight:    Wt Readings from Last 3 Encounters:   02/10/25 125.8 kg (277 lb 5.4 oz)   01/28/25 129 kg (284 lb 6.4 oz)   06/16/21 134.3 kg (296 lb)       KPS: 80% Normal activity with effort; some signs or symptoms of disease    General: Awake, alert and oriented    HEENT: normocephalic, alopecia, PERRL, no scleral erythema or  Fludarabine  Date of BMT:  11/11/20  Source of stem cells:  PBPC - 4.4 x10^6 ea25mmuxo/kg  Donor/Recipient Blood Type:  A positive / 0 positive   Donor Sex:  Female / NMDP (DID # [id number]) - Follow FISH XY  CMV Donor / Recipient: Negative / Negative      ASSESSMENT AND PLAN:           1. 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  - Day 88 BMBx (2/11/20): No evidence of dysplasia or increased blasts; 25% cellularity, erythroid hypoplasia, megakaryocytic hyperplasia, and increased stainable iron; 94% XX      Engraftment FISH XY(Repeat Q 3 months):   (stem cell boost 4/1/21)  - 7/8/21: 100% engrafted  - 10/14/21: 100% engrafted  - 11/18/21: 100% engrafted  - 3/18/22: 100% engrafted  - 8/10/22: 100%   - 1/27/23: 100%   - 4/3/23: 100%   - 7/31/23: 100%   - 12/13/23: 100% donor   - 2/7/24: 100%  - 6/5/24: 100%        2. ID:   Patient is currently afebrile without evidence of infection.   - Cont Bactrim SS daily, Valtrex ppx   - Micafungin 150 mg daily while on HD steroids  -Discontinue Micafungin and start Cresemba; will need increased fungal coverage while on high-dose steroids.   - Parvo (12/19/20): Negative    - Hep panel, HIV, CMV, EBV negative.   - Send Adeno PCR (2/5/25): Pending     Donor/Recipient CMV:  Negative / negative  - Routine surveillance is not required          3. Heme:  Mild anemia  - Transfuse for Hgb < 7 and Platelets < 10 K.   - No transfusion today     Diabetes:   -Lantus 50 units subcutaneously QHS      11. Fe overload:  - Ferritin 5/30/23: 1128. Repeat 8/30/23: 1514. Repeat Ferritin (9/27/23): 1311, 6/5/24 - ferritin 917 with 37% sat.  - Begin phlebotomy when Hgb > 11 - (started 9/3/21)      Condition on discharge: Stable     Discharge Instructions: Follow-up at [facility] on Thursday 2/13/25 for labs and a provider evaluation     The patient was advised on activity and dietary restrictions.    The patient was advised to follow up in the emergency department or contact the physician with any unresolved nausea/vomiting/diarrhea/pain or temperature greater than 100.5 F or any other unusual symptoms.       This discharge summary and plan was discussed and agreed upon with Dr. Paz.    LEANDRA Monae - CNP

## 2025-02-11 NOTE — CARE COORDINATION
Case Management Assessment            Discharge Note                    Date / Time of Note: 2/11/2025 10:16 AM                  Discharge Note Completed by: ELIZABETH Martinez   for [facility] ([facility])  [employer] Mobile: [phone]    Patient Name: Caden Singleton   YOB: 1960  Diagnosis: GVHD (graft versus host disease) (HCC) [D89.813]  Qtpbu-qlatio-xuwd disease of liver (HCC) [T86.99, D89.813]   Date / Time: 2/5/2025  5:20 PM    Current PCP: Chucho Conley MD  Clinic patient: No    Hospitalization in the last 30 days: No       Advance Directives:  Code Status: Full Code  [place] DNR form completed and on chart: Not Indicated    Financial:  Payor: MEDICARE / Plan: MEDICARE PART A AND B / Product Type: *No Product type* /      Pharmacy:    [facility]/pharmacy #[id number] - [place], [place] - [address] - P [phone] - F [phone]  [address] [place] [address]  Phone: [phone] Fax: [phone]      Assistance purchasing medications?: Potential Assistance Purchasing Medications: No  Assistance provided by Case Management: None at this time    Does patient want to participate in local refill/ meds to beds program?:      Meds To Beds General Rules:  1. Can ONLY be done Monday- Friday between 8:30am-5pm  2. Prescription(s) must be in pharmacy by 3pm to be filled same day  3.Copy of patient's insurance/ prescription drug card and patient face sheet must be sent along with the prescription(s)  4. Cost of Rx cannot be added to hospital bill. If financial assistance is needed, please contact unit  or ;  or  CANNOT provide pharmacy voucher for patients co-pays  5. Patients can then  the prescription on their way out of the hospital at discharge, or pharmacy can deliver to the bedside if staff is available. (payment due at time of pick-up or delivery - cash, check, or card

## 2025-02-11 NOTE — PROGRESS NOTES
Pt discharged today. Pt verbalize understanding of the treatment plan. Pt verbalize understanding of medications and side effects. Pt walked out with educator.

## 2025-02-11 NOTE — PLAN OF CARE
Problem: Safety - Adult  Goal: Free from fall injury  2/11/2025 0804 by Uday Johnson, RN  Outcome: Progressing  Note: Orthostatic vital signs obtained at start of shift - see flowsheet for details.  Pt did meet criteria for orthostasis at that time. 1 Liter fluid bolus given and upon recheck he currently does not meet criteria for orthostasis.  Pt is a Med fall risk. See [place] Fall Score and ABCDS Injury Risk assessments.     - Screening for Orthostasis AND not a [place] Risk per CAMEJO/ABCDS: Pt bed is in low position, side rails up, call light and belongings are in reach.  Fall risk light is on outside pts room.  Pt encouraged to call for assistance as needed. Will continue with hourly rounds for PO intake, pain needs, toileting and repositioning as needed.        Problem: Pain  Goal: Verbalizes/displays adequate comfort level or baseline comfort level  2/11/2025 0804 by Uday Johnson, RN  Note: Patient had no complaint of pain this shift.      Problem: Skin/Tissue Integrity - Adult  Goal: Skin integrity remains intact  2/11/2025 0804 by Uday Johnson, RN  Outcome: Progressing  Note: Skin remains clean dry and intact.

## 2025-02-11 NOTE — PLAN OF CARE
Problem: Safety - Adult  Goal: Free from fall injury  2/10/2025 1847 by Abena Harrell, RN  Outcome: Progressing    Pt with activity orders for up ad abebe.  Encouraged pt to be up OOB as much as possible throughout the day and for all meals.  Encouraged frequent short naps as necessary to preserve energy but instructed that while awake, pt should be OOB.    Encouraged pt to ambulate in halls.  Pt seen up ad abebe in halls several times this shift. Pt is visualized to be OOB % of the time this shift.  Will continue to encourage frequent activity.      Problem: Pain  Goal: Verbalizes/displays adequate comfort level or baseline comfort level  Outcome: Progressing   Pt denies pain at this time. Encourage pt to call out with any needs or status changes.     Problem: ABCDS Injury Assessment  Goal: Absence of physical injury  2/10/2025 1847 by Abena Harrell, RN  Outcome: Progressing   CVC site remains free of signs/symptoms of infection. No drainage, edema, erythema, pain, or warmth noted at site. Dressing changes continue per protocol and on an as needed basis - see flowsheet.       Performed CHG bath today per [facility] protocol utilizing CHG solution in the shower.  CVC site cleansed with CHG wipe over dressing, skin surrounding dressing, and first 6\" of IV tubing.  Pt tolerated well.  Continued to encourage daily CHG bathing per [facility] protocol.

## 2025-02-13 LAB — MISCELLANEOUS LAB TEST ORDER: NORMAL
